# Patient Record
Sex: MALE | Race: BLACK OR AFRICAN AMERICAN | NOT HISPANIC OR LATINO | URBAN - METROPOLITAN AREA
[De-identification: names, ages, dates, MRNs, and addresses within clinical notes are randomized per-mention and may not be internally consistent; named-entity substitution may affect disease eponyms.]

---

## 2019-02-12 ENCOUNTER — OUTPATIENT (OUTPATIENT)
Dept: OUTPATIENT SERVICES | Facility: HOSPITAL | Age: 59
LOS: 1 days | End: 2019-02-12
Payer: MEDICARE

## 2019-02-12 ENCOUNTER — APPOINTMENT (OUTPATIENT)
Dept: CV DIAGNOSITCS | Facility: HOSPITAL | Age: 59
End: 2019-02-12

## 2019-02-12 DIAGNOSIS — I25.10 ATHEROSCLEROTIC HEART DISEASE OF NATIVE CORONARY ARTERY WITHOUT ANGINA PECTORIS: ICD-10-CM

## 2019-02-12 PROCEDURE — 93306 TTE W/DOPPLER COMPLETE: CPT | Mod: 26

## 2019-02-12 PROCEDURE — 93306 TTE W/DOPPLER COMPLETE: CPT

## 2019-03-19 ENCOUNTER — APPOINTMENT (OUTPATIENT)
Dept: CV DIAGNOSTICS | Facility: HOSPITAL | Age: 59
End: 2019-03-19

## 2019-03-19 ENCOUNTER — OUTPATIENT (OUTPATIENT)
Dept: OUTPATIENT SERVICES | Facility: HOSPITAL | Age: 59
LOS: 1 days | End: 2019-03-19
Payer: MEDICARE

## 2019-03-19 DIAGNOSIS — I25.10 ATHEROSCLEROTIC HEART DISEASE OF NATIVE CORONARY ARTERY WITHOUT ANGINA PECTORIS: ICD-10-CM

## 2019-03-19 PROCEDURE — 93018 CV STRESS TEST I&R ONLY: CPT

## 2019-03-19 PROCEDURE — 78452 HT MUSCLE IMAGE SPECT MULT: CPT | Mod: 26

## 2019-03-19 PROCEDURE — 93017 CV STRESS TEST TRACING ONLY: CPT

## 2019-03-19 PROCEDURE — A9500: CPT

## 2019-03-19 PROCEDURE — 93016 CV STRESS TEST SUPVJ ONLY: CPT

## 2019-03-19 PROCEDURE — 78452 HT MUSCLE IMAGE SPECT MULT: CPT

## 2019-04-09 ENCOUNTER — OUTPATIENT (OUTPATIENT)
Dept: OUTPATIENT SERVICES | Facility: HOSPITAL | Age: 59
LOS: 1 days | Discharge: HOME | End: 2019-04-09
Payer: MEDICARE

## 2019-04-09 VITALS
DIASTOLIC BLOOD PRESSURE: 79 MMHG | RESPIRATION RATE: 16 BRPM | HEART RATE: 82 BPM | HEIGHT: 66 IN | TEMPERATURE: 98 F | SYSTOLIC BLOOD PRESSURE: 158 MMHG | OXYGEN SATURATION: 94 % | WEIGHT: 270.07 LBS

## 2019-04-09 DIAGNOSIS — R06.02 SHORTNESS OF BREATH: ICD-10-CM

## 2019-04-09 LAB
ANION GAP SERPL CALC-SCNC: 18 MMOL/L — HIGH (ref 5–17)
BUN SERPL-MCNC: 48 MG/DL — HIGH (ref 7–23)
CALCIUM SERPL-MCNC: 7.4 MG/DL — LOW (ref 8.4–10.5)
CHLORIDE SERPL-SCNC: 92 MMOL/L — LOW (ref 96–108)
CO2 SERPL-SCNC: 28 MMOL/L — SIGNIFICANT CHANGE UP (ref 22–31)
CREAT SERPL-MCNC: 10.61 MG/DL — HIGH (ref 0.5–1.3)
GLUCOSE SERPL-MCNC: 86 MG/DL — SIGNIFICANT CHANGE UP (ref 70–99)
HCT VFR BLD CALC: 31.1 % — LOW (ref 39–50)
HGB BLD-MCNC: 9.9 G/DL — LOW (ref 13–17)
MCHC RBC-ENTMCNC: 27.9 PG — SIGNIFICANT CHANGE UP (ref 27–34)
MCHC RBC-ENTMCNC: 31.7 GM/DL — LOW (ref 32–36)
MCV RBC AUTO: 87.9 FL — SIGNIFICANT CHANGE UP (ref 80–100)
PLATELET # BLD AUTO: 210 K/UL — SIGNIFICANT CHANGE UP (ref 150–400)
POTASSIUM SERPL-MCNC: 4.8 MMOL/L — SIGNIFICANT CHANGE UP (ref 3.5–5.3)
POTASSIUM SERPL-SCNC: 4.8 MMOL/L — SIGNIFICANT CHANGE UP (ref 3.5–5.3)
RBC # BLD: 3.53 M/UL — LOW (ref 4.2–5.8)
RBC # FLD: 15.6 % — HIGH (ref 10.3–14.5)
SODIUM SERPL-SCNC: 138 MMOL/L — SIGNIFICANT CHANGE UP (ref 135–145)
WBC # BLD: 7 K/UL — SIGNIFICANT CHANGE UP (ref 3.8–10.5)
WBC # FLD AUTO: 7 K/UL — SIGNIFICANT CHANGE UP (ref 3.8–10.5)

## 2019-04-09 PROCEDURE — 93005 ELECTROCARDIOGRAM TRACING: CPT

## 2019-04-09 PROCEDURE — 99203 OFFICE O/P NEW LOW 30 MIN: CPT

## 2019-04-09 PROCEDURE — 99152 MOD SED SAME PHYS/QHP 5/>YRS: CPT | Mod: GC

## 2019-04-09 PROCEDURE — 85027 COMPLETE CBC AUTOMATED: CPT

## 2019-04-09 PROCEDURE — 93010 ELECTROCARDIOGRAM REPORT: CPT

## 2019-04-09 PROCEDURE — 93456 R HRT CORONARY ARTERY ANGIO: CPT

## 2019-04-09 PROCEDURE — 99152 MOD SED SAME PHYS/QHP 5/>YRS: CPT

## 2019-04-09 PROCEDURE — C1894: CPT

## 2019-04-09 PROCEDURE — 93456 R HRT CORONARY ARTERY ANGIO: CPT | Mod: 26,GC

## 2019-04-09 PROCEDURE — 80048 BASIC METABOLIC PNL TOTAL CA: CPT

## 2019-04-09 PROCEDURE — C1887: CPT

## 2019-04-09 PROCEDURE — C1769: CPT

## 2019-04-09 NOTE — CHART NOTE - NSCHARTNOTEFT_GEN_A_CORE
Called to see pt for oozing groin site upon sitting up. Dressing taken down, no hematoma, superficial ooze that stopped within 2 minutes. Quick clot applied, re dressed. Pt to lay flat for an additional 30 minutes. Plan d/w pt and GERMAN Meyer.    Ancelmo Milton, NP  x [phone]
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
## 2019-04-09 NOTE — H&P CARDIOLOGY - PMH
Hepatitis C    HIV (human immunodeficiency virus infection)    Anemia    Transient ischemic attack (TIA)  5yrs ago  ESRD (end stage renal disease)    Dyslipidemia    DM (diabetes mellitus)    HTN (hypertension)

## 2019-04-09 NOTE — H&P CARDIOLOGY - HISTORY OF PRESENT ILLNESS
This is a 58y yo obese AA Male with PMHX  HTN, DM II, ESRD on HD since 4/2012 (HD Mon/Wed/Fri, Nephrologist Dr. Dumotn, HD @ [facility] via ELOYE [facility]A Fist[facility]), mild Aortic Valve stenosis , bicuspid aortic valve .  of former smoker, former cocaine abuser, current marijuana abuser with PMH HIV Positive 1996, Hepatitis C, anemia with prior blood transfusions, Pt presents with C/P and SOB x 3months ago . Pt had FLAHERTY and SOB walking < 1block .  Pt presented to [facility] with HTN and Dyspnea. He was started on Nitrates , Hydralazine and Beta Blocker with improvement of symptoms . Pt had Echo at [facility] which revealed mild Aortic stenosis. Found to have a valve area of 1.7cm. He has severe Pulmonary HTN, with PA pressure  66 mmhg. Pt went to Cardiologist  for medical workup . Had Stress test on March 19, 2019 which revealed EF 59% with reduced systolic thickening of the distal anterior wall and apex with overall preserved ejection fraction. Right ventricle was enlarged, with evidence of medium sized mild to moderate defect to the distal septum and apical wall suggestive of infarct and mild kemal infarct ischemia. Now presents for Right and Left Heart Catheterization today with Dr. Wang  Currently CP free no sob no palpitations no lightheadedness or dizziness noted.     < from: Transthoracic Echocardiogram (02.12.19 @ 10:15) >  Conclusions:  1. Mild mitral annular calcification, otherwise normal  mitral valve. Minimal mitral regurgitation.  2. Calcified aortic valve with decreased opening. Unable to  exclude the presence of a bicuspid valve. The noncoronary  and left coronary cusps are heavily calcified and may be  fused. SRIKANTH by planimetryabout 1.7 cmsq. Peak transaortic  valve gradient equals 40 mm Hg, mean transaortic valve  gradient equals 17 mm Hg, estimated aortic valve area  equals 1.6 sqcm (by continuity equation), aortic valve  velocity time integral equals 70 cm, consistent with mild  aortic stenosis. Minimal aortic regurgitation.  3. Mild concentric left ventricular hypertrophy.  4. Endocardium not well visualized; normal left ventricular  systolic function. Septal flattening consistent with right  ventricular overload.  5. Right ventricular enlargement with normal right  ventricularsystolic function.  6. Estimated pulmonary artery systolic pressure equals 66  mm Hg, assuming right atrial pressure equals 8 mm Hg,  consistent with severe pulmonary pressures.  *** No previous Echo exam.  ------------------------------------------------------------------------  Confirmed on  2/13/2019 - 09:23:28 by Isidra Bloom M.D.  ------------------------------------------------------------------------    < end of copied text >  < from: Nuclear Stress Test-Pharmacologic (03.19.19 @ 14:38) >  NUCLEAR FINDINGS:  Review of raw data shows: Minor motion artifact.,  Extensive splanchnic uptake  The left ventricle was enlarged. There are medium-sized,  mild to moderate defects in the distal anterior, distal  septal, and apical walls that are mostly fixed suggestive  of infarct with mild kemal-infarct ischemia.  There are also mild to moderate defects in the inferior  and basal inferolateral walls that mostly fixed,  predominantly correct with prone imaging, and have normal  wall motion suggestive of attenuation artifact.  Increased right ventricular tracer uptake is noted on rest  and stress imaging.  ------------------------------------------------------------------------  GATED ANALYSIS:  Post-stress gated wall motion analysis was performed (LVEF  = 59 %;LVEDV = 180 ml.) revealing reduced systolic  thickening of the distal anterior wall and apex with  preserved overall left ventricular ejection fraction.  The  right ventricle appears enlarge; correlation with  echocardiography is recommended.  ------------------------------------------------------------------------  IMPRESSIONS:Abnormal Study  * Technically difficult study due to body habitus.  * Chest Pain: No chest pain with administration of  Regadenoson.  * Symptom: Shortness of breath, dizziness.  * BP Response: Appropriate.  * Heart Rhythm: Sinus Rhythm - 67 BPM.  * Conduction defects: right bundle branch block.  * Baseline ECG: Nonspecific ST-T wave abnormality.  * ECG Changes: No significant ischemic ST segment changes  beyond baseline abnormalities.  * Arrhythmia: None.  * Review of raw data shows: Minor motion artifact.,  Extensive splanchnic uptake  * The left ventricle was enlarged. There are medium-sized,  mild to moderate defects in the distal anterior, distal  septal, and apical walls that are mostly fixed suggestive  of infarct with mild kemal-infarct ischemia.  * There are also mild to moderate defects in the inferior  and basal inferolateral walls that mostly fixed,  predominantly correct with prone imaging, and have normal  wall motion suggestive of attenuation artifact.  * Increased right ventricular tracer uptake is noted on  rest and stress imaging.  * Post-stress gated wall motion analysis was performed  (LVEF = 59 %;LVEDV = 180 ml.) revealing reduced systolic  thickening of the distal anterior wall and apex with  preserved overall left ventricular ejection fraction.  The  right ventricle appears enlarge; correlation with  echocardiography is recommended.  *** No previous Nuclear/Stress exam.  ------------------------------------------------------------------------  Confirmed on  3/19/2019 - 17:12:03 by Dilan Dc M.D.  ------------------------------------------------------------------------    < end of copied text >  < from: Cardiac Cath Lab (02.28.13 @ 13:32) >  VENTRICLES: No LV gram was performed; however, a recent echocardiogram  demonstrated an EF of 55 %.  CORONARY VESSELS:The coronary circulation is right dominant.  LM:   --  LM: Normal.  LAD:   --  LAD: Normal.  CX:   --  Circumflex: Normal.  RCA:   --  RCA: Normal.  COMPLICATIONS: There were no complications.  DIAGNOSTIC RECOMMENDATIONS:  1. Coronary angiography revealed normal coronary arteries, which do not  explain the positive stress test.  2. Medical management and risk factor modification.  INTERVENTIONAL RECOMMENDATIONS:  1. Coronary angiography revealed normal coronary arteries, which do not  explain thepositive stress test.  2. Medical management and risk factor modification.  Prepared and signed by  Steven Cortes M.D.  Signed 02/28/2013 18:37:31    < end of copied text > This is a 58y yo obese AA Male with PMHX  HTN,  ESRD on HD since 4/2012 (HD Mon/Wed/Fri, Nephrologist Dr. Dumont, HD @ [facility] via ELOYE AV Fistual), mild Aortic Valve stenosis , bicuspid aortic valve .  of former smoker, former cocaine abuser, current marijuana abuser with PMH HIV Positive 1996, Hepatitis C, anemia with prior blood transfusions, Pt presents with C/P and SOB x 3months ago . Pt had FLAHERTY and SOB walking < 1block .  Pt presented to [facility] with HTN and Dyspnea. He was started on Nitrates , Hydralazine and Beta Blocker with improvement of symptoms . Pt had Echo at [facility] which revealed mild Aortic stenosis. Found to have a valve area of 1.7cm. He has severe Pulmonary HTN, with PA pressure  66 mmhg. Pt went to Cardiologist  for medical workup . Had Stress test on March 19, 2019 which revealed EF 59% with reduced systolic thickening of the distal anterior wall and apex with overall preserved ejection fraction. Right ventricle was enlarged, with evidence of medium sized mild to moderate defect to the distal septum and apical wall suggestive of infarct and mild kemal infarct ischemia. Now presents for Right and Left Heart Catheterization today with Dr. Wang  Currently CP free no sob no palpitations no lightheadedness or dizziness noted.     < from: Transthoracic Echocardiogram (02.12.19 @ 10:15) >  Conclusions:  1. Mild mitral annular calcification, otherwise normal  mitral valve. Minimal mitral regurgitation.  2. Calcified aortic valve with decreased opening. Unable to  exclude the presence of a bicuspid valve. The noncoronary  and left coronary cusps are heavily calcified and may be  fused. SRIKANTH by planimetryabout 1.7 cmsq. Peak transaortic  valve gradient equals 40 mm Hg, mean transaortic valve  gradient equals 17 mm Hg, estimated aortic valve area  equals 1.6 sqcm (by continuity equation), aortic valve  velocity time integral equals 70 cm, consistent with mild  aortic stenosis. Minimal aortic regurgitation.  3. Mild concentric left ventricular hypertrophy.  4. Endocardium not well visualized; normal left ventricular  systolic function. Septal flattening consistent with right  ventricular overload.  5. Right ventricular enlargement with normal right  ventricularsystolic function.  6. Estimated pulmonary artery systolic pressure equals 66  mm Hg, assuming right atrial pressure equals 8 mm Hg,  consistent with severe pulmonary pressures.  *** No previous Echo exam.  ------------------------------------------------------------------------  Confirmed on  2/13/2019 - 09:23:28 by Isidra Bloom M.D.  ------------------------------------------------------------------------    < end of copied text >  < from: Nuclear Stress Test-Pharmacologic (03.19.19 @ 14:38) >  NUCLEAR FINDINGS:  Review of raw data shows: Minor motion artifact.,  Extensive splanchnic uptake  The left ventricle was enlarged. There are medium-sized,  mild to moderate defects in the distal anterior, distal  septal, and apical walls that are mostly fixed suggestive  of infarct with mild kemal-infarct ischemia.  There are also mild to moderate defects in the inferior  and basal inferolateral walls that mostly fixed,  predominantly correct with prone imaging, and have normal  wall motion suggestive of attenuation artifact.  Increased right ventricular tracer uptake is noted on rest  and stress imaging.  ------------------------------------------------------------------------  GATED ANALYSIS:  Post-stress gated wall motion analysis was performed (LVEF  = 59 %;LVEDV = 180 ml.) revealing reduced systolic  thickening of the distal anterior wall and apex with  preserved overall left ventricular ejection fraction.  The  right ventricle appears enlarge; correlation with  echocardiography is recommended.  ------------------------------------------------------------------------  IMPRESSIONS:Abnormal Study  * Technically difficult study due to body habitus.  * Chest Pain: No chest pain with administration of  Regadenoson.  * Symptom: Shortness of breath, dizziness.  * BP Response: Appropriate.  * Heart Rhythm: Sinus Rhythm - 67 BPM.  * Conduction defects: right bundle branch block.  * Baseline ECG: Nonspecific ST-T wave abnormality.  * ECG Changes: No significant ischemic ST segment changes  beyond baseline abnormalities.  * Arrhythmia: None.  * Review of raw data shows: Minor motion artifact.,  Extensive splanchnic uptake  * The left ventricle was enlarged. There are medium-sized,  mild to moderate defects in the distal anterior, distal  septal, and apical walls that are mostly fixed suggestive  of infarct with mild kemal-infarct ischemia.  * There are also mild to moderate defects in the inferior  and basal inferolateral walls that mostly fixed,  predominantly correct with prone imaging, and have normal  wall motion suggestive of attenuation artifact.  * Increased right ventricular tracer uptake is noted on  rest and stress imaging.  * Post-stress gated wall motion analysis was performed  (LVEF = 59 %;LVEDV = 180 ml.) revealing reduced systolic  thickening of the distal anterior wall and apex with  preserved overall left ventricular ejection fraction.  The  right ventricle appears enlarge; correlation with  echocardiography is recommended.  *** No previous Nuclear/Stress exam.  ------------------------------------------------------------------------  Confirmed on  3/19/2019 - 17:12:03 by Dilan Dc M.D.  ------------------------------------------------------------------------    < end of copied text >  < from: Cardiac Cath Lab (02.28.13 @ 13:32) >  VENTRICLES: No LV gram was performed; however, a recent echocardiogram  demonstrated an EF of 55 %.  CORONARY VESSELS:The coronary circulation is right dominant.  LM:   --  LM: Normal.  LAD:   --  LAD: Normal.  CX:   --  Circumflex: Normal.  RCA:   --  RCA: Normal.  COMPLICATIONS: There were no complications.  DIAGNOSTIC RECOMMENDATIONS:  1. Coronary angiography revealed normal coronary arteries, which do not  explain the positive stress test.  2. Medical management and risk factor modification.  INTERVENTIONAL RECOMMENDATIONS:  1. Coronary angiography revealed normal coronary arteries, which do not  explain thepositive stress test.  2. Medical management and risk factor modification.  Prepared and signed by  Steven Cortes M.D.  Signed 02/28/2013 18:37:31    < end of copied text >

## 2019-05-14 ENCOUNTER — OUTPATIENT (OUTPATIENT)
Dept: OUTPATIENT SERVICES | Facility: HOSPITAL | Age: 59
LOS: 1 days | End: 2019-05-14
Payer: MEDICARE

## 2019-05-14 DIAGNOSIS — J84.9 INTERSTITIAL PULMONARY DISEASE, UNSPECIFIED: ICD-10-CM

## 2019-05-14 PROCEDURE — 71250 CT THORAX DX C-: CPT | Mod: 26

## 2019-05-14 PROCEDURE — 71250 CT THORAX DX C-: CPT

## 2019-07-09 ENCOUNTER — APPOINTMENT (OUTPATIENT)
Dept: PULMONOLOGY | Facility: CLINIC | Age: 59
End: 2019-07-09

## 2019-09-17 ENCOUNTER — TRANSCRIPTION ENCOUNTER (OUTPATIENT)
Age: 59
End: 2019-09-17

## 2019-09-18 ENCOUNTER — RESULT REVIEW (OUTPATIENT)
Age: 59
End: 2019-09-18

## 2019-09-18 ENCOUNTER — INPATIENT (INPATIENT)
Facility: HOSPITAL | Age: 59
LOS: 12 days | Discharge: HOME HEALTH | End: 2019-10-01
Attending: SURGERY | Admitting: SURGERY
Payer: MEDICARE

## 2019-09-18 VITALS
DIASTOLIC BLOOD PRESSURE: 63 MMHG | TEMPERATURE: 98 F | WEIGHT: 259.93 LBS | SYSTOLIC BLOOD PRESSURE: 124 MMHG | RESPIRATION RATE: 16 BRPM | OXYGEN SATURATION: 98 % | HEIGHT: 66 IN | HEART RATE: 93 BPM

## 2019-09-18 DIAGNOSIS — K63.1 PERFORATION OF INTESTINE (NONTRAUMATIC): ICD-10-CM

## 2019-09-18 DIAGNOSIS — E66.01 MORBID (SEVERE) OBESITY DUE TO EXCESS CALORIES: ICD-10-CM

## 2019-09-18 DIAGNOSIS — Z21 ASYMPTOMATIC HUMAN IMMUNODEFICIENCY VIRUS [HIV] INFECTION STATUS: ICD-10-CM

## 2019-09-18 DIAGNOSIS — B18.2 CHRONIC VIRAL HEPATITIS C: ICD-10-CM

## 2019-09-18 DIAGNOSIS — Z29.9 ENCOUNTER FOR PROPHYLACTIC MEASURES, UNSPECIFIED: ICD-10-CM

## 2019-09-18 DIAGNOSIS — K43.0 INCISIONAL HERNIA WITH OBSTRUCTION, WITHOUT GANGRENE: ICD-10-CM

## 2019-09-18 DIAGNOSIS — I10 ESSENTIAL (PRIMARY) HYPERTENSION: ICD-10-CM

## 2019-09-18 DIAGNOSIS — N18.6 END STAGE RENAL DISEASE: ICD-10-CM

## 2019-09-18 DIAGNOSIS — E78.5 HYPERLIPIDEMIA, UNSPECIFIED: ICD-10-CM

## 2019-09-18 LAB
ABO RH CONFIRMATION: SIGNIFICANT CHANGE UP
ALBUMIN SERPL ELPH-MCNC: 2.4 G/DL — LOW (ref 3.3–5)
ALP SERPL-CCNC: 104 U/L — SIGNIFICANT CHANGE UP (ref 40–120)
ALT FLD-CCNC: 10 U/L — LOW (ref 12–78)
ANION GAP SERPL CALC-SCNC: 9 MMOL/L — SIGNIFICANT CHANGE UP (ref 5–17)
APTT BLD: 36.2 SEC — SIGNIFICANT CHANGE UP (ref 28.5–37)
AST SERPL-CCNC: 24 U/L — SIGNIFICANT CHANGE UP (ref 15–37)
BASOPHILS # BLD AUTO: 0.03 K/UL — SIGNIFICANT CHANGE UP (ref 0–0.2)
BASOPHILS NFR BLD AUTO: 0.3 % — SIGNIFICANT CHANGE UP (ref 0–2)
BILIRUB SERPL-MCNC: 0.5 MG/DL — SIGNIFICANT CHANGE UP (ref 0.2–1.2)
BLD GP AB SCN SERPL QL: SIGNIFICANT CHANGE UP
BUN SERPL-MCNC: 23 MG/DL — SIGNIFICANT CHANGE UP (ref 7–23)
CALCIUM SERPL-MCNC: 7.6 MG/DL — LOW (ref 8.5–10.1)
CHLORIDE SERPL-SCNC: 96 MMOL/L — SIGNIFICANT CHANGE UP (ref 96–108)
CO2 SERPL-SCNC: 30 MMOL/L — SIGNIFICANT CHANGE UP (ref 22–31)
CREAT SERPL-MCNC: 6 MG/DL — HIGH (ref 0.5–1.3)
EOSINOPHIL # BLD AUTO: 0.18 K/UL — SIGNIFICANT CHANGE UP (ref 0–0.5)
EOSINOPHIL NFR BLD AUTO: 1.7 % — SIGNIFICANT CHANGE UP (ref 0–6)
GLUCOSE BLDC GLUCOMTR-MCNC: 87 MG/DL — SIGNIFICANT CHANGE UP (ref 70–99)
GLUCOSE SERPL-MCNC: 69 MG/DL — LOW (ref 70–99)
HCT VFR BLD CALC: 34.5 % — LOW (ref 39–50)
HGB BLD-MCNC: 10.6 G/DL — LOW (ref 13–17)
IMM GRANULOCYTES NFR BLD AUTO: 1.8 % — HIGH (ref 0–1.5)
INR BLD: 1.28 RATIO — HIGH (ref 0.88–1.16)
LYMPHOCYTES # BLD AUTO: 1.32 K/UL — SIGNIFICANT CHANGE UP (ref 1–3.3)
LYMPHOCYTES # BLD AUTO: 12.5 % — LOW (ref 13–44)
MCHC RBC-ENTMCNC: 27 PG — SIGNIFICANT CHANGE UP (ref 27–34)
MCHC RBC-ENTMCNC: 30.7 GM/DL — LOW (ref 32–36)
MCV RBC AUTO: 87.8 FL — SIGNIFICANT CHANGE UP (ref 80–100)
MONOCYTES # BLD AUTO: 0.74 K/UL — SIGNIFICANT CHANGE UP (ref 0–0.9)
MONOCYTES NFR BLD AUTO: 7 % — SIGNIFICANT CHANGE UP (ref 2–14)
NEUTROPHILS # BLD AUTO: 8.07 K/UL — HIGH (ref 1.8–7.4)
NEUTROPHILS NFR BLD AUTO: 76.7 % — SIGNIFICANT CHANGE UP (ref 43–77)
NRBC # BLD: 0 /100 WBCS — SIGNIFICANT CHANGE UP (ref 0–0)
PLATELET # BLD AUTO: 205 K/UL — SIGNIFICANT CHANGE UP (ref 150–400)
POTASSIUM SERPL-MCNC: 3.7 MMOL/L — SIGNIFICANT CHANGE UP (ref 3.5–5.3)
POTASSIUM SERPL-SCNC: 3.7 MMOL/L — SIGNIFICANT CHANGE UP (ref 3.5–5.3)
PROT SERPL-MCNC: 7.7 GM/DL — SIGNIFICANT CHANGE UP (ref 6–8.3)
PROTHROM AB SERPL-ACNC: 14.5 SEC — HIGH (ref 10–12.9)
RBC # BLD: 3.93 M/UL — LOW (ref 4.2–5.8)
RBC # FLD: 17.6 % — HIGH (ref 10.3–14.5)
SODIUM SERPL-SCNC: 135 MMOL/L — SIGNIFICANT CHANGE UP (ref 135–145)
WBC # BLD: 10.53 K/UL — HIGH (ref 3.8–10.5)
WBC # FLD AUTO: 10.53 K/UL — HIGH (ref 3.8–10.5)

## 2019-09-18 PROCEDURE — 88302 TISSUE EXAM BY PATHOLOGIST: CPT | Mod: 26

## 2019-09-18 PROCEDURE — 44143 PARTIAL REMOVAL OF COLON: CPT | Mod: AS

## 2019-09-18 PROCEDURE — 71045 X-RAY EXAM CHEST 1 VIEW: CPT | Mod: 26

## 2019-09-18 PROCEDURE — 44120 REMOVAL OF SMALL INTESTINE: CPT | Mod: AS

## 2019-09-18 PROCEDURE — 74176 CT ABD & PELVIS W/O CONTRAST: CPT | Mod: 26

## 2019-09-18 PROCEDURE — 99285 EMERGENCY DEPT VISIT HI MDM: CPT

## 2019-09-18 PROCEDURE — 88307 TISSUE EXAM BY PATHOLOGIST: CPT | Mod: 26

## 2019-09-18 PROCEDURE — 93010 ELECTROCARDIOGRAM REPORT: CPT

## 2019-09-18 PROCEDURE — 93010 ELECTROCARDIOGRAM REPORT: CPT | Mod: 77

## 2019-09-18 PROCEDURE — 99291 CRITICAL CARE FIRST HOUR: CPT

## 2019-09-18 PROCEDURE — 71045 X-RAY EXAM CHEST 1 VIEW: CPT | Mod: 26,77

## 2019-09-18 PROCEDURE — 49566: CPT | Mod: AS,59

## 2019-09-18 RX ORDER — HYDROMORPHONE HYDROCHLORIDE 2 MG/ML
1 INJECTION INTRAMUSCULAR; INTRAVENOUS; SUBCUTANEOUS ONCE
Refills: 0 | Status: DISCONTINUED | OUTPATIENT
Start: 2019-09-18 | End: 2019-09-18

## 2019-09-18 RX ORDER — METOPROLOL TARTRATE 50 MG
1 TABLET ORAL
Qty: 0 | Refills: 0 | DISCHARGE

## 2019-09-18 RX ORDER — DARUNAVIR 75 MG/1
1 TABLET, FILM COATED ORAL
Qty: 0 | Refills: 0 | DISCHARGE

## 2019-09-18 RX ORDER — HYDROMORPHONE HYDROCHLORIDE 2 MG/ML
0.5 INJECTION INTRAMUSCULAR; INTRAVENOUS; SUBCUTANEOUS ONCE
Refills: 0 | Status: DISCONTINUED | OUTPATIENT
Start: 2019-09-18 | End: 2019-09-18

## 2019-09-18 RX ORDER — CHLORHEXIDINE GLUCONATE 213 G/1000ML
1 SOLUTION TOPICAL
Refills: 0 | Status: DISCONTINUED | OUTPATIENT
Start: 2019-09-18 | End: 2019-10-01

## 2019-09-18 RX ORDER — ETRAVIRINE 200 MG/1
1 TABLET ORAL
Qty: 0 | Refills: 0 | DISCHARGE

## 2019-09-18 RX ORDER — PIPERACILLIN AND TAZOBACTAM 4; .5 G/20ML; G/20ML
3.38 INJECTION, POWDER, LYOPHILIZED, FOR SOLUTION INTRAVENOUS ONCE
Refills: 0 | Status: COMPLETED | OUTPATIENT
Start: 2019-09-18 | End: 2019-09-18

## 2019-09-18 RX ORDER — FLUCONAZOLE 150 MG/1
100 TABLET ORAL ONCE
Refills: 0 | Status: COMPLETED | OUTPATIENT
Start: 2019-09-18 | End: 2019-09-18

## 2019-09-18 RX ORDER — SODIUM CHLORIDE 9 MG/ML
1000 INJECTION INTRAMUSCULAR; INTRAVENOUS; SUBCUTANEOUS
Refills: 0 | Status: DISCONTINUED | OUTPATIENT
Start: 2019-09-18 | End: 2019-09-18

## 2019-09-18 RX ORDER — CHLORHEXIDINE GLUCONATE 213 G/1000ML
15 SOLUTION TOPICAL EVERY 12 HOURS
Refills: 0 | Status: DISCONTINUED | OUTPATIENT
Start: 2019-09-18 | End: 2019-09-19

## 2019-09-18 RX ORDER — SODIUM CHLORIDE 9 MG/ML
1000 INJECTION INTRAMUSCULAR; INTRAVENOUS; SUBCUTANEOUS ONCE
Refills: 0 | Status: COMPLETED | OUTPATIENT
Start: 2019-09-18 | End: 2019-09-18

## 2019-09-18 RX ORDER — PIPERACILLIN AND TAZOBACTAM 4; .5 G/20ML; G/20ML
3.38 INJECTION, POWDER, LYOPHILIZED, FOR SOLUTION INTRAVENOUS EVERY 12 HOURS
Refills: 0 | Status: COMPLETED | OUTPATIENT
Start: 2019-09-18 | End: 2019-09-23

## 2019-09-18 RX ORDER — RALTEGRAVIR 400 MG/1
1 TABLET, FILM COATED ORAL
Qty: 0 | Refills: 0 | DISCHARGE

## 2019-09-18 RX ORDER — IOHEXOL 300 MG/ML
30 INJECTION, SOLUTION INTRAVENOUS ONCE
Refills: 0 | Status: COMPLETED | OUTPATIENT
Start: 2019-09-18 | End: 2019-09-18

## 2019-09-18 RX ORDER — SODIUM CHLORIDE 9 MG/ML
1000 INJECTION, SOLUTION INTRAVENOUS
Refills: 0 | Status: DISCONTINUED | OUTPATIENT
Start: 2019-09-18 | End: 2019-09-18

## 2019-09-18 RX ORDER — PANTOPRAZOLE SODIUM 20 MG/1
40 TABLET, DELAYED RELEASE ORAL DAILY
Refills: 0 | Status: DISCONTINUED | OUTPATIENT
Start: 2019-09-18 | End: 2019-09-19

## 2019-09-18 RX ORDER — HEPARIN SODIUM 5000 [USP'U]/ML
5000 INJECTION INTRAVENOUS; SUBCUTANEOUS EVERY 12 HOURS
Refills: 0 | Status: DISCONTINUED | OUTPATIENT
Start: 2019-09-18 | End: 2019-10-01

## 2019-09-18 RX ORDER — FENTANYL CITRATE 50 UG/ML
0.5 INJECTION INTRAVENOUS
Qty: 2500 | Refills: 0 | Status: DISCONTINUED | OUTPATIENT
Start: 2019-09-18 | End: 2019-09-19

## 2019-09-18 RX ORDER — RITONAVIR 100 MG/1
1 TABLET, FILM COATED ORAL
Qty: 0 | Refills: 0 | DISCHARGE

## 2019-09-18 RX ADMIN — HYDROMORPHONE HYDROCHLORIDE 1 MILLIGRAM(S): 2 INJECTION INTRAMUSCULAR; INTRAVENOUS; SUBCUTANEOUS at 15:26

## 2019-09-18 RX ADMIN — IOHEXOL 30 MILLILITER(S): 300 INJECTION, SOLUTION INTRAVENOUS at 14:41

## 2019-09-18 RX ADMIN — PANTOPRAZOLE SODIUM 40 MILLIGRAM(S): 20 TABLET, DELAYED RELEASE ORAL at 23:50

## 2019-09-18 RX ADMIN — HEPARIN SODIUM 5000 UNIT(S): 5000 INJECTION INTRAVENOUS; SUBCUTANEOUS at 23:50

## 2019-09-18 RX ADMIN — PIPERACILLIN AND TAZOBACTAM 25 GRAM(S): 4; .5 INJECTION, POWDER, LYOPHILIZED, FOR SOLUTION INTRAVENOUS at 23:50

## 2019-09-18 RX ADMIN — FLUCONAZOLE 50 MILLIGRAM(S): 150 TABLET ORAL at 23:55

## 2019-09-18 RX ADMIN — PIPERACILLIN AND TAZOBACTAM 200 GRAM(S): 4; .5 INJECTION, POWDER, LYOPHILIZED, FOR SOLUTION INTRAVENOUS at 17:18

## 2019-09-18 RX ADMIN — SODIUM CHLORIDE 1000 MILLILITER(S): 9 INJECTION INTRAMUSCULAR; INTRAVENOUS; SUBCUTANEOUS at 17:20

## 2019-09-18 NOTE — H&P ADULT - ATTENDING COMMENTS
The pathology and indication for Emergent Exploratory Laparotomy were explained to the patient in person and mother on the phone.  The risks and complications include but not limited to bleeding, infection, bowel leak, heart attack, organ failure, death.  Patient and mother verbally expressed understanding.  Informed consent was obtained and documented in chart.

## 2019-09-18 NOTE — ED ADULT NURSE NOTE - CHPI ED NUR SYMPTOMS NEG
no chills/no diarrhea/no hematuria/no burning urination/no abdominal distension/no blood in stool/no vomiting/no dysuria/no fever/no nausea

## 2019-09-18 NOTE — ED ADULT NURSE REASSESSMENT NOTE - NS ED NURSE REASSESS COMMENT FT1
Pts mom called to notify pt going to OR at pts request Surgeon at bedside
1430 unable to get line on Pt Dr christopher aware dialysis RN called unable to draw from pt access without clearance from nephrology Dr Christopher aware 1530 large prtrusion noted Dr Christopher at bedside reducable hernia CT pending. ![phone] pt returned from CT R Jose RN covering pt aware pink needed surgical consult pending

## 2019-09-18 NOTE — H&P ADULT - NSHPLABSRESULTS_GEN_ALL_CORE
< from: CT Abdomen and Pelvis w/ Oral Cont (09.18.19 @ 16:08) >    BOWEL: Small hiatal hernia. No bowel obstruction. Appendix within normal   limits. Colonic diverticulosis. Sigmoid colon inflammatory changes. 8.5 x   2.8 cm gas-filled collection adjacent to the sigmoid colon.  PERITONEUM: Trace ascites. Mild-to-moderate free intraperitoneal air.  VESSELS: Atherosclerotic calcifications.  RETROPERITONEUM/LYMPH NODES: No lymphadenopathy. Several prominent   retroperitoneal lymph nodes.  ABDOMINAL WALL: Large ventral abdominal hernia containing small bowel.   Hernia arises inferior to an anterior abdominal wall surgical mesh.  BONES: Spinal degenerative changes. Appearance of osseous structures   suggestive of renal osteodystrophy.    IMPRESSION:     Mild to moderate pneumoperitoneum, appears to be arising from sigmoid   colon where there is a large gas-filled collection measuring 8.5 x 2.8 cm   and inflammatory changes, question diverticulitis with perforation.  Large ventral abdominal hernia containing small bowel.  Indeterminate splenic mass (5.7 cm).

## 2019-09-18 NOTE — H&P ADULT - NSHPREVIEWOFSYSTEMS_GEN_ALL_CORE
REVIEW OF SYSTEMS:  Constitutional: Denies fever, weight loss, fatigue  Eye: Denies eye pain, visual changes, discharge, blurred vision  ENT: Denies hearing changes, tinnitus, vertigo, sinus congestion, sore throat  Neck: Denies pain or stiffness  Respiratory: Denies cough, wheezing, chills, hemoptysis, shortness of breath, difficulty breathing  Cardiovascular: Denies chest pain, palpitations, dizziness, leg swelling  Gastrointestinal: Admits to abdominal pain. Denies nausea, vomiting, hematemesis, diarrhea, constipation, melena, hematochezia  Genitourinary: Patient anuric  Neurological: Denies headaches, memory loss, loss of strength, numbness, tremors  Skin: Denies itching, burning, rashes, lesions   Endocrine: Denies heat or cold intolerance, hair loss  Musculoskeletal: Denies joint pain or swelling, back, extremity pain  Psychiatric: Denies depression, anxiety, mood swings, difficulty sleeping, suicidal ideation  Hematology: Denies easy bruising, bleeding gums  Immunologic: Denies hives or eczema

## 2019-09-18 NOTE — ED PROVIDER NOTE - CARE PLAN
Principal Discharge DX:	Perforated sigmoid colon  Secondary Diagnosis:	ESRD (end stage renal disease) on dialysis

## 2019-09-18 NOTE — H&P ADULT - NSHPPHYSICALEXAM_GEN_ALL_CORE
Vital Signs Last 24 Hrs  T(C): 36.7 (18 Sep 2019 17:03), Max: 36.9 (18 Sep 2019 12:25)  T(F): 98.1 (18 Sep 2019 17:03), Max: 98.5 (18 Sep 2019 12:25)  HR: 94 (18 Sep 2019 17:03) (93 - 94)  BP: 110/66 (18 Sep 2019 17:03) (110/66 - 124/63)  RR: 16 (18 Sep 2019 17:03) (16 - 16)  SpO2: 96% (18 Sep 2019 17:03) (96% - 98%)    PHYSICAL EXAM:  GENERAL: NAD, Obese  HEAD:  Atraumatic, Normocephalic  EYES: EOMI, PERRL, conjunctiva and sclera clear  ENMT: No tonsillar erythema, exudates, or enlargement; Moist mucous membranes  NECK: Supple, No JVD, Normal thyroid  NERVOUS SYSTEM:  Alert & Oriented X3, Good concentration  CHEST/LUNG: Clear to auscultation bilaterally; No rales, rhonchi, wheezing, or rubs  HEART: Regular rate and rhythm; systolic murmur present  ABDOMEN: Distended abdomen, severe tenderness to palpation in all 4 quadrants  MSK: ROM intact in all extremities, 5/5 strength in upper and lower extremities, B/L.  EXTREMITIES:  2+ Peripheral Pulses, No clubbing, cyanosis, or edema  LYMPH: No lymphadenopathy noted Vital Signs Last 24 Hrs  T(C): 36.7 (18 Sep 2019 17:03), Max: 36.9 (18 Sep 2019 12:25)  T(F): 98.1 (18 Sep 2019 17:03), Max: 98.5 (18 Sep 2019 12:25)  HR: 94 (18 Sep 2019 17:03) (93 - 94)  BP: 110/66 (18 Sep 2019 17:03) (110/66 - 124/63)  RR: 16 (18 Sep 2019 17:03) (16 - 16)  SpO2: 96% (18 Sep 2019 17:03) (96% - 98%)    PHYSICAL EXAM:  GENERAL: NAD, Obese  HEAD:  Atraumatic, Normocephalic  EYES: EOMI, PERRL, conjunctiva and sclera clear  ENMT: No tonsillar erythema, exudates, or enlargement; Moist mucous membranes  NECK: Supple, No JVD, Normal thyroid  NERVOUS SYSTEM:  Alert & Oriented X3, Good concentration  CHEST/LUNG: Clear to auscultation bilaterally; No rales, rhonchi, wheezing, or rubs  HEART: Regular rate and rhythm; systolic murmur present  ABDOMEN: Distended abdomen, severe tenderness to palpation in all 4 quadrants  MSK: ROM intact in all extremities, 5/5 strength in upper and lower extremities, B/L.  EXTREMITIES:  2+ Peripheral Pulses, No clubbing, cyanosis, or edema  LYMPH: No lymphadenopathy noted  Vasc: peripheral pulses  Psych: Normal affect

## 2019-09-18 NOTE — ED PROVIDER NOTE - PMH
Anemia    Diabetes    DM (diabetes mellitus)    Dyslipidemia    ESRD (end stage renal disease)    ESRD (end stage renal disease) on dialysis    Hepatitis C    HIV (human immunodeficiency virus infection)    HTN (hypertension)    Hypertension    Transient ischemic attack (TIA)  5yrs ago

## 2019-09-18 NOTE — H&P ADULT - PROBLEM SELECTOR PLAN 3
Continue home medications  Continue to monitor Continue home medications Nephrology consult appreciated  Dialysis as per nephro
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
## 2019-09-18 NOTE — ED ADULT NURSE NOTE - CHIEF COMPLAINT QUOTE
Infected hernia, seen at [facility] for the same, mid abd pain, vomiting and diarrhea intermittant since last week, Dialysis M-W-F

## 2019-09-18 NOTE — ED ADULT NURSE NOTE - NSIMPLEMENTINTERV_GEN_ALL_ED
Implemented All Fall Risk Interventions:  [place] to call system. Call bell, personal items and telephone within reach. Instruct patient to call for assistance. Room bathroom lighting operational. Non-slip footwear when patient is off stretcher. Physically safe environment: no spills, clutter or unnecessary equipment. Stretcher in lowest position, wheels locked, appropriate side rails in place. Provide visual cue, wrist band, yellow gown, etc. Monitor gait and stability. Monitor for mental status changes and reorient to person, place, and time. Review medications for side effects contributing to fall risk. Reinforce activity limits and safety measures with patient and family.

## 2019-09-18 NOTE — H&P ADULT - PROBLEM SELECTOR PROBLEM 3
Hypertension, unspecified type Asymptomatic HIV infection ESRD (end stage renal disease) on dialysis
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
## 2019-09-18 NOTE — H&P ADULT - NSICDXPASTMEDICALHX_GEN_ALL_CORE_FT
PAST MEDICAL HISTORY:  Anemia     Diabetes     DM (diabetes mellitus)     Dyslipidemia     ESRD (end stage renal disease)     ESRD (end stage renal disease) on dialysis     Hepatitis C     HIV (human immunodeficiency virus infection)     HTN (hypertension)     Hypertension     Transient ischemic attack (TIA) 5yrs ago

## 2019-09-18 NOTE — ED ADULT NURSE NOTE - OBJECTIVE STATEMENT
c/o abd pain disch yesterday from [facility] and was to  flagyl and cipro pt states he didn't get and pain persists
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
## 2019-09-18 NOTE — ED ADULT TRIAGE NOTE - CHIEF COMPLAINT QUOTE
Infected hernia, seen at [facility] for the same, mid abd pain, vomiting and diarrhea intermittant since last week, Dialysis M-W-F

## 2019-09-18 NOTE — BRIEF OPERATIVE NOTE - NSICDXBRIEFPROCEDURE_GEN_ALL_CORE_FT
PROCEDURES:  Incision and drainage of intra-abdominal abscess 18-Sep-2019 23:06:02  Pau Johnson  Creation, colostomy 18-Sep-2019 23:05:03  Pau Johnson  Peritoneal lavage 18-Sep-2019 23:04:51  Pau Johnson  Repair of incarcerated recurrent ventral hernia 18-Sep-2019 23:04:42  Pau Johnson  Small bowel resection with anastomosis 18-Sep-2019 23:03:54  Pau Johnson  Resection, sigmoid colon, open 18-Sep-2019 23:03:40  Pau Johnson  Exploratory laparotomy 18-Sep-2019 23:02:58  Pau Johnson

## 2019-09-18 NOTE — H&P ADULT - PROBLEM SELECTOR PLAN 1
Admit to surgery  Pending OR for repair of perforated sigmoid colon  Judicious IVF  Zosyn Perforated Sigmoid Diverticulitis with large Abscess and Free Air.  Admit to surgery  Emergent Exploratory Laparotomy, possible bowel resection, possible Ostomy and indicated procedures.  Bowel rest, IVF Resuscitation  Zosyn IV.
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
## 2019-09-18 NOTE — ED PROVIDER NOTE - GASTROINTESTINAL, MLM
Abdomen soft, tender to palpation of RLQ, abdomen distended Abdomen soft, tender to palpation of RLQ, abdomen distended + reducible ventral hernia with old sx scars + diffused tenderness

## 2019-09-18 NOTE — H&P ADULT - NSHPSOCIALHISTORY_GEN_ALL_CORE
Patient has history of tobacco use and drug abuse. Patient states he quit 20 years ago. Denies ETOH abuse.
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
## 2019-09-19 LAB
4/8 RATIO: 0.35 RATIO — LOW (ref 0.9–3.6)
4/8 RATIO: 0.44 RATIO — LOW (ref 0.9–3.6)
ABS CD8: 348 /UL — SIGNIFICANT CHANGE UP (ref 142–740)
ABS CD8: 566 /UL — SIGNIFICANT CHANGE UP (ref 142–740)
ALBUMIN SERPL ELPH-MCNC: 2.2 G/DL — LOW (ref 3.3–5)
ALP SERPL-CCNC: 125 U/L — HIGH (ref 40–120)
ALT FLD-CCNC: 10 U/L — LOW (ref 12–78)
ANION GAP SERPL CALC-SCNC: 13 MMOL/L — SIGNIFICANT CHANGE UP (ref 5–17)
APTT BLD: 31.2 SEC — SIGNIFICANT CHANGE UP (ref 28.5–37)
AST SERPL-CCNC: 31 U/L — SIGNIFICANT CHANGE UP (ref 15–37)
BASE EXCESS BLDA CALC-SCNC: 1 MMOL/L — SIGNIFICANT CHANGE UP (ref -2–2)
BASOPHILS # BLD AUTO: 0.03 K/UL — SIGNIFICANT CHANGE UP (ref 0–0.2)
BASOPHILS NFR BLD AUTO: 0.1 % — SIGNIFICANT CHANGE UP (ref 0–2)
BILIRUB SERPL-MCNC: 0.6 MG/DL — SIGNIFICANT CHANGE UP (ref 0.2–1.2)
BLOOD GAS COMMENTS: SIGNIFICANT CHANGE UP
BLOOD GAS SOURCE: SIGNIFICANT CHANGE UP
BUN SERPL-MCNC: 28 MG/DL — HIGH (ref 7–23)
CALCIUM SERPL-MCNC: 7 MG/DL — LOW (ref 8.5–10.1)
CD16+CD56+ CELLS NFR BLD: 25 % — HIGH (ref 5–23)
CD16+CD56+ CELLS NFR SPEC: 215 /UL — SIGNIFICANT CHANGE UP (ref 71–410)
CD19 BLASTS SPEC-ACNC: 11 % — SIGNIFICANT CHANGE UP (ref 6–24)
CD19 BLASTS SPEC-ACNC: 97 /UL — SIGNIFICANT CHANGE UP (ref 84–469)
CD3 BLASTS SPEC-ACNC: 528 /UL — LOW (ref 672–1870)
CD3 BLASTS SPEC-ACNC: 61 % — SIGNIFICANT CHANGE UP (ref 59–83)
CD3 BLASTS SPEC-ACNC: 73 % — SIGNIFICANT CHANGE UP (ref 59–83)
CD3 BLASTS SPEC-ACNC: 788 /UL — SIGNIFICANT CHANGE UP (ref 672–1870)
CD4 %: 18 % — LOW (ref 30–62)
CD4 %: 19 % — LOW (ref 30–62)
CD8 %: 40 % — HIGH (ref 12–36)
CD8 %: 53 % — HIGH (ref 12–36)
CHLORIDE SERPL-SCNC: 99 MMOL/L — SIGNIFICANT CHANGE UP (ref 96–108)
CO2 SERPL-SCNC: 23 MMOL/L — SIGNIFICANT CHANGE UP (ref 22–31)
CREAT SERPL-MCNC: 6.88 MG/DL — HIGH (ref 0.5–1.3)
EOSINOPHIL # BLD AUTO: 0.02 K/UL — SIGNIFICANT CHANGE UP (ref 0–0.5)
EOSINOPHIL NFR BLD AUTO: 0.1 % — SIGNIFICANT CHANGE UP (ref 0–6)
GLUCOSE BLDC GLUCOMTR-MCNC: 127 MG/DL — HIGH (ref 70–99)
GLUCOSE BLDC GLUCOMTR-MCNC: 129 MG/DL — HIGH (ref 70–99)
GLUCOSE BLDC GLUCOMTR-MCNC: 135 MG/DL — HIGH (ref 70–99)
GLUCOSE SERPL-MCNC: 137 MG/DL — HIGH (ref 70–99)
HCO3 BLDA-SCNC: 24 MMOL/L — SIGNIFICANT CHANGE UP (ref 21–29)
HCT VFR BLD CALC: 35.5 % — LOW (ref 39–50)
HCV AB S/CO SERPL IA: 0.17 S/CO — SIGNIFICANT CHANGE UP (ref 0–0.99)
HCV AB SERPL-IMP: SIGNIFICANT CHANGE UP
HGB BLD-MCNC: 10.7 G/DL — LOW (ref 13–17)
HOROWITZ INDEX BLDA+IHG-RTO: 100 — SIGNIFICANT CHANGE UP
IMM GRANULOCYTES NFR BLD AUTO: 1 % — SIGNIFICANT CHANGE UP (ref 0–1.5)
INR BLD: 1.29 RATIO — HIGH (ref 0.88–1.16)
LYMPHOCYTES # BLD AUTO: 1.08 K/UL — SIGNIFICANT CHANGE UP (ref 1–3.3)
LYMPHOCYTES # BLD AUTO: 5.3 % — LOW (ref 13–44)
MAGNESIUM SERPL-MCNC: 2.3 MG/DL — SIGNIFICANT CHANGE UP (ref 1.6–2.6)
MCHC RBC-ENTMCNC: 26.8 PG — LOW (ref 27–34)
MCHC RBC-ENTMCNC: 30.1 GM/DL — LOW (ref 32–36)
MCV RBC AUTO: 88.8 FL — SIGNIFICANT CHANGE UP (ref 80–100)
MONOCYTES # BLD AUTO: 0.83 K/UL — SIGNIFICANT CHANGE UP (ref 0–0.9)
MONOCYTES NFR BLD AUTO: 4.1 % — SIGNIFICANT CHANGE UP (ref 2–14)
NEUTROPHILS # BLD AUTO: 18.24 K/UL — HIGH (ref 1.8–7.4)
NEUTROPHILS NFR BLD AUTO: 89.4 % — HIGH (ref 43–77)
NRBC # BLD: 0 /100 WBCS — SIGNIFICANT CHANGE UP (ref 0–0)
PCO2 BLDA: 36 MMHG — SIGNIFICANT CHANGE UP (ref 32–46)
PH BLD: 7.44 — SIGNIFICANT CHANGE UP (ref 7.35–7.45)
PHOSPHATE SERPL-MCNC: 5 MG/DL — HIGH (ref 2.5–4.5)
PLATELET # BLD AUTO: 290 K/UL — SIGNIFICANT CHANGE UP (ref 150–400)
PO2 BLDA: 342 MMHG — HIGH (ref 74–108)
POTASSIUM SERPL-MCNC: 3.9 MMOL/L — SIGNIFICANT CHANGE UP (ref 3.5–5.3)
POTASSIUM SERPL-SCNC: 3.9 MMOL/L — SIGNIFICANT CHANGE UP (ref 3.5–5.3)
PROT SERPL-MCNC: 7.2 GM/DL — SIGNIFICANT CHANGE UP (ref 6–8.3)
PROTHROM AB SERPL-ACNC: 14.6 SEC — HIGH (ref 10–12.9)
RBC # BLD: 4 M/UL — LOW (ref 4.2–5.8)
RBC # FLD: 18 % — HIGH (ref 10.3–14.5)
SAO2 % BLDA: 100 % — HIGH (ref 92–96)
SODIUM SERPL-SCNC: 135 MMOL/L — SIGNIFICANT CHANGE UP (ref 135–145)
T-CELL CD4 SUBSET PNL BLD: 153 /UL — LOW (ref 489–1457)
T-CELL CD4 SUBSET PNL BLD: 201 /UL — LOW (ref 489–1457)
WBC # BLD: 20.41 K/UL — HIGH (ref 3.8–10.5)
WBC # FLD AUTO: 20.41 K/UL — HIGH (ref 3.8–10.5)

## 2019-09-19 PROCEDURE — 99291 CRITICAL CARE FIRST HOUR: CPT

## 2019-09-19 RX ORDER — ATORVASTATIN CALCIUM 80 MG/1
10 TABLET, FILM COATED ORAL AT BEDTIME
Refills: 0 | Status: DISCONTINUED | OUTPATIENT
Start: 2019-09-19 | End: 2019-10-01

## 2019-09-19 RX ORDER — DARUNAVIR 75 MG/1
800 TABLET, FILM COATED ORAL DAILY
Refills: 0 | Status: DISCONTINUED | OUTPATIENT
Start: 2019-09-19 | End: 2019-10-01

## 2019-09-19 RX ORDER — ETRAVIRINE 200 MG/1
200 TABLET ORAL
Refills: 0 | Status: DISCONTINUED | OUTPATIENT
Start: 2019-09-19 | End: 2019-10-01

## 2019-09-19 RX ORDER — FENTANYL CITRATE 50 UG/ML
50 INJECTION INTRAVENOUS
Refills: 0 | Status: DISCONTINUED | OUTPATIENT
Start: 2019-09-19 | End: 2019-09-19

## 2019-09-19 RX ORDER — RALTEGRAVIR 400 MG/1
400 TABLET, FILM COATED ORAL
Refills: 0 | Status: DISCONTINUED | OUTPATIENT
Start: 2019-09-19 | End: 2019-10-01

## 2019-09-19 RX ORDER — MORPHINE SULFATE 50 MG/1
2 CAPSULE, EXTENDED RELEASE ORAL EVERY 4 HOURS
Refills: 0 | Status: DISCONTINUED | OUTPATIENT
Start: 2019-09-19 | End: 2019-09-25

## 2019-09-19 RX ORDER — FENTANYL CITRATE 50 UG/ML
100 INJECTION INTRAVENOUS ONCE
Refills: 0 | Status: DISCONTINUED | OUTPATIENT
Start: 2019-09-19 | End: 2019-09-19

## 2019-09-19 RX ORDER — RITONAVIR 100 MG/1
100 TABLET, FILM COATED ORAL DAILY
Refills: 0 | Status: DISCONTINUED | OUTPATIENT
Start: 2019-09-19 | End: 2019-10-01

## 2019-09-19 RX ORDER — HYDRALAZINE HCL 50 MG
10 TABLET ORAL EVERY 8 HOURS
Refills: 0 | Status: DISCONTINUED | OUTPATIENT
Start: 2019-09-19 | End: 2019-10-01

## 2019-09-19 RX ADMIN — HEPARIN SODIUM 5000 UNIT(S): 5000 INJECTION INTRAVENOUS; SUBCUTANEOUS at 06:49

## 2019-09-19 RX ADMIN — PIPERACILLIN AND TAZOBACTAM 25 GRAM(S): 4; .5 INJECTION, POWDER, LYOPHILIZED, FOR SOLUTION INTRAVENOUS at 13:01

## 2019-09-19 RX ADMIN — CHLORHEXIDINE GLUCONATE 1 APPLICATION(S): 213 SOLUTION TOPICAL at 06:49

## 2019-09-19 RX ADMIN — CHLORHEXIDINE GLUCONATE 15 MILLILITER(S): 213 SOLUTION TOPICAL at 06:48

## 2019-09-19 RX ADMIN — FENTANYL CITRATE 100 MICROGRAM(S): 50 INJECTION INTRAVENOUS at 02:15

## 2019-09-19 RX ADMIN — FENTANYL CITRATE 50 MICROGRAM(S): 50 INJECTION INTRAVENOUS at 17:28

## 2019-09-19 RX ADMIN — MORPHINE SULFATE 2 MILLIGRAM(S): 50 CAPSULE, EXTENDED RELEASE ORAL at 19:41

## 2019-09-19 RX ADMIN — HEPARIN SODIUM 5000 UNIT(S): 5000 INJECTION INTRAVENOUS; SUBCUTANEOUS at 17:30

## 2019-09-19 RX ADMIN — PANTOPRAZOLE SODIUM 40 MILLIGRAM(S): 20 TABLET, DELAYED RELEASE ORAL at 13:00

## 2019-09-19 RX ADMIN — FENTANYL CITRATE 5.89 MICROGRAM(S)/KG/HR: 50 INJECTION INTRAVENOUS at 03:54

## 2019-09-19 RX ADMIN — MORPHINE SULFATE 2 MILLIGRAM(S): 50 CAPSULE, EXTENDED RELEASE ORAL at 19:57

## 2019-09-19 NOTE — PROGRESS NOTE ADULT - SUBJECTIVE AND OBJECTIVE BOX
Subjective: stable borderline low BP. Awake. FiO2 40%      MEDICATIONS  (STANDING):  chlorhexidine 0.12% Liquid 15 milliLiter(s) Oral Mucosa every 12 hours  chlorhexidine 4% Liquid 1 Application(s) Topical <User Schedule>  fentaNYL   Infusion. 0.5 MICROgram(s)/kG/Hr (5.895 mL/Hr) IV Continuous <Continuous>  heparin  Injectable 5000 Unit(s) SubCutaneous every 12 hours  pantoprazole  Injectable 40 milliGRAM(s) IV Push daily  piperacillin/tazobactam IVPB.. 3.375 Gram(s) IV Intermittent every 12 hours    MEDICATIONS  (PRN):          T(C): 37.3 (09-19-19 @ 04:35), Max: 37.3 (09-19-19 @ 04:35)  HR: 90 (09-19-19 @ 07:32) (89 - 111)  BP: 104/64 (09-19-19 @ 07:32) (89/70 - 179/91)  RR: 19 (09-19-19 @ 07:32) (15 - 23)  SpO2: 99% (09-19-19 @ 07:32) (58% - 100%)  Wt(kg): --    ABG - ( 19 Sep 2019 00:19 )  pH, Arterial: x     pH, Blood: 7.44  /  pCO2: 36    /  pO2: 342   / HCO3: 24    / Base Excess: 1.0   /  SaO2: 100                 I&O's Detail    18 Sep 2019 07:01  -  19 Sep 2019 07:00  --------------------------------------------------------  IN:    fentaNYL Infusion.: 51.8 mL    IV PiggyBack: 100 mL  Total IN: 151.8 mL    OUT:    Bulb: 50 mL    Nasoenteral Tube: 100 mL  Total OUT: 150 mL    Total NET: 1.8 mL          Mode: AC/ CMV (Assist Control/ Continuous Mandatory Ventilation)  RR (machine): 16  TV (machine): 500  FiO2: 50  PEEP: 5  ITime: 0.91  MAP: 10  PIP: 25       PHYSICAL EXAM:    GENERAL: awake, vented  EYES: EOMI, PERRLA, conjunctiva and sclera clear  NECK: Supple, no inc in JVP  CHEST/LUNG: Clear  HEART: S1S2  ABDOMEN: L ostomy, midline dressing, R CHARLEY drain  EXTREMITIES:  min edema  L AVF pos thrill, bruit      LABS:  CBC Full  -  ( 19 Sep 2019 04:34 )  WBC Count : 20.41 K/uL  RBC Count : 4.00 M/uL  Hemoglobin : 10.7 g/dL  Hematocrit : 35.5 %  Platelet Count - Automated : 290 K/uL  Mean Cell Volume : 88.8 fl  Mean Cell Hemoglobin : 26.8 pg  Mean Cell Hemoglobin Concentration : 30.1 gm/dL  Auto Neutrophil # : 18.24 K/uL  Auto Lymphocyte # : 1.08 K/uL  Auto Monocyte # : 0.83 K/uL  Auto Eosinophil # : 0.02 K/uL  Auto Basophil # : 0.03 K/uL  Auto Neutrophil % : 89.4 %  Auto Lymphocyte % : 5.3 %  Auto Monocyte % : 4.1 %  Auto Eosinophil % : 0.1 %  Auto Basophil % : 0.1 %    09-19    135  |  99  |  28<H>  ----------------------------<  137<H>  3.9   |  23  |  6.88<H>    Ca    7.0<L>      19 Sep 2019 04:34  Phos  5.0     09-19  Mg     2.3     09-19    TPro  7.2  /  Alb  2.2<L>  /  TBili  0.6  /  DBili  x   /  AST  31  /  ALT  10<L>  /  AlkPhos  125<H>  09-19    PT/INR - ( 19 Sep 2019 04:34 )   PT: 14.6 sec;   INR: 1.29 ratio         PTT - ( 19 Sep 2019 04:34 )  PTT:31.2 sec        Impression:  * HD dependent ESRD  * POD #1 exp lap, sigm colon resection, perit lavage, I&D of abscess for perfed viscus    Recommendations:   * Not in need of dialysis today. Will re-eval in 24h  * Please avoid volume resuscitation with LR in ESRD as it contains K
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
## 2019-09-19 NOTE — AIRWAY REMOVAL NOTE  ADULT & PEDS - ARTIFICAL AIRWAY REMOVAL COMMENTS
no stridor heard post extubation. pt able to talk. placed on 2L nasal cannula. will continue to monitor
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
## 2019-09-19 NOTE — PROGRESS NOTE ADULT - SUBJECTIVE AND OBJECTIVE BOX
Pt seen and examined at bedside, intubated, no acute issues overnight. Lowgrade temp, Tmax 99.1. No complaints offered, pain adequately controlled.    T(F): 99.1 (09-19-19 @ 04:35), Max: 99.1 (09-19-19 @ 04:35)  HR: 98 (09-19-19 @ 05:33) (93 - 111)  BP: 120/70 (09-19-19 @ 04:35) (109/72 - 179/91)  RR: 19 (09-19-19 @ 04:35) (15 - 23)  SpO2: 100% (09-19-19 @ 05:33) (58% - 100%)  Wt(kg): --  CAPILLARY BLOOD GLUCOSE      POCT Blood Glucose.: 87 mg/dL (18 Sep 2019 23:12)      PHYSICAL EXAM:  General: intubated, awake alert, NAD, communicates by nodding and communication board   Neuro:  Alert & oriented x 3  CV: +S1+S2 regular rate and rhythm  Lung: clear to ausculation bilaterally, respirations nonlabored, good inspiratory effort  Abdomen: obese, soft, ND, hypoactive BS, ostomy pink with serosanguinous drainage, no gas or stool in bag, Bhavin drain with bloody drainage 50cc/24hrs, NGT in place with light green output 100cc/6hrs.  Extremities: no pedal edema or calf tenderness noted, IPC in place    LABS:                        10.7   20.41 )-----------( 290      ( 19 Sep 2019 04:34 )             35.5     09-19    135  |  99  |  28<H>  ----------------------------<  137<H>  3.9   |  23  |  6.88<H>    Ca    7.0<L>      19 Sep 2019 04:34  Phos  5.0     09-19  Mg     2.3     09-19    TPro  7.2  /  Alb  2.2<L>  /  TBili  0.6  /  DBili  x   /  AST  31  /  ALT  10<L>  /  AlkPhos  125<H>  09-19    PT/INR - ( 19 Sep 2019 04:34 )   PT: 14.6 sec;   INR: 1.29 ratio         PTT - ( 19 Sep 2019 04:34 )  PTT:31.2 sec  I&O's Detail        Impression: 58y MaleS/P Exp Lap, MAYDA, SBR, Sigmoid Colon rsxn, peritoneal lavage, I&D if intra-abdominal abscess- POD#1  Plan:  - cont vent per ICU, Possible wean trial today  - NPO/IVF/NGT  -cont IV ABX  -continue VTE prophylaxis: SQH   -continue local wound and ostomy care  - Renal Consult for continued HD care/treatment  -cont care per ICU  -will discuss with surgical attending

## 2019-09-20 DIAGNOSIS — E11.9 TYPE 2 DIABETES MELLITUS WITHOUT COMPLICATIONS: ICD-10-CM

## 2019-09-20 DIAGNOSIS — B20 HUMAN IMMUNODEFICIENCY VIRUS [HIV] DISEASE: ICD-10-CM

## 2019-09-20 LAB
ANION GAP SERPL CALC-SCNC: 12 MMOL/L — SIGNIFICANT CHANGE UP (ref 5–17)
BUN SERPL-MCNC: 39 MG/DL — HIGH (ref 7–23)
CALCIUM SERPL-MCNC: 7.8 MG/DL — LOW (ref 8.5–10.1)
CHLORIDE SERPL-SCNC: 100 MMOL/L — SIGNIFICANT CHANGE UP (ref 96–108)
CO2 SERPL-SCNC: 25 MMOL/L — SIGNIFICANT CHANGE UP (ref 22–31)
CREAT SERPL-MCNC: 10.3 MG/DL — HIGH (ref 0.5–1.3)
GLUCOSE SERPL-MCNC: 108 MG/DL — HIGH (ref 70–99)
HCT VFR BLD CALC: 31.6 % — LOW (ref 39–50)
HGB BLD-MCNC: 9.6 G/DL — LOW (ref 13–17)
HIV-1 VIRAL LOAD RESULT: ABNORMAL
HIV1 RNA # SERPL NAA+PROBE: SIGNIFICANT CHANGE UP
HIV1 RNA SER-IMP: SIGNIFICANT CHANGE UP
HIV1 RNA SERPL NAA+PROBE-ACNC: ABNORMAL
HIV1 RNA SERPL NAA+PROBE-LOG#: 3.23 — SIGNIFICANT CHANGE UP
MAGNESIUM SERPL-MCNC: 2.4 MG/DL — SIGNIFICANT CHANGE UP (ref 1.6–2.6)
MCHC RBC-ENTMCNC: 26.8 PG — LOW (ref 27–34)
MCHC RBC-ENTMCNC: 30.4 GM/DL — LOW (ref 32–36)
MCV RBC AUTO: 88.3 FL — SIGNIFICANT CHANGE UP (ref 80–100)
NRBC # BLD: 0 /100 WBCS — SIGNIFICANT CHANGE UP (ref 0–0)
PHOSPHATE SERPL-MCNC: 7.4 MG/DL — HIGH (ref 2.5–4.5)
PLATELET # BLD AUTO: 254 K/UL — SIGNIFICANT CHANGE UP (ref 150–400)
POTASSIUM SERPL-MCNC: 4.4 MMOL/L — SIGNIFICANT CHANGE UP (ref 3.5–5.3)
POTASSIUM SERPL-SCNC: 4.4 MMOL/L — SIGNIFICANT CHANGE UP (ref 3.5–5.3)
RBC # BLD: 3.58 M/UL — LOW (ref 4.2–5.8)
RBC # FLD: 18 % — HIGH (ref 10.3–14.5)
SODIUM SERPL-SCNC: 137 MMOL/L — SIGNIFICANT CHANGE UP (ref 135–145)
WBC # BLD: 13.37 K/UL — HIGH (ref 3.8–10.5)
WBC # FLD AUTO: 13.37 K/UL — HIGH (ref 3.8–10.5)

## 2019-09-20 PROCEDURE — 99223 1ST HOSP IP/OBS HIGH 75: CPT

## 2019-09-20 RX ORDER — SODIUM CHLORIDE 9 MG/ML
1000 INJECTION, SOLUTION INTRAVENOUS
Refills: 0 | Status: DISCONTINUED | OUTPATIENT
Start: 2019-09-20 | End: 2019-10-01

## 2019-09-20 RX ORDER — MICAFUNGIN SODIUM 100 MG/1
INJECTION, POWDER, LYOPHILIZED, FOR SOLUTION INTRAVENOUS
Refills: 0 | Status: DISCONTINUED | OUTPATIENT
Start: 2019-09-20 | End: 2019-09-25

## 2019-09-20 RX ORDER — MICAFUNGIN SODIUM 100 MG/1
100 INJECTION, POWDER, LYOPHILIZED, FOR SOLUTION INTRAVENOUS EVERY 24 HOURS
Refills: 0 | Status: DISCONTINUED | OUTPATIENT
Start: 2019-09-21 | End: 2019-09-25

## 2019-09-20 RX ORDER — MICAFUNGIN SODIUM 100 MG/1
100 INJECTION, POWDER, LYOPHILIZED, FOR SOLUTION INTRAVENOUS ONCE
Refills: 0 | Status: COMPLETED | OUTPATIENT
Start: 2019-09-20 | End: 2019-09-20

## 2019-09-20 RX ADMIN — SODIUM CHLORIDE 75 MILLILITER(S): 9 INJECTION, SOLUTION INTRAVENOUS at 11:52

## 2019-09-20 RX ADMIN — Medication 10 MILLIGRAM(S): at 13:01

## 2019-09-20 RX ADMIN — MICAFUNGIN SODIUM 105 MILLIGRAM(S): 100 INJECTION, POWDER, LYOPHILIZED, FOR SOLUTION INTRAVENOUS at 18:22

## 2019-09-20 RX ADMIN — CHLORHEXIDINE GLUCONATE 1 APPLICATION(S): 213 SOLUTION TOPICAL at 07:36

## 2019-09-20 RX ADMIN — RALTEGRAVIR 400 MILLIGRAM(S): 400 TABLET, FILM COATED ORAL at 06:27

## 2019-09-20 RX ADMIN — ETRAVIRINE 200 MILLIGRAM(S): 200 TABLET ORAL at 09:47

## 2019-09-20 RX ADMIN — DARUNAVIR 800 MILLIGRAM(S): 75 TABLET, FILM COATED ORAL at 11:52

## 2019-09-20 RX ADMIN — PIPERACILLIN AND TAZOBACTAM 25 GRAM(S): 4; .5 INJECTION, POWDER, LYOPHILIZED, FOR SOLUTION INTRAVENOUS at 11:52

## 2019-09-20 RX ADMIN — HEPARIN SODIUM 5000 UNIT(S): 5000 INJECTION INTRAVENOUS; SUBCUTANEOUS at 06:09

## 2019-09-20 RX ADMIN — HEPARIN SODIUM 5000 UNIT(S): 5000 INJECTION INTRAVENOUS; SUBCUTANEOUS at 18:22

## 2019-09-20 RX ADMIN — Medication 10 MILLIGRAM(S): at 06:09

## 2019-09-20 RX ADMIN — RITONAVIR 100 MILLIGRAM(S): 100 TABLET, FILM COATED ORAL at 11:52

## 2019-09-20 RX ADMIN — RALTEGRAVIR 400 MILLIGRAM(S): 400 TABLET, FILM COATED ORAL at 18:23

## 2019-09-20 RX ADMIN — ETRAVIRINE 200 MILLIGRAM(S): 200 TABLET ORAL at 18:22

## 2019-09-20 RX ADMIN — PIPERACILLIN AND TAZOBACTAM 25 GRAM(S): 4; .5 INJECTION, POWDER, LYOPHILIZED, FOR SOLUTION INTRAVENOUS at 00:41

## 2019-09-20 RX ADMIN — Medication 10 MILLIGRAM(S): at 22:40

## 2019-09-20 RX ADMIN — ATORVASTATIN CALCIUM 10 MILLIGRAM(S): 80 TABLET, FILM COATED ORAL at 22:40

## 2019-09-20 RX ADMIN — MORPHINE SULFATE 2 MILLIGRAM(S): 50 CAPSULE, EXTENDED RELEASE ORAL at 06:10

## 2019-09-20 RX ADMIN — MORPHINE SULFATE 2 MILLIGRAM(S): 50 CAPSULE, EXTENDED RELEASE ORAL at 06:30

## 2019-09-20 NOTE — DIETITIAN INITIAL EVALUATION ADULT. - PROBLEM SELECTOR PLAN 1
Perforated Sigmoid Diverticulitis with large Abscess and Free Air.  Admit to surgery  Emergent Exploratory Laparotomy, possible bowel resection, possible Ostomy and indicated procedures.  Bowel rest, IVF Resuscitation  Zosyn IV.

## 2019-09-20 NOTE — PHYSICAL THERAPY INITIAL EVALUATION ADULT - CRITERIA FOR SKILLED THERAPEUTIC INTERVENTIONS
functional limitations in following categories/therapy frequency/predicted duration of therapy intervention/anticipated discharge recommendation/impairments found/risk reduction/prevention/rehab potential

## 2019-09-20 NOTE — PROGRESS NOTE ADULT - SUBJECTIVE AND OBJECTIVE BOX
Patient comfortable; no distress;   on bipap earlier    MEDICATIONS  (STANDING):  atorvastatin 10 milliGRAM(s) Oral at bedtime  chlorhexidine 4% Liquid 1 Application(s) Topical <User Schedule>  darunavir 800 milliGRAM(s) Oral daily  dextrose 5% + sodium chloride 0.45%. 1000 milliLiter(s) (75 mL/Hr) IV Continuous <Continuous>  etravirine 200 milliGRAM(s) Oral two times a day after meals  heparin  Injectable 5000 Unit(s) SubCutaneous every 12 hours  hydrALAZINE 10 milliGRAM(s) Oral every 8 hours  micafungin IVPB      micafungin IVPB 100 milliGRAM(s) IV Intermittent once  piperacillin/tazobactam IVPB.. 3.375 Gram(s) IV Intermittent every 12 hours  raltegravir Tablet 400 milliGRAM(s) Oral two times a day  ritonavir Tablet 100 milliGRAM(s) Oral daily    MEDICATIONS  (PRN):  morphine  - Injectable 2 milliGRAM(s) IV Push every 4 hours PRN Moderate Pain (4 - 6)      09-19-19 @ 07:01  -  09-20-19 @ 07:00  --------------------------------------------------------  IN: 268.8 mL / OUT: 115 mL / NET: 153.8 mL    09-20-19 @ 07:01  -  09-20-19 @ 17:28  --------------------------------------------------------  IN: 0 mL / OUT: 0 mL / NET: 0 mL      PHYSICAL EXAM:      T(C): 36.9 (09-20-19 @ 13:45), Max: 36.9 (09-20-19 @ 13:45)  HR: 90 (09-20-19 @ 13:45) (89 - 96)  BP: 134/73 (09-20-19 @ 13:45) (96/65 - 151/82)  RR: 18 (09-20-19 @ 13:45) (16 - 20)  SpO2: 98% (09-20-19 @ 13:45) (97% - 100%)  Wt(kg): --  Respiratory: clear anteriorly, decreased BS at bases  Cardiovascular: S1 S2  Gastrointestinal: soft mild diffuse tenderness; no BS  Extremities:   1 edema                                    9.6    13.37 )-----------( 254      ( 20 Sep 2019 10:45 )             31.6     09-20    137  |  100  |  39<H>  ----------------------------<  108<H>  4.4   |  25  |  10.30<H>    Ca    7.8<L>      20 Sep 2019 10:45  Phos  7.4     09-20  Mg     2.4     09-20    TPro  7.2  /  Alb  2.2<L>  /  TBili  0.6  /  DBili  x   /  AST  31  /  ALT  10<L>  /  AlkPhos  125<H>  09-19    ABG - ( 19 Sep 2019 00:19 )  pH, Arterial: x     pH, Blood: 7.44  /  pCO2: 36    /  pO2: 342   / HCO3: 24    / Base Excess: 1.0   /  SaO2: 100               LIVER FUNCTIONS - ( 19 Sep 2019 04:34 )  Alb: 2.2 g/dL / Pro: 7.2 gm/dL / ALK PHOS: 125 U/L / ALT: 10 U/L / AST: 31 U/L / GGT: x           Creatinine Trend: 10.30<--, 6.88<--, 6.00<--  Assessment and Plan:  Maintenance HD  UF as tolerated 1.5 kg  Will follow course.

## 2019-09-20 NOTE — PHYSICAL THERAPY INITIAL EVALUATION ADULT - BALANCE TRAINING, PT EVAL
Pt will increase static/dynamic sitting balance to good and static/dynamic standing balance to good to perform all functional mobility without LOB, by 2weeks.

## 2019-09-20 NOTE — CHART NOTE - NSCHARTNOTEFT_GEN_A_CORE
Upon Nutritional Assessment by the Registered Dietitian your patient was determined to meet criteria / has evidence of the following diagnosis/diagnoses:          [ ]  Mild Protein Calorie Malnutrition        [ ]  Moderate Protein Calorie Malnutrition        [ ] Severe Protein Calorie Malnutrition        [ ] Unspecified Protein Calorie Malnutrition        [ ] Underweight / BMI <19        [x ] Morbid Obesity / BMI > 40      Findings as based on:  •  Comprehensive nutrition assessment and consultation  •  Calorie counts (nutrient intake analysis)  •  Food acceptance and intake status from observations by staff  •  Follow up  •  Patient education  •  Intervention secondary to interdisciplinary rounds  •   concerns      Treatment:    The following diet has been recommended:  Adv po diet when medically feasible Clear liquids to full liquids to Low fiber/soft & if NPO/Clear liquids > 7 days consider temp nutrition support via PPN/TPN    PROVIDER Section:     By signing this assessment you are acknowledging and agree with the diagnosis/diagnoses assigned by the Registered Dietitian    Comments:

## 2019-09-20 NOTE — DIETITIAN INITIAL EVALUATION ADULT. - PERTINENT MEDS FT
atorvastatin  chlorhexidine 4% Liquid  darunavir  etravirine  heparin  Injectable  hydrALAZINE  morphine  - Injectable PRN  piperacillin/tazobactam IVPB..  raltegravir Tablet  ritonavir Tablet

## 2019-09-20 NOTE — PHYSICAL THERAPY INITIAL EVALUATION ADULT - GENERAL OBSERVATIONS, REHAB EVAL
Pt was seen in supine c NG drainage tube, abdominal binder,Bipap and  Venodyne pumps  donned, abdominal dressing and colostomy bag c/d/i, alert and Ox4. SpO2 was 100%

## 2019-09-20 NOTE — PHYSICAL THERAPY INITIAL EVALUATION ADULT - IMPAIRMENTS FOUND, PT EVAL
ergonomics and body mechanics/aerobic capacity/endurance/muscle strength/gait, locomotion, and balance

## 2019-09-20 NOTE — DIETITIAN INITIAL EVALUATION ADULT. - OTHER INFO
Pt with hx ESRD on dialyisis, HIV, Hp C, drug abuse unable to obtain Wt & diet hx due to currently on Bipap. Pt with s/p 9/18 Exp lap, sigmoid colon resection, I&D of abscess for perforated viscus. NGT [id number] x 24 hours.

## 2019-09-20 NOTE — PROGRESS NOTE ADULT - SUBJECTIVE AND OBJECTIVE BOX
INTERVAL HPI/OVERNIGHT EVENTS:  Pt. seen and examined at bedside resting comfortably on BIPAP. C/o mild abdominal pain at this time, pain has been well controlled. Denies N/V. No other complaints offered, no acute overnight events.   Denies fever/chills, chest pain, dyspnea, cough, dizziness.     Vital Signs Last 24 Hrs  T(C): 36.1 (20 Sep 2019 05:40), Max: 36.4 (19 Sep 2019 19:35)  T(F): 97 (20 Sep 2019 05:40), Max: 97.5 (19 Sep 2019 19:35)  HR: 89 (20 Sep 2019 09:26) (87 - 100)  BP: 129/74 (20 Sep 2019 09:26) (96/65 - 133/70)  BP(mean): 85 (19 Sep 2019 21:00) (72 - 96)  RR: 18 (20 Sep 2019 09:26) (16 - 26)  SpO2: 100% (20 Sep 2019 09:26) (74% - 100%)    PHYSICAL EXAM:    GENERAL: NAD. NGT in place with light green output 85cc/24hrs.  CHEST/LUNG: Clear to ausculation, bilaterally   HEART: S1S2  ABDOMEN: Abdominal binder in place. Midline dressing CDI. Bhavin drain with s/s fluid (output: 30cc/24hr). Ostomy viable, congested, no air or stool in bag yet. Hypoactive BS, Nondistended, soft, Appropriate tenderness to postop area, no guarding, no rebound   EXTREMITIES:  calf soft, non tender b/l. 2+ distal pulses b/l.     I&O's Detail    19 Sep 2019 07:01  -  20 Sep 2019 07:00  --------------------------------------------------------  IN:    fentaNYL Infusion.: 68.8 mL    IV PiggyBack: 200 mL  Total IN: 268.8 mL    OUT:    Bulb: 30 mL    Nasoenteral Tube: 85 mL  Total OUT: 115 mL    Total NET: 153.8 mL      LABS:                        9.6    13.37 )-----------( 254      ( 20 Sep 2019 10:45 )             31.6     09-20    137  |  100  |  39<H>  ----------------------------<  108<H>  4.4   |  25  |  10.30<H>    Ca    7.8<L>      20 Sep 2019 10:45  Phos  5.0     09-19  Mg     2.4     09-20    TPro  7.2  /  Alb  2.2<L>  /  TBili  0.6  /  DBili  x   /  AST  31  /  ALT  10<L>  /  AlkPhos  125<H>  09-19    PT/INR - ( 19 Sep 2019 04:34 )   PT: 14.6 sec;   INR: 1.29 ratio         PTT - ( 19 Sep 2019 04:34 )  PTT:31.2 sec    Culture Results:   Rare Yeast (09-19 @ 09:12)      Impression: 58y Male admitted with perforated sigmoid diverticulitis S/P Exp Lap, MAYDA, SBR, Sigmoid Colon rsxn, peritoneal lavage, I&D of intra-abdominal abscess- POD#2    Plan:  - NPO / IVF / NGT  - cont IV ABX, f/u OR culture  - F/u Medical consult (Left message for Dr. Paulino)  - F/u ID consult (Dr. Chase)  - continue VTE prophylaxis: SQH   - pain management   - incentive spirometer, ambulate as tolerated, increase activity with PT  - continue local wound and ostomy care -- will monitor ostomy closely   - Bhavin drain care, monitor output  - HD per renal, Renal f/u  - will discuss with Dr. Garcia INTERVAL HPI/OVERNIGHT EVENTS:  Pt. seen and examined at bedside resting comfortably on BIPAP. C/o mild abdominal pain at this time, pain has been well controlled. Denies N/V. No other complaints offered, no acute overnight events.   Denies fever/chills, chest pain, dyspnea, cough, dizziness.     Vital Signs Last 24 Hrs  T(C): 36.1 (20 Sep 2019 05:40), Max: 36.4 (19 Sep 2019 19:35)  T(F): 97 (20 Sep 2019 05:40), Max: 97.5 (19 Sep 2019 19:35)  HR: 89 (20 Sep 2019 09:26) (87 - 100)  BP: 129/74 (20 Sep 2019 09:26) (96/65 - 133/70)  BP(mean): 85 (19 Sep 2019 21:00) (72 - 96)  RR: 18 (20 Sep 2019 09:26) (16 - 26)  SpO2: 100% (20 Sep 2019 09:26) (74% - 100%)    PHYSICAL EXAM:    GENERAL: NAD. NGT in place with light green output 85cc/24hrs.  CHEST/LUNG: Clear to ausculation, bilaterally   HEART: S1S2  ABDOMEN: Abdominal binder in place. Midline dressing CDI. Bhavin drain with s/s fluid (output: 30cc/24hr). Ostomy viable, congested, no air or stool in bag yet. Hypoactive BS, Nondistended, soft, Appropriate tenderness to postop area, no guarding, no rebound   EXTREMITIES:  calf soft, non tender b/l. 2+ distal pulses b/l.     I&O's Detail    19 Sep 2019 07:01  -  20 Sep 2019 07:00  --------------------------------------------------------  IN:    fentaNYL Infusion.: 68.8 mL    IV PiggyBack: 200 mL  Total IN: 268.8 mL    OUT:    Bulb: 30 mL    Nasoenteral Tube: 85 mL  Total OUT: 115 mL    Total NET: 153.8 mL      LABS:                        9.6    13.37 )-----------( 254      ( 20 Sep 2019 10:45 )             31.6     09-20    137  |  100  |  39<H>  ----------------------------<  108<H>  4.4   |  25  |  10.30<H>    Ca    7.8<L>      20 Sep 2019 10:45  Phos  5.0     09-19  Mg     2.4     09-20    TPro  7.2  /  Alb  2.2<L>  /  TBili  0.6  /  DBili  x   /  AST  31  /  ALT  10<L>  /  AlkPhos  125<H>  09-19    PT/INR - ( 19 Sep 2019 04:34 )   PT: 14.6 sec;   INR: 1.29 ratio         PTT - ( 19 Sep 2019 04:34 )  PTT:31.2 sec    Culture Results:   Rare Yeast (09-19 @ 09:12)      Impression: 58y Male admitted with perforated sigmoid diverticulitis S/P Exp Lap, MAYDA, SBR, Sigmoid Colon rsxn, peritoneal lavage, I&D of intra-abdominal abscess- POD#2    Plan:  - NPO / IVF / NGT  - cont IV ABX, f/u OR culture  - F/u Medical consult (Left message for Dr. Paulino)  - F/u ID consult (Dr. Chase)  - GI consult per Dr. Garcia for peripheral nutrition for expected prolonged NPO status  (Left message for Dr. Weber-Onu)  - continue VTE prophylaxis: SQH   - pain management   - incentive spirometer, ambulate as tolerated, increase activity with PT  - continue local wound and ostomy care -- will monitor ostomy closely   - Bhavin drain care, monitor output  - HD per renal, Renal f/u  - Discussed with Dr. Garcia
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
## 2019-09-20 NOTE — PROGRESS NOTE ADULT - SUBJECTIVE AND OBJECTIVE BOX
Patient is a 58y old  Male who presents with a chief complaint of Sigmoid perforation (20 Sep 2019 11:15)      INTERVAL HPI/OVERNIGHT EVENTS:  Patient is a 58 year old man with HIV, esrd on dialysis, sigmoid colon perforarion s/p sigmoid colon resection.  He is stable.  No complaints except for pain  MEDICATIONS  (STANDING):  atorvastatin 10 milliGRAM(s) Oral at bedtime  chlorhexidine 4% Liquid 1 Application(s) Topical <User Schedule>  darunavir 800 milliGRAM(s) Oral daily  dextrose 5% + sodium chloride 0.45%. 1000 milliLiter(s) (75 mL/Hr) IV Continuous <Continuous>  etravirine 200 milliGRAM(s) Oral two times a day after meals  heparin  Injectable 5000 Unit(s) SubCutaneous every 12 hours  hydrALAZINE 10 milliGRAM(s) Oral every 8 hours  micafungin IVPB      piperacillin/tazobactam IVPB.. 3.375 Gram(s) IV Intermittent every 12 hours  raltegravir Tablet 400 milliGRAM(s) Oral two times a day  ritonavir Tablet 100 milliGRAM(s) Oral daily    MEDICATIONS  (PRN):  morphine  - Injectable 2 milliGRAM(s) IV Push every 4 hours PRN Moderate Pain (4 - 6)      Allergies    ciprofloxacin (Rash)  Levaquin (Rash)    Intolerances        REVIEW OF SYSTEMS:  CONSTITUTIONAL: No fever, weight loss, or fatigue  EYES: No eye pain, visual disturbances, or discharge  ENMT:  No difficulty hearing, tinnitus, vertigo; No sinus or throat pain  NECK: No pain or stiffness  BREASTS: No pain, masses, or nipple discharge  RESPIRATORY: No cough, wheezing, chills or hemoptysis; No shortness of breath  CARDIOVASCULAR: No chest pain, palpitations, dizziness, or leg swelling  GASTROINTESTINAL: No abdominal or epigastric pain. No nausea, vomiting, or hematemesis; No diarrhea or constipation. No melena or hematochezia.  GENITOURINARY: No dysuria, frequency, hematuria, or incontinence  NEUROLOGICAL: No headaches, memory loss, loss of strength, numbness, or tremors  SKIN: No itching, burning, rashes, or lesions   LYMPH NODES: No enlarged glands  ENDOCRINE: No heat or cold intolerance; No hair loss  MUSCULOSKELETAL: No joint pain or swelling; No muscle, back, or extremity pain  PSYCHIATRIC: No depression, anxiety, mood swings, or difficulty sleeping  HEME/LYMPH: No easy bruising, or bleeding gums  ALLERGY AND IMMUNOLOGIC: No hives or eczema    Vital Signs Last 24 Hrs  T(C): 36.1 (20 Sep 2019 05:40), Max: 36.4 (19 Sep 2019 19:35)  T(F): 97 (20 Sep 2019 05:40), Max: 97.5 (19 Sep 2019 19:35)  HR: 89 (20 Sep 2019 09:26) (87 - 99)  BP: 129/74 (20 Sep 2019 09:26) (96/65 - 133/70)  BP(mean): 85 (19 Sep 2019 21:00) (72 - 96)  RR: 18 (20 Sep 2019 09:26) (16 - 20)  SpO2: 100% (20 Sep 2019 09:26) (97% - 100%)    PHYSICAL EXAM:  GENERAL: NAD, well-groomed, well-developed  HEAD:  Atraumatic, Normocephalic  EYES: EOMI, PERRLA, conjunctiva and sclera clear  ENMT: No tonsillar erythema, exudates, or enlargement; Moist mucous membranes, Good dentition, No lesions  NECK: Supple, No JVD, Normal thyroid  NERVOUS SYSTEM:  Alert & Oriented X3, Good concentration; Motor Strength 5/5 B/L upper and lower extremities; DTRs 2+ intact and symmetric  CHEST/LUNG: Clear to percussion bilaterally; No rales, rhonchi, wheezing, or rubs  HEART: Regular rate and rhythm; No murmurs, rubs, or gallops  ABDOMEN: Soft, Nontender, Nondistended; Bowel sounds present  EXTREMITIES:  2+ Peripheral Pulses, No clubbing, cyanosis, or edema  LYMPH: No lymphadenopathy noted  SKIN: No rashes or lesions    LABS:                        9.6    13.37 )-----------( 254      ( 20 Sep 2019 10:45 )             31.6     09-20    137  |  100  |  39<H>  ----------------------------<  108<H>  4.4   |  25  |  10.30<H>    Ca    7.8<L>      20 Sep 2019 10:45  Phos  7.4     09-20  Mg     2.4     09-20    TPro  7.2  /  Alb  2.2<L>  /  TBili  0.6  /  DBili  x   /  AST  31  /  ALT  10<L>  /  AlkPhos  125<H>  09-19    PT/INR - ( 19 Sep 2019 04:34 )   PT: 14.6 sec;   INR: 1.29 ratio         PTT - ( 19 Sep 2019 04:34 )  PTT:31.2 sec    CAPILLARY BLOOD GLUCOSE      POCT Blood Glucose.: 127 mg/dL (19 Sep 2019 17:36)  POCT Blood Glucose.: 135 mg/dL (19 Sep 2019 11:50)    CULTURES:  Culture Results:   Rare Yeast (09-19 @ 09:12)    HEMOGLOBIN A1C:    CHOLESTEROL:    ABG - ( 19 Sep 2019 00:19 )  pH, Arterial: x     pH, Blood: 7.44  /  pCO2: 36    /  pO2: 342   / HCO3: 24    / Base Excess: 1.0   /  SaO2: 100                 RADIOLOGY & ADDITIONAL TESTS:

## 2019-09-21 LAB
ALBUMIN SERPL ELPH-MCNC: 1.8 G/DL — LOW (ref 3.3–5)
ALP SERPL-CCNC: 85 U/L — SIGNIFICANT CHANGE UP (ref 40–120)
ALT FLD-CCNC: 6 U/L — LOW (ref 12–78)
ANION GAP SERPL CALC-SCNC: 10 MMOL/L — SIGNIFICANT CHANGE UP (ref 5–17)
AST SERPL-CCNC: 19 U/L — SIGNIFICANT CHANGE UP (ref 15–37)
BILIRUB SERPL-MCNC: 0.5 MG/DL — SIGNIFICANT CHANGE UP (ref 0.2–1.2)
BUN SERPL-MCNC: 20 MG/DL — SIGNIFICANT CHANGE UP (ref 7–23)
CALCIUM SERPL-MCNC: 7.7 MG/DL — LOW (ref 8.5–10.1)
CHLORIDE SERPL-SCNC: 98 MMOL/L — SIGNIFICANT CHANGE UP (ref 96–108)
CO2 SERPL-SCNC: 28 MMOL/L — SIGNIFICANT CHANGE UP (ref 22–31)
CREAT SERPL-MCNC: 6.78 MG/DL — HIGH (ref 0.5–1.3)
GLUCOSE SERPL-MCNC: 82 MG/DL — SIGNIFICANT CHANGE UP (ref 70–99)
HCT VFR BLD CALC: 28 % — LOW (ref 39–50)
HGB BLD-MCNC: 8.6 G/DL — LOW (ref 13–17)
MAGNESIUM SERPL-MCNC: 2.5 MG/DL — SIGNIFICANT CHANGE UP (ref 1.6–2.6)
MCHC RBC-ENTMCNC: 27.1 PG — SIGNIFICANT CHANGE UP (ref 27–34)
MCHC RBC-ENTMCNC: 30.7 GM/DL — LOW (ref 32–36)
MCV RBC AUTO: 88.3 FL — SIGNIFICANT CHANGE UP (ref 80–100)
NRBC # BLD: 0 /100 WBCS — SIGNIFICANT CHANGE UP (ref 0–0)
PHOSPHATE SERPL-MCNC: 5.3 MG/DL — HIGH (ref 2.5–4.5)
PLATELET # BLD AUTO: 252 K/UL — SIGNIFICANT CHANGE UP (ref 150–400)
POTASSIUM SERPL-MCNC: 3.6 MMOL/L — SIGNIFICANT CHANGE UP (ref 3.5–5.3)
POTASSIUM SERPL-SCNC: 3.6 MMOL/L — SIGNIFICANT CHANGE UP (ref 3.5–5.3)
PROT SERPL-MCNC: 6.9 GM/DL — SIGNIFICANT CHANGE UP (ref 6–8.3)
RBC # BLD: 3.17 M/UL — LOW (ref 4.2–5.8)
RBC # FLD: 17.8 % — HIGH (ref 10.3–14.5)
SODIUM SERPL-SCNC: 136 MMOL/L — SIGNIFICANT CHANGE UP (ref 135–145)
WBC # BLD: 10.84 K/UL — HIGH (ref 3.8–10.5)
WBC # FLD AUTO: 10.84 K/UL — HIGH (ref 3.8–10.5)

## 2019-09-21 PROCEDURE — 76937 US GUIDE VASCULAR ACCESS: CPT | Mod: 26,59

## 2019-09-21 PROCEDURE — 36569 INSJ PICC 5 YR+ W/O IMAGING: CPT

## 2019-09-21 RX ORDER — INFLUENZA VIRUS VACCINE 15; 15; 15; 15 UG/.5ML; UG/.5ML; UG/.5ML; UG/.5ML
0.5 SUSPENSION INTRAMUSCULAR ONCE
Refills: 0 | Status: DISCONTINUED | OUTPATIENT
Start: 2019-09-21 | End: 2019-10-01

## 2019-09-21 RX ORDER — CHLORHEXIDINE GLUCONATE 213 G/1000ML
1 SOLUTION TOPICAL DAILY
Refills: 0 | Status: DISCONTINUED | OUTPATIENT
Start: 2019-09-21 | End: 2019-10-01

## 2019-09-21 RX ADMIN — CHLORHEXIDINE GLUCONATE 1 APPLICATION(S): 213 SOLUTION TOPICAL at 10:03

## 2019-09-21 RX ADMIN — PIPERACILLIN AND TAZOBACTAM 25 GRAM(S): 4; .5 INJECTION, POWDER, LYOPHILIZED, FOR SOLUTION INTRAVENOUS at 14:18

## 2019-09-21 RX ADMIN — MICAFUNGIN SODIUM 105 MILLIGRAM(S): 100 INJECTION, POWDER, LYOPHILIZED, FOR SOLUTION INTRAVENOUS at 11:50

## 2019-09-21 RX ADMIN — MORPHINE SULFATE 2 MILLIGRAM(S): 50 CAPSULE, EXTENDED RELEASE ORAL at 14:23

## 2019-09-21 RX ADMIN — CHLORHEXIDINE GLUCONATE 1 APPLICATION(S): 213 SOLUTION TOPICAL at 14:54

## 2019-09-21 RX ADMIN — SODIUM CHLORIDE 50 MILLILITER(S): 9 INJECTION, SOLUTION INTRAVENOUS at 17:33

## 2019-09-21 RX ADMIN — RALTEGRAVIR 400 MILLIGRAM(S): 400 TABLET, FILM COATED ORAL at 06:00

## 2019-09-21 RX ADMIN — ETRAVIRINE 200 MILLIGRAM(S): 200 TABLET ORAL at 17:30

## 2019-09-21 RX ADMIN — Medication 10 MILLIGRAM(S): at 14:20

## 2019-09-21 RX ADMIN — RALTEGRAVIR 400 MILLIGRAM(S): 400 TABLET, FILM COATED ORAL at 17:30

## 2019-09-21 RX ADMIN — MORPHINE SULFATE 2 MILLIGRAM(S): 50 CAPSULE, EXTENDED RELEASE ORAL at 14:38

## 2019-09-21 RX ADMIN — ETRAVIRINE 200 MILLIGRAM(S): 200 TABLET ORAL at 10:02

## 2019-09-21 RX ADMIN — DARUNAVIR 800 MILLIGRAM(S): 75 TABLET, FILM COATED ORAL at 14:19

## 2019-09-21 RX ADMIN — HEPARIN SODIUM 5000 UNIT(S): 5000 INJECTION INTRAVENOUS; SUBCUTANEOUS at 17:31

## 2019-09-21 RX ADMIN — MORPHINE SULFATE 2 MILLIGRAM(S): 50 CAPSULE, EXTENDED RELEASE ORAL at 09:40

## 2019-09-21 RX ADMIN — Medication 10 MILLIGRAM(S): at 05:54

## 2019-09-21 RX ADMIN — RITONAVIR 100 MILLIGRAM(S): 100 TABLET, FILM COATED ORAL at 14:19

## 2019-09-21 RX ADMIN — HEPARIN SODIUM 5000 UNIT(S): 5000 INJECTION INTRAVENOUS; SUBCUTANEOUS at 05:54

## 2019-09-21 RX ADMIN — PIPERACILLIN AND TAZOBACTAM 25 GRAM(S): 4; .5 INJECTION, POWDER, LYOPHILIZED, FOR SOLUTION INTRAVENOUS at 04:01

## 2019-09-21 NOTE — PROGRESS NOTE ADULT - SUBJECTIVE AND OBJECTIVE BOX
SURGERY PROGRESS HPI:  Pt seen and examined at bedside. Denies pain. Pt denies complaints. Pt tolerating NGT. Pt denies nausea and vomiting. No ostomy function yet. Pt denies chest pain, SOB, dizziness, fever, chills.       Vital Signs Last 24 Hrs  T(C): 36.6 (21 Sep 2019 05:52), Max: 36.9 (20 Sep 2019 13:45)  T(F): 97.8 (21 Sep 2019 05:52), Max: 98.5 (20 Sep 2019 18:30)  HR: 101 (21 Sep 2019 06:12) (89 - 110)  BP: 125/60 (21 Sep 2019 05:52) (125/60 - 151/82)  BP(mean): --  RR: 18 (21 Sep 2019 05:52) (17 - 20)  SpO2: 96% (21 Sep 2019 06:12) (96% - 100%)      PHYSICAL EXAM:  GENERAL: NAD. NGT in place with bilious output  CHEST/LUNG: Clear to ausculation, bilaterally   HEART: S1S2  ABDOMEN: Abdominal binder in place. Midline dressing CDI. Dressing changed. Bhavin drain with s/s fluid. Ostomy viable, no air or stool in bag yet. Hypoactive BS, Nondistended, soft, Appropriate tenderness to postop area, no guarding, no rebound   EXTREMITIES:  calf soft, non tender b/l. 2+ distal pulses b/l.     I&O's Detail    20 Sep 2019 07:01  -  21 Sep 2019 07:00  --------------------------------------------------------  IN:  Total IN: 0 mL    OUT:    Bulb: 10 mL    Nasoenteral Tube: 200 mL    Other: 1500 mL  Total OUT: 1710 mL    Total NET: -1710 mL          LABS:                        8.6    10.84 )-----------( 252      ( 21 Sep 2019 07:49 )             28.0     09-21    136  |  98  |  20  ----------------------------<  82  3.6   |  28  |  6.78<H>    Ca    7.7<L>      21 Sep 2019 07:49  Phos  5.3     09-21  Mg     2.5     09-21    TPro  6.9  /  Alb  1.8<L>  /  TBili  0.5  /  DBili  x   /  AST  19  /  ALT  6<L>  /  AlkPhos  85  09-21    Culture Results:   Rare Yeast (09-19 @ 09:12)      Impression: 58y Male admitted with perforated sigmoid diverticulitis S/P Exp Lap, MAYDA, SBR, Sigmoid Colon rsxn, peritoneal lavage, I&D of intra-abdominal abscess- POD#3    Plan:  -NPO, NGT, IVF  -pain management prn  -continue DVT prophylaxis, OOB, Ambulating  -continue incentive spirometer  -continue local wound care and ostomy care  -antibiotics per ID  -f/u labs  -continue medical and renal input. Follow up GI input   -will discuss pt with surgical attending
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
## 2019-09-21 NOTE — PROCEDURE NOTE - ADDITIONAL PROCEDURE DETAILS
Patient seen at bedside for midline catheter placement.  Consent obtained from patient after risks/benefits/alternatives explained.   Upper extremity prepped and draped in usual sterile fashion. Time out performed with RN. 4Fr 15 cm single lumen midline catheter placed using ultrasound guided seldinger technique, R brachial vein. Flushes well, with good venous return. Patient tolerated procedure well without complication and was left in no acute distress. Upper arm circumference noted to be 35cm

## 2019-09-21 NOTE — PROCEDURE NOTE - NSPROCDETAILS_GEN_ALL_CORE
sterile dressing applied/sterile technique, catheter placed/ultrasound guidance/location identified, draped/prepped, sterile technique used/supine position/ultrasound assessment

## 2019-09-21 NOTE — OCCUPATIONAL THERAPY INITIAL EVALUATION ADULT - PERTINENT HX OF CURRENT PROBLEM, REHAB EVAL
59 y/o male admitted to [facility] due to perforated sigmoid diverticulitis. Patient had Exp Lap, MAYDA, SBR, Sigmoid Colon rsxn, peritoneal lavage, I&D of intra-abdominal abscess done on 9/18/19 and was remained intubated post surgery due to acute respiratory failure secondary to anaesthesia. Patient extubated on 9/19/19 succesfully.

## 2019-09-21 NOTE — OCCUPATIONAL THERAPY INITIAL EVALUATION ADULT - STRENGTHENING, PT EVAL
Pt will increase BUE/BLE lower extremity strength to 5/5 to improve functional strength needed to engage in functional tasks by 4 weeks

## 2019-09-21 NOTE — PROGRESS NOTE ADULT - ASSESSMENT
58 male with a history of HIV, Hep C, HTN, CAD, CHF, DM. PVD, ESRD on HD now with perforated diverticulitis and S/P Exp Lap. Had stable dialysis yesterday. labs and medications reviewed. Continue supportive care.

## 2019-09-21 NOTE — OCCUPATIONAL THERAPY INITIAL EVALUATION ADULT - GENERAL OBSERVATIONS, REHAB EVAL
Pt was encountered supine in bed; NAD, S/P Exp Lap, MAYDA, SBR, Sigmoid Colon rsxn, peritoneal lavage, I&D of intra-abdominal abscess- POD#3, nasal cannula on 3.5L O2, NG tube on and intact, IV on and intact, CHARLEY drain on and intact, abdominal binder on and intact, surgical site clean and intact, pneumatic teds on and intact, AXOX4, cooperative, followed commands; pt c/o pain in abdominal region which limites patients ability to perform functional tasks and transfers/mobility.

## 2019-09-21 NOTE — PROCEDURE NOTE - NSPOSTCAREGUIDE_GEN_A_CORE
Care for catheter as per unit/ICU protocols
Instructed patient/caregiver to follow-up with primary care physician/Instructed patient/caregiver regarding signs and symptoms of infection/Verbal/written post procedure instructions were given to patient/caregiver/Keep the cast/splint/dressing clean and dry/Care for catheter as per unit/ICU protocols
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
## 2019-09-21 NOTE — OCCUPATIONAL THERAPY INITIAL EVALUATION ADULT - TRANSFER SAFETY CONCERNS NOTED: SIT/STAND, REHAB EVAL
decreased step length/decreased sequencing ability/decreased weight-shifting ability/decreased balance during turns

## 2019-09-22 LAB
-  AMPICILLIN: SIGNIFICANT CHANGE UP
-  DAPTOMYCIN: SIGNIFICANT CHANGE UP
-  LEVOFLOXACIN: SIGNIFICANT CHANGE UP
-  LINEZOLID: SIGNIFICANT CHANGE UP
-  TETRACYCLINE: SIGNIFICANT CHANGE UP
-  VANCOMYCIN: SIGNIFICANT CHANGE UP
ANION GAP SERPL CALC-SCNC: 10 MMOL/L — SIGNIFICANT CHANGE UP (ref 5–17)
BUN SERPL-MCNC: 28 MG/DL — HIGH (ref 7–23)
CALCIUM SERPL-MCNC: 7.1 MG/DL — LOW (ref 8.5–10.1)
CHLORIDE SERPL-SCNC: 97 MMOL/L — SIGNIFICANT CHANGE UP (ref 96–108)
CO2 SERPL-SCNC: 27 MMOL/L — SIGNIFICANT CHANGE UP (ref 22–31)
CREAT SERPL-MCNC: 8.75 MG/DL — HIGH (ref 0.5–1.3)
GLUCOSE SERPL-MCNC: 70 MG/DL — SIGNIFICANT CHANGE UP (ref 70–99)
HCT VFR BLD CALC: 27.7 % — LOW (ref 39–50)
HGB BLD-MCNC: 8.2 G/DL — LOW (ref 13–17)
MAGNESIUM SERPL-MCNC: 2.2 MG/DL — SIGNIFICANT CHANGE UP (ref 1.6–2.6)
MCHC RBC-ENTMCNC: 26 PG — LOW (ref 27–34)
MCHC RBC-ENTMCNC: 29.6 GM/DL — LOW (ref 32–36)
MCV RBC AUTO: 87.9 FL — SIGNIFICANT CHANGE UP (ref 80–100)
METHOD TYPE: SIGNIFICANT CHANGE UP
NRBC # BLD: 0 /100 WBCS — SIGNIFICANT CHANGE UP (ref 0–0)
PHOSPHATE SERPL-MCNC: 6 MG/DL — HIGH (ref 2.5–4.5)
PLATELET # BLD AUTO: 236 K/UL — SIGNIFICANT CHANGE UP (ref 150–400)
POTASSIUM SERPL-MCNC: 3.7 MMOL/L — SIGNIFICANT CHANGE UP (ref 3.5–5.3)
POTASSIUM SERPL-SCNC: 3.7 MMOL/L — SIGNIFICANT CHANGE UP (ref 3.5–5.3)
RBC # BLD: 3.15 M/UL — LOW (ref 4.2–5.8)
RBC # FLD: 17.9 % — HIGH (ref 10.3–14.5)
SODIUM SERPL-SCNC: 134 MMOL/L — LOW (ref 135–145)
WBC # BLD: 7.77 K/UL — SIGNIFICANT CHANGE UP (ref 3.8–10.5)
WBC # FLD AUTO: 7.77 K/UL — SIGNIFICANT CHANGE UP (ref 3.8–10.5)

## 2019-09-22 RX ADMIN — HEPARIN SODIUM 5000 UNIT(S): 5000 INJECTION INTRAVENOUS; SUBCUTANEOUS at 17:07

## 2019-09-22 RX ADMIN — MORPHINE SULFATE 2 MILLIGRAM(S): 50 CAPSULE, EXTENDED RELEASE ORAL at 00:45

## 2019-09-22 RX ADMIN — PIPERACILLIN AND TAZOBACTAM 25 GRAM(S): 4; .5 INJECTION, POWDER, LYOPHILIZED, FOR SOLUTION INTRAVENOUS at 11:35

## 2019-09-22 RX ADMIN — HEPARIN SODIUM 5000 UNIT(S): 5000 INJECTION INTRAVENOUS; SUBCUTANEOUS at 05:51

## 2019-09-22 RX ADMIN — MICAFUNGIN SODIUM 105 MILLIGRAM(S): 100 INJECTION, POWDER, LYOPHILIZED, FOR SOLUTION INTRAVENOUS at 11:34

## 2019-09-22 RX ADMIN — RALTEGRAVIR 400 MILLIGRAM(S): 400 TABLET, FILM COATED ORAL at 17:08

## 2019-09-22 RX ADMIN — RALTEGRAVIR 400 MILLIGRAM(S): 400 TABLET, FILM COATED ORAL at 06:15

## 2019-09-22 RX ADMIN — ETRAVIRINE 200 MILLIGRAM(S): 200 TABLET ORAL at 17:09

## 2019-09-22 RX ADMIN — RITONAVIR 100 MILLIGRAM(S): 100 TABLET, FILM COATED ORAL at 11:35

## 2019-09-22 RX ADMIN — ATORVASTATIN CALCIUM 10 MILLIGRAM(S): 80 TABLET, FILM COATED ORAL at 22:05

## 2019-09-22 RX ADMIN — MORPHINE SULFATE 2 MILLIGRAM(S): 50 CAPSULE, EXTENDED RELEASE ORAL at 00:31

## 2019-09-22 RX ADMIN — PIPERACILLIN AND TAZOBACTAM 25 GRAM(S): 4; .5 INJECTION, POWDER, LYOPHILIZED, FOR SOLUTION INTRAVENOUS at 00:22

## 2019-09-22 RX ADMIN — SODIUM CHLORIDE 50 MILLILITER(S): 9 INJECTION, SOLUTION INTRAVENOUS at 13:00

## 2019-09-22 RX ADMIN — Medication 10 MILLIGRAM(S): at 22:05

## 2019-09-22 RX ADMIN — ATORVASTATIN CALCIUM 10 MILLIGRAM(S): 80 TABLET, FILM COATED ORAL at 00:23

## 2019-09-22 RX ADMIN — ETRAVIRINE 200 MILLIGRAM(S): 200 TABLET ORAL at 10:27

## 2019-09-22 RX ADMIN — CHLORHEXIDINE GLUCONATE 1 APPLICATION(S): 213 SOLUTION TOPICAL at 07:50

## 2019-09-22 RX ADMIN — Medication 10 MILLIGRAM(S): at 05:51

## 2019-09-22 RX ADMIN — DARUNAVIR 800 MILLIGRAM(S): 75 TABLET, FILM COATED ORAL at 11:35

## 2019-09-22 RX ADMIN — Medication 10 MILLIGRAM(S): at 00:22

## 2019-09-22 RX ADMIN — Medication 10 MILLIGRAM(S): at 13:00

## 2019-09-22 NOTE — PROGRESS NOTE ADULT - ASSESSMENT
58 male with a history of HIV, Hep C, HTN, CAD, CHF, DM. PVD, ESRD on HD now with perforated diverticulitis and S/P Exp Lap. NGT suction in progress. Had stable dialysis yesterday. labs and medications reviewed. Continue supportive care.

## 2019-09-22 NOTE — PROGRESS NOTE ADULT - SUBJECTIVE AND OBJECTIVE BOX
POD#4 s/p ex lap, MAYDA, SB resection, sigmoid colon resection w creation of colostomy, I&D of intraabdominal abscess and repair of incarcerated hernia     Patient seen and examined bedside resting comfortably. Admits to mild nausea, no vomiting. As well as diffuse mild abdominal pain, controlled with medications.   Tolerating NGT. +ostomy function.  Denies fevers, chills, cough, dyspnea, chest pain. 	      T(F): 97.7 (09-22-19 @ 05:17), Max: 98.6 (09-21-19 @ 17:19)  HR: 98 (09-22-19 @ 08:17) (89 - 98)  BP: 127/77 (09-22-19 @ 05:17) (122/64 - 135/78)  RR: 17 (09-22-19 @ 05:17) (15 - 17)  SpO2: 99% (09-22-19 @ 08:17) (95% - 100%)  Wt(kg): --  CAPILLARY BLOOD GLUCOSE      PHYSICAL EXAM:  General: NAD, A&Ox3, NGT in place with bilious output  CV: +S1+S2 regular rate and rhythm  Lung: clear to auscultation bilaterally, respirations nonlabored   Abdomen: abdominal binder in place. midline dressing C/D/I. Dressing changed. Bhavin drain with serosanguinous fluid. Ostomy viable, stool in bag. obese abdomen, non-distended, normoactive BS, soft, appropriate incisional tenderness, no rebound/guarding.  Extremities: no pedal edema or calf tenderness noted     LABS:                        8.2    7.77  )-----------( 236      ( 22 Sep 2019 07:55 )             27.7     09-22    134<L>  |  97  |  28<H>  ----------------------------<  70  3.7   |  27  |  8.75<H>    Ca    7.1<L>      22 Sep 2019 07:55  Phos  6.0     09-22  Mg     2.2     09-22    TPro  6.9  /  Alb  1.8<L>  /  TBili  0.5  /  DBili  x   /  AST  19  /  ALT  6<L>  /  AlkPhos  85  09-21        I&O:     Culture Results:   Rare Yeast  Rare Enterococcus faecium (09-19 @ 09:12)      Impression: 58y Male admitted with perforated sigmoid diverticulitis S/P Exp Lap, MAYDA, SBR, Sigmoid Colon rsxn w creation of colostomy, peritoneal lavage, I&D of intra-abdominal abscess- POD#4  +stool in colostomy bag this AM    Plan:  - Continue NPO and NGT for now, IVF  - pain management prn  - continue DVT prophylaxis, OOB, Ambulating  - continue incentive spirometer  - continue local wound care and ostomy care  - antibiotics per ID  - continue medical and renal input. Follow up GI input for PPN      Will D/w Dr. Garcia

## 2019-09-22 NOTE — PROGRESS NOTE ADULT - SUBJECTIVE AND OBJECTIVE BOX
Patient is a 58y old  Male who presents with a chief complaint of Sigmoid perforation (21 Sep 2019 11:06)      INTERVAL HPI/OVERNIGHT EVENTS:  pt with no new complaint  MEDICATIONS  (STANDING):  atorvastatin 10 milliGRAM(s) Oral at bedtime  chlorhexidine 4% Liquid 1 Application(s) Topical <User Schedule>  chlorhexidine 4% Liquid 1 Application(s) Topical daily  darunavir 800 milliGRAM(s) Oral daily  dextrose 5% + sodium chloride 0.45%. 1000 milliLiter(s) (50 mL/Hr) IV Continuous <Continuous>  etravirine 200 milliGRAM(s) Oral two times a day after meals  heparin  Injectable 5000 Unit(s) SubCutaneous every 12 hours  hydrALAZINE 10 milliGRAM(s) Oral every 8 hours  influenza   Vaccine 0.5 milliLiter(s) IntraMuscular once  micafungin IVPB      micafungin IVPB 100 milliGRAM(s) IV Intermittent every 24 hours  piperacillin/tazobactam IVPB.. 3.375 Gram(s) IV Intermittent every 12 hours  raltegravir Tablet 400 milliGRAM(s) Oral two times a day  ritonavir Tablet 100 milliGRAM(s) Oral daily    MEDICATIONS  (PRN):  morphine  - Injectable 2 milliGRAM(s) IV Push every 4 hours PRN Moderate Pain (4 - 6)      Allergies    ciprofloxacin (Rash)  Levaquin (Rash)    Intolerances        REVIEW OF SYSTEMS:  CONSTITUTIONAL: No fever, weight loss, or fatigue  EYES: No eye pain, visual disturbances, or discharge  ENMT:  No difficulty hearing, tinnitus, vertigo; No sinus or throat pain  NECK: No pain or stiffness  BREASTS: No pain, masses, or nipple discharge  RESPIRATORY: No cough, wheezing, chills or hemoptysis; No shortness of breath  CARDIOVASCULAR: No chest pain, palpitations, dizziness, or leg swelling  GASTROINTESTINAL: No abdominal or epigastric pain. No nausea, vomiting, or hematemesis; No diarrhea or constipation. No melena or hematochezia.  GENITOURINARY: No dysuria, frequency, hematuria, or incontinence  NEUROLOGICAL: No headaches, memory loss, loss of strength, numbness, or tremors  SKIN: No itching, burning, rashes, or lesions   LYMPH NODES: No enlarged glands  ENDOCRINE: No heat or cold intolerance; No hair loss  MUSCULOSKELETAL: No joint pain or swelling; No muscle, back, or extremity pain  PSYCHIATRIC: No depression, anxiety, mood swings, or difficulty sleeping  HEME/LYMPH: No easy bruising, or bleeding gums  ALLERGY AND IMMUNOLOGIC: No hives or eczema    Vital Signs Last 24 Hrs  T(C): 36.5 (22 Sep 2019 05:17), Max: 37 (21 Sep 2019 17:19)  T(F): 97.7 (22 Sep 2019 05:17), Max: 98.6 (21 Sep 2019 17:19)  HR: 98 (22 Sep 2019 08:17) (89 - 98)  BP: 127/77 (22 Sep 2019 05:17) (122/64 - 135/78)  BP(mean): --  RR: 17 (22 Sep 2019 05:17) (15 - 17)  SpO2: 99% (22 Sep 2019 08:17) (95% - 100%)    PHYSICAL EXAM:  GENERAL: NAD, well-groomed, well-developed  HEAD:  Atraumatic, Normocephalic  EYES: EOMI, PERRLA, conjunctiva and sclera clear  ENMT: No tonsillar erythema, exudates, or enlargement; Moist mucous membranes, Good dentition, No lesions  NECK: Supple, No JVD, Normal thyroid  NERVOUS SYSTEM:  Alert & Oriented X3, Good concentration; Motor Strength 5/5 B/L upper and lower extremities; DTRs 2+ intact and symmetric  CHEST/LUNG: Clear to percussion bilaterally; No rales, rhonchi, wheezing, or rubs  HEART: Regular rate and rhythm; No murmurs, rubs, or gallops  ABDOMEN: Soft, Nontender, Nondistended; Bowel sounds present  EXTREMITIES:  2+ Peripheral Pulses, No clubbing, cyanosis, or edema  LYMPH: No lymphadenopathy noted  SKIN: No rashes or lesions    LABS:                        8.2    7.77  )-----------( 236      ( 22 Sep 2019 07:55 )             27.7     09-22    134<L>  |  97  |  28<H>  ----------------------------<  70  3.7   |  27  |  8.75<H>    Ca    7.1<L>      22 Sep 2019 07:55  Phos  6.0     09-22  Mg     2.2     09-22    TPro  6.9  /  Alb  1.8<L>  /  TBili  0.5  /  DBili  x   /  AST  19  /  ALT  6<L>  /  AlkPhos  85  09-21        CAPILLARY BLOOD GLUCOSE        CULTURES:    HEMOGLOBIN A1C:    CHOLESTEROL:        RADIOLOGY & ADDITIONAL TESTS:
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
## 2019-09-23 LAB
ANION GAP SERPL CALC-SCNC: 15 MMOL/L — SIGNIFICANT CHANGE UP (ref 5–17)
BUN SERPL-MCNC: 32 MG/DL — HIGH (ref 7–23)
CALCIUM SERPL-MCNC: 8 MG/DL — LOW (ref 8.5–10.1)
CHLORIDE SERPL-SCNC: 96 MMOL/L — SIGNIFICANT CHANGE UP (ref 96–108)
CO2 SERPL-SCNC: 24 MMOL/L — SIGNIFICANT CHANGE UP (ref 22–31)
CREAT SERPL-MCNC: 11.1 MG/DL — HIGH (ref 0.5–1.3)
GLUCOSE SERPL-MCNC: 58 MG/DL — LOW (ref 70–99)
HCT VFR BLD CALC: 27.4 % — LOW (ref 39–50)
HGB BLD-MCNC: 8.3 G/DL — LOW (ref 13–17)
MCHC RBC-ENTMCNC: 26.5 PG — LOW (ref 27–34)
MCHC RBC-ENTMCNC: 30.3 GM/DL — LOW (ref 32–36)
MCV RBC AUTO: 87.5 FL — SIGNIFICANT CHANGE UP (ref 80–100)
NRBC # BLD: 0 /100 WBCS — SIGNIFICANT CHANGE UP (ref 0–0)
PLATELET # BLD AUTO: 266 K/UL — SIGNIFICANT CHANGE UP (ref 150–400)
POTASSIUM SERPL-MCNC: 3.9 MMOL/L — SIGNIFICANT CHANGE UP (ref 3.5–5.3)
POTASSIUM SERPL-SCNC: 3.9 MMOL/L — SIGNIFICANT CHANGE UP (ref 3.5–5.3)
RBC # BLD: 3.13 M/UL — LOW (ref 4.2–5.8)
RBC # FLD: 17.6 % — HIGH (ref 10.3–14.5)
SODIUM SERPL-SCNC: 135 MMOL/L — SIGNIFICANT CHANGE UP (ref 135–145)
WBC # BLD: 8.56 K/UL — SIGNIFICANT CHANGE UP (ref 3.8–10.5)
WBC # FLD AUTO: 8.56 K/UL — SIGNIFICANT CHANGE UP (ref 3.8–10.5)

## 2019-09-23 PROCEDURE — 74019 RADEX ABDOMEN 2 VIEWS: CPT | Mod: 26

## 2019-09-23 PROCEDURE — 99233 SBSQ HOSP IP/OBS HIGH 50: CPT

## 2019-09-23 RX ORDER — CARVEDILOL PHOSPHATE 80 MG/1
6.25 CAPSULE, EXTENDED RELEASE ORAL EVERY 12 HOURS
Refills: 0 | Status: DISCONTINUED | OUTPATIENT
Start: 2019-09-23 | End: 2019-10-01

## 2019-09-23 RX ORDER — DAPTOMYCIN 500 MG/10ML
500 INJECTION, POWDER, LYOPHILIZED, FOR SOLUTION INTRAVENOUS ONCE
Refills: 0 | Status: COMPLETED | OUTPATIENT
Start: 2019-09-23 | End: 2019-09-23

## 2019-09-23 RX ORDER — DAPTOMYCIN 500 MG/10ML
INJECTION, POWDER, LYOPHILIZED, FOR SOLUTION INTRAVENOUS
Refills: 0 | Status: DISCONTINUED | OUTPATIENT
Start: 2019-09-23 | End: 2019-09-25

## 2019-09-23 RX ORDER — DAPTOMYCIN 500 MG/10ML
500 INJECTION, POWDER, LYOPHILIZED, FOR SOLUTION INTRAVENOUS
Refills: 0 | Status: DISCONTINUED | OUTPATIENT
Start: 2019-09-25 | End: 2019-09-25

## 2019-09-23 RX ADMIN — MICAFUNGIN SODIUM 105 MILLIGRAM(S): 100 INJECTION, POWDER, LYOPHILIZED, FOR SOLUTION INTRAVENOUS at 18:25

## 2019-09-23 RX ADMIN — ETRAVIRINE 200 MILLIGRAM(S): 200 TABLET ORAL at 18:24

## 2019-09-23 RX ADMIN — HEPARIN SODIUM 5000 UNIT(S): 5000 INJECTION INTRAVENOUS; SUBCUTANEOUS at 05:23

## 2019-09-23 RX ADMIN — HEPARIN SODIUM 5000 UNIT(S): 5000 INJECTION INTRAVENOUS; SUBCUTANEOUS at 18:25

## 2019-09-23 RX ADMIN — PIPERACILLIN AND TAZOBACTAM 25 GRAM(S): 4; .5 INJECTION, POWDER, LYOPHILIZED, FOR SOLUTION INTRAVENOUS at 00:22

## 2019-09-23 RX ADMIN — CARVEDILOL PHOSPHATE 6.25 MILLIGRAM(S): 80 CAPSULE, EXTENDED RELEASE ORAL at 18:25

## 2019-09-23 RX ADMIN — CHLORHEXIDINE GLUCONATE 1 APPLICATION(S): 213 SOLUTION TOPICAL at 18:27

## 2019-09-23 RX ADMIN — DARUNAVIR 800 MILLIGRAM(S): 75 TABLET, FILM COATED ORAL at 18:23

## 2019-09-23 RX ADMIN — PIPERACILLIN AND TAZOBACTAM 25 GRAM(S): 4; .5 INJECTION, POWDER, LYOPHILIZED, FOR SOLUTION INTRAVENOUS at 19:45

## 2019-09-23 RX ADMIN — RITONAVIR 100 MILLIGRAM(S): 100 TABLET, FILM COATED ORAL at 18:24

## 2019-09-23 RX ADMIN — MORPHINE SULFATE 2 MILLIGRAM(S): 50 CAPSULE, EXTENDED RELEASE ORAL at 19:41

## 2019-09-23 RX ADMIN — RALTEGRAVIR 400 MILLIGRAM(S): 400 TABLET, FILM COATED ORAL at 18:24

## 2019-09-23 RX ADMIN — MORPHINE SULFATE 2 MILLIGRAM(S): 50 CAPSULE, EXTENDED RELEASE ORAL at 19:56

## 2019-09-23 RX ADMIN — RALTEGRAVIR 400 MILLIGRAM(S): 400 TABLET, FILM COATED ORAL at 05:23

## 2019-09-23 RX ADMIN — CHLORHEXIDINE GLUCONATE 1 APPLICATION(S): 213 SOLUTION TOPICAL at 05:30

## 2019-09-23 RX ADMIN — DAPTOMYCIN 120 MILLIGRAM(S): 500 INJECTION, POWDER, LYOPHILIZED, FOR SOLUTION INTRAVENOUS at 19:44

## 2019-09-23 RX ADMIN — Medication 10 MILLIGRAM(S): at 05:23

## 2019-09-23 RX ADMIN — Medication 10 MILLIGRAM(S): at 18:25
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
## 2019-09-23 NOTE — PROGRESS NOTE ADULT - ASSESSMENT
58 yr old   obese male seen with   1.AIDS/HIV :cont HAART  pt non compliant   Inspite his Cd 4 is 201 and VL is 1703  will cont current HAART   will do genotype and phenotype to see if HAART therapy can be changed       2.Acute peritonitis: sec to perforated sigmoid diverticulitis perforation and abscess  s/p MAYDA and resection   OR c/s growingly yeast  and VRE so will add daptomycin  cont micafungin (day 4)  cont zosyn for now (day 6 )       3.Leukocytosis : resolving  sec to above      4.ESRD on HD  antibiotics dosage adjusted      5.Acute resp failure : resolved  CPAP as per requirement

## 2019-09-23 NOTE — PROGRESS NOTE ADULT - SUBJECTIVE AND OBJECTIVE BOX
Patient is a 58y old  Male who presents with a chief complaint of Sigmoid perforation (24 Sep 2019 10:10)      INTERVAL HPI / OVERNIGHT EVENTS: doing ok ,off BIPAP/CPAP now, uses it at night as needed, no abdo pain ,NGT +.pt seen in HD     MEDICATIONS  (STANDING):  atorvastatin 10 milliGRAM(s) Oral at bedtime  carvedilol 6.25 milliGRAM(s) Oral every 12 hours  chlorhexidine 4% Liquid 1 Application(s) Topical <User Schedule>  chlorhexidine 4% Liquid 1 Application(s) Topical daily  DAPTOmycin IVPB      darunavir 800 milliGRAM(s) Oral daily  dextrose 5% + sodium chloride 0.45%. 1000 milliLiter(s) (50 mL/Hr) IV Continuous <Continuous>  etravirine 200 milliGRAM(s) Oral two times a day after meals  heparin  Injectable 5000 Unit(s) SubCutaneous every 12 hours  hydrALAZINE 10 milliGRAM(s) Oral every 8 hours  influenza   Vaccine 0.5 milliLiter(s) IntraMuscular once  micafungin IVPB      micafungin IVPB 100 milliGRAM(s) IV Intermittent every 24 hours  raltegravir Tablet 400 milliGRAM(s) Oral two times a day  ritonavir Tablet 100 milliGRAM(s) Oral daily    MEDICATIONS  (PRN):  morphine  - Injectable 2 milliGRAM(s) IV Push every 4 hours PRN Moderate Pain (4 - 6)      Vital Signs Last 24 Hrs  T(C): 36.7 (24 Sep 2019 11:34), Max: 37.1 (23 Sep 2019 17:26)  T(F): 98 (24 Sep 2019 11:34), Max: 98.7 (23 Sep 2019 17:26)  HR: 103 (24 Sep 2019 11:34) (90 - 106)  BP: 150/77 (24 Sep 2019 11:34) (122/77 - 150/77)  BP(mean): --  RR: 17 (24 Sep 2019 11:34) (16 - 17)  SpO2: 96% (24 Sep 2019 11:34) (93% - 100%)    Review of systems:  General : no fever /chills, fatigue  CVS : no chest pain, palpitations  Lungs : no shortness of breath, cough  GI : no abdominal pain, vomiting, diarrhea   : no dysuria, hematuria        PHYSICAL EXAM:  General :NAD,obese ,NGT present  Constitutional:  well-groomed, well-developed  Respiratory: CTAB/L  Cardiovascular: S1 and S2, RRR, no M/G/R  Gastrointestinal: s/p Ex lap Right sided CHARLEY drain + colostomy present   Extremities: No peripheral edema  Vascular: 2+ peripheral pulses  Skin: No rashes      LABS:                        8.3    8.56  )-----------( 266      ( 23 Sep 2019 08:23 )             27.4     09-23    135  |  96  |  32<H>  ----------------------------<  58<L>  3.9   |  24  |  11.10<H>    Ca    8.0<L>      23 Sep 2019 11:37    HIV markers    ABS CD4: 201 /uL (09.19.19 @ 19:29)      HIV-1 RNA Quantitative, Viral Load:   1,703 (09.19.19 @ 19:42)          MICROBIOLOGY:  RECENT CULTURES:  09-19 .Surgical Swab Enterococcus faecium (vancomycin resistant) XXXX   Rare Candida albicans "Susceptibilities not performed"  Rare Enterococcus faecium (vancomycin resistant)          RADIOLOGY & ADDITIONAL STUDIES: Patient is a 58y old  Male who presents with a chief complaint of Sigmoid perforation (24 Sep 2019 10:10)      INTERVAL HPI / OVERNIGHT EVENTS: doing ok ,off BIPAP/CPAP now, uses it at night as needed, no abdo pain ,NGT +.pt seen in HD     MEDICATIONS  (STANDING):  atorvastatin 10 milliGRAM(s) Oral at bedtime  carvedilol 6.25 milliGRAM(s) Oral every 12 hours  chlorhexidine 4% Liquid 1 Application(s) Topical <User Schedule>  chlorhexidine 4% Liquid 1 Application(s) Topical daily  DAPTOmycin IVPB      darunavir 800 milliGRAM(s) Oral daily  dextrose 5% + sodium chloride 0.45%. 1000 milliLiter(s) (50 mL/Hr) IV Continuous <Continuous>  etravirine 200 milliGRAM(s) Oral two times a day after meals  heparin  Injectable 5000 Unit(s) SubCutaneous every 12 hours  hydrALAZINE 10 milliGRAM(s) Oral every 8 hours  influenza   Vaccine 0.5 milliLiter(s) IntraMuscular once  micafungin IVPB      micafungin IVPB 100 milliGRAM(s) IV Intermittent every 24 hours  raltegravir Tablet 400 milliGRAM(s) Oral two times a day  ritonavir Tablet 100 milliGRAM(s) Oral daily    MEDICATIONS  (PRN):  morphine  - Injectable 2 milliGRAM(s) IV Push every 4 hours PRN Moderate Pain (4 - 6)      Vital Signs Last 24 Hrs  Vs Stable ,no fever    Review of systems:  General : no fever /chills, fatigue  CVS : no chest pain, palpitations  Lungs : no shortness of breath, cough  GI : no abdominal pain, vomiting, diarrhea   : no dysuria, hematuria        PHYSICAL EXAM:  General :NAD,obese ,NGT present  Constitutional:  well-groomed, well-developed  Respiratory: CTAB/L  Cardiovascular: S1 and S2, RRR, no M/G/R  Gastrointestinal: s/p Ex lap Right sided CHARLEY drain + colostomy present   Extremities: No peripheral edema  Vascular: 2+ peripheral pulses  Skin: No rashes      LABS:                        8.3    8.56  )-----------( 266      ( 23 Sep 2019 08:23 )             27.4     09-23    135  |  96  |  32<H>  ----------------------------<  58<L>  3.9   |  24  |  11.10<H>    Ca    8.0<L>      23 Sep 2019 11:37    HIV markers    ABS CD4: 201 /uL (09.19.19 @ 19:29)      HIV-1 RNA Quantitative, Viral Load:   1,703 (09.19.19 @ 19:42)          MICROBIOLOGY:  RECENT CULTURES:  09-19 .Surgical Swab Enterococcus faecium (vancomycin resistant) XXXX   Rare Candida albicans "Susceptibilities not performed"  Rare Enterococcus faecium (vancomycin resistant)          RADIOLOGY & ADDITIONAL STUDIES:

## 2019-09-23 NOTE — PROGRESS NOTE ADULT - SUBJECTIVE AND OBJECTIVE BOX
Patient feels well no complaints today.    MEDICATIONS  (STANDING):  atorvastatin 10 milliGRAM(s) Oral at bedtime  chlorhexidine 4% Liquid 1 Application(s) Topical <User Schedule>  chlorhexidine 4% Liquid 1 Application(s) Topical daily  DAPTOmycin IVPB      DAPTOmycin IVPB 500 milliGRAM(s) IV Intermittent once  darunavir 800 milliGRAM(s) Oral daily  dextrose 5% + sodium chloride 0.45%. 1000 milliLiter(s) (50 mL/Hr) IV Continuous <Continuous>  etravirine 200 milliGRAM(s) Oral two times a day after meals  heparin  Injectable 5000 Unit(s) SubCutaneous every 12 hours  hydrALAZINE 10 milliGRAM(s) Oral every 8 hours  influenza   Vaccine 0.5 milliLiter(s) IntraMuscular once  micafungin IVPB      micafungin IVPB 100 milliGRAM(s) IV Intermittent every 24 hours  piperacillin/tazobactam IVPB.. 3.375 Gram(s) IV Intermittent every 12 hours  raltegravir Tablet 400 milliGRAM(s) Oral two times a day  ritonavir Tablet 100 milliGRAM(s) Oral daily    MEDICATIONS  (PRN):  morphine  - Injectable 2 milliGRAM(s) IV Push every 4 hours PRN Moderate Pain (4 - 6)      09-22-19 @ 07:01  -  09-23-19 @ 07:00  --------------------------------------------------------  IN: 700 mL / OUT: 1520 mL / NET: -820 mL    09-23-19 @ 07:01  -  09-23-19 @ 17:37  --------------------------------------------------------  IN: 0 mL / OUT: 1700 mL / NET: -1700 mL      PHYSICAL EXAM:      T(C): 37.1 (09-23-19 @ 17:26), Max: 37.1 (09-23-19 @ 10:45)  HR: 106 (09-23-19 @ 17:26) (96 - 106)  BP: 149/75 (09-23-19 @ 17:26) (139/74 - 169/81)  RR: 16 (09-23-19 @ 14:50) (16 - 19)  SpO2: 96% (09-23-19 @ 17:26) (96% - 100%)  Wt(kg): --  Respiratory: clear anteriorly, decreased BS at bases  Cardiovascular: S1 S2  Gastrointestinal: soft NT ND +BS  Extremities:   1 edema                            8.3    8.56  )-----------( 266      ( 23 Sep 2019 08:23 )             27.4     09-23    135  |  96  |  32<H>  ----------------------------<  58<L>  3.9   |  24  |  11.10<H>    Ca    8.0<L>      23 Sep 2019 11:37  Phos  6.0     09-22  Mg     2.2     09-22      58y Male admitted with perforated sigmoid diverticulitis S/P Exp Lap, MAYDA, SBR, Sigmoid Colon rsxn w creation of colostomy, peritoneal lavage, I&D of intra-abdominal abscess    Creatinine Trend: 11.10<--, 8.75<--, 6.78<--, 10.30<--, 6.88<--, 6.00<--  Assessment and Plan:    ESRD; maintenance HD  UF as tolerated;   Slow BP medication adjustments;   Will follow.

## 2019-09-23 NOTE — PROGRESS NOTE ADULT - SUBJECTIVE AND OBJECTIVE BOX
INTERVAL HPI/OVERNIGHT EVENTS:  Pt. seen and examined at bedside resting comfortably. Patient c/o postop abdominal pain, slowly improving, controlled with pain medications. Denies N/V. Tolerating NGT. No acute overnight events.  Ostomy functioning. Patient states he wants to get out of bed, has not ambulated yet or been to chair yet.   Denies fever/chills, chest pain, dyspnea, cough, dizziness.     Vital Signs Last 24 Hrs  T(C): 36.6 (22 Sep 2019 23:11), Max: 36.6 (22 Sep 2019 23:11)  T(F): 97.9 (22 Sep 2019 23:11), Max: 97.9 (22 Sep 2019 23:11)  HR: 96 (23 Sep 2019 09:13) (91 - 102)  BP: 139/74 (23 Sep 2019 05:13) (123/65 - 169/81)  RR: 19 (23 Sep 2019 05:13) (18 - 19)  SpO2: 98% (23 Sep 2019 09:13) (98% - 100%)    PHYSICAL EXAM:    GENERAL: NAD. NGT with bilious output (1400cc/24hr)  CHEST/LUNG: Clear to ausculation, bilaterally   HEART: S1S2  ABDOMEN: Midline wound clean and dry, with staples intact, new DSD placed. Ostomy viable, with stool and air in bag. Bhavin drain with s/s output (20cc/24hr).  Nondistended, soft, appropriate incisional tenderness, no guarding  EXTREMITIES:  calf soft, non tender b/l. 2+ distal pulses b/l.     I&O's Detail    22 Sep 2019 07:01  -  23 Sep 2019 07:00  --------------------------------------------------------  IN:    dextrose 5% + sodium chloride 0.45%.: 500 mL    IV PiggyBack: 200 mL  Total IN: 700 mL    OUT:    Bulb: 20 mL    Colostomy: 100 mL    Nasoenteral Tube: 1400 mL  Total OUT: 1520 mL    Total NET: -820 mL      LABS:                        8.3    8.56  )-----------( 266      ( 23 Sep 2019 08:23 )             27.4     09-22    134<L>  |  97  |  28<H>  ----------------------------<  70  3.7   |  27  |  8.75<H>    Ca    7.1<L>      22 Sep 2019 07:55  Phos  6.0     09-22  Mg     2.2     09-22      Impression: 58y Male admitted with perforated sigmoid diverticulitis S/P Exp Lap, MAYDA, SBR, Sigmoid Colon rsxn w creation of colostomy, peritoneal lavage, I&D of intra-abdominal abscess- POD#5  Ostomy viable and functioning well. NGT with high output    Plan:  - F/u AXR for position of NGT tip  - Continue NPO and NGT for now, IVF  - pain management prn  - continue DVT prophylaxis  - Encourage ambulation and OOB as tolerated, Increase activity with PT  - continue incentive spirometer  - continue local wound care and ostomy care  - antibiotics per ID  - continue medical and renal input. Follow up GI input for PPN    Discussed with Dr. Garcia

## 2019-09-23 NOTE — PROGRESS NOTE ADULT - SUBJECTIVE AND OBJECTIVE BOX
Patient is a 58y old  Male who presents with a chief complaint of Sigmoid perforation (23 Sep 2019 11:27)      INTERVAL HPI/OVERNIGHT EVENTS:    MEDICATIONS  (STANDING):  atorvastatin 10 milliGRAM(s) Oral at bedtime  chlorhexidine 4% Liquid 1 Application(s) Topical <User Schedule>  chlorhexidine 4% Liquid 1 Application(s) Topical daily  darunavir 800 milliGRAM(s) Oral daily  dextrose 5% + sodium chloride 0.45%. 1000 milliLiter(s) (50 mL/Hr) IV Continuous <Continuous>  etravirine 200 milliGRAM(s) Oral two times a day after meals  heparin  Injectable 5000 Unit(s) SubCutaneous every 12 hours  hydrALAZINE 10 milliGRAM(s) Oral every 8 hours  influenza   Vaccine 0.5 milliLiter(s) IntraMuscular once  micafungin IVPB      micafungin IVPB 100 milliGRAM(s) IV Intermittent every 24 hours  piperacillin/tazobactam IVPB.. 3.375 Gram(s) IV Intermittent every 12 hours  raltegravir Tablet 400 milliGRAM(s) Oral two times a day  ritonavir Tablet 100 milliGRAM(s) Oral daily    MEDICATIONS  (PRN):  morphine  - Injectable 2 milliGRAM(s) IV Push every 4 hours PRN Moderate Pain (4 - 6)      Allergies    ciprofloxacin (Rash)  Levaquin (Rash)    Intolerances        REVIEW OF SYSTEMS:  CONSTITUTIONAL: No fever, weight loss, or fatigue  EYES: No eye pain, visual disturbances, or discharge  ENMT:  No difficulty hearing, tinnitus, vertigo; No sinus or throat pain  NECK: No pain or stiffness  BREASTS: No pain, masses, or nipple discharge  RESPIRATORY: No cough, wheezing, chills or hemoptysis; No shortness of breath  CARDIOVASCULAR: No chest pain, palpitations, dizziness, or leg swelling  GASTROINTESTINAL: No abdominal or epigastric pain. No nausea, vomiting, or hematemesis; No diarrhea or constipation. No melena or hematochezia.  GENITOURINARY: No dysuria, frequency, hematuria, or incontinence  NEUROLOGICAL: No headaches, memory loss, loss of strength, numbness, or tremors  SKIN: No itching, burning, rashes, or lesions   LYMPH NODES: No enlarged glands  ENDOCRINE: No heat or cold intolerance; No hair loss  MUSCULOSKELETAL: No joint pain or swelling; No muscle, back, or extremity pain  PSYCHIATRIC: No depression, anxiety, mood swings, or difficulty sleeping  HEME/LYMPH: No easy bruising, or bleeding gums  ALLERGY AND IMMUNOLOGIC: No hives or eczema    Vital Signs Last 24 Hrs  T(C): 37.1 (23 Sep 2019 10:45), Max: 37.1 (23 Sep 2019 10:45)  T(F): 98.7 (23 Sep 2019 10:45), Max: 98.7 (23 Sep 2019 10:45)  HR: 97 (23 Sep 2019 10:45) (91 - 102)  BP: 149/86 (23 Sep 2019 10:45) (123/65 - 169/81)  BP(mean): --  RR: 17 (23 Sep 2019 10:45) (17 - 19)  SpO2: 98% (23 Sep 2019 10:45) (98% - 100%)    PHYSICAL EXAM:  GENERAL: NAD, well-groomed, well-developed  HEAD:  Atraumatic, Normocephalic  EYES: EOMI, PERRLA, conjunctiva and sclera clear  ENMT: No tonsillar erythema, exudates, or enlargement; Moist mucous membranes, Good dentition, No lesions  NECK: Supple, No JVD, Normal thyroid  NERVOUS SYSTEM:  Alert & Oriented X3, Good concentration; Motor Strength 5/5 B/L upper and lower extremities; DTRs 2+ intact and symmetric  CHEST/LUNG: Clear to percussion bilaterally; No rales, rhonchi, wheezing, or rubs  HEART: Regular rate and rhythm; No murmurs, rubs, or gallops  ABDOMEN: Soft, Nontender, Nondistended; Bowel sounds present  EXTREMITIES:  2+ Peripheral Pulses, No clubbing, cyanosis, or edema  LYMPH: No lymphadenopathy noted  SKIN: No rashes or lesions    LABS:                        8.3    8.56  )-----------( 266      ( 23 Sep 2019 08:23 )             27.4     09-23    135  |  96  |  32<H>  ----------------------------<  58<L>  3.9   |  24  |  11.10<H>    Ca    8.0<L>      23 Sep 2019 11:37  Phos  6.0     09-22  Mg     2.2     09-22          CAPILLARY BLOOD GLUCOSE        CULTURES:    HEMOGLOBIN A1C:    CHOLESTEROL:        RADIOLOGY & ADDITIONAL TESTS:

## 2019-09-24 LAB
CULTURE RESULTS: SIGNIFICANT CHANGE UP
ORGANISM # SPEC MICROSCOPIC CNT: SIGNIFICANT CHANGE UP
ORGANISM # SPEC MICROSCOPIC CNT: SIGNIFICANT CHANGE UP
SPECIMEN SOURCE: SIGNIFICANT CHANGE UP
SURGICAL PATHOLOGY STUDY: SIGNIFICANT CHANGE UP
SURGICAL PATHOLOGY STUDY: SIGNIFICANT CHANGE UP

## 2019-09-24 PROCEDURE — 99233 SBSQ HOSP IP/OBS HIGH 50: CPT

## 2019-09-24 RX ADMIN — Medication 10 MILLIGRAM(S): at 14:39

## 2019-09-24 RX ADMIN — SODIUM CHLORIDE 50 MILLILITER(S): 9 INJECTION, SOLUTION INTRAVENOUS at 18:07

## 2019-09-24 RX ADMIN — DARUNAVIR 800 MILLIGRAM(S): 75 TABLET, FILM COATED ORAL at 12:47

## 2019-09-24 RX ADMIN — CARVEDILOL PHOSPHATE 6.25 MILLIGRAM(S): 80 CAPSULE, EXTENDED RELEASE ORAL at 18:07

## 2019-09-24 RX ADMIN — ETRAVIRINE 200 MILLIGRAM(S): 200 TABLET ORAL at 10:16

## 2019-09-24 RX ADMIN — RALTEGRAVIR 400 MILLIGRAM(S): 400 TABLET, FILM COATED ORAL at 18:10

## 2019-09-24 RX ADMIN — MICAFUNGIN SODIUM 105 MILLIGRAM(S): 100 INJECTION, POWDER, LYOPHILIZED, FOR SOLUTION INTRAVENOUS at 12:46

## 2019-09-24 RX ADMIN — HEPARIN SODIUM 5000 UNIT(S): 5000 INJECTION INTRAVENOUS; SUBCUTANEOUS at 18:10

## 2019-09-24 RX ADMIN — MORPHINE SULFATE 2 MILLIGRAM(S): 50 CAPSULE, EXTENDED RELEASE ORAL at 07:55

## 2019-09-24 RX ADMIN — MORPHINE SULFATE 2 MILLIGRAM(S): 50 CAPSULE, EXTENDED RELEASE ORAL at 07:40

## 2019-09-24 RX ADMIN — CHLORHEXIDINE GLUCONATE 1 APPLICATION(S): 213 SOLUTION TOPICAL at 07:43

## 2019-09-24 RX ADMIN — ETRAVIRINE 200 MILLIGRAM(S): 200 TABLET ORAL at 18:07

## 2019-09-24 RX ADMIN — RITONAVIR 100 MILLIGRAM(S): 100 TABLET, FILM COATED ORAL at 12:47

## 2019-09-24 NOTE — PROGRESS NOTE ADULT - SUBJECTIVE AND OBJECTIVE BOX
Patient is a 58y old  Male who presents with a chief complaint of Sigmoid perforation (24 Sep 2019 10:10)      INTERVAL HPI / OVERNIGHT EVENTS:    MEDICATIONS  (STANDING):  atorvastatin 10 milliGRAM(s) Oral at bedtime  carvedilol 6.25 milliGRAM(s) Oral every 12 hours  chlorhexidine 4% Liquid 1 Application(s) Topical <User Schedule>  chlorhexidine 4% Liquid 1 Application(s) Topical daily  DAPTOmycin IVPB      darunavir 800 milliGRAM(s) Oral daily  dextrose 5% + sodium chloride 0.45%. 1000 milliLiter(s) (50 mL/Hr) IV Continuous <Continuous>  etravirine 200 milliGRAM(s) Oral two times a day after meals  heparin  Injectable 5000 Unit(s) SubCutaneous every 12 hours  hydrALAZINE 10 milliGRAM(s) Oral every 8 hours  influenza   Vaccine 0.5 milliLiter(s) IntraMuscular once  micafungin IVPB      micafungin IVPB 100 milliGRAM(s) IV Intermittent every 24 hours  raltegravir Tablet 400 milliGRAM(s) Oral two times a day  ritonavir Tablet 100 milliGRAM(s) Oral daily    MEDICATIONS  (PRN):  morphine  - Injectable 2 milliGRAM(s) IV Push every 4 hours PRN Moderate Pain (4 - 6)      Vital Signs Last 24 Hrs  T(C): 36.7 (24 Sep 2019 11:34), Max: 37.1 (23 Sep 2019 17:26)  T(F): 98 (24 Sep 2019 11:34), Max: 98.7 (23 Sep 2019 17:26)  HR: 103 (24 Sep 2019 11:34) (90 - 106)  BP: 150/77 (24 Sep 2019 11:34) (122/77 - 150/77)  BP(mean): --  RR: 17 (24 Sep 2019 11:34) (16 - 17)  SpO2: 96% (24 Sep 2019 11:34) (93% - 100%)    Review of systems:  General : no fever /chills,fatigue  CVS : no chest pain, palpitations  Lungs : no shortness of breath, cough  GI : no abdominal pain,vomiting, diarrhea   : no dysuria,hematuria        PHYSICAL EXAM:  General :NAD  Constitutional:  well-groomed, well-developed  Respiratory: CTAB/L  Cardiovascular: S1 and S2, RRR, no M/G/R  Gastrointestinal: BS+, soft, NT/ND  Extremities: No peripheral edema  Vascular: 2+ peripheral pulses  Skin: No rashes      LABS:                        8.3    8.56  )-----------( 266      ( 23 Sep 2019 08:23 )             27.4     09-23    135  |  96  |  32<H>  ----------------------------<  58<L>  3.9   |  24  |  11.10<H>    Ca    8.0<L>      23 Sep 2019 11:37            MICROBIOLOGY:  RECENT CULTURES:  09-19 .Surgical Swab Enterococcus faecium (vancomycin resistant) XXXX   Rare Candida albicans "Susceptibilities not performed"  Rare Enterococcus faecium (vancomycin resistant)          RADIOLOGY & ADDITIONAL STUDIES: Patient is a 58y old  Male who presents with a chief complaint of Sigmoid perforation (24 Sep 2019 10:10)      INTERVAL HPI / OVERNIGHT EVENTS: doing ok ,off BIPAP/CPAP now, uses it at night as needed, no abdo pain ,NGT +    MEDICATIONS  (STANDING):  atorvastatin 10 milliGRAM(s) Oral at bedtime  carvedilol 6.25 milliGRAM(s) Oral every 12 hours  chlorhexidine 4% Liquid 1 Application(s) Topical <User Schedule>  chlorhexidine 4% Liquid 1 Application(s) Topical daily  DAPTOmycin IVPB      darunavir 800 milliGRAM(s) Oral daily  dextrose 5% + sodium chloride 0.45%. 1000 milliLiter(s) (50 mL/Hr) IV Continuous <Continuous>  etravirine 200 milliGRAM(s) Oral two times a day after meals  heparin  Injectable 5000 Unit(s) SubCutaneous every 12 hours  hydrALAZINE 10 milliGRAM(s) Oral every 8 hours  influenza   Vaccine 0.5 milliLiter(s) IntraMuscular once  micafungin IVPB      micafungin IVPB 100 milliGRAM(s) IV Intermittent every 24 hours  raltegravir Tablet 400 milliGRAM(s) Oral two times a day  ritonavir Tablet 100 milliGRAM(s) Oral daily    MEDICATIONS  (PRN):  morphine  - Injectable 2 milliGRAM(s) IV Push every 4 hours PRN Moderate Pain (4 - 6)      Vital Signs Last 24 Hrs  T(C): 36.7 (24 Sep 2019 11:34), Max: 37.1 (23 Sep 2019 17:26)  T(F): 98 (24 Sep 2019 11:34), Max: 98.7 (23 Sep 2019 17:26)  HR: 103 (24 Sep 2019 11:34) (90 - 106)  BP: 150/77 (24 Sep 2019 11:34) (122/77 - 150/77)  BP(mean): --  RR: 17 (24 Sep 2019 11:34) (16 - 17)  SpO2: 96% (24 Sep 2019 11:34) (93% - 100%)    Review of systems:  General : no fever /chills, fatigue  CVS : no chest pain, palpitations  Lungs : no shortness of breath, cough  GI : no abdominal pain, vomiting, diarrhea   : no dysuria, hematuria        PHYSICAL EXAM:  General :NAD,obese ,NGT present  Constitutional:  well-groomed, well-developed  Respiratory: CTAB/L  Cardiovascular: S1 and S2, RRR, no M/G/R  Gastrointestinal: s/p Ex lap Right sided CHARLEY drain + colostomy present   Extremities: No peripheral edema  Vascular: 2+ peripheral pulses  Skin: No rashes      LABS:                        8.3    8.56  )-----------( 266      ( 23 Sep 2019 08:23 )             27.4     09-23    135  |  96  |  32<H>  ----------------------------<  58<L>  3.9   |  24  |  11.10<H>    Ca    8.0<L>      23 Sep 2019 11:37    HIV markers    ABS CD4: 201 /uL (09.19.19 @ 19:29)      HIV-1 RNA Quantitative, Viral Load:   1,703 (09.19.19 @ 19:42)          MICROBIOLOGY:  RECENT CULTURES:  09-19 .Surgical Swab Enterococcus faecium (vancomycin resistant) XXXX   Rare Candida albicans "Susceptibilities not performed"  Rare Enterococcus faecium (vancomycin resistant)          RADIOLOGY & ADDITIONAL STUDIES:

## 2019-09-24 NOTE — PROGRESS NOTE ADULT - ASSESSMENT
58 yr old   obese male seen with   1.AIDS/HIV :cont HAART  pt non complaint so follow up on t cell and viral load  pt says his current HAART is giving him diarrhea dn dosent agree with his body so he takes it every other day/few times a week   Inspite his Cd 4 is 201 and VL is 1703  will cont current HAART   will do genotype and phenotype to see if HAART therapy can be changed   with him having ESRD the choices will be restricted   He says last HIV visit was few days before admission but he wants to change his HIV doctor. Will give him phone number for [facility] ID office     2.Acute peritonitis: sec to perforated sigmoid diverticulitis perforation and abscess  s/p MAYDA and resection   OR c/s growingly yeast  and VRE so daptomycin has been added since two days  cont micafungin (day 5)  cont zosyn for now (day 7 )       3.Leukocytosis : resolving  sec to above      4.ESRD on HD  antibiotics dosage adjusted      5.Acute resp failure : resolved  CPAP as per requirement

## 2019-09-24 NOTE — PROGRESS NOTE ADULT - SUBJECTIVE AND OBJECTIVE BOX
POD#6 s/p ex lap, MAYDA, SBR, sigmoid colon resection w creation of ostomy, I&D of intraabd abscess, repair of ventral hernia     Patient seen and examined bedside resting comfortably. Admits to intermittent nausea requiring IV zofran, however states it is improving and less frequent.   Admits to mild abdominal pain, well controlled with pain medication. Pt not frequently oob.   Denies fevers, chills, cough, chest pain, dyspnea.       T(F): 98.1 (09-24-19 @ 06:28), Max: 98.7 (09-23-19 @ 10:45)  HR: 91 (09-24-19 @ 06:28) (91 - 106)  BP: 130/79 (09-24-19 @ 06:28) (122/77 - 167/77)  RR: 17 (09-24-19 @ 06:28) (16 - 17)  SpO2: 100% (09-24-19 @ 06:28) (94% - 100%)  Wt(kg): --  CAPILLARY BLOOD GLUCOSE      PHYSICAL EXAM:  General: NAD. NGT in place w bilious output - pulled back 4-5 inches and reinforced, patient tolerated well.   CV: +S1+S2 regular rate and rhythm  Lung: clear to auscultation bilaterally, respirations nonlabored  Abdomen: dressing c/d/i, dressing removed, midline incision healing well, intact with staples, new sterile dressing applied. Ostomy viable, bag with stool. CHARLEY with 25ccs serosanguinous output. Abdomen obese, non-distended, normoactive BS, soft, mildly tender to palpation around incision sites. no rebound/guarding.  Extremities: no pedal edema or calf tenderness noted     LABS:                        8.3    8.56  )-----------( 266      ( 23 Sep 2019 08:23 )             27.4     09-23    135  |  96  |  32<H>  ----------------------------<  58<L>  3.9   |  24  |  11.10<H>    Ca    8.0<L>      23 Sep 2019 11:37  Phos  6.0     09-22  Mg     2.2     09-22          I&O:     Culture Results:   Rare Carlene albicans "Susceptibilities not performed"  Rare Enterococcus faecium (09-19 @ 09:12)      Impression: 58y Male admitted with perforated sigmoid diverticulitis S/P Exp Lap, MAYDA, SBR, Sigmoid Colon rsxn w creation of colostomy, peritoneal lavage, I&D of intra-abdominal abscess- POD#6  Ostomy viable and functioning well. NGT with high output, abdominal x ray obtained to assess NGT placement     Plan:  - Continue NPO and NGT for now, IVF  - F/u abdominal XR read: NGT readjusted and pulled back 4-5 inches. Possible clamp trial and removal today.   - pain management prn  - continue DVT prophylaxis  - Encourage ambulation and OOB as tolerated, Increase activity with PT  - continue incentive spirometer  - continue local wound care and ostomy care  - antibiotics per ID  - continue medical and renal input. Follow up GI input for PPN    Will d.w Dr. Garcia
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
## 2019-09-24 NOTE — PROGRESS NOTE ADULT - SUBJECTIVE AND OBJECTIVE BOX
Patient in no distress;   Still with NGT;     MEDICATIONS  (STANDING):  atorvastatin 10 milliGRAM(s) Oral at bedtime  carvedilol 6.25 milliGRAM(s) Oral every 12 hours  chlorhexidine 4% Liquid 1 Application(s) Topical <User Schedule>  chlorhexidine 4% Liquid 1 Application(s) Topical daily  DAPTOmycin IVPB      darunavir 800 milliGRAM(s) Oral daily  dextrose 5% + sodium chloride 0.45%. 1000 milliLiter(s) (50 mL/Hr) IV Continuous <Continuous>  etravirine 200 milliGRAM(s) Oral two times a day after meals  heparin  Injectable 5000 Unit(s) SubCutaneous every 12 hours  hydrALAZINE 10 milliGRAM(s) Oral every 8 hours  influenza   Vaccine 0.5 milliLiter(s) IntraMuscular once  micafungin IVPB      micafungin IVPB 100 milliGRAM(s) IV Intermittent every 24 hours  raltegravir Tablet 400 milliGRAM(s) Oral two times a day  ritonavir Tablet 100 milliGRAM(s) Oral daily    MEDICATIONS  (PRN):  morphine  - Injectable 2 milliGRAM(s) IV Push every 4 hours PRN Moderate Pain (4 - 6)      09-23-19 @ 07:01  -  09-24-19 @ 07:00  --------------------------------------------------------  IN: 0 mL / OUT: 1975 mL / NET: -1975 mL      PHYSICAL EXAM:      T(C): 36.7 (09-24-19 @ 11:34), Max: 37.1 (09-23-19 @ 17:26)  HR: 103 (09-24-19 @ 11:34) (90 - 106)  BP: 150/77 (09-24-19 @ 11:34) (122/77 - 150/77)  RR: 17 (09-24-19 @ 11:34) (16 - 17)  SpO2: 96% (09-24-19 @ 11:34) (93% - 100%)  Wt(kg): --  Respiratory: clear anteriorly, decreased BS at bases  Cardiovascular: S1 S2  Gastrointestinal: soft NT ND minimal BS?  + ostomy  Extremities:  1 edema                                    8.3    8.56  )-----------( 266      ( 23 Sep 2019 08:23 )             27.4     09-23    135  |  96  |  32<H>  ----------------------------<  58<L>  3.9   |  24  |  11.10<H>    Ca    8.0<L>      23 Sep 2019 11:37          Creatinine Trend: 11.10<--, 8.75<--, 6.78<--, 10.30<--, 6.88<--, 6.00<--  Assessment and Plan:  Perforated sigmoid diverticulitis S/P Exp Lap, MAYDA, SBR, Sigmoid Colon rsxn w creation of colostomy, peritoneal lavage, I&D of intra-abdominal abscess-  ESRD; maintenance HD; BP stable;   HD for tomorrow; Will follow.

## 2019-09-24 NOTE — PROGRESS NOTE ADULT - SUBJECTIVE AND OBJECTIVE BOX
Patient is a 58y old  Male who presents with a chief complaint of Sigmoid perforation (24 Sep 2019 06:31)      INTERVAL HPI/OVERNIGHT EVENTS:    MEDICATIONS  (STANDING):  atorvastatin 10 milliGRAM(s) Oral at bedtime  carvedilol 6.25 milliGRAM(s) Oral every 12 hours  chlorhexidine 4% Liquid 1 Application(s) Topical <User Schedule>  chlorhexidine 4% Liquid 1 Application(s) Topical daily  DAPTOmycin IVPB      darunavir 800 milliGRAM(s) Oral daily  dextrose 5% + sodium chloride 0.45%. 1000 milliLiter(s) (50 mL/Hr) IV Continuous <Continuous>  etravirine 200 milliGRAM(s) Oral two times a day after meals  heparin  Injectable 5000 Unit(s) SubCutaneous every 12 hours  hydrALAZINE 10 milliGRAM(s) Oral every 8 hours  influenza   Vaccine 0.5 milliLiter(s) IntraMuscular once  micafungin IVPB      micafungin IVPB 100 milliGRAM(s) IV Intermittent every 24 hours  raltegravir Tablet 400 milliGRAM(s) Oral two times a day  ritonavir Tablet 100 milliGRAM(s) Oral daily    MEDICATIONS  (PRN):  morphine  - Injectable 2 milliGRAM(s) IV Push every 4 hours PRN Moderate Pain (4 - 6)      Allergies    ciprofloxacin (Rash)  Levaquin (Rash)    Intolerances        REVIEW OF SYSTEMS:  CONSTITUTIONAL: No fever, weight loss, or fatigue  EYES: No eye pain, visual disturbances, or discharge  ENMT:  No difficulty hearing, tinnitus, vertigo; No sinus or throat pain  NECK: No pain or stiffness  BREASTS: No pain, masses, or nipple discharge  RESPIRATORY: No cough, wheezing, chills or hemoptysis; No shortness of breath  CARDIOVASCULAR: No chest pain, palpitations, dizziness, or leg swelling  GASTROINTESTINAL: No abdominal or epigastric pain. No nausea, vomiting, or hematemesis; No diarrhea or constipation. No melena or hematochezia.  GENITOURINARY: No dysuria, frequency, hematuria, or incontinence  NEUROLOGICAL: No headaches, memory loss, loss of strength, numbness, or tremors  SKIN: No itching, burning, rashes, or lesions   LYMPH NODES: No enlarged glands  ENDOCRINE: No heat or cold intolerance; No hair loss  MUSCULOSKELETAL: No joint pain or swelling; No muscle, back, or extremity pain  PSYCHIATRIC: No depression, anxiety, mood swings, or difficulty sleeping  HEME/LYMPH: No easy bruising, or bleeding gums  ALLERGY AND IMMUNOLOGIC: No hives or eczema    Vital Signs Last 24 Hrs  T(C): 36.7 (24 Sep 2019 06:28), Max: 37.1 (23 Sep 2019 10:45)  T(F): 98.1 (24 Sep 2019 06:28), Max: 98.7 (23 Sep 2019 10:45)  HR: 90 (24 Sep 2019 07:26) (90 - 106)  BP: 130/79 (24 Sep 2019 06:28) (122/77 - 167/77)  BP(mean): --  RR: 17 (24 Sep 2019 06:28) (16 - 17)  SpO2: 93% (24 Sep 2019 07:26) (93% - 100%)    PHYSICAL EXAM:  GENERAL: NAD, well-groomed, well-developed  HEAD:  Atraumatic, Normocephalic  EYES: EOMI, PERRLA, conjunctiva and sclera clear  ENMT: No tonsillar erythema, exudates, or enlargement; Moist mucous membranes, Good dentition, No lesions  NECK: Supple, No JVD, Normal thyroid  NERVOUS SYSTEM:  Alert & Oriented X3, Good concentration; Motor Strength 5/5 B/L upper and lower extremities; DTRs 2+ intact and symmetric  CHEST/LUNG: Clear to percussion bilaterally; No rales, rhonchi, wheezing, or rubs  HEART: Regular rate and rhythm; No murmurs, rubs, or gallops  ABDOMEN: Soft, Nontender, Nondistended; Bowel sounds present  EXTREMITIES:  2+ Peripheral Pulses, No clubbing, cyanosis, or edema  LYMPH: No lymphadenopathy noted  SKIN: No rashes or lesions    LABS:                        8.3    8.56  )-----------( 266      ( 23 Sep 2019 08:23 )             27.4     09-23    135  |  96  |  32<H>  ----------------------------<  58<L>  3.9   |  24  |  11.10<H>    Ca    8.0<L>      23 Sep 2019 11:37          CAPILLARY BLOOD GLUCOSE        CULTURES:    HEMOGLOBIN A1C:    CHOLESTEROL:        RADIOLOGY & ADDITIONAL TESTS:

## 2019-09-25 LAB
ANION GAP SERPL CALC-SCNC: 14 MMOL/L — SIGNIFICANT CHANGE UP (ref 5–17)
BUN SERPL-MCNC: 25 MG/DL — HIGH (ref 7–23)
CALCIUM SERPL-MCNC: 8.3 MG/DL — LOW (ref 8.5–10.1)
CHLORIDE SERPL-SCNC: 97 MMOL/L — SIGNIFICANT CHANGE UP (ref 96–108)
CO2 SERPL-SCNC: 25 MMOL/L — SIGNIFICANT CHANGE UP (ref 22–31)
CREAT SERPL-MCNC: 9.68 MG/DL — HIGH (ref 0.5–1.3)
GLUCOSE SERPL-MCNC: 86 MG/DL — SIGNIFICANT CHANGE UP (ref 70–99)
HCT VFR BLD CALC: 28 % — LOW (ref 39–50)
HGB BLD-MCNC: 8.6 G/DL — LOW (ref 13–17)
MCHC RBC-ENTMCNC: 26.5 PG — LOW (ref 27–34)
MCHC RBC-ENTMCNC: 30.7 GM/DL — LOW (ref 32–36)
MCV RBC AUTO: 86.2 FL — SIGNIFICANT CHANGE UP (ref 80–100)
NRBC # BLD: 0 /100 WBCS — SIGNIFICANT CHANGE UP (ref 0–0)
PLATELET # BLD AUTO: 296 K/UL — SIGNIFICANT CHANGE UP (ref 150–400)
POTASSIUM SERPL-MCNC: 3.2 MMOL/L — LOW (ref 3.5–5.3)
POTASSIUM SERPL-SCNC: 3.2 MMOL/L — LOW (ref 3.5–5.3)
RBC # BLD: 3.25 M/UL — LOW (ref 4.2–5.8)
RBC # FLD: 17.4 % — HIGH (ref 10.3–14.5)
SODIUM SERPL-SCNC: 136 MMOL/L — SIGNIFICANT CHANGE UP (ref 135–145)
WBC # BLD: 8.89 K/UL — SIGNIFICANT CHANGE UP (ref 3.8–10.5)
WBC # FLD AUTO: 8.89 K/UL — SIGNIFICANT CHANGE UP (ref 3.8–10.5)

## 2019-09-25 RX ORDER — LINEZOLID 600 MG/300ML
600 INJECTION, SOLUTION INTRAVENOUS EVERY 12 HOURS
Refills: 0 | Status: DISCONTINUED | OUTPATIENT
Start: 2019-09-25 | End: 2019-10-01

## 2019-09-25 RX ORDER — FLUCONAZOLE 150 MG/1
200 TABLET ORAL EVERY 24 HOURS
Refills: 0 | Status: DISCONTINUED | OUTPATIENT
Start: 2019-09-25 | End: 2019-10-01

## 2019-09-25 RX ORDER — ERYTHROPOIETIN 10000 [IU]/ML
10000 INJECTION, SOLUTION INTRAVENOUS; SUBCUTANEOUS
Refills: 0 | Status: COMPLETED | OUTPATIENT
Start: 2019-09-25 | End: 2019-09-30

## 2019-09-25 RX ADMIN — ETRAVIRINE 200 MILLIGRAM(S): 200 TABLET ORAL at 19:19

## 2019-09-25 RX ADMIN — RALTEGRAVIR 400 MILLIGRAM(S): 400 TABLET, FILM COATED ORAL at 06:02

## 2019-09-25 RX ADMIN — CHLORHEXIDINE GLUCONATE 1 APPLICATION(S): 213 SOLUTION TOPICAL at 11:22

## 2019-09-25 RX ADMIN — LINEZOLID 600 MILLIGRAM(S): 600 INJECTION, SOLUTION INTRAVENOUS at 19:17

## 2019-09-25 RX ADMIN — DARUNAVIR 800 MILLIGRAM(S): 75 TABLET, FILM COATED ORAL at 11:35

## 2019-09-25 RX ADMIN — MORPHINE SULFATE 2 MILLIGRAM(S): 50 CAPSULE, EXTENDED RELEASE ORAL at 09:35

## 2019-09-25 RX ADMIN — HEPARIN SODIUM 5000 UNIT(S): 5000 INJECTION INTRAVENOUS; SUBCUTANEOUS at 06:02

## 2019-09-25 RX ADMIN — FLUCONAZOLE 200 MILLIGRAM(S): 150 TABLET ORAL at 19:18

## 2019-09-25 RX ADMIN — MICAFUNGIN SODIUM 105 MILLIGRAM(S): 100 INJECTION, POWDER, LYOPHILIZED, FOR SOLUTION INTRAVENOUS at 11:35

## 2019-09-25 RX ADMIN — RITONAVIR 100 MILLIGRAM(S): 100 TABLET, FILM COATED ORAL at 11:35

## 2019-09-25 RX ADMIN — CHLORHEXIDINE GLUCONATE 1 APPLICATION(S): 213 SOLUTION TOPICAL at 06:02

## 2019-09-25 RX ADMIN — ETRAVIRINE 200 MILLIGRAM(S): 200 TABLET ORAL at 10:28

## 2019-09-25 RX ADMIN — MORPHINE SULFATE 2 MILLIGRAM(S): 50 CAPSULE, EXTENDED RELEASE ORAL at 09:50

## 2019-09-25 RX ADMIN — ERYTHROPOIETIN 10000 UNIT(S): 10000 INJECTION, SOLUTION INTRAVENOUS; SUBCUTANEOUS at 14:18

## 2019-09-25 RX ADMIN — CARVEDILOL PHOSPHATE 6.25 MILLIGRAM(S): 80 CAPSULE, EXTENDED RELEASE ORAL at 19:17

## 2019-09-25 RX ADMIN — HEPARIN SODIUM 5000 UNIT(S): 5000 INJECTION INTRAVENOUS; SUBCUTANEOUS at 19:16

## 2019-09-25 RX ADMIN — RALTEGRAVIR 400 MILLIGRAM(S): 400 TABLET, FILM COATED ORAL at 19:22
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
## 2019-09-25 NOTE — PROGRESS NOTE ADULT - SUBJECTIVE AND OBJECTIVE BOX
Patient seen and examined at bedside in no distress.  No complaints offered.  Tolerated clear liquids. Denies abdominal pain/nausea/vomiting.   Denies fever, chills, chest pain, sob.    Vital Signs Last 24 Hrs  T(F): 97.5 (09-24-19 @ 23:25), Max: 98 (09-24-19 @ 11:34)  HR: 82 (09-25-19 @ 06:05)  BP: 112/64 (09-24-19 @ 23:25)  RR: 17 (09-24-19 @ 23:25)  SpO2: 100% (09-25-19 @ 06:05)    GENERAL: Alert, oriented, NAD  CHEST/LUNG: Clear to auscultation bilaterally, respirations nonlabored  HEART: S1S2, RRR  ABDOMEN: + Bowel sounds. Colostomy functioning, stool in bag. Midline dressing c/d/i, removed, staples clean and dry, surrounding skin WNL. New dry, sterile dressing applied. CHARLEY s/s output, 10cc/24hrs. Soft, nondistended, nontender  EXTREMITIES: RUE midline. No pedal edema b/l       LABS:  AM labs pending    A/P: 58M a/w perforated sigmoid diverticulitis, now POD7 s/p Exp Lap, MAYDA, SBR, Sigmoid Colon rsxn w creation of colostomy, peritoneal lavage, I&D of intra-abdominal abscess  - advance diet as tolerated  - post op care; DVT ppx, ambulate frequently, pain management PRN, incentive spirometer  - antibiotics per ID  - local wound care, CHARLEY monitoring  - discharge planning  - will d/w surgical attending

## 2019-09-25 NOTE — PROGRESS NOTE ADULT - SUBJECTIVE AND OBJECTIVE BOX
Patient is a 58y old  Male who presents with a chief complaint of Sigmoid perforation (25 Sep 2019 09:20)      INTERVAL HPI/OVERNIGHT EVENTS:  pt is feeing better  MEDICATIONS  (STANDING):  atorvastatin 10 milliGRAM(s) Oral at bedtime  carvedilol 6.25 milliGRAM(s) Oral every 12 hours  chlorhexidine 4% Liquid 1 Application(s) Topical <User Schedule>  chlorhexidine 4% Liquid 1 Application(s) Topical daily  DAPTOmycin IVPB      DAPTOmycin IVPB 500 milliGRAM(s) IV Intermittent every 48 hours  darunavir 800 milliGRAM(s) Oral daily  dextrose 5% + sodium chloride 0.45%. 1000 milliLiter(s) (50 mL/Hr) IV Continuous <Continuous>  etravirine 200 milliGRAM(s) Oral two times a day after meals  heparin  Injectable 5000 Unit(s) SubCutaneous every 12 hours  hydrALAZINE 10 milliGRAM(s) Oral every 8 hours  influenza   Vaccine 0.5 milliLiter(s) IntraMuscular once  micafungin IVPB      micafungin IVPB 100 milliGRAM(s) IV Intermittent every 24 hours  raltegravir Tablet 400 milliGRAM(s) Oral two times a day  ritonavir Tablet 100 milliGRAM(s) Oral daily    MEDICATIONS  (PRN):  morphine  - Injectable 2 milliGRAM(s) IV Push every 4 hours PRN Moderate Pain (4 - 6)      Allergies    ciprofloxacin (Rash)  Levaquin (Rash)    Intolerances        REVIEW OF SYSTEMS:  CONSTITUTIONAL: No fever, weight loss, or fatigue  EYES: No eye pain, visual disturbances, or discharge  ENMT:  No difficulty hearing, tinnitus, vertigo; No sinus or throat pain  NECK: No pain or stiffness  BREASTS: No pain, masses, or nipple discharge  RESPIRATORY: No cough, wheezing, chills or hemoptysis; No shortness of breath  CARDIOVASCULAR: No chest pain, palpitations, dizziness, or leg swelling  GASTROINTESTINAL: No abdominal or epigastric pain. No nausea, vomiting, or hematemesis; No diarrhea or constipation. No melena or hematochezia.  GENITOURINARY: No dysuria, frequency, hematuria, or incontinence  NEUROLOGICAL: No headaches, memory loss, loss of strength, numbness, or tremors  SKIN: No itching, burning, rashes, or lesions   LYMPH NODES: No enlarged glands  ENDOCRINE: No heat or cold intolerance; No hair loss  MUSCULOSKELETAL: No joint pain or swelling; No muscle, back, or extremity pain  PSYCHIATRIC: No depression, anxiety, mood swings, or difficulty sleeping  HEME/LYMPH: No easy bruising, or bleeding gums  ALLERGY AND IMMUNOLOGIC: No hives or eczema    Vital Signs Last 24 Hrs  T(C): 36.6 (25 Sep 2019 09:27), Max: 36.7 (24 Sep 2019 11:34)  T(F): 97.8 (25 Sep 2019 09:27), Max: 98 (24 Sep 2019 11:34)  HR: 92 (25 Sep 2019 09:27) (79 - 103)  BP: 168/91 (25 Sep 2019 09:27) (105/62 - 168/91)  BP(mean): --  RR: 17 (25 Sep 2019 09:27) (17 - 17)  SpO2: 97% (25 Sep 2019 09:27) (95% - 100%)    PHYSICAL EXAM:  GENERAL: NAD, well-groomed, well-developed  HEAD:  Atraumatic, Normocephalic  EYES: EOMI, PERRLA, conjunctiva and sclera clear  ENMT: No tonsillar erythema, exudates, or enlargement; Moist mucous membranes, Good dentition, No lesions  NECK: Supple, No JVD, Normal thyroid  NERVOUS SYSTEM:  Alert & Oriented X3, Good concentration; Motor Strength 5/5 B/L upper and lower extremities; DTRs 2+ intact and symmetric  CHEST/LUNG: Clear to percussion bilaterally; No rales, rhonchi, wheezing, or rubs  HEART: Regular rate and rhythm; No murmurs, rubs, or gallops  ABDOMEN: Soft, Nontender, Nondistended; Bowel sounds present  EXTREMITIES:  2+ Peripheral Pulses, No clubbing, cyanosis, or edema  LYMPH: No lymphadenopathy noted  SKIN: No rashes or lesions    LABS:    09-23    135  |  96  |  32<H>  ----------------------------<  58<L>  3.9   |  24  |  11.10<H>    Ca    8.0<L>      23 Sep 2019 11:37          CAPILLARY BLOOD GLUCOSE        CULTURES:    HEMOGLOBIN A1C:    CHOLESTEROL:        RADIOLOGY & ADDITIONAL TESTS:

## 2019-09-25 NOTE — PROGRESS NOTE ADULT - SUBJECTIVE AND OBJECTIVE BOX
[facility] NEPHROLOGY SERVICES, [facility]  NEPHROLOGY AND HYPERTENSION  [address]  [address]  [place] [address]  [phone]    MD PARVEEN NOLAND MD ANDREY GONCHARUK, MD MADHU KORRAPATI, MD YELENA ROSENBERG, MD BINNY KOSHY, MD CHRISTOPHER CAPUTO, MD DONAVAN CORTES MD          Patient feels better;     MEDICATIONS  (STANDING):  atorvastatin 10 milliGRAM(s) Oral at bedtime  carvedilol 6.25 milliGRAM(s) Oral every 12 hours  chlorhexidine 4% Liquid 1 Application(s) Topical <User Schedule>  chlorhexidine 4% Liquid 1 Application(s) Topical daily  darunavir 800 milliGRAM(s) Oral daily  dextrose 5% + sodium chloride 0.45%. 1000 milliLiter(s) (50 mL/Hr) IV Continuous <Continuous>  etravirine 200 milliGRAM(s) Oral two times a day after meals  fluconAZOLE   Tablet 200 milliGRAM(s) Oral every 24 hours  heparin  Injectable 5000 Unit(s) SubCutaneous every 12 hours  hydrALAZINE 10 milliGRAM(s) Oral every 8 hours  influenza   Vaccine 0.5 milliLiter(s) IntraMuscular once  linezolid    Tablet 600 milliGRAM(s) Oral every 12 hours  raltegravir Tablet 400 milliGRAM(s) Oral two times a day  ritonavir Tablet 100 milliGRAM(s) Oral daily    MEDICATIONS  (PRN):  morphine  - Injectable 2 milliGRAM(s) IV Push every 4 hours PRN Moderate Pain (4 - 6)      09-24-19 @ 07:01  -  09-25-19 @ 07:00  --------------------------------------------------------  IN: 600 mL / OUT: 417 mL / NET: 183 mL      PHYSICAL EXAM:      T(C): 36.8 (09-25-19 @ 12:25), Max: 36.8 (09-25-19 @ 12:25)  HR: 89 (09-25-19 @ 12:25) (79 - 99)  BP: 121/63 (09-25-19 @ 12:25) (105/62 - 168/91)  RR: 16 (09-25-19 @ 12:25) (16 - 17)  SpO2: 98% (09-25-19 @ 12:25) (95% - 100%)  Wt(kg): --  Respiratory: clear anteriorly, decreased BS at bases  Cardiovascular: S1 S2  Gastrointestinal: soft mild diffuse tenderness +BS  + ostomy  Extremities:   1 edema                          Creatinine Trend: 11.10<--, 8.75<--, 6.78<--, 10.30<--, 6.88<--, 6.00<--      Assessment and Plan:    Perforated sigmoid diverticulitis S/P Exp Lap, MAYDA, SBR, Sigmoid Colon rsxn w creation of colostomy, peritoneal lavage, I&D of intra-abdominal abscess-  Maintenance HD; UF as tolerated;   DIVYA support  Bp improved, adjusting medications accordingly.  Jacob Hobson MD

## 2019-09-26 LAB
ANION GAP SERPL CALC-SCNC: 10 MMOL/L — SIGNIFICANT CHANGE UP (ref 5–17)
BUN SERPL-MCNC: 13 MG/DL — SIGNIFICANT CHANGE UP (ref 7–23)
CALCIUM SERPL-MCNC: 8.1 MG/DL — LOW (ref 8.5–10.1)
CHLORIDE SERPL-SCNC: 99 MMOL/L — SIGNIFICANT CHANGE UP (ref 96–108)
CK MB BLD-MCNC: <3.8 % — HIGH (ref 0–3.5)
CK MB CFR SERPL CALC: <1 NG/ML — SIGNIFICANT CHANGE UP (ref 0.5–3.6)
CK SERPL-CCNC: 26 U/L — SIGNIFICANT CHANGE UP (ref 26–308)
CO2 SERPL-SCNC: 29 MMOL/L — SIGNIFICANT CHANGE UP (ref 22–31)
CREAT SERPL-MCNC: 6.67 MG/DL — HIGH (ref 0.5–1.3)
GLUCOSE SERPL-MCNC: 91 MG/DL — SIGNIFICANT CHANGE UP (ref 70–99)
HBV SURFACE AB SER-ACNC: SIGNIFICANT CHANGE UP
HBV SURFACE AG SER-ACNC: SIGNIFICANT CHANGE UP
HCT VFR BLD CALC: 28.9 % — LOW (ref 39–50)
HGB BLD-MCNC: 8.6 G/DL — LOW (ref 13–17)
MAGNESIUM SERPL-MCNC: 2.1 MG/DL — SIGNIFICANT CHANGE UP (ref 1.6–2.6)
MCHC RBC-ENTMCNC: 26.5 PG — LOW (ref 27–34)
MCHC RBC-ENTMCNC: 29.8 GM/DL — LOW (ref 32–36)
MCV RBC AUTO: 88.9 FL — SIGNIFICANT CHANGE UP (ref 80–100)
NRBC # BLD: 0 /100 WBCS — SIGNIFICANT CHANGE UP (ref 0–0)
PHOSPHATE SERPL-MCNC: 4.4 MG/DL — SIGNIFICANT CHANGE UP (ref 2.5–4.5)
PLATELET # BLD AUTO: 217 K/UL — SIGNIFICANT CHANGE UP (ref 150–400)
POTASSIUM SERPL-MCNC: 3.4 MMOL/L — LOW (ref 3.5–5.3)
POTASSIUM SERPL-SCNC: 3.4 MMOL/L — LOW (ref 3.5–5.3)
RBC # BLD: 3.25 M/UL — LOW (ref 4.2–5.8)
RBC # FLD: 17.2 % — HIGH (ref 10.3–14.5)
SODIUM SERPL-SCNC: 138 MMOL/L — SIGNIFICANT CHANGE UP (ref 135–145)
TROPONIN I SERPL-MCNC: 0.07 NG/ML — HIGH (ref 0.01–0.04)
TROPONIN I SERPL-MCNC: 0.09 NG/ML — HIGH (ref 0.01–0.04)
WBC # BLD: 8.57 K/UL — SIGNIFICANT CHANGE UP (ref 3.8–10.5)
WBC # FLD AUTO: 8.57 K/UL — SIGNIFICANT CHANGE UP (ref 3.8–10.5)

## 2019-09-26 PROCEDURE — 93010 ELECTROCARDIOGRAM REPORT: CPT

## 2019-09-26 PROCEDURE — 99223 1ST HOSP IP/OBS HIGH 75: CPT

## 2019-09-26 PROCEDURE — 74018 RADEX ABDOMEN 1 VIEW: CPT | Mod: 26

## 2019-09-26 PROCEDURE — 99232 SBSQ HOSP IP/OBS MODERATE 35: CPT

## 2019-09-26 PROCEDURE — 71045 X-RAY EXAM CHEST 1 VIEW: CPT | Mod: 26

## 2019-09-26 RX ORDER — ASPIRIN/CALCIUM CARB/MAGNESIUM 324 MG
81 TABLET ORAL DAILY
Refills: 0 | Status: DISCONTINUED | OUTPATIENT
Start: 2019-09-26 | End: 2019-10-01

## 2019-09-26 RX ORDER — ASPIRIN/CALCIUM CARB/MAGNESIUM 324 MG
81 TABLET ORAL DAILY
Refills: 0 | Status: DISCONTINUED | OUTPATIENT
Start: 2019-09-26 | End: 2019-09-26

## 2019-09-26 RX ORDER — PANTOPRAZOLE SODIUM 20 MG/1
40 TABLET, DELAYED RELEASE ORAL ONCE
Refills: 0 | Status: COMPLETED | OUTPATIENT
Start: 2019-09-26 | End: 2019-09-26

## 2019-09-26 RX ORDER — SIMETHICONE 80 MG/1
80 TABLET, CHEWABLE ORAL ONCE
Refills: 0 | Status: COMPLETED | OUTPATIENT
Start: 2019-09-26 | End: 2019-09-26

## 2019-09-26 RX ADMIN — FLUCONAZOLE 200 MILLIGRAM(S): 150 TABLET ORAL at 21:41

## 2019-09-26 RX ADMIN — Medication 10 MILLIGRAM(S): at 21:42

## 2019-09-26 RX ADMIN — LINEZOLID 600 MILLIGRAM(S): 600 INJECTION, SOLUTION INTRAVENOUS at 17:24

## 2019-09-26 RX ADMIN — Medication 10 MILLIGRAM(S): at 06:32

## 2019-09-26 RX ADMIN — ETRAVIRINE 200 MILLIGRAM(S): 200 TABLET ORAL at 18:29

## 2019-09-26 RX ADMIN — ATORVASTATIN CALCIUM 10 MILLIGRAM(S): 80 TABLET, FILM COATED ORAL at 21:41

## 2019-09-26 RX ADMIN — ETRAVIRINE 200 MILLIGRAM(S): 200 TABLET ORAL at 09:37

## 2019-09-26 RX ADMIN — RITONAVIR 100 MILLIGRAM(S): 100 TABLET, FILM COATED ORAL at 13:11

## 2019-09-26 RX ADMIN — PANTOPRAZOLE SODIUM 40 MILLIGRAM(S): 20 TABLET, DELAYED RELEASE ORAL at 09:38

## 2019-09-26 RX ADMIN — SIMETHICONE 80 MILLIGRAM(S): 80 TABLET, CHEWABLE ORAL at 09:38

## 2019-09-26 RX ADMIN — CHLORHEXIDINE GLUCONATE 1 APPLICATION(S): 213 SOLUTION TOPICAL at 06:37

## 2019-09-26 RX ADMIN — RALTEGRAVIR 400 MILLIGRAM(S): 400 TABLET, FILM COATED ORAL at 06:33

## 2019-09-26 RX ADMIN — HEPARIN SODIUM 5000 UNIT(S): 5000 INJECTION INTRAVENOUS; SUBCUTANEOUS at 17:24

## 2019-09-26 RX ADMIN — RALTEGRAVIR 400 MILLIGRAM(S): 400 TABLET, FILM COATED ORAL at 17:23

## 2019-09-26 RX ADMIN — DARUNAVIR 800 MILLIGRAM(S): 75 TABLET, FILM COATED ORAL at 13:11

## 2019-09-26 RX ADMIN — HEPARIN SODIUM 5000 UNIT(S): 5000 INJECTION INTRAVENOUS; SUBCUTANEOUS at 06:35

## 2019-09-26 RX ADMIN — LINEZOLID 600 MILLIGRAM(S): 600 INJECTION, SOLUTION INTRAVENOUS at 06:32

## 2019-09-26 RX ADMIN — CHLORHEXIDINE GLUCONATE 1 APPLICATION(S): 213 SOLUTION TOPICAL at 13:10

## 2019-09-26 RX ADMIN — CARVEDILOL PHOSPHATE 6.25 MILLIGRAM(S): 80 CAPSULE, EXTENDED RELEASE ORAL at 06:35
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
## 2019-09-26 NOTE — PROGRESS NOTE ADULT - ASSESSMENT
58 yr old   obese male seen with   1.AIDS/HIV :cont HAART  pt non complaint so follow up on t cell and viral load  pt says his current HAART is giving him diarrhea dn dosent agree with his body so he takes it every other day/few times a week   Inspite his Cd 4 is 201 and VL is 1703  will cont current HAART   will do genotype and phenotype to see if HAART therapy can be changed   with him having ESRD the choices will be restricted   He says last HIV visit was few days before admission but he wants to change his HIV doctor. Will give him phone number for [facility] ID office     2.Acute peritonitis: sec to perforated sigmoid diverticulitis perforation and abscess  s/p MAYDA and resection   OR c/s growingly yeast  and VRE  cont linezolid (changed from daptomycin ) day 4 of VRE coverage  cont diflucan (changed from micafungin ) day 7 fungal coverage  cont zosyn for now (day 9 )       3.Leukocytosis : resolving  sec to above      4.ESRD on HD  antibiotics dosage adjusted      5.Acute resp failure : resolved  CPAP as per requirement

## 2019-09-26 NOTE — PROGRESS NOTE ADULT - SUBJECTIVE AND OBJECTIVE BOX
Patient is a 58y old  Male who presents with a chief complaint of Sigmoid perforation (26 Sep 2019 15:22)      INTERVAL HPI / OVERNIGHT EVENTS: doing ok     MEDICATIONS  (STANDING):  atorvastatin 10 milliGRAM(s) Oral at bedtime  carvedilol 6.25 milliGRAM(s) Oral every 12 hours  chlorhexidine 4% Liquid 1 Application(s) Topical <User Schedule>  chlorhexidine 4% Liquid 1 Application(s) Topical daily  darunavir 800 milliGRAM(s) Oral daily  dextrose 5% + sodium chloride 0.45%. 1000 milliLiter(s) (50 mL/Hr) IV Continuous <Continuous>  epoetin marsha Injectable 77720 Unit(s) SubCutaneous <User Schedule>  etravirine 200 milliGRAM(s) Oral two times a day after meals  fluconAZOLE   Tablet 200 milliGRAM(s) Oral every 24 hours  heparin  Injectable 5000 Unit(s) SubCutaneous every 12 hours  hydrALAZINE 10 milliGRAM(s) Oral every 8 hours  influenza   Vaccine 0.5 milliLiter(s) IntraMuscular once  linezolid    Tablet 600 milliGRAM(s) Oral every 12 hours  raltegravir Tablet 400 milliGRAM(s) Oral two times a day  ritonavir Tablet 100 milliGRAM(s) Oral daily    MEDICATIONS  (PRN):  morphine  - Injectable 2 milliGRAM(s) IV Push every 4 hours PRN Moderate Pain (4 - 6)      Vital Signs Last 24 Hrs  T(C): 36.9 (26 Sep 2019 12:18), Max: 37.1 (26 Sep 2019 05:18)  T(F): 98.4 (26 Sep 2019 12:18), Max: 98.8 (26 Sep 2019 05:18)  HR: 88 (26 Sep 2019 12:18) (80 - 93)  BP: 99/58 (26 Sep 2019 12:18) (99/58 - 156/74)  BP(mean): --  RR: 16 (26 Sep 2019 12:18) (16 - 19)  SpO2: 94% (26 Sep 2019 12:18) (91% - 100%)    Review of systems:  General : no fever /chills,fatigue  CVS : no chest pain, palpitations  Lungs : no shortness of breath, cough  GI : no abdominal pain,vomiting, diarrhea   : no dysuria,hematuria        PHYSICAL EXAM:  General :NAD  Constitutional: well-groomed, well-developed  Respiratory: CTAB/L  Cardiovascular: S1 and S2, RRR, no M/G/R  Gastrointestinal: BS+, s/p colostomy, NGT removed, drain +  Extremities: No peripheral edema  Vascular: 2+ peripheral pulses  Skin: No rashes      LABS:                        8.6    8.57  )-----------( 217      ( 26 Sep 2019 06:55 )             28.9     09-26    138  |  99  |  13  ----------------------------<  91  3.4<L>   |  29  |  6.67<H>    Ca    8.1<L>      26 Sep 2019 10:52  Phos  4.4     09-26  Mg     2.1     09-26            MICROBIOLOGY:  RECENT CULTURES:        RADIOLOGY & ADDITIONAL STUDIES:

## 2019-09-26 NOTE — PROGRESS NOTE ADULT - SUBJECTIVE AND OBJECTIVE BOX
Subjective: c/o weakness. No dyspnea. Well tolerated HD yest.       MEDICATIONS  (STANDING):  atorvastatin 10 milliGRAM(s) Oral at bedtime  carvedilol 6.25 milliGRAM(s) Oral every 12 hours  chlorhexidine 4% Liquid 1 Application(s) Topical <User Schedule>  chlorhexidine 4% Liquid 1 Application(s) Topical daily  darunavir 800 milliGRAM(s) Oral daily  dextrose 5% + sodium chloride 0.45%. 1000 milliLiter(s) (50 mL/Hr) IV Continuous <Continuous>  epoetin marsha Injectable 46436 Unit(s) SubCutaneous <User Schedule>  etravirine 200 milliGRAM(s) Oral two times a day after meals  fluconAZOLE   Tablet 200 milliGRAM(s) Oral every 24 hours  heparin  Injectable 5000 Unit(s) SubCutaneous every 12 hours  hydrALAZINE 10 milliGRAM(s) Oral every 8 hours  influenza   Vaccine 0.5 milliLiter(s) IntraMuscular once  linezolid    Tablet 600 milliGRAM(s) Oral every 12 hours  raltegravir Tablet 400 milliGRAM(s) Oral two times a day  ritonavir Tablet 100 milliGRAM(s) Oral daily    MEDICATIONS  (PRN):  morphine  - Injectable 2 milliGRAM(s) IV Push every 4 hours PRN Moderate Pain (4 - 6)          T(C): 36.8 (09-26-19 @ 08:45), Max: 37.1 (09-26-19 @ 05:18)  HR: 81 (09-26-19 @ 08:45) (80 - 93)  BP: 102/65 (09-26-19 @ 08:45) (102/65 - 156/74)  RR: 16 (09-26-19 @ 08:45) (16 - 19)  SpO2: 95% (09-26-19 @ 08:45) (91% - 100%)  Wt(kg): --        I&O's Detail    25 Sep 2019 07:01  -  26 Sep 2019 07:00  --------------------------------------------------------  IN:    Oral Fluid: 360 mL  Total IN: 360 mL    OUT:    Bulb: 13 mL    Other: 1500 mL  Total OUT: 1513 mL    Total NET: -1153 mL               PHYSICAL EXAM:    GENERAL: awake, mildly anxious  EYES: EOMI, PERRLA, conjunctiva and sclera clear  NECK: Supple, no inc in JVP  CHEST/LUNG: Clear  HEART: S1S2  ABDOMEN: L ostomy with loose dark stool  EXTREMITIES:  min edema  L AVF pos thrill, bruit          LABS:  CBC Full  -  ( 26 Sep 2019 06:55 )  WBC Count : 8.57 K/uL  RBC Count : 3.25 M/uL  Hemoglobin : 8.6 g/dL  Hematocrit : 28.9 %  Platelet Count - Automated : 217 K/uL  Mean Cell Volume : 88.9 fl  Mean Cell Hemoglobin : 26.5 pg  Mean Cell Hemoglobin Concentration : 29.8 gm/dL  Auto Neutrophil # : x  Auto Lymphocyte # : x  Auto Monocyte # : x  Auto Eosinophil # : x  Auto Basophil # : x  Auto Neutrophil % : x  Auto Lymphocyte % : x  Auto Monocyte % : x  Auto Eosinophil % : x  Auto Basophil % : x    09-25    136  |  97  |  25<H>  ----------------------------<  86  3.2<L>   |  25  |  9.68<H>    Ca    8.3<L>      25 Sep 2019 13:50              Impression:  * HD dependent ESRD  * POD #8 exp lap, sigm colon resection, perit lavage, I&D of abscess for perfed viscus    Recommendations:   * Not in need of dialysis today. Will plan maint session for am of 9/27  * 3K/4K bath with HD pending repeat serum K

## 2019-09-26 NOTE — PROGRESS NOTE ADULT - SUBJECTIVE AND OBJECTIVE BOX
Surgery NP note  Patient seen and examined bedside.  Patient endorses having "chest pain", described as pressure-like pain rated 7/10 overnight.  Abdominal pain is well controlled.  Denies nausea and vomiting. Tolerating diet.  Ostomy bag is viable, stool and gas seen.     T(F): 98.2 (09-26-19 @ 08:45), Max: 98.8 (09-26-19 @ 05:18)  HR: 81 (09-26-19 @ 08:45) (80 - 93)  BP: 102/65 (09-26-19 @ 08:45) (102/65 - 168/91)  RR: 16 (09-26-19 @ 08:45) (16 - 19)  SpO2: 95% (09-26-19 @ 08:45) (91% - 100%    PHYSICAL EXAM:  General: NAD, WDWN. Afebrile.  Neuro:  Alert & responsive  HEENT: NCAT, EOMI, icteric sclera.  CV: +S1+S2 regular rate and rhythm. nonreproducible pain, upon chest palpation( Non Tender)  Lung: clear to ausculation bilaterally, respirations nonlabored, good inspiratory effort  Abdomen: nondistended, dressings c/d/i at midline, osvaldo drain on R side with 13cc/24 hr serosanguinous fluid, + BS, soft, + TTP on RUQ.   Extremities: no pedal edema or calf tenderness noted     LABS:                        8.6    8.57  )-----------( 217      ( 26 Sep 2019 06:55 )             28.9     09-25    136  |  97  |  25<H>  ----------------------------<  86  3.2<L>   |  25  |  9.68<H>    Ca    8.3<L>      25 Sep 2019 13:50      25 Sep 2019 07:01  -  26 Sep 2019 07:00  --------------------------------------------------------  IN:    Oral Fluid: 360 mL  Total IN: 360 mL    OUT:    Bulb: 13 mL    Other: 1500 mL  Total OUT: 1513 mL    Total NET: -1153 mL        A/P: 58M a/w perforated sigmoid diverticulitis, now POD8 s/p Exp Lap, MAYDA, SBR, Sigmoid Colon rsxn w creation of colostomy, peritoneal lavage, I&D of intra-abdominal abscess. Patient has new onset of chest pain, cardiac vs GI origin.      PMH   ESRD (end stage renal disease) on dialysis  Diabetes  Hypertension  Hepatitis C  HIV (human immunodeficiency virus infection)  Anemia  Transient ischemic attack (TIA)  ESRD (end stage renal disease)  Dyslipidemia  DM (diabetes mellitus)  HTN (hypertension)      Plan:  -F/U AM labs, cardiac labs, EKG, CXR - Medicine contacted for Evaluation  -Cont Dapto/Diflucan per ID  -Started on Protonix and simethicone  -Local wound care, CHARLEY drain d/c today  -continue VTE prophylaxis with SQ heparin and SCDs  -Continue regular diet  -Patient instructed on and encouraged incentive spirometry use, PT on CPAP  -will discuss with surgical attending for Recommendations
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
## 2019-09-26 NOTE — PROGRESS NOTE ADULT - SUBJECTIVE AND OBJECTIVE BOX
Patient is a 58y old  Male who presents with a chief complaint of Sigmoid perforation (26 Sep 2019 10:47)      INTERVAL HPI/OVERNIGHT EVENTS:  pt has chest pain this morning  MEDICATIONS  (STANDING):  atorvastatin 10 milliGRAM(s) Oral at bedtime  carvedilol 6.25 milliGRAM(s) Oral every 12 hours  chlorhexidine 4% Liquid 1 Application(s) Topical <User Schedule>  chlorhexidine 4% Liquid 1 Application(s) Topical daily  darunavir 800 milliGRAM(s) Oral daily  dextrose 5% + sodium chloride 0.45%. 1000 milliLiter(s) (50 mL/Hr) IV Continuous <Continuous>  epoetin marsha Injectable 50978 Unit(s) SubCutaneous <User Schedule>  etravirine 200 milliGRAM(s) Oral two times a day after meals  fluconAZOLE   Tablet 200 milliGRAM(s) Oral every 24 hours  heparin  Injectable 5000 Unit(s) SubCutaneous every 12 hours  hydrALAZINE 10 milliGRAM(s) Oral every 8 hours  influenza   Vaccine 0.5 milliLiter(s) IntraMuscular once  linezolid    Tablet 600 milliGRAM(s) Oral every 12 hours  raltegravir Tablet 400 milliGRAM(s) Oral two times a day  ritonavir Tablet 100 milliGRAM(s) Oral daily    MEDICATIONS  (PRN):  morphine  - Injectable 2 milliGRAM(s) IV Push every 4 hours PRN Moderate Pain (4 - 6)      Allergies    ciprofloxacin (Rash)  Levaquin (Rash)    Intolerances        REVIEW OF SYSTEMS:  CONSTITUTIONAL: No fever, weight loss, or fatigue  EYES: No eye pain, visual disturbances, or discharge  ENMT:  No difficulty hearing, tinnitus, vertigo; No sinus or throat pain  NECK: No pain or stiffness  BREASTS: No pain, masses, or nipple discharge  RESPIRATORY: No cough, wheezing, chills or hemoptysis; No shortness of breath  CARDIOVASCULAR: No chest pain, palpitations, dizziness, or leg swelling  GASTROINTESTINAL: No abdominal or epigastric pain. No nausea, vomiting, or hematemesis; No diarrhea or constipation. No melena or hematochezia.  GENITOURINARY: No dysuria, frequency, hematuria, or incontinence  NEUROLOGICAL: No headaches, memory loss, loss of strength, numbness, or tremors  SKIN: No itching, burning, rashes, or lesions   LYMPH NODES: No enlarged glands  ENDOCRINE: No heat or cold intolerance; No hair loss  MUSCULOSKELETAL: No joint pain or swelling; No muscle, back, or extremity pain  PSYCHIATRIC: No depression, anxiety, mood swings, or difficulty sleeping  HEME/LYMPH: No easy bruising, or bleeding gums  ALLERGY AND IMMUNOLOGIC: No hives or eczema    Vital Signs Last 24 Hrs  T(C): 36.9 (26 Sep 2019 12:18), Max: 37.1 (26 Sep 2019 05:18)  T(F): 98.4 (26 Sep 2019 12:18), Max: 98.8 (26 Sep 2019 05:18)  HR: 88 (26 Sep 2019 12:18) (80 - 93)  BP: 99/58 (26 Sep 2019 12:18) (99/58 - 156/74)  BP(mean): --  RR: 16 (26 Sep 2019 12:18) (16 - 19)  SpO2: 94% (26 Sep 2019 12:18) (91% - 100%)    PHYSICAL EXAM:  GENERAL: NAD, well-groomed, well-developed  HEAD:  Atraumatic, Normocephalic  EYES: EOMI, PERRLA, conjunctiva and sclera clear  ENMT: No tonsillar erythema, exudates, or enlargement; Moist mucous membranes, Good dentition, No lesions  NECK: Supple, No JVD, Normal thyroid  NERVOUS SYSTEM:  Alert & Oriented X3, Good concentration; Motor Strength 5/5 B/L upper and lower extremities; DTRs 2+ intact and symmetric  CHEST/LUNG: Clear to percussion bilaterally; No rales, rhonchi, wheezing, or rubs  HEART: Regular rate and rhythm; No murmurs, rubs, or gallops  ABDOMEN: Soft, Nontender, Nondistended; Bowel sounds present  EXTREMITIES:  2+ Peripheral Pulses, No clubbing, cyanosis, or edema  LYMPH: No lymphadenopathy noted  SKIN: No rashes or lesions    LABS:                        8.6    8.57  )-----------( 217      ( 26 Sep 2019 06:55 )             28.9     09-26    138  |  99  |  13  ----------------------------<  91  3.4<L>   |  29  |  6.67<H>    Ca    8.1<L>      26 Sep 2019 10:52  Phos  4.4     09-26  Mg     2.1 09-26          CAPILLARY BLOOD GLUCOSE        CULTURES:    HEMOGLOBIN A1C:    CHOLESTEROL:        RADIOLOGY & ADDITIONAL TESTS:

## 2019-09-27 LAB
ANION GAP SERPL CALC-SCNC: 11 MMOL/L — SIGNIFICANT CHANGE UP (ref 5–17)
BUN SERPL-MCNC: 22 MG/DL — SIGNIFICANT CHANGE UP (ref 7–23)
CALCIUM SERPL-MCNC: 8.4 MG/DL — LOW (ref 8.5–10.1)
CHLORIDE SERPL-SCNC: 101 MMOL/L — SIGNIFICANT CHANGE UP (ref 96–108)
CO2 SERPL-SCNC: 27 MMOL/L — SIGNIFICANT CHANGE UP (ref 22–31)
CREAT SERPL-MCNC: 9 MG/DL — HIGH (ref 0.5–1.3)
GLUCOSE SERPL-MCNC: 128 MG/DL — HIGH (ref 70–99)
HCT VFR BLD CALC: 26.2 % — LOW (ref 39–50)
HGB BLD-MCNC: 8 G/DL — LOW (ref 13–17)
MCHC RBC-ENTMCNC: 26.8 PG — LOW (ref 27–34)
MCHC RBC-ENTMCNC: 30.5 GM/DL — LOW (ref 32–36)
MCV RBC AUTO: 87.9 FL — SIGNIFICANT CHANGE UP (ref 80–100)
NRBC # BLD: 0 /100 WBCS — SIGNIFICANT CHANGE UP (ref 0–0)
PLATELET # BLD AUTO: 235 K/UL — SIGNIFICANT CHANGE UP (ref 150–400)
POTASSIUM SERPL-MCNC: 3.3 MMOL/L — LOW (ref 3.5–5.3)
POTASSIUM SERPL-SCNC: 3.3 MMOL/L — LOW (ref 3.5–5.3)
RBC # BLD: 2.98 M/UL — LOW (ref 4.2–5.8)
RBC # FLD: 17.4 % — HIGH (ref 10.3–14.5)
SODIUM SERPL-SCNC: 139 MMOL/L — SIGNIFICANT CHANGE UP (ref 135–145)
TROPONIN I SERPL-MCNC: 0.06 NG/ML — HIGH (ref 0.01–0.04)
WBC # BLD: 9.32 K/UL — SIGNIFICANT CHANGE UP (ref 3.8–10.5)
WBC # FLD AUTO: 9.32 K/UL — SIGNIFICANT CHANGE UP (ref 3.8–10.5)

## 2019-09-27 PROCEDURE — 99232 SBSQ HOSP IP/OBS MODERATE 35: CPT

## 2019-09-27 PROCEDURE — 93306 TTE W/DOPPLER COMPLETE: CPT | Mod: 26

## 2019-09-27 RX ADMIN — Medication 81 MILLIGRAM(S): at 15:29

## 2019-09-27 RX ADMIN — RALTEGRAVIR 400 MILLIGRAM(S): 400 TABLET, FILM COATED ORAL at 05:57

## 2019-09-27 RX ADMIN — LINEZOLID 600 MILLIGRAM(S): 600 INJECTION, SOLUTION INTRAVENOUS at 05:57

## 2019-09-27 RX ADMIN — RITONAVIR 100 MILLIGRAM(S): 100 TABLET, FILM COATED ORAL at 15:32

## 2019-09-27 RX ADMIN — HEPARIN SODIUM 5000 UNIT(S): 5000 INJECTION INTRAVENOUS; SUBCUTANEOUS at 05:57

## 2019-09-27 RX ADMIN — Medication 10 MILLIGRAM(S): at 05:57

## 2019-09-27 RX ADMIN — Medication 10 MILLIGRAM(S): at 15:31

## 2019-09-27 RX ADMIN — HEPARIN SODIUM 5000 UNIT(S): 5000 INJECTION INTRAVENOUS; SUBCUTANEOUS at 17:48

## 2019-09-27 RX ADMIN — CARVEDILOL PHOSPHATE 6.25 MILLIGRAM(S): 80 CAPSULE, EXTENDED RELEASE ORAL at 17:48

## 2019-09-27 RX ADMIN — DARUNAVIR 800 MILLIGRAM(S): 75 TABLET, FILM COATED ORAL at 15:31

## 2019-09-27 RX ADMIN — ETRAVIRINE 200 MILLIGRAM(S): 200 TABLET ORAL at 08:02

## 2019-09-27 RX ADMIN — RALTEGRAVIR 400 MILLIGRAM(S): 400 TABLET, FILM COATED ORAL at 17:49

## 2019-09-27 RX ADMIN — CHLORHEXIDINE GLUCONATE 1 APPLICATION(S): 213 SOLUTION TOPICAL at 08:04

## 2019-09-27 RX ADMIN — ERYTHROPOIETIN 10000 UNIT(S): 10000 INJECTION, SOLUTION INTRAVENOUS; SUBCUTANEOUS at 13:25

## 2019-09-27 RX ADMIN — CARVEDILOL PHOSPHATE 6.25 MILLIGRAM(S): 80 CAPSULE, EXTENDED RELEASE ORAL at 05:57

## 2019-09-27 RX ADMIN — CHLORHEXIDINE GLUCONATE 1 APPLICATION(S): 213 SOLUTION TOPICAL at 15:30

## 2019-09-27 RX ADMIN — LINEZOLID 600 MILLIGRAM(S): 600 INJECTION, SOLUTION INTRAVENOUS at 17:48

## 2019-09-27 NOTE — PROGRESS NOTE ADULT - SUBJECTIVE AND OBJECTIVE BOX
[facility] [place] NEPHROLOGY SERVICES, [facility]  NEPHROLOGY AND HYPERTENSION  [address]  [address]  [place] [address]  [phone]    MD PARVEEN NOLAND, MD SHEYLA MILLS MD YELENA ROSENBERG, MD STACIE OWENS, MD DONAVAN CORTES MD          Patient feels ok still with incisional pain;     MEDICATIONS  (STANDING):  aspirin enteric coated 81 milliGRAM(s) Oral daily  atorvastatin 10 milliGRAM(s) Oral at bedtime  carvedilol 6.25 milliGRAM(s) Oral every 12 hours  chlorhexidine 4% Liquid 1 Application(s) Topical <User Schedule>  chlorhexidine 4% Liquid 1 Application(s) Topical daily  darunavir 800 milliGRAM(s) Oral daily  dextrose 5% + sodium chloride 0.45%. 1000 milliLiter(s) (50 mL/Hr) IV Continuous <Continuous>  epoetin marsha Injectable 33926 Unit(s) SubCutaneous <User Schedule>  etravirine 200 milliGRAM(s) Oral two times a day after meals  fluconAZOLE   Tablet 200 milliGRAM(s) Oral every 24 hours  heparin  Injectable 5000 Unit(s) SubCutaneous every 12 hours  hydrALAZINE 10 milliGRAM(s) Oral every 8 hours  influenza   Vaccine 0.5 milliLiter(s) IntraMuscular once  linezolid    Tablet 600 milliGRAM(s) Oral every 12 hours  raltegravir Tablet 400 milliGRAM(s) Oral two times a day  ritonavir Tablet 100 milliGRAM(s) Oral daily    MEDICATIONS  (PRN):      PHYSICAL EXAM:      T(C): 36.3 (09-27-19 @ 09:49), Max: 36.6 (09-26-19 @ 17:00)  HR: 84 (09-27-19 @ 09:49) (77 - 88)  BP: 97/62 (09-27-19 @ 09:49) (94/58 - 126/76)  RR: 16 (09-27-19 @ 09:49) (16 - 17)  SpO2: 100% (09-27-19 @ 09:49) (95% - 100%)  Wt(kg): --  Respiratory: clear anteriorly, decreased BS at bases  Cardiovascular: S1 S2  Gastrointestinal: soft NT ND +BS  + ostomy  Extremities:  1 edema                                    8.0    9.32  )-----------( 235      ( 27 Sep 2019 10:29 )             26.2     09-27    139  |  101  |  22  ----------------------------<  128<H>  3.3<L>   |  27  |  9.00<H>    Ca    8.4<L>      27 Sep 2019 10:29  Phos  4.4     09-26  Mg     2.1     09-26          Creatinine Trend: 9.00<--, 6.67<--, 9.68<--, 11.10<--, 8.75<--, 6.78<--      Assessment and Plan:  Perforated sigmoid diverticulitis S/P Exp Lap, MAYDA, SBR, Sigmoid Colon rsxn w creation of colostomy, peritoneal lavage, I&D of intra-abdominal abscess-  Maintenance HD; UF as tolerated;   4 k bath  Will follow closely.      Jacob Hobson MD

## 2019-09-27 NOTE — PROGRESS NOTE ADULT - ASSESSMENT
58 yr old   obese male seen with   1.AIDS/HIV :cont HAART  pt non complaint so follow up on t cell and viral load  pt says his current HAART is giving him diarrhea dn dosent agree with his body so he takes it every other day/few times a week   Inspite his Cd 4 is 201 and VL is 1703  will cont current HAART   will do genotype and phenotype to see if HAART therapy can be changed   with him having ESRD the choices will be restricted   He says last HIV visit was few days before admission but he wants to change his HIV doctor. Will give him phone number for [facility] ID office     2.Acute peritonitis: sec to perforated sigmoid diverticulitis perforation and abscess  s/p MAYDA and resection   OR c/s growingly yeast  and VRE  cont linezolid (changed from daptomycin ) day 4 of VRE coverage  cont diflucan (changed from micafungin ) day 7 fungal coverage  cont zosyn for now (day 9 )       3.Leukocytosis : resolving  sec to above      4.ESRD on HD  antibiotics dosage adjusted      5.Acute resp failure : resolved  CPAP as per requirement 58 yr old   obese male seen with   1.AIDS/HIV :cont HAART  pt non complaint so follow up on t cell and viral load  pt says his current HAART is giving him diarrhea dn dosent agree with his body so he takes it every other day/few times a week   Inspite his Cd 4 is 201 and VL is 1703  will cont current HAART   will do genotype and phenotype to see if HAART therapy can be changed   with him having ESRD the choices will be restricted   He says last HIV visit was few days before admission but he wants to change his HIV doctor. Will give him phone number for [facility] ID office     2.Acute peritonitis: sec to perforated sigmoid diverticulitis perforation and abscess  s/p MAYDA and resection   OR c/s growingly yeast  and VRE  cont linezolid (changed from daptomycin ) day 5 of VRE coverage  cont diflucan (changed from micafungin ) day8 fungal coverage  cont zosyn for now (day 9 )       3.Leukocytosis : resolving  sec to above      4.ESRD on HD  antibiotics dosage adjusted      5.Acute resp failure : resolved  CPAP as per requirement

## 2019-09-27 NOTE — CHART NOTE - NSCHARTNOTEFT_GEN_A_CORE
Pt s/p exp lat, SBR, MAYDA, creation of colostomy, peritoneal lavage, I & D abdominal abscess POD #9    Factors impacting intake: [ ] none [ ] nausea  [ ] vomiting [ ] diarrhea [ ] constipation  [ ]chewing problems [ ] swallowing issues  [X ] other: pain    Diet Prescription: Diet, Regular:   Fiber/Residue Restricted  DASH/TLC {Sodium & Cholesterol Restricted}  For patients receiving Renal Replacement - No Protein Restr, No Conc K, No Conc Phos, Low Sodium (RENAL) (09-26-19 @ 09:32)    Intake:  staff & pt report appetite improving; mostly approx 50%; RD observed 75% breakfast consumed  this morning    Current Weight:  111 kg (9/27); admission wt 115 kg (9/18)  % Weight Change: 3% loss x 9 days; wt fluctuates between HD txs    Pertinent Medications: MEDICATIONS  (STANDING):  aspirin enteric coated 81 milliGRAM(s) Oral daily  atorvastatin 10 milliGRAM(s) Oral at bedtime  carvedilol 6.25 milliGRAM(s) Oral every 12 hours  chlorhexidine 4% Liquid 1 Application(s) Topical <User Schedule>  chlorhexidine 4% Liquid 1 Application(s) Topical daily  darunavir 800 milliGRAM(s) Oral daily  dextrose 5% + sodium chloride 0.45%. 1000 milliLiter(s) (50 mL/Hr) IV Continuous <Continuous>  epoetin marsha Injectable 74792 Unit(s) SubCutaneous <User Schedule>  etravirine 200 milliGRAM(s) Oral two times a day after meals  fluconAZOLE   Tablet 200 milliGRAM(s) Oral every 24 hours  heparin  Injectable 5000 Unit(s) SubCutaneous every 12 hours  hydrALAZINE 10 milliGRAM(s) Oral every 8 hours  influenza   Vaccine 0.5 milliLiter(s) IntraMuscular once  linezolid    Tablet 600 milliGRAM(s) Oral every 12 hours  raltegravir Tablet 400 milliGRAM(s) Oral two times a day  ritonavir Tablet 100 milliGRAM(s) Oral daily    MEDICATIONS  (PRN):    Pertinent Labs:  09-26 Na138 mmol/L Glu 91 mg/dL K+ 3.4 mmol/L<L> Cr  6.67 mg/dL<H> BUN 13 mg/dL 09-26 Phos 4.4 mg/dL 09-21 Alb 1.8 g/dL<L>    Skin: WDL; surgical incision noted    Estimated Needs:   [x ] no change since previous assessment  (9/20)  [ ] recalculated:     Previous Nutrition Diagnosis:   [X ] Inadequate Energy Intake   Etiology:  Decreased ability to consume sufficient energy    Previous Signs & Symptoms:  NPO x 2 days; distended abdomen, abdominal pain  NEW Signs & Symptoms:  Pt consuming < 75% most meals; c/o continued abdominal pain    Previous GOAL:  As medically appropriate Pt to tolerate PO diet w/o GI distress - met  NEW GOAL:  Pt to consume > 75% meals/supplements    Nutrition Diagnosis is [X ] ongoing  [ ] resolved [ ] not applicable     New Nutrition Diagnosis: [X ] not applicable    Interventions:   Recommend  [ ] Change Diet To: Liberalize to Low Fiber + Renal diet  [X ] Nutrition Supplement: Nepro x 1/day (provides 425 kcal, 19 g protein)   [ ] Nutrition Support  [X ] Other: provided nutritional counseling/educational material re Renal & Low Fiber diet recommendations    Monitoring and Evaluation:   [ ] PO intake [ x ] Tolerance to diet prescription [ x ] weights [ x ] labs[ x ] follow up per protocol  [ ] other: Pt s/p exp lat, SBR, MAYDA, creation of colostomy, peritoneal lavage, I & D abdominal abscess POD #9    Factors impacting intake: [ ] none [ ] nausea  [ ] vomiting [ ] diarrhea [ ] constipation  [ ]chewing problems [ ] swallowing issues  [X ] other: pain    Diet Prescription: Diet, Regular:   Fiber/Residue Restricted  DASH/TLC {Sodium & Cholesterol Restricted}  For patients receiving Renal Replacement - No Protein Restr, No Conc K, No Conc Phos, Low Sodium (RENAL) (09-26-19 @ 09:32)    Intake:  staff & pt report appetite improving; mostly approx 50%; RD observed 75% breakfast consumed  this morning    Current Weight:  111 kg (9/27); admission wt 115 kg (9/18)  % Weight Change: 3% loss x 9 days; wt fluctuates between HD txs    Pertinent Medications: MEDICATIONS  (STANDING):  aspirin enteric coated 81 milliGRAM(s) Oral daily  atorvastatin 10 milliGRAM(s) Oral at bedtime  carvedilol 6.25 milliGRAM(s) Oral every 12 hours  chlorhexidine 4% Liquid 1 Application(s) Topical <User Schedule>  chlorhexidine 4% Liquid 1 Application(s) Topical daily  darunavir 800 milliGRAM(s) Oral daily  dextrose 5% + sodium chloride 0.45%. 1000 milliLiter(s) (50 mL/Hr) IV Continuous <Continuous>  epoetin marsha Injectable 98408 Unit(s) SubCutaneous <User Schedule>  etravirine 200 milliGRAM(s) Oral two times a day after meals  fluconAZOLE   Tablet 200 milliGRAM(s) Oral every 24 hours  heparin  Injectable 5000 Unit(s) SubCutaneous every 12 hours  hydrALAZINE 10 milliGRAM(s) Oral every 8 hours  influenza   Vaccine 0.5 milliLiter(s) IntraMuscular once  linezolid    Tablet 600 milliGRAM(s) Oral every 12 hours  raltegravir Tablet 400 milliGRAM(s) Oral two times a day  ritonavir Tablet 100 milliGRAM(s) Oral daily    MEDICATIONS  (PRN):    Pertinent Labs:  09-26 Na138 mmol/L Glu 91 mg/dL K+ 3.4 mmol/L<L> Cr  6.67 mg/dL<H> BUN 13 mg/dL 09-26 Phos 4.4 mg/dL 09-21 Alb 1.8 g/dL<L>    Skin: WDL; surgical incision noted    Estimated Needs:   [x ] no change since previous assessment  (9/20)  [ ] recalculated:     Previous Nutrition Diagnosis:   [X ] Inadequate Energy Intake   Etiology:  Decreased ability to consume sufficient energy    Previous Signs & Symptoms:  NPO x 2 days; distended abdomen, abdominal pain  NEW Signs & Symptoms:  Pt consuming < 75% most meals; c/o continued abdominal pain    Previous GOAL:  As medically appropriate Pt to tolerate PO diet w/o GI distress - met  NEW GOAL:  Pt to consume > 75% meals/supplements    Nutrition Diagnosis is [X ] ongoing  [ ] resolved [ ] not applicable     New Nutrition Diagnosis: [X ] not applicable    Interventions:  recommendation for diet change to surgery as they are managing diet  Recommend  [ ] Change Diet To: Liberalize to Low Fiber + Renal diet  [X ] Nutrition Supplement: Nepro x 1/day (provides 425 kcal, 19 g protein)   [ ] Nutrition Support  [X ] Other: provided nutritional counseling/educational material re Renal & Low Fiber diet recommendations    Monitoring and Evaluation:   [ ] PO intake [ x ] Tolerance to diet prescription [ x ] weights [ x ] labs[ x ] follow up per protocol  [ ] other:

## 2019-09-27 NOTE — PROGRESS NOTE ADULT - SUBJECTIVE AND OBJECTIVE BOX
Patient is a 58y old  Male who presents with a chief complaint of Sigmoid perforation (27 Sep 2019 12:29)      INTERVAL HPI / OVERNIGHT EVENTS: no new c/o    MEDICATIONS  (STANDING):  aspirin enteric coated 81 milliGRAM(s) Oral daily  atorvastatin 10 milliGRAM(s) Oral at bedtime  carvedilol 6.25 milliGRAM(s) Oral every 12 hours  chlorhexidine 4% Liquid 1 Application(s) Topical <User Schedule>  chlorhexidine 4% Liquid 1 Application(s) Topical daily  darunavir 800 milliGRAM(s) Oral daily  dextrose 5% + sodium chloride 0.45%. 1000 milliLiter(s) (50 mL/Hr) IV Continuous <Continuous>  epoetin marsha Injectable 74156 Unit(s) SubCutaneous <User Schedule>  etravirine 200 milliGRAM(s) Oral two times a day after meals  fluconAZOLE   Tablet 200 milliGRAM(s) Oral every 24 hours  heparin  Injectable 5000 Unit(s) SubCutaneous every 12 hours  hydrALAZINE 10 milliGRAM(s) Oral every 8 hours  influenza   Vaccine 0.5 milliLiter(s) IntraMuscular once  linezolid    Tablet 600 milliGRAM(s) Oral every 12 hours  raltegravir Tablet 400 milliGRAM(s) Oral two times a day  ritonavir Tablet 100 milliGRAM(s) Oral daily    MEDICATIONS  (PRN):      Vital Signs Last 24 Hrs  T(C): 36 (27 Sep 2019 15:16), Max: 36.6 (27 Sep 2019 00:07)  T(F): 96.8 (27 Sep 2019 15:16), Max: 97.8 (27 Sep 2019 00:07)  HR: 92 (27 Sep 2019 16:59) (75 - 95)  BP: 130/83 (27 Sep 2019 15:16) (97/62 - 145/75)  BP(mean): --  RR: 17 (27 Sep 2019 15:16) (16 - 17)  SpO2: 93% (27 Sep 2019 16:59) (93% - 100%)    Review of systems:  General : no fever /chills,fatigue  CVS : no chest pain, palpitations  Lungs : no shortness of breath, cough  GI : no abdominal pain,vomiting, diarrhea   : no dysuria,hematuria        PHYSICAL EXAM:  General :NAD  Constitutional:  well-groomed, well-developed  Respiratory: CTAB/L  Cardiovascular: S1 and S2, RRR, no M/G/R  Gastrointestinal: BS+, soft, NT/ND  Extremities: No peripheral edema  Vascular: 2+ peripheral pulses  Skin: No rashes      LABS:                        8.0    9.32  )-----------( 235      ( 27 Sep 2019 10:29 )             26.2     09-27    139  |  101  |  22  ----------------------------<  128<H>  3.3<L>   |  27  |  9.00<H>    Ca    8.4<L>      27 Sep 2019 10:29  Phos  4.4     09-26  Mg     2.1     09-26            MICROBIOLOGY:  RECENT CULTURES:        RADIOLOGY & ADDITIONAL STUDIES:

## 2019-09-27 NOTE — PROGRESS NOTE ADULT - SUBJECTIVE AND OBJECTIVE BOX
Patient seen and examined at bedside in no distress.  Reports relief of chest pain. Denies sob, palpitations.  Tolerating regular diet.   Admits to mild abdominal pain, well controlled with pain meds.  Denies fever, chills, nausea, vomiting.    Vital Signs Last 24 Hrs  T(F): 97.6 (09-27-19 @ 05:20), Max: 98.4 (09-26-19 @ 12:18)  HR: 84 (09-27-19 @ 05:35)  BP: 126/76 (09-27-19 @ 05:20)  RR: 17 (09-27-19 @ 05:20)  SpO2: 95% (09-27-19 @ 05:35)    GENERAL: Alert, oriented, NAD  CHEST/LUNG: Clear to auscultation bilaterally, respirations nonlabored  HEART: S1S2, RRR  ABDOMEN: + Bowel sounds. Midline dressing c/d/i, removed. Staples intact, skin healed. New dressing applied. R drain with minima. output. Colostomy functioning, stool in bag. Soft, nondistended, nontender  EXTREMITIES: No pedal edema b/l     LABS:  AM labs pending      A/P: 58M a/w perforated sigmoid diverticulitis, now POD9 s/p Exp Lap, MAYDA, SBR, Sigmoid Colon rsxn w creation of colostomy, peritoneal lavage, I&D of intra-abdominal abscess, c/o chest pain with elevated troponins 9/26  - baby ASA per cardio, f/u echo  - diet as tolerated  - post op care; DVT ppx, ambulate frequently, pain management PRN, incentive spirometer  - linezolid/diflucan/zosyn per ID  - local wound care, CHARLEY monitoring  - f/u AM labs  - discharge planning once cleared by cardio  - will d/w surgical attending

## 2019-09-27 NOTE — PROGRESS NOTE ADULT - SUBJECTIVE AND OBJECTIVE BOX
Patient is a 58y old  Male who presents with a chief complaint of Sigmoid perforation (27 Sep 2019 17:01)      INTERVAL HPI/OVERNIGHT EVENTS:  pt is no new complaint  MEDICATIONS  (STANDING):  aspirin enteric coated 81 milliGRAM(s) Oral daily  atorvastatin 10 milliGRAM(s) Oral at bedtime  carvedilol 6.25 milliGRAM(s) Oral every 12 hours  chlorhexidine 4% Liquid 1 Application(s) Topical <User Schedule>  chlorhexidine 4% Liquid 1 Application(s) Topical daily  darunavir 800 milliGRAM(s) Oral daily  dextrose 5% + sodium chloride 0.45%. 1000 milliLiter(s) (50 mL/Hr) IV Continuous <Continuous>  epoetin marsha Injectable 91674 Unit(s) SubCutaneous <User Schedule>  etravirine 200 milliGRAM(s) Oral two times a day after meals  fluconAZOLE   Tablet 200 milliGRAM(s) Oral every 24 hours  heparin  Injectable 5000 Unit(s) SubCutaneous every 12 hours  hydrALAZINE 10 milliGRAM(s) Oral every 8 hours  influenza   Vaccine 0.5 milliLiter(s) IntraMuscular once  linezolid    Tablet 600 milliGRAM(s) Oral every 12 hours  raltegravir Tablet 400 milliGRAM(s) Oral two times a day  ritonavir Tablet 100 milliGRAM(s) Oral daily    MEDICATIONS  (PRN):      Allergies    ciprofloxacin (Rash)  Levaquin (Rash)    Intolerances        REVIEW OF SYSTEMS:  CONSTITUTIONAL: No fever, weight loss, or fatigue  EYES: No eye pain, visual disturbances, or discharge  ENMT:  No difficulty hearing, tinnitus, vertigo; No sinus or throat pain  NECK: No pain or stiffness  BREASTS: No pain, masses, or nipple discharge  RESPIRATORY: No cough, wheezing, chills or hemoptysis; No shortness of breath  CARDIOVASCULAR: No chest pain, palpitations, dizziness, or leg swelling  GASTROINTESTINAL: No abdominal or epigastric pain. No nausea, vomiting, or hematemesis; No diarrhea or constipation. No melena or hematochezia.  GENITOURINARY: No dysuria, frequency, hematuria, or incontinence  NEUROLOGICAL: No headaches, memory loss, loss of strength, numbness, or tremors  SKIN: No itching, burning, rashes, or lesions   LYMPH NODES: No enlarged glands  ENDOCRINE: No heat or cold intolerance; No hair loss  MUSCULOSKELETAL: No joint pain or swelling; No muscle, back, or extremity pain  PSYCHIATRIC: No depression, anxiety, mood swings, or difficulty sleeping  HEME/LYMPH: No easy bruising, or bleeding gums  ALLERGY AND IMMUNOLOGIC: No hives or eczema    Vital Signs Last 24 Hrs  T(C): 36.4 (27 Sep 2019 17:00), Max: 36.6 (27 Sep 2019 00:07)  T(F): 97.6 (27 Sep 2019 17:00), Max: 97.8 (27 Sep 2019 00:07)  HR: 93 (27 Sep 2019 17:00) (75 - 95)  BP: 125/75 (27 Sep 2019 17:00) (97/62 - 145/75)  BP(mean): --  RR: 17 (27 Sep 2019 17:00) (16 - 17)  SpO2: 95% (27 Sep 2019 17:00) (93% - 100%)    PHYSICAL EXAM:  GENERAL: NAD, well-groomed, well-developed  HEAD:  Atraumatic, Normocephalic  EYES: EOMI, PERRLA, conjunctiva and sclera clear  ENMT: No tonsillar erythema, exudates, or enlargement; Moist mucous membranes, Good dentition, No lesions  NECK: Supple, No JVD, Normal thyroid  NERVOUS SYSTEM:  Alert & Oriented X3, Good concentration; Motor Strength 5/5 B/L upper and lower extremities; DTRs 2+ intact and symmetric  CHEST/LUNG: Clear to percussion bilaterally; No rales, rhonchi, wheezing, or rubs  HEART: Regular rate and rhythm; No murmurs, rubs, or gallops  ABDOMEN: Soft, Nontender, Nondistended; Bowel sounds present  EXTREMITIES:  2+ Peripheral Pulses, No clubbing, cyanosis, or edema  LYMPH: No lymphadenopathy noted  SKIN: No rashes or lesions    LABS:                        8.0    9.32  )-----------( 235      ( 27 Sep 2019 10:29 )             26.2     09-27    139  |  101  |  22  ----------------------------<  128<H>  3.3<L>   |  27  |  9.00<H>    Ca    8.4<L>      27 Sep 2019 10:29  Phos  4.4     09-26  Mg     2.1     09-26          CAPILLARY BLOOD GLUCOSE        CULTURES:    HEMOGLOBIN A1C:    CHOLESTEROL:        RADIOLOGY & ADDITIONAL TESTS:

## 2019-09-28 ENCOUNTER — TRANSCRIPTION ENCOUNTER (OUTPATIENT)
Age: 59
End: 2019-09-28

## 2019-09-28 LAB
ANION GAP SERPL CALC-SCNC: 7 MMOL/L — SIGNIFICANT CHANGE UP (ref 5–17)
BUN SERPL-MCNC: 14 MG/DL — SIGNIFICANT CHANGE UP (ref 7–23)
CALCIUM SERPL-MCNC: 8.2 MG/DL — LOW (ref 8.5–10.1)
CHLORIDE SERPL-SCNC: 104 MMOL/L — SIGNIFICANT CHANGE UP (ref 96–108)
CK MB BLD-MCNC: <3.6 % — HIGH (ref 0–3.5)
CK MB CFR SERPL CALC: <1 NG/ML — SIGNIFICANT CHANGE UP (ref 0.5–3.6)
CK SERPL-CCNC: 28 U/L — SIGNIFICANT CHANGE UP (ref 26–308)
CO2 SERPL-SCNC: 30 MMOL/L — SIGNIFICANT CHANGE UP (ref 22–31)
CREAT SERPL-MCNC: 6.31 MG/DL — HIGH (ref 0.5–1.3)
GLUCOSE SERPL-MCNC: 100 MG/DL — HIGH (ref 70–99)
HCT VFR BLD CALC: 28.5 % — LOW (ref 39–50)
HGB BLD-MCNC: 8.3 G/DL — LOW (ref 13–17)
MCHC RBC-ENTMCNC: 26.3 PG — LOW (ref 27–34)
MCHC RBC-ENTMCNC: 29.1 GM/DL — LOW (ref 32–36)
MCV RBC AUTO: 90.2 FL — SIGNIFICANT CHANGE UP (ref 80–100)
NRBC # BLD: 0 /100 WBCS — SIGNIFICANT CHANGE UP (ref 0–0)
PLATELET # BLD AUTO: 224 K/UL — SIGNIFICANT CHANGE UP (ref 150–400)
POTASSIUM SERPL-MCNC: 3.9 MMOL/L — SIGNIFICANT CHANGE UP (ref 3.5–5.3)
POTASSIUM SERPL-SCNC: 3.9 MMOL/L — SIGNIFICANT CHANGE UP (ref 3.5–5.3)
RBC # BLD: 3.16 M/UL — LOW (ref 4.2–5.8)
RBC # FLD: 17.5 % — HIGH (ref 10.3–14.5)
SODIUM SERPL-SCNC: 141 MMOL/L — SIGNIFICANT CHANGE UP (ref 135–145)
TROPONIN I SERPL-MCNC: 0.06 NG/ML — HIGH (ref 0.01–0.04)
WBC # BLD: 8.91 K/UL — SIGNIFICANT CHANGE UP (ref 3.8–10.5)
WBC # FLD AUTO: 8.91 K/UL — SIGNIFICANT CHANGE UP (ref 3.8–10.5)

## 2019-09-28 RX ORDER — OXYCODONE AND ACETAMINOPHEN 5; 325 MG/1; MG/1
1 TABLET ORAL EVERY 6 HOURS
Refills: 0 | Status: DISCONTINUED | OUTPATIENT
Start: 2019-09-28 | End: 2019-10-01

## 2019-09-28 RX ORDER — MORPHINE SULFATE 50 MG/1
2 CAPSULE, EXTENDED RELEASE ORAL ONCE
Refills: 0 | Status: DISCONTINUED | OUTPATIENT
Start: 2019-09-28 | End: 2019-09-28

## 2019-09-28 RX ADMIN — HEPARIN SODIUM 5000 UNIT(S): 5000 INJECTION INTRAVENOUS; SUBCUTANEOUS at 17:09

## 2019-09-28 RX ADMIN — MORPHINE SULFATE 2 MILLIGRAM(S): 50 CAPSULE, EXTENDED RELEASE ORAL at 22:05

## 2019-09-28 RX ADMIN — RITONAVIR 100 MILLIGRAM(S): 100 TABLET, FILM COATED ORAL at 11:12

## 2019-09-28 RX ADMIN — Medication 10 MILLIGRAM(S): at 21:45

## 2019-09-28 RX ADMIN — RALTEGRAVIR 400 MILLIGRAM(S): 400 TABLET, FILM COATED ORAL at 17:11

## 2019-09-28 RX ADMIN — Medication 81 MILLIGRAM(S): at 11:11

## 2019-09-28 RX ADMIN — CHLORHEXIDINE GLUCONATE 1 APPLICATION(S): 213 SOLUTION TOPICAL at 06:28

## 2019-09-28 RX ADMIN — HEPARIN SODIUM 5000 UNIT(S): 5000 INJECTION INTRAVENOUS; SUBCUTANEOUS at 06:18

## 2019-09-28 RX ADMIN — LINEZOLID 600 MILLIGRAM(S): 600 INJECTION, SOLUTION INTRAVENOUS at 17:09

## 2019-09-28 RX ADMIN — MORPHINE SULFATE 2 MILLIGRAM(S): 50 CAPSULE, EXTENDED RELEASE ORAL at 22:20

## 2019-09-28 RX ADMIN — ETRAVIRINE 200 MILLIGRAM(S): 200 TABLET ORAL at 07:45

## 2019-09-28 RX ADMIN — LINEZOLID 600 MILLIGRAM(S): 600 INJECTION, SOLUTION INTRAVENOUS at 06:18

## 2019-09-28 RX ADMIN — RALTEGRAVIR 400 MILLIGRAM(S): 400 TABLET, FILM COATED ORAL at 06:20

## 2019-09-28 RX ADMIN — Medication 10 MILLIGRAM(S): at 06:18

## 2019-09-28 RX ADMIN — ETRAVIRINE 200 MILLIGRAM(S): 200 TABLET ORAL at 17:10

## 2019-09-28 RX ADMIN — ATORVASTATIN CALCIUM 10 MILLIGRAM(S): 80 TABLET, FILM COATED ORAL at 21:45

## 2019-09-28 RX ADMIN — CARVEDILOL PHOSPHATE 6.25 MILLIGRAM(S): 80 CAPSULE, EXTENDED RELEASE ORAL at 06:18

## 2019-09-28 RX ADMIN — FLUCONAZOLE 200 MILLIGRAM(S): 150 TABLET ORAL at 19:39

## 2019-09-28 RX ADMIN — Medication 30 MILLILITER(S): at 22:04

## 2019-09-28 RX ADMIN — DARUNAVIR 800 MILLIGRAM(S): 75 TABLET, FILM COATED ORAL at 11:30

## 2019-09-28 NOTE — DISCHARGE NOTE PROVIDER - NSDCCPCAREPLAN_GEN_ALL_CORE_FT
PRINCIPAL DISCHARGE DIAGNOSIS  Diagnosis: Perforated sigmoid colon  Assessment and Plan of Treatment:       SECONDARY DISCHARGE DIAGNOSES  Diagnosis: ESRD (end stage renal disease) on dialysis  Assessment and Plan of Treatment:

## 2019-09-28 NOTE — PROGRESS NOTE ADULT - SUBJECTIVE AND OBJECTIVE BOX
Patient is a 58y old  Male who presents with a chief complaint of Sigmoid perforation (28 Sep 2019 07:46)      INTERVAL HPI/OVERNIGHT EVENTS:    MEDICATIONS  (STANDING):  aspirin enteric coated 81 milliGRAM(s) Oral daily  atorvastatin 10 milliGRAM(s) Oral at bedtime  carvedilol 6.25 milliGRAM(s) Oral every 12 hours  chlorhexidine 4% Liquid 1 Application(s) Topical <User Schedule>  chlorhexidine 4% Liquid 1 Application(s) Topical daily  darunavir 800 milliGRAM(s) Oral daily  dextrose 5% + sodium chloride 0.45%. 1000 milliLiter(s) (50 mL/Hr) IV Continuous <Continuous>  epoetin marsha Injectable 37474 Unit(s) SubCutaneous <User Schedule>  etravirine 200 milliGRAM(s) Oral two times a day after meals  fluconAZOLE   Tablet 200 milliGRAM(s) Oral every 24 hours  heparin  Injectable 5000 Unit(s) SubCutaneous every 12 hours  hydrALAZINE 10 milliGRAM(s) Oral every 8 hours  influenza   Vaccine 0.5 milliLiter(s) IntraMuscular once  linezolid    Tablet 600 milliGRAM(s) Oral every 12 hours  raltegravir Tablet 400 milliGRAM(s) Oral two times a day  ritonavir Tablet 100 milliGRAM(s) Oral daily    MEDICATIONS  (PRN):      Allergies    ciprofloxacin (Rash)  Levaquin (Rash)    Intolerances        REVIEW OF SYSTEMS:  CONSTITUTIONAL: No fever, weight loss, or fatigue  EYES: No eye pain, visual disturbances, or discharge  ENMT:  No difficulty hearing, tinnitus, vertigo; No sinus or throat pain  NECK: No pain or stiffness  BREASTS: No pain, masses, or nipple discharge  RESPIRATORY: No cough, wheezing, chills or hemoptysis; No shortness of breath  CARDIOVASCULAR: No chest pain, palpitations, dizziness, or leg swelling  GASTROINTESTINAL: No abdominal or epigastric pain. No nausea, vomiting, or hematemesis; No diarrhea or constipation. No melena or hematochezia.  GENITOURINARY: No dysuria, frequency, hematuria, or incontinence  NEUROLOGICAL: No headaches, memory loss, loss of strength, numbness, or tremors  SKIN: No itching, burning, rashes, or lesions   LYMPH NODES: No enlarged glands  ENDOCRINE: No heat or cold intolerance; No hair loss  MUSCULOSKELETAL: No joint pain or swelling; No muscle, back, or extremity pain  PSYCHIATRIC: No depression, anxiety, mood swings, or difficulty sleeping  HEME/LYMPH: No easy bruising, or bleeding gums  ALLERGY AND IMMUNOLOGIC: No hives or eczema    Vital Signs Last 24 Hrs  T(C): 36.5 (28 Sep 2019 06:59), Max: 37.1 (27 Sep 2019 23:15)  T(F): 97.7 (28 Sep 2019 06:59), Max: 98.7 (27 Sep 2019 23:15)  HR: 78 (28 Sep 2019 08:31) (74 - 100)  BP: 120/70 (28 Sep 2019 06:59) (97/62 - 145/75)  BP(mean): --  RR: 18 (28 Sep 2019 06:59) (16 - 18)  SpO2: 100% (28 Sep 2019 08:31) (93% - 100%)    PHYSICAL EXAM:  GENERAL: NAD, well-groomed, well-developed  HEAD:  Atraumatic, Normocephalic  EYES: EOMI, PERRLA, conjunctiva and sclera clear  ENMT: No tonsillar erythema, exudates, or enlargement; Moist mucous membranes, Good dentition, No lesions  NECK: Supple, No JVD, Normal thyroid  NERVOUS SYSTEM:  Alert & Oriented X3, Good concentration; Motor Strength 5/5 B/L upper and lower extremities; DTRs 2+ intact and symmetric  CHEST/LUNG: Clear to percussion bilaterally; No rales, rhonchi, wheezing, or rubs  HEART: Regular rate and rhythm; No murmurs, rubs, or gallops  ABDOMEN: Soft, Nontender, Nondistended; Bowel sounds present  EXTREMITIES:  2+ Peripheral Pulses, No clubbing, cyanosis, or edema  LYMPH: No lymphadenopathy noted  SKIN: No rashes or lesions    LABS:                        8.3    8.91  )-----------( 224      ( 28 Sep 2019 07:23 )             28.5     09-28    141  |  104  |  14  ----------------------------<  100<H>  3.9   |  30  |  6.31<H>    Ca    8.2<L>      28 Sep 2019 07:23  Phos  4.4     09-26  Mg     2.1     09-26          CAPILLARY BLOOD GLUCOSE        CULTURES:    HEMOGLOBIN A1C:    CHOLESTEROL:        RADIOLOGY & ADDITIONAL TESTS:

## 2019-09-28 NOTE — DISCHARGE NOTE PROVIDER - HOSPITAL COURSE
57 y/o male  admitted with perforated sigmoid diverticulitis s/p Exp Lap, MAYDA, SBR, Sigmoid Colon rsxn w creation of colostomy, peritoneal lavage, I&D of intra-abdominal abscess 9/18/19. Operation went well. Patient had return of bowel function and tolerated advancement of diet. Patient had chest pain with elevated troponins 9/26. Cardiology was consulted and an echo was completed which showed an EF: 60-65%. Medicine, GI, renal, and ID were consulted as well. Per cardiology, in setting of normal EF patient may be discharged from cardiac standpoint. Pt cleared by ID who recommended that patient may be discharged on 10 days of: zyvox 600mg BID, augmentin 250mg daily and diflucan 200mg daily.    At the time of discharge on POD#11, the patient was hemodynamically stable, was tolerating PO diet, was voiding urine, was ambulating, and was comfortable with adequate pain control. Patient instructed to follow up and to call the office to make an appointment. Case discussed with case management and patient d/c'd with home care for ostomy care and home PT as well as OP HD. 57 y/o male  admitted with perforated sigmoid diverticulitis s/p Exp Lap, MAYDA, SBR, Sigmoid Colon rsxn w creation of colostomy, peritoneal lavage, I&D of intra-abdominal abscess 9/18/19. Operation went well. Patient had return of bowel function and tolerated advancement of diet. Patient had chest pain with elevated troponins 9/26. Cardiology was consulted and an echo was completed which showed an EF: 60-65%. Medicine, GI, renal, and ID were consulted as well. Per cardiology, in setting of normal EF patient may be discharged from cardiac standpoint. Pt cleared by ID who recommended that patient may be discharged on 10 days of: zyvox 600mg BID, augmentin 250mg daily and diflucan 200mg daily.    At the time of discharge on POD#13, the patient was hemodynamically stable, was tolerating PO diet, was voiding urine, was ambulating, and was comfortable with adequate pain control. Patient instructed to follow up and to call the office to make an appointment. Case discussed with case management and patient d/c'd with home care for ostomy care and home PT as well as OP HD.

## 2019-09-28 NOTE — CHART NOTE - NSCHARTNOTEFT_GEN_A_CORE
Pt was seen for follow up throughout the day.  He had c/o epigastric pain earlier this am, described as tightness across stomach, around upper abdomen.  Cardiac enzymes were performed. CE and TTE results discussed with Dr Vidal, cardiology, who states in setting of normal EF patient may be discharged from cardiac standpoint.    Presently, patient denies cp, n/v. Tolerating diet. Ostomy functioning with stool and air in bag. Abd soft and nontender to palpation.     Staples removed from healed midline incision. Pt tolerated well.     Continue postoperative care, patient encouraged to use analgesia prn. Continue diet as tolerated.     Per Dr Garcia, patient is surgically stable for discharge with OP Follow up in office on Tuesday 10/1 at 10am.  Explained to patient, he is comfortable with plan.    I have discussed the case with Dr Chase, patient may be discharged on 10 days of: zyvox 600mg BID, augmentin 250mg daily and diflucan 200mg daily.    I discussed case with case management, plan for d/c in AM with home care for ostomy care and home PT as well as OP HD.

## 2019-09-28 NOTE — PROGRESS NOTE ADULT - PROBLEM SELECTOR PROBLEM 4
ESRD (end stage renal disease) on dialysis
Type 2 diabetes mellitus without complication, without long-term current use of insulin

## 2019-09-28 NOTE — DISCHARGE NOTE PROVIDER - NSDCCPGOAL_GEN_ALL_CORE_FT
Follow up on October 1st at 10am with Dr. Garcia. Activity as tolerated. Rest as needed. You may eat a low residue diet. Do not lift anything heavier than 10 pounds. You may take motrin or advil for mild pain as needed, in addition to prescribed narcotic medication. Do not drive while taking narcotic pain medication. You may take over the counter stool softeners as needed. Call for any fever over 101, nausea, vomiting, severe pain, no passing of gas or bowel movement. You may shower and pat dry abdomen. Follow up on October 15th at 10am with Dr. Garcia. Activity as tolerated. Rest as needed. You may eat a low residue diet. Do not lift anything heavier than 10 pounds. You may take motrin or advil for mild pain as needed, in addition to prescribed narcotic medication. Do not drive while taking narcotic pain medication. You may take over the counter stool softeners as needed. Call for any fever over 101, nausea, vomiting, severe pain, no passing of gas or bowel movement. You may shower and pat dry abdomen.

## 2019-09-28 NOTE — DISCHARGE NOTE PROVIDER - CARE PROVIDER_API CALL
Dilan Garcia (MD)  Surgery  [address] [facility], [place] [address]  Phone: [phone]  Fax: [phone]  Follow Up Time: Dilan Garcia (MD)  Surgery  [address] [facility], [place] [address]  Phone: [phone]  Fax: [phone]  Follow Up Time: 1-3 days

## 2019-09-29 RX ADMIN — RALTEGRAVIR 400 MILLIGRAM(S): 400 TABLET, FILM COATED ORAL at 06:24

## 2019-09-29 RX ADMIN — OXYCODONE AND ACETAMINOPHEN 1 TABLET(S): 5; 325 TABLET ORAL at 11:34

## 2019-09-29 RX ADMIN — CHLORHEXIDINE GLUCONATE 1 APPLICATION(S): 213 SOLUTION TOPICAL at 08:01

## 2019-09-29 RX ADMIN — OXYCODONE AND ACETAMINOPHEN 1 TABLET(S): 5; 325 TABLET ORAL at 12:30

## 2019-09-29 RX ADMIN — DARUNAVIR 800 MILLIGRAM(S): 75 TABLET, FILM COATED ORAL at 11:36

## 2019-09-29 RX ADMIN — HEPARIN SODIUM 5000 UNIT(S): 5000 INJECTION INTRAVENOUS; SUBCUTANEOUS at 17:33

## 2019-09-29 RX ADMIN — FLUCONAZOLE 200 MILLIGRAM(S): 150 TABLET ORAL at 20:01

## 2019-09-29 RX ADMIN — ETRAVIRINE 200 MILLIGRAM(S): 200 TABLET ORAL at 08:01

## 2019-09-29 RX ADMIN — Medication 10 MILLIGRAM(S): at 22:30

## 2019-09-29 RX ADMIN — LINEZOLID 600 MILLIGRAM(S): 600 INJECTION, SOLUTION INTRAVENOUS at 06:23

## 2019-09-29 RX ADMIN — SODIUM CHLORIDE 50 MILLILITER(S): 9 INJECTION, SOLUTION INTRAVENOUS at 20:01

## 2019-09-29 RX ADMIN — OXYCODONE AND ACETAMINOPHEN 1 TABLET(S): 5; 325 TABLET ORAL at 06:22

## 2019-09-29 RX ADMIN — Medication 10 MILLIGRAM(S): at 06:23

## 2019-09-29 RX ADMIN — LINEZOLID 600 MILLIGRAM(S): 600 INJECTION, SOLUTION INTRAVENOUS at 17:33

## 2019-09-29 RX ADMIN — OXYCODONE AND ACETAMINOPHEN 1 TABLET(S): 5; 325 TABLET ORAL at 07:22

## 2019-09-29 RX ADMIN — HEPARIN SODIUM 5000 UNIT(S): 5000 INJECTION INTRAVENOUS; SUBCUTANEOUS at 06:23

## 2019-09-29 RX ADMIN — RITONAVIR 100 MILLIGRAM(S): 100 TABLET, FILM COATED ORAL at 11:36

## 2019-09-29 RX ADMIN — Medication 81 MILLIGRAM(S): at 11:34

## 2019-09-29 RX ADMIN — CARVEDILOL PHOSPHATE 6.25 MILLIGRAM(S): 80 CAPSULE, EXTENDED RELEASE ORAL at 06:23

## 2019-09-29 RX ADMIN — ETRAVIRINE 200 MILLIGRAM(S): 200 TABLET ORAL at 17:33

## 2019-09-29 RX ADMIN — RALTEGRAVIR 400 MILLIGRAM(S): 400 TABLET, FILM COATED ORAL at 17:34

## 2019-09-29 RX ADMIN — ATORVASTATIN CALCIUM 10 MILLIGRAM(S): 80 TABLET, FILM COATED ORAL at 22:30

## 2019-09-29 RX ADMIN — Medication 10 MILLIGRAM(S): at 13:27

## 2019-09-29 NOTE — PROGRESS NOTE ADULT - SUBJECTIVE AND OBJECTIVE BOX
Surgery NP note  Patient seen and examined bedside resting comfortably. No complaints offered. Admits to having concerns about a home aid and wishes to only be discharged once a home aid is provided.    Abdominal pain is well controlled.  Denies nausea and vomiting. Tolerating diet.  Normal flatus/BM.       T(F): 96.8 (09-29-19 @ 11:30), Max: 98.6 (09-29-19 @ 06:08)  HR: 65 (09-29-19 @ 13:25) (65 - 105)  BP: 112/71 (09-29-19 @ 13:25) (111/70 - 132/79)  RR: 18 (09-29-19 @ 11:30) (18 - 18)  SpO2: 100% (09-29-19 @ 11:30) (94% - 100%)      PHYSICAL EXAM:  General: NAD, WDWN.   Neuro:  Alert & responsive  HEENT: NCAT, EOMI, conjunctiva clear  CV: +S1+S2  Lung: Respirations nonlabored  Abdomen: Nondistended, Midline incision c/d/i, healing well, RLQ incision open to air, ostomy with viable stoma with stool in bag, Non tender, Normal active BS,   Extremities: No pedal edema or calf tenderness noted     LABS:                        8.3    8.91  )-----------( 224      ( 28 Sep 2019 07:23 )             28.5     09-28    141  |  104  |  14  ----------------------------<  100<H>  3.9   |  30  |  6.31<H>    Ca    8.2<L>      28 Sep 2019 07:23          I&O's Detail    28 Sep 2019 07:01  -  29 Sep 2019 07:00  --------------------------------------------------------  IN:    dextrose 5% + sodium chloride 0.45%.: 600 mL  Total IN: 600 mL    OUT:  Total OUT: 0 mL    Total NET: 600 mL      Assessment: 58M a/w perforated sigmoid diverticulitis, now POD#11 s/p Exp Lap, MAYDA, SBR, Sigmoid Colon rsxn w creation of colostomy, peritoneal lavage, and I&D of intra-abdominal abscess. Postop stay complicated by elevated troponins 9/26, cleared by Cardiology consult.       Plan:  - D/C after home health aid and PT services set up, discussed with case management, will be set up tomorrow. Will need ambulette services upon discharge.   - Continue with ostomy teaching   - Continue DVT prophylaxis, Abx, pain management, and IVF   - Continue reg diet   - Continue OOB to ambulate  - Continue medical management and supportive care

## 2019-09-29 NOTE — PROGRESS NOTE ADULT - SUBJECTIVE AND OBJECTIVE BOX
Patient is a 58y old  Male who presents with a chief complaint of Sigmoid perforation (28 Sep 2019 20:49)      INTERVAL HPI/OVERNIGHT EVENTS:  pt is doing better  MEDICATIONS  (STANDING):  aspirin enteric coated 81 milliGRAM(s) Oral daily  atorvastatin 10 milliGRAM(s) Oral at bedtime  carvedilol 6.25 milliGRAM(s) Oral every 12 hours  chlorhexidine 4% Liquid 1 Application(s) Topical <User Schedule>  chlorhexidine 4% Liquid 1 Application(s) Topical daily  darunavir 800 milliGRAM(s) Oral daily  dextrose 5% + sodium chloride 0.45%. 1000 milliLiter(s) (50 mL/Hr) IV Continuous <Continuous>  epoetin marsha Injectable 19502 Unit(s) SubCutaneous <User Schedule>  etravirine 200 milliGRAM(s) Oral two times a day after meals  fluconAZOLE   Tablet 200 milliGRAM(s) Oral every 24 hours  heparin  Injectable 5000 Unit(s) SubCutaneous every 12 hours  hydrALAZINE 10 milliGRAM(s) Oral every 8 hours  influenza   Vaccine 0.5 milliLiter(s) IntraMuscular once  linezolid    Tablet 600 milliGRAM(s) Oral every 12 hours  raltegravir Tablet 400 milliGRAM(s) Oral two times a day  ritonavir Tablet 100 milliGRAM(s) Oral daily    MEDICATIONS  (PRN):  aluminum hydroxide/magnesium hydroxide/simethicone Suspension 30 milliLiter(s) Oral every 6 hours PRN Dyspepsia  oxyCODONE    5 mG/acetaminophen 325 mG 1 Tablet(s) Oral every 6 hours PRN Severe Pain (7 - 10)      Allergies    ciprofloxacin (Rash)  Levaquin (Rash)    Intolerances        REVIEW OF SYSTEMS:  CONSTITUTIONAL: No fever, weight loss, or fatigue  EYES: No eye pain, visual disturbances, or discharge  ENMT:  No difficulty hearing, tinnitus, vertigo; No sinus or throat pain  NECK: No pain or stiffness  BREASTS: No pain, masses, or nipple discharge  RESPIRATORY: No cough, wheezing, chills or hemoptysis; No shortness of breath  CARDIOVASCULAR: No chest pain, palpitations, dizziness, or leg swelling  GASTROINTESTINAL: No abdominal or epigastric pain. No nausea, vomiting, or hematemesis; No diarrhea or constipation. No melena or hematochezia.  GENITOURINARY: No dysuria, frequency, hematuria, or incontinence  NEUROLOGICAL: No headaches, memory loss, loss of strength, numbness, or tremors  SKIN: No itching, burning, rashes, or lesions   LYMPH NODES: No enlarged glands  ENDOCRINE: No heat or cold intolerance; No hair loss  MUSCULOSKELETAL: No joint pain or swelling; No muscle, back, or extremity pain  PSYCHIATRIC: No depression, anxiety, mood swings, or difficulty sleeping  HEME/LYMPH: No easy bruising, or bleeding gums  ALLERGY AND IMMUNOLOGIC: No hives or eczema    Vital Signs Last 24 Hrs  T(C): 37 (29 Sep 2019 06:08), Max: 37 (29 Sep 2019 06:08)  T(F): 98.6 (29 Sep 2019 06:08), Max: 98.6 (29 Sep 2019 06:08)  HR: 94 (29 Sep 2019 08:50) (73 - 105)  BP: 116/66 (29 Sep 2019 06:08) (108/65 - 132/79)  BP(mean): --  RR: 18 (29 Sep 2019 06:08) (17 - 18)  SpO2: 95% (29 Sep 2019 08:50) (94% - 100%)    PHYSICAL EXAM:  GENERAL: NAD, well-groomed, well-developed  HEAD:  Atraumatic, Normocephalic  EYES: EOMI, PERRLA, conjunctiva and sclera clear  ENMT: No tonsillar erythema, exudates, or enlargement; Moist mucous membranes, Good dentition, No lesions  NECK: Supple, No JVD, Normal thyroid  NERVOUS SYSTEM:  Alert & Oriented X3, Good concentration; Motor Strength 5/5 B/L upper and lower extremities; DTRs 2+ intact and symmetric  CHEST/LUNG: Clear to percussion bilaterally; No rales, rhonchi, wheezing, or rubs  HEART: Regular rate and rhythm; No murmurs, rubs, or gallops  ABDOMEN: Soft, Nontender, Nondistended; Bowel sounds present  EXTREMITIES:  2+ Peripheral Pulses, No clubbing, cyanosis, or edema  LYMPH: No lymphadenopathy noted  SKIN: No rashes or lesions    LABS:                        8.3    8.91  )-----------( 224      ( 28 Sep 2019 07:23 )             28.5     09-28    141  |  104  |  14  ----------------------------<  100<H>  3.9   |  30  |  6.31<H>    Ca    8.2<L>      28 Sep 2019 07:23          CAPILLARY BLOOD GLUCOSE        CULTURES:    HEMOGLOBIN A1C:    CHOLESTEROL:        RADIOLOGY & ADDITIONAL TESTS:

## 2019-09-30 LAB
ANION GAP SERPL CALC-SCNC: 11 MMOL/L — SIGNIFICANT CHANGE UP (ref 5–17)
BUN SERPL-MCNC: 32 MG/DL — HIGH (ref 7–23)
CALCIUM SERPL-MCNC: 8.7 MG/DL — SIGNIFICANT CHANGE UP (ref 8.5–10.1)
CHLORIDE SERPL-SCNC: 102 MMOL/L — SIGNIFICANT CHANGE UP (ref 96–108)
CO2 SERPL-SCNC: 27 MMOL/L — SIGNIFICANT CHANGE UP (ref 22–31)
CREAT SERPL-MCNC: 10.8 MG/DL — HIGH (ref 0.5–1.3)
GLUCOSE SERPL-MCNC: 108 MG/DL — HIGH (ref 70–99)
HCT VFR BLD CALC: 24.5 % — LOW (ref 39–50)
HGB BLD-MCNC: 7.2 G/DL — LOW (ref 13–17)
MCHC RBC-ENTMCNC: 26.3 PG — LOW (ref 27–34)
MCHC RBC-ENTMCNC: 29.4 GM/DL — LOW (ref 32–36)
MCV RBC AUTO: 89.4 FL — SIGNIFICANT CHANGE UP (ref 80–100)
NRBC # BLD: 0 /100 WBCS — SIGNIFICANT CHANGE UP (ref 0–0)
PLATELET # BLD AUTO: 212 K/UL — SIGNIFICANT CHANGE UP (ref 150–400)
POTASSIUM SERPL-MCNC: 4 MMOL/L — SIGNIFICANT CHANGE UP (ref 3.5–5.3)
POTASSIUM SERPL-SCNC: 4 MMOL/L — SIGNIFICANT CHANGE UP (ref 3.5–5.3)
RBC # BLD: 2.74 M/UL — LOW (ref 4.2–5.8)
RBC # FLD: 18 % — HIGH (ref 10.3–14.5)
SODIUM SERPL-SCNC: 140 MMOL/L — SIGNIFICANT CHANGE UP (ref 135–145)
WBC # BLD: 6.92 K/UL — SIGNIFICANT CHANGE UP (ref 3.8–10.5)
WBC # FLD AUTO: 6.92 K/UL — SIGNIFICANT CHANGE UP (ref 3.8–10.5)

## 2019-09-30 PROCEDURE — 99232 SBSQ HOSP IP/OBS MODERATE 35: CPT

## 2019-09-30 RX ORDER — MORPHINE SULFATE 50 MG/1
2 CAPSULE, EXTENDED RELEASE ORAL ONCE
Refills: 0 | Status: DISCONTINUED | OUTPATIENT
Start: 2019-09-30 | End: 2019-09-30

## 2019-09-30 RX ADMIN — ATORVASTATIN CALCIUM 10 MILLIGRAM(S): 80 TABLET, FILM COATED ORAL at 21:51

## 2019-09-30 RX ADMIN — FLUCONAZOLE 200 MILLIGRAM(S): 150 TABLET ORAL at 20:30

## 2019-09-30 RX ADMIN — OXYCODONE AND ACETAMINOPHEN 1 TABLET(S): 5; 325 TABLET ORAL at 08:57

## 2019-09-30 RX ADMIN — OXYCODONE AND ACETAMINOPHEN 1 TABLET(S): 5; 325 TABLET ORAL at 09:12

## 2019-09-30 RX ADMIN — RALTEGRAVIR 400 MILLIGRAM(S): 400 TABLET, FILM COATED ORAL at 17:08

## 2019-09-30 RX ADMIN — CARVEDILOL PHOSPHATE 6.25 MILLIGRAM(S): 80 CAPSULE, EXTENDED RELEASE ORAL at 06:20

## 2019-09-30 RX ADMIN — MORPHINE SULFATE 2 MILLIGRAM(S): 50 CAPSULE, EXTENDED RELEASE ORAL at 21:01

## 2019-09-30 RX ADMIN — HEPARIN SODIUM 5000 UNIT(S): 5000 INJECTION INTRAVENOUS; SUBCUTANEOUS at 06:21

## 2019-09-30 RX ADMIN — RALTEGRAVIR 400 MILLIGRAM(S): 400 TABLET, FILM COATED ORAL at 06:26

## 2019-09-30 RX ADMIN — Medication 10 MILLIGRAM(S): at 06:21

## 2019-09-30 RX ADMIN — ERYTHROPOIETIN 10000 UNIT(S): 10000 INJECTION, SOLUTION INTRAVENOUS; SUBCUTANEOUS at 11:48

## 2019-09-30 RX ADMIN — LINEZOLID 600 MILLIGRAM(S): 600 INJECTION, SOLUTION INTRAVENOUS at 06:21

## 2019-09-30 RX ADMIN — CARVEDILOL PHOSPHATE 6.25 MILLIGRAM(S): 80 CAPSULE, EXTENDED RELEASE ORAL at 17:04

## 2019-09-30 RX ADMIN — Medication 10 MILLIGRAM(S): at 21:51

## 2019-09-30 RX ADMIN — LINEZOLID 600 MILLIGRAM(S): 600 INJECTION, SOLUTION INTRAVENOUS at 17:04

## 2019-09-30 RX ADMIN — HEPARIN SODIUM 5000 UNIT(S): 5000 INJECTION INTRAVENOUS; SUBCUTANEOUS at 17:04

## 2019-09-30 RX ADMIN — OXYCODONE AND ACETAMINOPHEN 1 TABLET(S): 5; 325 TABLET ORAL at 17:04

## 2019-09-30 RX ADMIN — CHLORHEXIDINE GLUCONATE 1 APPLICATION(S): 213 SOLUTION TOPICAL at 08:52

## 2019-09-30 RX ADMIN — ETRAVIRINE 200 MILLIGRAM(S): 200 TABLET ORAL at 20:30

## 2019-09-30 RX ADMIN — MORPHINE SULFATE 2 MILLIGRAM(S): 50 CAPSULE, EXTENDED RELEASE ORAL at 20:31

## 2019-09-30 NOTE — PROGRESS NOTE ADULT - PROBLEM SELECTOR PLAN 3
cont dialysis
RISS
RISS
cont dialysis
cont dialysis
Continue dialysis
RISS
cont dialysis

## 2019-09-30 NOTE — PROGRESS NOTE ADULT - PROBLEM SELECTOR PROBLEM 2
R/O Perforated sigmoid colon
R/O Perforated sigmoid colon
Type 2 diabetes mellitus without complication, without long-term current use of insulin
Type 2 diabetes mellitus without complication, without long-term current use of insulin
HIV infection, unspecified symptom status
R/O Perforated sigmoid colon
Type 2 diabetes mellitus without complication, without long-term current use of insulin

## 2019-09-30 NOTE — PROGRESS NOTE ADULT - ASSESSMENT
58 yr old   obese male seen with   1.AIDS/HIV :cont HAART  pt non complaint so follow up on t cell and viral load  pt says his current HAART is giving him diarrhea dn dosent agree with his body so he takes it every other day/few times a week   Inspite his Cd 4 is 201 and VL is 1703  will cont current HAART   will do genotype and phenotype to see if HAART therapy can be changed  with him having ESRD the choices will be restricted   He says last HIV visit was few days before admission but he wants to change his HIV doctor. Will give him phone number for [facility] ID office     2.Acute peritonitis: sec to perforated sigmoid diverticulitis perforation and abscess  s/p MAYDA and resection   OR c/s growingly yeast  and VRE  cont linezolid (changed from daptomycin ) day 8 of VRE coverage  cont diflucan (changed from micafungin ) day 11fungal coverage  add augmentin  cont all antibiotics for 10 more days (renal dose )      3.Leukocytosis : resolving  sec to above      4.ESRD on HD  antibiotics dosage adjusted      5.Acute resp failure : resolved  CPAP as per requirement 58 yr old   obese male seen with       1.AIDS/HIV :cont HAART  pt non complaint so follow up on t cell and viral load  pt says his current HAART is giving him diarrhea dn dosent agree with his body so he takes it every other day/few times a week   Inspite his Cd 4 is 201 and VL is 1703  will cont current HAART   awaiting genotype and phenotype to see if HAART therapy can be changed  with him having ESRD the choices will be restricted   He says last HIV visit was few days before admission but he wants to change his HIV doctor. Will give him phone number for [facility] ID office     2.Acute peritonitis: sec to perforated sigmoid diverticulitis perforation and abscess  s/p MAYDA and resection   OR c/s growingly yeast  and VRE  cont linezolid (changed from daptomycin ) day 8 of VRE coverage  cont diflucan (changed from micafungin ) day 11fungal coverage  add augmentin  cont all antimicrobials for 10 more days (renal dose )      3.Leukocytosis : resolving  sec to above      4.ESRD on HD  antibiotics dosage adjusted      5.Acute resp failure : resolved  CPAP as per requirement

## 2019-09-30 NOTE — PROGRESS NOTE ADULT - PROBLEM SELECTOR PLAN 1
echo, cardiology consult
cardiology follow up
surgery follow up
surgery follow up
echo, cardiology consult
s/p resection of sigmoid colon  Surgery to follow.
surgery follow up

## 2019-09-30 NOTE — PROGRESS NOTE ADULT - PROBLEM SELECTOR PROBLEM 3
Type 2 diabetes mellitus without complication, without long-term current use of insulin
ESRD (end stage renal disease) on dialysis
ESRD (end stage renal disease) on dialysis
Type 2 diabetes mellitus without complication, without long-term current use of insulin
ESRD (end stage renal disease) on dialysis
Type 2 diabetes mellitus without complication, without long-term current use of insulin
ESRD (end stage renal disease) on dialysis

## 2019-09-30 NOTE — PROGRESS NOTE ADULT - SUBJECTIVE AND OBJECTIVE BOX
[facility] NEPHROLOGY SERVICES, [facility]  NEPHROLOGY AND HYPERTENSION  [address]  [address]  [place] [address]  [phone]    MD PARVEEN NOLAND MD ANDREY GONCHARUK, MD MADHU KORRAPATI, MD YELENA ROSENBERG, MD BINNY KOSHY, MD CHRISTOPHER CAPUTO, MD DONAVAN CORTES MD          Patient feels well no complaints today.    MEDICATIONS  (STANDING):  aspirin enteric coated 81 milliGRAM(s) Oral daily  atorvastatin 10 milliGRAM(s) Oral at bedtime  carvedilol 6.25 milliGRAM(s) Oral every 12 hours  chlorhexidine 4% Liquid 1 Application(s) Topical <User Schedule>  chlorhexidine 4% Liquid 1 Application(s) Topical daily  darunavir 800 milliGRAM(s) Oral daily  dextrose 5% + sodium chloride 0.45%. 1000 milliLiter(s) (50 mL/Hr) IV Continuous <Continuous>  etravirine 200 milliGRAM(s) Oral two times a day after meals  fluconAZOLE   Tablet 200 milliGRAM(s) Oral every 24 hours  heparin  Injectable 5000 Unit(s) SubCutaneous every 12 hours  hydrALAZINE 10 milliGRAM(s) Oral every 8 hours  influenza   Vaccine 0.5 milliLiter(s) IntraMuscular once  linezolid    Tablet 600 milliGRAM(s) Oral every 12 hours  raltegravir Tablet 400 milliGRAM(s) Oral two times a day  ritonavir Tablet 100 milliGRAM(s) Oral daily    MEDICATIONS  (PRN):  aluminum hydroxide/magnesium hydroxide/simethicone Suspension 30 milliLiter(s) Oral every 6 hours PRN Dyspepsia  oxyCODONE    5 mG/acetaminophen 325 mG 1 Tablet(s) Oral every 6 hours PRN Severe Pain (7 - 10)      09-29-19 @ 07:01  -  09-30-19 @ 07:00  --------------------------------------------------------  IN: 1080 mL / OUT: 0 mL / NET: 1080 mL    09-30-19 @ 07:01  -  09-30-19 @ 16:20  --------------------------------------------------------  IN: 0 mL / OUT: 1500 mL / NET: -1500 mL      PHYSICAL EXAM:      T(C): 37 (09-30-19 @ 13:50), Max: 37 (09-29-19 @ 17:31)  HR: 93 (09-30-19 @ 13:50) (80 - 93)  BP: 131/80 (09-30-19 @ 13:50) (106/66 - 131/80)  RR: 18 (09-30-19 @ 13:50) (17 - 18)  SpO2: 97% (09-30-19 @ 13:50) (95% - 100%)  Wt(kg): --  Respiratory: clear anteriorly, decreased BS at bases  Cardiovascular: S1 S2  Gastrointestinal: soft NT ND +BS  Extremities:   1 edema                                    7.2    6.92  )-----------( 212      ( 30 Sep 2019 10:29 )             24.5     09-30    140  |  102  |  32<H>  ----------------------------<  108<H>  4.0   |  27  |  10.80<H>    Ca    8.7      30 Sep 2019 10:29          Creatinine Trend: 10.80<--, 6.31<--, 9.00<--, 6.67<--, 9.68<--, 11.10<--    Assessment and Plan:  Perforated sigmoid diverticulitis S/P Exp Lap, MAYDA, SBR, Sigmoid Colon rsxn w creation of colostomy, peritoneal lavage, I&D of intra-abdominal abscess-  Maintenance HD; UF as tolerated;   4 k bath  Will follow closely.            Jacob Hobson MD

## 2019-09-30 NOTE — PROGRESS NOTE ADULT - SUBJECTIVE AND OBJECTIVE BOX
Patient is a 58y old  Male who presents with a chief complaint of Sigmoid perforation (30 Sep 2019 20:44)      INTERVAL HPI / OVERNIGHT EVENTS:    MEDICATIONS  (STANDING):  aspirin enteric coated 81 milliGRAM(s) Oral daily  atorvastatin 10 milliGRAM(s) Oral at bedtime  carvedilol 6.25 milliGRAM(s) Oral every 12 hours  chlorhexidine 4% Liquid 1 Application(s) Topical <User Schedule>  chlorhexidine 4% Liquid 1 Application(s) Topical daily  darunavir 800 milliGRAM(s) Oral daily  dextrose 5% + sodium chloride 0.45%. 1000 milliLiter(s) (50 mL/Hr) IV Continuous <Continuous>  etravirine 200 milliGRAM(s) Oral two times a day after meals  fluconAZOLE   Tablet 200 milliGRAM(s) Oral every 24 hours  heparin  Injectable 5000 Unit(s) SubCutaneous every 12 hours  hydrALAZINE 10 milliGRAM(s) Oral every 8 hours  influenza   Vaccine 0.5 milliLiter(s) IntraMuscular once  linezolid    Tablet 600 milliGRAM(s) Oral every 12 hours  raltegravir Tablet 400 milliGRAM(s) Oral two times a day  ritonavir Tablet 100 milliGRAM(s) Oral daily    MEDICATIONS  (PRN):  aluminum hydroxide/magnesium hydroxide/simethicone Suspension 30 milliLiter(s) Oral every 6 hours PRN Dyspepsia  oxyCODONE    5 mG/acetaminophen 325 mG 1 Tablet(s) Oral every 6 hours PRN Severe Pain (7 - 10)      Vital Signs Last 24 Hrs  T(C): 36.8 (30 Sep 2019 17:12), Max: 37 (30 Sep 2019 00:36)  T(F): 98.2 (30 Sep 2019 17:12), Max: 98.6 (30 Sep 2019 00:36)  HR: 85 (30 Sep 2019 17:19) (84 - 100)  BP: 125/81 (30 Sep 2019 17:12) (120/72 - 131/80)  BP(mean): --  RR: 18 (30 Sep 2019 17:12) (17 - 18)  SpO2: 96% (30 Sep 2019 17:19) (95% - 100%)    Review of systems:  General : no fever /chills,fatigue  CVS : no chest pain, palpitations  Lungs : no shortness of breath, cough  GI : no abdominal pain,vomiting, diarrhea   : no dysuria,hematuria        PHYSICAL EXAM:  General :NAD  Constitutional:  well-groomed, well-developed  Respiratory: CTAB/L  Cardiovascular: S1 and S2, RRR, no M/G/R  Gastrointestinal: BS+, soft, NT/ND  Extremities: No peripheral edema  Vascular: 2+ peripheral pulses  Skin: No rashes      LABS:                        7.2    6.92  )-----------( 212      ( 30 Sep 2019 10:29 )             24.5     09-30    140  |  102  |  32<H>  ----------------------------<  108<H>  4.0   |  27  |  10.80<H>    Ca    8.7      30 Sep 2019 10:29            MICROBIOLOGY:  RECENT CULTURES:        RADIOLOGY & ADDITIONAL STUDIES: Patient is a 58y old  Male who presents with a chief complaint of Sigmoid perforation (30 Sep 2019 20:44)      INTERVAL HPI / OVERNIGHT EVENTS: doing better,pt was supposed to leave in the weekend ,abdo drain has been removed    MEDICATIONS  (STANDING):  aspirin enteric coated 81 milliGRAM(s) Oral daily  atorvastatin 10 milliGRAM(s) Oral at bedtime  carvedilol 6.25 milliGRAM(s) Oral every 12 hours  chlorhexidine 4% Liquid 1 Application(s) Topical <User Schedule>  chlorhexidine 4% Liquid 1 Application(s) Topical daily  darunavir 800 milliGRAM(s) Oral daily  dextrose 5% + sodium chloride 0.45%. 1000 milliLiter(s) (50 mL/Hr) IV Continuous <Continuous>  etravirine 200 milliGRAM(s) Oral two times a day after meals  fluconAZOLE   Tablet 200 milliGRAM(s) Oral every 24 hours  heparin  Injectable 5000 Unit(s) SubCutaneous every 12 hours  hydrALAZINE 10 milliGRAM(s) Oral every 8 hours  influenza   Vaccine 0.5 milliLiter(s) IntraMuscular once  linezolid    Tablet 600 milliGRAM(s) Oral every 12 hours  raltegravir Tablet 400 milliGRAM(s) Oral two times a day  ritonavir Tablet 100 milliGRAM(s) Oral daily    MEDICATIONS  (PRN):  aluminum hydroxide/magnesium hydroxide/simethicone Suspension 30 milliLiter(s) Oral every 6 hours PRN Dyspepsia  oxyCODONE    5 mG/acetaminophen 325 mG 1 Tablet(s) Oral every 6 hours PRN Severe Pain (7 - 10)      Vital Signs Last 24 Hrs  T(C): 36.8 (30 Sep 2019 17:12), Max: 37 (30 Sep 2019 00:36)  T(F): 98.2 (30 Sep 2019 17:12), Max: 98.6 (30 Sep 2019 00:36)  HR: 85 (30 Sep 2019 17:19) (84 - 100)  BP: 125/81 (30 Sep 2019 17:12) (120/72 - 131/80)  BP(mean): --  RR: 18 (30 Sep 2019 17:12) (17 - 18)  SpO2: 96% (30 Sep 2019 17:19) (95% - 100%)    Review of systems:  General : no fever /chills,fatigue  CVS : no chest pain, palpitations  Lungs : no shortness of breath, cough  GI : no abdominal pain,vomiting, diarrhea   : no dysuria,hematuria        PHYSICAL EXAM:  General :NAD  Constitutional:  well-groomed, well-developed  Respiratory: CTAB/L  Cardiovascular: S1 and S2, RRR, no M/G/R  Gastrointestinal: BS+, s/p exlap with colostomy  Extremities: No peripheral edema  Vascular: 2+ peripheral pulses  Skin: No rashes      LABS:                        7.2    6.92  )-----------( 212      ( 30 Sep 2019 10:29 )             24.5     09-30    140  |  102  |  32<H>  ----------------------------<  108<H>  4.0   |  27  |  10.80<H>    Ca    8.7      30 Sep 2019 10:29            MICROBIOLOGY:  RECENT CULTURES:        RADIOLOGY & ADDITIONAL STUDIES:

## 2019-09-30 NOTE — PROGRESS NOTE ADULT - SUBJECTIVE AND OBJECTIVE BOX
Patient seen and examined bedside resting comfortably. No complaints offered.   Abdominal pain well controlled on Percocet.   Denies nausea and vomiting. Tolerating diet.  Normal flatus/BM.       T(F): 98.6 (09-30-19 @ 13:50), Max: 98.6 (09-29-19 @ 17:31)  HR: 93 (09-30-19 @ 13:50) (80 - 93)  BP: 131/80 (09-30-19 @ 13:50) (106/66 - 131/80)  RR: 18 (09-30-19 @ 13:50) (17 - 18)  SpO2: 97% (09-30-19 @ 13:50) (95% - 100%)      PHYSICAL EXAM:  General: NAD, WDWN  Neuro:  Alert & oriented x 3  HEENT: NCAT, EOMI, conjunctiva clear  CV: +S1+S2 regular rate and rhythm  Lung: clear to auscultation bilaterally, respirations nonlabored, good inspiratory effort  Abdomen:  Nondistended, Midline incision c/d/i, healing well, RLQ incision with c/d/i dressing, ostomy with viable stoma, Non tender, Normal active BS  Extremities: no pedal edema or calf tenderness noted       LABS:                        7.2    6.92  )-----------( 212      ( 30 Sep 2019 10:29 )             24.5     09-30    140  |  102  |  32<H>  ----------------------------<  108<H>  4.0   |  27  |  10.80<H>    Ca    8.7      30 Sep 2019 10:29        Assessment: 58M a/w perforated sigmoid diverticulitis, POD#12 s/p Exp Lap, MAYDA, SBR, Sigmoid Colon rsxn w creation of colostomy, peritoneal lavage, and I&D of intra-abdominal abscess. Postop stay complicated by elevated troponins 9/26, cleared by Cardiology    Plan:  - patient is stable for discharge. Pet my discussion with case management, awaiting HD services to be set up prior to discharge, plan for tomorrow.  - Continue with present care

## 2019-09-30 NOTE — PROGRESS NOTE ADULT - SUBJECTIVE AND OBJECTIVE BOX
Patient is a 58y old  Male who presents with a chief complaint of Sigmoid perforation (30 Sep 2019 16:32)      INTERVAL HPI/OVERNIGHT EVENTS:  No new complaints.  MEDICATIONS  (STANDING):  aspirin enteric coated 81 milliGRAM(s) Oral daily  atorvastatin 10 milliGRAM(s) Oral at bedtime  carvedilol 6.25 milliGRAM(s) Oral every 12 hours  chlorhexidine 4% Liquid 1 Application(s) Topical <User Schedule>  chlorhexidine 4% Liquid 1 Application(s) Topical daily  darunavir 800 milliGRAM(s) Oral daily  dextrose 5% + sodium chloride 0.45%. 1000 milliLiter(s) (50 mL/Hr) IV Continuous <Continuous>  etravirine 200 milliGRAM(s) Oral two times a day after meals  fluconAZOLE   Tablet 200 milliGRAM(s) Oral every 24 hours  heparin  Injectable 5000 Unit(s) SubCutaneous every 12 hours  hydrALAZINE 10 milliGRAM(s) Oral every 8 hours  influenza   Vaccine 0.5 milliLiter(s) IntraMuscular once  linezolid    Tablet 600 milliGRAM(s) Oral every 12 hours  raltegravir Tablet 400 milliGRAM(s) Oral two times a day  ritonavir Tablet 100 milliGRAM(s) Oral daily    MEDICATIONS  (PRN):  aluminum hydroxide/magnesium hydroxide/simethicone Suspension 30 milliLiter(s) Oral every 6 hours PRN Dyspepsia  oxyCODONE    5 mG/acetaminophen 325 mG 1 Tablet(s) Oral every 6 hours PRN Severe Pain (7 - 10)      Allergies    ciprofloxacin (Rash)  Levaquin (Rash)    Intolerances        REVIEW OF SYSTEMS:  CONSTITUTIONAL: No fever, weight loss, or fatigue  EYES: No eye pain, visual disturbances, or discharge  ENMT:  No difficulty hearing, tinnitus, vertigo; No sinus or throat pain  NECK: No pain or stiffness  BREASTS: No pain, masses, or nipple discharge  RESPIRATORY: No cough, wheezing, chills or hemoptysis; No shortness of breath  CARDIOVASCULAR: No chest pain, palpitations, dizziness, or leg swelling  GASTROINTESTINAL: No abdominal or epigastric pain. No nausea, vomiting, or hematemesis; No diarrhea or constipation. No melena or hematochezia.  GENITOURINARY: No dysuria, frequency, hematuria, or incontinence  NEUROLOGICAL: No headaches, memory loss, loss of strength, numbness, or tremors  SKIN: No itching, burning, rashes, or lesions   LYMPH NODES: No enlarged glands  ENDOCRINE: No heat or cold intolerance; No hair loss  MUSCULOSKELETAL: No joint pain or swelling; No muscle, back, or extremity pain  PSYCHIATRIC: No depression, anxiety, mood swings, or difficulty sleeping  HEME/LYMPH: No easy bruising, or bleeding gums  ALLERGY AND IMMUNOLOGIC: No hives or eczema    Vital Signs Last 24 Hrs  T(C): 36.8 (30 Sep 2019 17:12), Max: 37 (30 Sep 2019 00:36)  T(F): 98.2 (30 Sep 2019 17:12), Max: 98.6 (30 Sep 2019 00:36)  HR: 85 (30 Sep 2019 17:19) (84 - 100)  BP: 125/81 (30 Sep 2019 17:12) (120/72 - 131/80)  BP(mean): --  RR: 18 (30 Sep 2019 17:12) (17 - 18)  SpO2: 96% (30 Sep 2019 17:19) (95% - 100%)    PHYSICAL EXAM:  GENERAL: NAD, well-groomed, well-developed  HEAD:  Atraumatic, Normocephalic  EYES: EOMI, PERRLA, conjunctiva and sclera clear  ENMT: No tonsillar erythema, exudates, or enlargement; Moist mucous membranes, Good dentition, No lesions  NECK: Supple, No JVD, Normal thyroid  NERVOUS SYSTEM:  Alert & Oriented X3, Good concentration; Motor Strength 5/5 B/L upper and lower extremities; DTRs 2+ intact and symmetric  CHEST/LUNG: Clear to percussion bilaterally; No rales, rhonchi, wheezing, or rubs  HEART: Regular rate and rhythm; No murmurs, rubs, or gallops  ABDOMEN: Soft, Nontender, Nondistended; Bowel sounds present  EXTREMITIES:  2+ Peripheral Pulses, No clubbing, cyanosis, or edema  LYMPH: No lymphadenopathy noted  SKIN: No rashes or lesions    LABS:                        7.2    6.92  )-----------( 212      ( 30 Sep 2019 10:29 )             24.5     09-30    140  |  102  |  32<H>  ----------------------------<  108<H>  4.0   |  27  |  10.80<H>    Ca    8.7      30 Sep 2019 10:29          CAPILLARY BLOOD GLUCOSE        CULTURES:    HEMOGLOBIN A1C:    CHOLESTEROL:        RADIOLOGY & ADDITIONAL TESTS:

## 2019-09-30 NOTE — PROGRESS NOTE ADULT - PROBLEM SELECTOR PROBLEM 1
R/O Chest pain, unspecified type
R/O Chest pain, unspecified type
R/O Perforated sigmoid colon
R/O Perforated sigmoid colon
Perforated sigmoid colon
R/O Perforated sigmoid colon
R/O Chest pain, unspecified type

## 2019-10-01 ENCOUNTER — TRANSCRIPTION ENCOUNTER (OUTPATIENT)
Age: 59
End: 2019-10-01

## 2019-10-01 VITALS — OXYGEN SATURATION: 98 % | HEART RATE: 99 BPM

## 2019-10-01 RX ORDER — FLUCONAZOLE 150 MG/1
1 TABLET ORAL
Qty: 0 | Refills: 0 | DISCHARGE
Start: 2019-10-01

## 2019-10-01 RX ORDER — METRONIDAZOLE 500 MG
1 TABLET ORAL
Qty: 0 | Refills: 0 | DISCHARGE

## 2019-10-01 RX ORDER — CARVEDILOL PHOSPHATE 80 MG/1
1 CAPSULE, EXTENDED RELEASE ORAL
Qty: 0 | Refills: 0 | DISCHARGE
Start: 2019-10-01

## 2019-10-01 RX ORDER — FLUCONAZOLE 150 MG/1
1 TABLET ORAL
Qty: 10 | Refills: 0
Start: 2019-10-01 | End: 2019-10-10

## 2019-10-01 RX ORDER — LINEZOLID 600 MG/300ML
1 INJECTION, SOLUTION INTRAVENOUS
Qty: 0 | Refills: 0 | DISCHARGE
Start: 2019-10-01

## 2019-10-01 RX ORDER — MOXIFLOXACIN HYDROCHLORIDE TABLETS, 400 MG 400 MG/1
1 TABLET, FILM COATED ORAL
Qty: 0 | Refills: 0 | DISCHARGE

## 2019-10-01 RX ORDER — LINEZOLID 600 MG/300ML
1 INJECTION, SOLUTION INTRAVENOUS
Qty: 20 | Refills: 0
Start: 2019-10-01 | End: 2019-10-10

## 2019-10-01 RX ORDER — ATORVASTATIN CALCIUM 80 MG/1
1 TABLET, FILM COATED ORAL
Qty: 0 | Refills: 0 | DISCHARGE
Start: 2019-10-01

## 2019-10-01 RX ADMIN — CARVEDILOL PHOSPHATE 6.25 MILLIGRAM(S): 80 CAPSULE, EXTENDED RELEASE ORAL at 05:52

## 2019-10-01 RX ADMIN — HEPARIN SODIUM 5000 UNIT(S): 5000 INJECTION INTRAVENOUS; SUBCUTANEOUS at 05:53

## 2019-10-01 RX ADMIN — CHLORHEXIDINE GLUCONATE 1 APPLICATION(S): 213 SOLUTION TOPICAL at 08:23

## 2019-10-01 RX ADMIN — RALTEGRAVIR 400 MILLIGRAM(S): 400 TABLET, FILM COATED ORAL at 05:52

## 2019-10-01 RX ADMIN — DARUNAVIR 800 MILLIGRAM(S): 75 TABLET, FILM COATED ORAL at 11:47

## 2019-10-01 RX ADMIN — Medication 1 TABLET(S): at 11:47

## 2019-10-01 RX ADMIN — Medication 10 MILLIGRAM(S): at 05:52

## 2019-10-01 RX ADMIN — RITONAVIR 100 MILLIGRAM(S): 100 TABLET, FILM COATED ORAL at 11:45

## 2019-10-01 RX ADMIN — ETRAVIRINE 200 MILLIGRAM(S): 200 TABLET ORAL at 08:25

## 2019-10-01 RX ADMIN — CHLORHEXIDINE GLUCONATE 1 APPLICATION(S): 213 SOLUTION TOPICAL at 11:47

## 2019-10-01 RX ADMIN — LINEZOLID 600 MILLIGRAM(S): 600 INJECTION, SOLUTION INTRAVENOUS at 05:52

## 2019-10-01 RX ADMIN — OXYCODONE AND ACETAMINOPHEN 1 TABLET(S): 5; 325 TABLET ORAL at 12:30

## 2019-10-01 RX ADMIN — OXYCODONE AND ACETAMINOPHEN 1 TABLET(S): 5; 325 TABLET ORAL at 11:35

## 2019-10-01 RX ADMIN — Medication 81 MILLIGRAM(S): at 11:45

## 2019-10-01 NOTE — PROGRESS NOTE ADULT - REASON FOR ADMISSION
Sigmoid perforation
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
## 2019-10-01 NOTE — PROGRESS NOTE ADULT - ASSESSMENT
57 yo male with PMHx of HTN, DM, HLD, HIV, TIA, ESRD on HD p/w abdominal pain found to have perforated sigmoid colon s/p 1) Incision and drainage of intra-abdominal abscess 2) Creation, colostomy 3) Peritoneal lavage 4) Repair of incarcerated recurrent ventral hernia 5) Small bowel resection with anastomosis 6) Resection, sigmoid colon, open 7) 9/18 Exploratory laparotomy, c/o  (new) atypical epigastric and bilateral pleuritic chest wall pain on POD#8.  Despite borderline cardiac enzymes, does not clinically appear to be due to CAD.     Plan  Post op care  Pain management as per surgery team.  Cont with bbl and statin.   Cont with asa  Echo: normal EF, Grade I DD, mild  MR/AS/Mod pHTN  HD as per renal

## 2019-10-01 NOTE — DISCHARGE NOTE NURSING/CASE MANAGEMENT/SOCIAL WORK - PATIENT PORTAL LINK FT
You can access the FollowMyHealth Patient Portal offered by [facility] by registering at the following website: [email or web address]. By joining [facility]’s FollowMyHealth portal, you will also be able to view your health information using other applications (apps) compatible with our system.

## 2019-10-01 NOTE — PROGRESS NOTE ADULT - SUBJECTIVE AND OBJECTIVE BOX
INTERVAL HPI/OVERNIGHT EVENTS:  Pt. seen and examined at bedside resting comfortably. No new events or complaints. States pain has been improving, well controlled with percocet. Denies N/V, tolerating diet. Ostomy viable and functioning   Denies fever/chills, chest pain, dyspnea, cough, dizziness.     Vital Signs Last 24 Hrs  T(C): 37.1 (30 Sep 2019 23:48), Max: 37.1 (30 Sep 2019 23:48)  T(F): 98.7 (30 Sep 2019 23:48), Max: 98.7 (30 Sep 2019 23:48)  HR: 110 (01 Oct 2019 01:29) (85 - 112)  BP: 109/46 (30 Sep 2019 23:48) (109/46 - 131/80)  RR: 18 (30 Sep 2019 23:48) (17 - 18)  SpO2: 100% (01 Oct 2019 01:29) (95% - 100%)    PHYSICAL EXAM:    General: NAD, WDWN  Neuro:  Alert & oriented x 3  HEENT: NCAT, EOMI, conjunctiva clear  CV: +S1+S2  Lung: clear to auscultation bilaterally, respirations nonlabored, good inspiratory effort  Abdomen:  Nondistended, Midline incision c/d/i, healing well, RLQ incision with c/d/i dressing, ostomy with viable stoma, air and stool in bag. Non tender, Normal active BS  Extremities: no pedal edema or calf tenderness noted     I&O's Detail    29 Sep 2019 07:01  -  30 Sep 2019 07:00  --------------------------------------------------------  IN:    dextrose 5% + sodium chloride 0.45%.: 600 mL    Oral Fluid: 480 mL  Total IN: 1080 mL    OUT:  Total OUT: 0 mL    Total NET: 1080 mL      30 Sep 2019 07:01  -  01 Oct 2019 06:22  --------------------------------------------------------  IN:    Oral Fluid: 480 mL  Total IN: 480 mL    OUT:    Other: 1500 mL  Total OUT: 1500 mL    Total NET: -1020 mL      LABS:                        7.2    6.92  )-----------( 212      ( 30 Sep 2019 10:29 )             24.5     09-30    140  |  102  |  32<H>  ----------------------------<  108<H>  4.0   |  27  |  10.80<H>    Ca    8.7      30 Sep 2019 10:29    Assessment: 58M a/w perforated sigmoid diverticulitis, POD#12 s/p Exp Lap, MAYDA, SBR, Sigmoid Colon rsxn w creation of colostomy, peritoneal lavage, and I&D of intra-abdominal abscess. Postop stay complicated by elevated troponins 9/26, cleared by Cardiology    Plan:  - patient is stable for discharge. Per discussion with case management, awaiting HD services to be set up prior to discharge today  - Continue with present care INTERVAL HPI/OVERNIGHT EVENTS:  Pt. seen and examined at bedside resting comfortably. No new events or complaints. States pain has been improving, well controlled with percocet. Denies N/V, tolerating diet. Ostomy viable and functioning   Denies fever/chills, chest pain, dyspnea, cough, dizziness.     Vital Signs Last 24 Hrs  T(C): 37.1 (30 Sep 2019 23:48), Max: 37.1 (30 Sep 2019 23:48)  T(F): 98.7 (30 Sep 2019 23:48), Max: 98.7 (30 Sep 2019 23:48)  HR: 110 (01 Oct 2019 01:29) (85 - 112)  BP: 109/46 (30 Sep 2019 23:48) (109/46 - 131/80)  RR: 18 (30 Sep 2019 23:48) (17 - 18)  SpO2: 100% (01 Oct 2019 01:29) (95% - 100%)    PHYSICAL EXAM:    General: NAD, WDWN  Neuro:  Alert & oriented x 3  HEENT: NCAT, EOMI, conjunctiva clear  CV: +S1+S2  Lung: clear to auscultation bilaterally, respirations nonlabored, good inspiratory effort  Abdomen:  Nondistended, Midline incision c/d/i, healing well, RLQ incision with c/d/i dressing, ostomy with viable stoma, air and stool in bag. Non tender, Normal active BS  Extremities: no pedal edema or calf tenderness noted     I&O's Detail    29 Sep 2019 07:01  -  30 Sep 2019 07:00  --------------------------------------------------------  IN:    dextrose 5% + sodium chloride 0.45%.: 600 mL    Oral Fluid: 480 mL  Total IN: 1080 mL    OUT:  Total OUT: 0 mL    Total NET: 1080 mL      30 Sep 2019 07:01  -  01 Oct 2019 06:22  --------------------------------------------------------  IN:    Oral Fluid: 480 mL  Total IN: 480 mL    OUT:    Other: 1500 mL  Total OUT: 1500 mL    Total NET: -1020 mL      LABS:                        7.2    6.92  )-----------( 212      ( 30 Sep 2019 10:29 )             24.5     09-30    140  |  102  |  32<H>  ----------------------------<  108<H>  4.0   |  27  |  10.80<H>    Ca    8.7      30 Sep 2019 10:29    Assessment: 58M a/w perforated sigmoid diverticulitis, POD#13 s/p Exp Lap, MAYDA, SBR, Sigmoid Colon rsxn w creation of colostomy, peritoneal lavage, and I&D of intra-abdominal abscess. Postop stay complicated by elevated troponins 9/26, cleared by Cardiology    Plan:  - patient is stable for discharge. Per discussion with case management, awaiting HD services to be set up prior to discharge today  - Continue with present care

## 2019-10-01 NOTE — PROGRESS NOTE ADULT - PROVIDER SPECIALTY LIST ADULT
Cardiology
Critical Care
Gastroenterology
Infectious Disease
Internal Medicine
Nephrology
Surgery
Infectious Disease
Internal Medicine
Surgery
Surgery
Internal Medicine

## 2019-10-05 DIAGNOSIS — Z86.73 PERSONAL HISTORY OF TRANSIENT ISCHEMIC ATTACK (TIA), AND CEREBRAL INFARCTION WITHOUT RESIDUAL DEFICITS: ICD-10-CM

## 2019-10-05 DIAGNOSIS — D72.829 ELEVATED WHITE BLOOD CELL COUNT, UNSPECIFIED: ICD-10-CM

## 2019-10-05 DIAGNOSIS — R79.89 OTHER SPECIFIED ABNORMAL FINDINGS OF BLOOD CHEMISTRY: ICD-10-CM

## 2019-10-05 DIAGNOSIS — K43.0 INCISIONAL HERNIA WITH OBSTRUCTION, WITHOUT GANGRENE: ICD-10-CM

## 2019-10-05 DIAGNOSIS — K65.0 GENERALIZED (ACUTE) PERITONITIS: ICD-10-CM

## 2019-10-05 DIAGNOSIS — Z21 ASYMPTOMATIC HUMAN IMMUNODEFICIENCY VIRUS [HIV] INFECTION STATUS: ICD-10-CM

## 2019-10-05 DIAGNOSIS — B19.20 UNSPECIFIED VIRAL HEPATITIS C WITHOUT HEPATIC COMA: ICD-10-CM

## 2019-10-05 DIAGNOSIS — Z90.49 ACQUIRED ABSENCE OF OTHER SPECIFIED PARTS OF DIGESTIVE TRACT: ICD-10-CM

## 2019-10-05 DIAGNOSIS — Z95.828 PRESENCE OF OTHER VASCULAR IMPLANTS AND GRAFTS: ICD-10-CM

## 2019-10-05 DIAGNOSIS — N18.6 END STAGE RENAL DISEASE: ICD-10-CM

## 2019-10-05 DIAGNOSIS — K66.0 PERITONEAL ADHESIONS (POSTPROCEDURAL) (POSTINFECTION): ICD-10-CM

## 2019-10-05 DIAGNOSIS — K63.1 PERFORATION OF INTESTINE (NONTRAUMATIC): ICD-10-CM

## 2019-10-05 DIAGNOSIS — K57.20 DIVERTICULITIS OF LARGE INTESTINE WITH PERFORATION AND ABSCESS WITHOUT BLEEDING: ICD-10-CM

## 2019-10-05 DIAGNOSIS — Z99.2 DEPENDENCE ON RENAL DIALYSIS: ICD-10-CM

## 2019-10-05 DIAGNOSIS — L02.211 CUTANEOUS ABSCESS OF ABDOMINAL WALL: ICD-10-CM

## 2019-10-05 DIAGNOSIS — J95.821 ACUTE POSTPROCEDURAL RESPIRATORY FAILURE: ICD-10-CM

## 2019-10-05 DIAGNOSIS — I12.0 HYPERTENSIVE CHRONIC KIDNEY DISEASE WITH STAGE 5 CHRONIC KIDNEY DISEASE OR END STAGE RENAL DISEASE: ICD-10-CM

## 2019-10-14 ENCOUNTER — INPATIENT (INPATIENT)
Facility: HOSPITAL | Age: 59
LOS: 8 days | Discharge: HOME | End: 2019-10-23
Attending: INTERNAL MEDICINE | Admitting: INTERNAL MEDICINE
Payer: MEDICARE

## 2019-10-14 VITALS
TEMPERATURE: 100 F | DIASTOLIC BLOOD PRESSURE: 57 MMHG | OXYGEN SATURATION: 98 % | HEART RATE: 102 BPM | SYSTOLIC BLOOD PRESSURE: 123 MMHG | RESPIRATION RATE: 18 BRPM

## 2019-10-14 DIAGNOSIS — I10 ESSENTIAL (PRIMARY) HYPERTENSION: ICD-10-CM

## 2019-10-14 DIAGNOSIS — D64.9 ANEMIA, UNSPECIFIED: ICD-10-CM

## 2019-10-14 DIAGNOSIS — N18.6 END STAGE RENAL DISEASE: ICD-10-CM

## 2019-10-14 LAB
ALBUMIN SERPL ELPH-MCNC: 3.6 G/DL — SIGNIFICANT CHANGE UP (ref 3.3–5)
ALP SERPL-CCNC: 80 U/L — SIGNIFICANT CHANGE UP (ref 40–120)
ALT FLD-CCNC: 5 U/L — SIGNIFICANT CHANGE UP (ref 4–41)
ANION GAP SERPL CALC-SCNC: 19 MMO/L — HIGH (ref 7–14)
APTT BLD: 20 SEC — LOW (ref 27.5–36.3)
AST SERPL-CCNC: 20 U/L — SIGNIFICANT CHANGE UP (ref 4–40)
BASE EXCESS BLDV CALC-SCNC: 10.3 MMOL/L — SIGNIFICANT CHANGE UP
BASOPHILS # BLD AUTO: 0.03 K/UL — SIGNIFICANT CHANGE UP (ref 0–0.2)
BASOPHILS NFR BLD AUTO: 0.4 % — SIGNIFICANT CHANGE UP (ref 0–2)
BILIRUB SERPL-MCNC: 0.3 MG/DL — SIGNIFICANT CHANGE UP (ref 0.2–1.2)
BLD GP AB SCN SERPL QL: NEGATIVE — SIGNIFICANT CHANGE UP
BLOOD GAS VENOUS - CREATININE: SIGNIFICANT CHANGE UP MG/DL (ref 0.5–1.3)
BUN SERPL-MCNC: 15 MG/DL — SIGNIFICANT CHANGE UP (ref 7–23)
CALCIUM SERPL-MCNC: 9.1 MG/DL — SIGNIFICANT CHANGE UP (ref 8.4–10.5)
CHLORIDE BLDV-SCNC: 94 MMOL/L — LOW (ref 96–108)
CHLORIDE SERPL-SCNC: 90 MMOL/L — LOW (ref 98–107)
CO2 SERPL-SCNC: 28 MMOL/L — SIGNIFICANT CHANGE UP (ref 22–31)
CREAT SERPL-MCNC: 5.17 MG/DL — HIGH (ref 0.5–1.3)
EOSINOPHIL # BLD AUTO: 0.02 K/UL — SIGNIFICANT CHANGE UP (ref 0–0.5)
EOSINOPHIL NFR BLD AUTO: 0.3 % — SIGNIFICANT CHANGE UP (ref 0–6)
GAS PNL BLDV: 136 MMOL/L — SIGNIFICANT CHANGE UP (ref 136–146)
GLUCOSE BLDV-MCNC: 96 MG/DL — SIGNIFICANT CHANGE UP (ref 70–99)
GLUCOSE SERPL-MCNC: 91 MG/DL — SIGNIFICANT CHANGE UP (ref 70–99)
HCO3 BLDV-SCNC: 33 MMOL/L — HIGH (ref 20–27)
HCT VFR BLD CALC: 20.6 % — CRITICAL LOW (ref 39–50)
HCT VFR BLDV CALC: 16.6 % — CRITICAL LOW (ref 39–51)
HGB BLD-MCNC: 6.1 G/DL — CRITICAL LOW (ref 13–17)
HGB BLDV-MCNC: 5.2 G/DL — CRITICAL LOW (ref 13–17)
IMM GRANULOCYTES NFR BLD AUTO: 0.3 % — SIGNIFICANT CHANGE UP (ref 0–1.5)
INR BLD: 1.23 — HIGH (ref 0.88–1.17)
LACTATE BLDV-MCNC: 2.2 MMOL/L — HIGH (ref 0.5–2)
LIDOCAIN IGE QN: 45.2 U/L — SIGNIFICANT CHANGE UP (ref 7–60)
LYMPHOCYTES # BLD AUTO: 1.31 K/UL — SIGNIFICANT CHANGE UP (ref 1–3.3)
LYMPHOCYTES # BLD AUTO: 18.2 % — SIGNIFICANT CHANGE UP (ref 13–44)
MCHC RBC-ENTMCNC: 26.3 PG — LOW (ref 27–34)
MCHC RBC-ENTMCNC: 29.6 % — LOW (ref 32–36)
MCV RBC AUTO: 88.8 FL — SIGNIFICANT CHANGE UP (ref 80–100)
MONOCYTES # BLD AUTO: 0.68 K/UL — SIGNIFICANT CHANGE UP (ref 0–0.9)
MONOCYTES NFR BLD AUTO: 9.5 % — SIGNIFICANT CHANGE UP (ref 2–14)
NEUTROPHILS # BLD AUTO: 5.13 K/UL — SIGNIFICANT CHANGE UP (ref 1.8–7.4)
NEUTROPHILS NFR BLD AUTO: 71.3 % — SIGNIFICANT CHANGE UP (ref 43–77)
NRBC # FLD: 0.04 K/UL — SIGNIFICANT CHANGE UP (ref 0–0)
OB PNL STL: POSITIVE — SIGNIFICANT CHANGE UP
PCO2 BLDV: 44 MMHG — SIGNIFICANT CHANGE UP (ref 41–51)
PH BLDV: 7.5 PH — HIGH (ref 7.32–7.43)
PLATELET # BLD AUTO: 250 K/UL — SIGNIFICANT CHANGE UP (ref 150–400)
PMV BLD: 10.6 FL — SIGNIFICANT CHANGE UP (ref 7–13)
PO2 BLDV: < 24 MMHG — LOW (ref 35–40)
POTASSIUM BLDV-SCNC: 3.6 MMOL/L — SIGNIFICANT CHANGE UP (ref 3.4–4.5)
POTASSIUM SERPL-MCNC: 3.7 MMOL/L — SIGNIFICANT CHANGE UP (ref 3.5–5.3)
POTASSIUM SERPL-SCNC: 3.7 MMOL/L — SIGNIFICANT CHANGE UP (ref 3.5–5.3)
PROT SERPL-MCNC: 8 G/DL — SIGNIFICANT CHANGE UP (ref 6–8.3)
PROTHROM AB SERPL-ACNC: 13.7 SEC — HIGH (ref 9.8–13.1)
RBC # BLD: 2.32 M/UL — LOW (ref 4.2–5.8)
RBC # FLD: 18.3 % — HIGH (ref 10.3–14.5)
RH IG SCN BLD-IMP: POSITIVE — SIGNIFICANT CHANGE UP
SAO2 % BLDV: 36.3 % — LOW (ref 60–85)
SODIUM SERPL-SCNC: 137 MMOL/L — SIGNIFICANT CHANGE UP (ref 135–145)
TROPONIN T, HIGH SENSITIVITY: 95 NG/L — CRITICAL HIGH (ref ?–14)
WBC # BLD: 7.19 K/UL — SIGNIFICANT CHANGE UP (ref 3.8–10.5)
WBC # FLD AUTO: 7.19 K/UL — SIGNIFICANT CHANGE UP (ref 3.8–10.5)

## 2019-10-14 PROCEDURE — 71275 CT ANGIOGRAPHY CHEST: CPT | Mod: 26

## 2019-10-14 PROCEDURE — 74177 CT ABD & PELVIS W/CONTRAST: CPT | Mod: 26

## 2019-10-14 PROCEDURE — 71045 X-RAY EXAM CHEST 1 VIEW: CPT | Mod: 26

## 2019-10-14 RX ORDER — SODIUM CHLORIDE 9 MG/ML
500 INJECTION INTRAMUSCULAR; INTRAVENOUS; SUBCUTANEOUS ONCE
Refills: 0 | Status: COMPLETED | OUTPATIENT
Start: 2019-10-14 | End: 2019-10-14

## 2019-10-14 RX ORDER — MORPHINE SULFATE 50 MG/1
4 CAPSULE, EXTENDED RELEASE ORAL ONCE
Refills: 0 | Status: DISCONTINUED | OUTPATIENT
Start: 2019-10-14 | End: 2019-10-14

## 2019-10-14 RX ORDER — ERYTHROPOIETIN 10000 [IU]/ML
6000 INJECTION, SOLUTION INTRAVENOUS; SUBCUTANEOUS
Refills: 0 | Status: DISCONTINUED | OUTPATIENT
Start: 2019-10-14 | End: 2019-10-23

## 2019-10-14 RX ORDER — ONDANSETRON 8 MG/1
4 TABLET, FILM COATED ORAL ONCE
Refills: 0 | Status: COMPLETED | OUTPATIENT
Start: 2019-10-14 | End: 2019-10-14

## 2019-10-14 RX ADMIN — MORPHINE SULFATE 4 MILLIGRAM(S): 50 CAPSULE, EXTENDED RELEASE ORAL at 14:50

## 2019-10-14 RX ADMIN — MORPHINE SULFATE 4 MILLIGRAM(S): 50 CAPSULE, EXTENDED RELEASE ORAL at 14:34

## 2019-10-14 RX ADMIN — ONDANSETRON 4 MILLIGRAM(S): 8 TABLET, FILM COATED ORAL at 14:35

## 2019-10-14 RX ADMIN — SODIUM CHLORIDE 500 MILLILITER(S): 9 INJECTION INTRAMUSCULAR; INTRAVENOUS; SUBCUTANEOUS at 17:55
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
## 2019-10-14 NOTE — CONSULT NOTE ADULT - ASSESSMENT
58 y/o M PMHx of HTN, ESRD (M,W,F), HIV, hepatitis C, drug abuse (former cocaine 20 years prior), presented with abdominal pain and N/V. MICU consulted for hypotension.    #Hypotension  Likely in setting of HD today, now BP improved s/p 500cc bolus. BP at time of exam 110s/70s. No evidence of decreased end-organ perfusion.  - transfuse 1u pRBCs  - continue to monitor BP    Patient is not a MICU candidate at this time. Please reconsult if status changes.  d/w Dr. Edgar

## 2019-10-14 NOTE — ED PROVIDER NOTE - PHYSICAL EXAMINATION
GENERAL: Patient awake alert NAD.  HEENT: NC/AT, Moist mucous membranes, PERRL, EOMI.  LUNGS: CTAB, no wheezes or crackles.   CARDIAC: RRR, no m/r/g.    ABDOMEN: Soft, irreducible ventral hernia present, tender around the hernia, ND, No rebound, guarding. No CVA tenderness.   EXT: No edema. No calf tenderness. CV 2+DP/PT bilaterally.   MSK: No spinal tenderness, no pain with movement, no deformities.  NEURO: A&Ox3. Moving all extremities.  SKIN: Warm and dry. No rash.  PSYCH: Normal affect.

## 2019-10-14 NOTE — ED ADULT NURSE NOTE - OBJECTIVE STATEMENT
Patient received to room 10 sent from dialysis center for chest pain and vomiting at approximately 830. Pt reports left sided CP, generalized abdominal pain, SOB, and nausea. Pt has colostomy in LLQ, dressing dry and intact. VS as noted. Patient received to room 10, sent from dialysis center for chest pain and vomiting at approximately 830. Pt reports left sided CP, generalized abdominal pain, SOB, and nausea. Pt has colostomy in LLQ, dressing dry and intact. VS as noted, breathing even and unlabored. Awaiting further orders, will continue to monitor.

## 2019-10-14 NOTE — ED ADULT NURSE REASSESSMENT NOTE - NS ED NURSE REASSESS COMMENT FT1
report received from GERMAN Botello, pt A&Ox4, endorses chest pain. pt on tele monitor. MD Carballo aware. report received from GERMAN Botello, pt A&Ox4, endorses chest pain. no stool noted in colostomy bag.  pt on tele monitor. MD Carballo aware. report received from GERMAN Botello, pt A&Ox4, endorses chest pain. no stool noted in colostomy bag.  pt on tele monitor. MD Carballo aware. to obtain EKG.

## 2019-10-14 NOTE — CONSULT NOTE ADULT - ASSESSMENT
ASSESSMENT: Patient is a 59M with recent Dee's, SBR, and incisional hernia repair with mesh. Subcutaneous collections could be seromas vs small abscesses, but there are no skin changes or areas of fluctuance. Pt is also not febrile or tender in those areas. Most likely these are sterile, post-surgical changes.     Upper GI bleed could be secondary to his medications or stress of recent surgery and hospitalization.     PLAN:    - Would recommend transfer to [facility] when stable (also requested by the patient) so that he can continue his post-surgical care with his surgeon.   - Would recommend EGD with GI to evaluate for upper GI bleed.   - Trend H/H and transfuse as needed.   - Seen and examined with Dr. Calderon ASSESSMENT: Patient is a 59M with recent Dee's, SBR, and incisional hernia repair with mesh. Subcutaneous collections could be seromas vs small abscesses, but there are no skin changes or areas of fluctuance. Pt is also not febrile or tender in those areas. Most likely these are sterile, post-surgical changes.     Upper GI bleed could be secondary to his medications or stress of recent surgery and hospitalization.     PLAN:    - Would recommend transfer to [facility] when stable (also requested by the patient) so that he can continue his post-surgical care with his surgeon.   - Would recommend EGD with GI to evaluate for upper GI bleed.   - Trend H/H and transfuse as needed.   - Seen and examined with Dr. Calderon

## 2019-10-14 NOTE — ED PROVIDER NOTE - CARE PLAN
Principal Discharge DX:	GI bleed  Secondary Diagnosis:	Demand ischemia  Secondary Diagnosis:	Hypotension  Secondary Diagnosis:	Tachycardia

## 2019-10-14 NOTE — CONSULT NOTE ADULT - PROBLEM SELECTOR RECOMMENDATION 3
Patient with anemia in the setting of ESRD. Hemoglobin below target range. Will give DIVYA with HD. Monitor hemoglobin. Patient with anemia in the setting of ESRD. Hemoglobin below target range. Will give DIVYA with HD. Consider 1 U PRBC. Pt. needs further workup for GI Bleed. Monitor hemoglobin.    Case discussed with nephrologist on call (Dr. Husain)

## 2019-10-14 NOTE — CONSULT NOTE ADULT - PROBLEM SELECTOR RECOMMENDATION 9
Pt. with ESRD on HD TIW (MWF). Last HD as outpatient was on 10/14/19 via [facility]. Pt. clinically stable. Serum potassium WNL today. Will arrange for maintenance HD 10/16/19. Monitor labs. Pt. with ESRD on HD TIW (MWF). Last HD as outpatient was on 10/14/19 via [facility]. Pt. clinically stable. Serum potassium WNL today. No plan for HD today. Will arrange for maintenance HD 10/16/19. Monitor labs.

## 2019-10-14 NOTE — ED PROVIDER NOTE - ATTENDING CONTRIBUTION TO CARE
I have personally performed a face to face bedside history and physical examination of this patient. I have discussed the history, examination, review of systems, assessment and plan of management with the resident. I have reviewed the electronic medical record and amended it to reflect my history, review of systems, physical exam, assessment and plan.    59 y.o. M w/ PMHx of ESRD on HD (M,W,F), HIV on HAART, Hepatitis C, HLD, HTN, recent hospitalization for incarcerated hernia? now with ostomy p/w abdominal tenderness, CP, and SOB since today morning.  Patient also reporting dark stools from ostomy site.  Tachy on arrival.  Exam non-toxic appearing, lungs clear, heart sounds tachy, no le swelling, abdomen soft but diffusely tender, ostomy pink with gas in bag and black stools.  EKG sinus tach with rbbb similar to previous.  DDx broad at his time.  Possible infectious surgical complication, GI bleed.  PE also considered given new onset sob, tachycardia, and cp.  Low c/f acs.  Will send labs, ctpe, ct.  Dispo pending.

## 2019-10-14 NOTE — ED PROVIDER NOTE - PROGRESS NOTE DETAILS
Cedrick PGY1 - MICU has been consulted.  They will come and see pt. Cedrick PGY1 - Pt. found to have an anterior abdominal wall collection.  Surgery has been consulted and will see pt. Haris SWEET: Patient reports CP; EKG ordered and communicated with nurse. OMAIRA- spoke with surgical attending at [facility] regarding superficial fluid collection seen on abdominal CT and not recommending any acute intervention here and recommending transfer to [facility] as pt has very complicated surgical hx which if pt decompensates with GIB OR if any intervention needed for fluid collection, patient's surgery team at [facility] would be best to operate on patient.  Patient agrees to transfer.  Called Dr. Dilan Garcia (general surgeon) who performed most recent surgery on pt ([phone]), left message.  Called [facility] but requiring accepting surgeon to accept transfer Ricky SWEET: Pt signed out to me.  Pt here with GIB after weeks of melena - noted worsening anemia Hb 6, tachycardic and hypotensive.  Pt also with recent hernia repair, abd wall fluid collection, not abscess per CT read.  HD instability likely in setting of GIB, unlikely this post-op fluid collection is causing anemia or hypotension/tachycardia.  MICU reconsulted, 2nd unit PRBCs ordered, will need admission and pt is not stable for transfer. Haris SWEET: Patient declined by surgery and MICU x 2; MAP > 65 , tachycardic - 2nd unit running.  Patient mentating appropriately and no further melena episodes in the ER.

## 2019-10-14 NOTE — ED ADULT NURSE NOTE - CHIEF COMPLAINT QUOTE
Pt states that he has been having abd pain x 3 days, states that he had abd surgery with colostomy at [facility], discharged on 10/1/19. Sent here from dialysis  [facility] ESRD, HIV, HCV, HTN
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
## 2019-10-14 NOTE — ED ADULT TRIAGE NOTE - CHIEF COMPLAINT QUOTE
Pt states that he has been having abd pain x 3 days, states that he had abd surgery with colostomy at [facility], discharged on 10/1/19. Sent here from dialysis  [facility] ESRD, HIV, HCV, HTN
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
## 2019-10-14 NOTE — ED PROVIDER NOTE - CLINICAL SUMMARY MEDICAL DECISION MAKING FREE TEXT BOX
Pt. is a 59 y.o. M w/ multiple PMHx p/w abdominal pain, CP, SOB.  Concerned for incarcerated vs strangulated hernia, appendicitis, SBO.  concerned for PE considering tachycardia and SOB.  Will get basis, trops, ct abd, cta chest, vbg.  Will maintain an extremely low threshold for admission.

## 2019-10-14 NOTE — CONSULT NOTE ADULT - PROBLEM SELECTOR RECOMMENDATION 2
BP at target range. Monitor BP on current BP medication. Low salt diet. BP borderline low. Improved with IVF. Hold BP meds.

## 2019-10-14 NOTE — ED PROVIDER NOTE - NS ED ROS FT
General: denies fever, chills, weight loss/weight gain.  HENT: denies nasal congestion, sore throat, rhinorrhea, ear pain  Eyes: denies visual changes, blurred vision, eye discharge, eye redness  Neck: denies neck pain, neck swelling  CV: +chest pain; denies palpitations  Resp:  +difficulty breathing; denies cough  Abdominal: +abdominal pain, dark stool; denies nausea, vomiting, diarrhea  MSK: denies muscle aches, bony pain, leg pain, leg swelling  Neuro: denies headaches, numbness, tingling, dizziness, lightheadedness.  Skin: denies rashes, cuts, bruises  Hematologic: denies unexplained bruises
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
## 2019-10-14 NOTE — ED ADULT NURSE REASSESSMENT NOTE - NS ED NURSE REASSESS COMMENT FT1
MICU at bedside. Fluids started as per orders. Second type and screen drawn and sent. R AC IV discontinued. 20 G IV placed in R hand. VS as noted. Will continue to monitor.

## 2019-10-15 DIAGNOSIS — K92.2 GASTROINTESTINAL HEMORRHAGE, UNSPECIFIED: ICD-10-CM

## 2019-10-15 DIAGNOSIS — Z29.9 ENCOUNTER FOR PROPHYLACTIC MEASURES, UNSPECIFIED: ICD-10-CM

## 2019-10-15 DIAGNOSIS — D62 ACUTE POSTHEMORRHAGIC ANEMIA: ICD-10-CM

## 2019-10-15 DIAGNOSIS — B20 HUMAN IMMUNODEFICIENCY VIRUS [HIV] DISEASE: ICD-10-CM

## 2019-10-15 DIAGNOSIS — I24.8 OTHER FORMS OF ACUTE ISCHEMIC HEART DISEASE: ICD-10-CM

## 2019-10-15 DIAGNOSIS — E11.9 TYPE 2 DIABETES MELLITUS WITHOUT COMPLICATIONS: ICD-10-CM

## 2019-10-15 DIAGNOSIS — R16.1 SPLENOMEGALY, NOT ELSEWHERE CLASSIFIED: ICD-10-CM

## 2019-10-15 DIAGNOSIS — I10 ESSENTIAL (PRIMARY) HYPERTENSION: ICD-10-CM

## 2019-10-15 LAB
BASE EXCESS BLDV CALC-SCNC: 0.7 MMOL/L — SIGNIFICANT CHANGE UP
BLOOD GAS VENOUS - CREATININE: SIGNIFICANT CHANGE UP MG/DL (ref 0.5–1.3)
CHLORIDE BLDV-SCNC: 99 MMOL/L — SIGNIFICANT CHANGE UP (ref 96–108)
CK MB BLD-MCNC: 1.5 NG/ML — SIGNIFICANT CHANGE UP (ref 1–6.6)
CK SERPL-CCNC: 42 U/L — SIGNIFICANT CHANGE UP (ref 30–200)
GAS PNL BLDV: 135 MMOL/L — LOW (ref 136–146)
GLUCOSE BLDV-MCNC: 100 MG/DL — HIGH (ref 70–99)
HCO3 BLDV-SCNC: 24 MMOL/L — SIGNIFICANT CHANGE UP (ref 20–27)
HCT VFR BLD CALC: 21.8 % — LOW (ref 39–50)
HCT VFR BLD CALC: 24.4 % — LOW (ref 39–50)
HCT VFR BLD CALC: 25.6 % — LOW (ref 39–50)
HCT VFR BLDV CALC: 31.5 % — LOW (ref 39–51)
HGB BLD-MCNC: 6.7 G/DL — CRITICAL LOW (ref 13–17)
HGB BLD-MCNC: 7.6 G/DL — LOW (ref 13–17)
HGB BLD-MCNC: 7.9 G/DL — LOW (ref 13–17)
HGB BLDV-MCNC: 10.2 G/DL — LOW (ref 13–17)
LACTATE BLDV-MCNC: 3 MMOL/L — HIGH (ref 0.5–2)
MCHC RBC-ENTMCNC: 26.6 PG — LOW (ref 27–34)
MCHC RBC-ENTMCNC: 26.9 PG — LOW (ref 27–34)
MCHC RBC-ENTMCNC: 27 PG — SIGNIFICANT CHANGE UP (ref 27–34)
MCHC RBC-ENTMCNC: 30.7 % — LOW (ref 32–36)
MCHC RBC-ENTMCNC: 30.9 % — LOW (ref 32–36)
MCHC RBC-ENTMCNC: 31.1 % — LOW (ref 32–36)
MCV RBC AUTO: 86.2 FL — SIGNIFICANT CHANGE UP (ref 80–100)
MCV RBC AUTO: 86.8 FL — SIGNIFICANT CHANGE UP (ref 80–100)
MCV RBC AUTO: 87.6 FL — SIGNIFICANT CHANGE UP (ref 80–100)
NRBC # FLD: 0.03 K/UL — SIGNIFICANT CHANGE UP (ref 0–0)
NRBC # FLD: 0.03 K/UL — SIGNIFICANT CHANGE UP (ref 0–0)
NRBC # FLD: 0.05 K/UL — SIGNIFICANT CHANGE UP (ref 0–0)
PCO2 BLDV: 41 MMHG — SIGNIFICANT CHANGE UP (ref 41–51)
PH BLDV: 7.4 PH — SIGNIFICANT CHANGE UP (ref 7.32–7.43)
PLATELET # BLD AUTO: 211 K/UL — SIGNIFICANT CHANGE UP (ref 150–400)
PLATELET # BLD AUTO: 213 K/UL — SIGNIFICANT CHANGE UP (ref 150–400)
PLATELET # BLD AUTO: 232 K/UL — SIGNIFICANT CHANGE UP (ref 150–400)
PMV BLD: 10.2 FL — SIGNIFICANT CHANGE UP (ref 7–13)
PMV BLD: 10.4 FL — SIGNIFICANT CHANGE UP (ref 7–13)
PMV BLD: 10.7 FL — SIGNIFICANT CHANGE UP (ref 7–13)
PO2 BLDV: 36 MMHG — SIGNIFICANT CHANGE UP (ref 35–40)
POTASSIUM BLDV-SCNC: 4.1 MMOL/L — SIGNIFICANT CHANGE UP (ref 3.4–4.5)
RBC # BLD: 2.49 M/UL — LOW (ref 4.2–5.8)
RBC # BLD: 2.81 M/UL — LOW (ref 4.2–5.8)
RBC # BLD: 2.97 M/UL — LOW (ref 4.2–5.8)
RBC # FLD: 17.4 % — HIGH (ref 10.3–14.5)
RBC # FLD: 17.6 % — HIGH (ref 10.3–14.5)
RBC # FLD: 18.1 % — HIGH (ref 10.3–14.5)
SAO2 % BLDV: 64.9 % — SIGNIFICANT CHANGE UP (ref 60–85)
TROPONIN T, HIGH SENSITIVITY: 113 NG/L — CRITICAL HIGH (ref ?–14)
TROPONIN T, HIGH SENSITIVITY: 93 NG/L — CRITICAL HIGH (ref ?–14)
WBC # BLD: 6.45 K/UL — SIGNIFICANT CHANGE UP (ref 3.8–10.5)
WBC # BLD: 7.13 K/UL — SIGNIFICANT CHANGE UP (ref 3.8–10.5)
WBC # BLD: 7.42 K/UL — SIGNIFICANT CHANGE UP (ref 3.8–10.5)
WBC # FLD AUTO: 6.45 K/UL — SIGNIFICANT CHANGE UP (ref 3.8–10.5)
WBC # FLD AUTO: 7.13 K/UL — SIGNIFICANT CHANGE UP (ref 3.8–10.5)
WBC # FLD AUTO: 7.42 K/UL — SIGNIFICANT CHANGE UP (ref 3.8–10.5)

## 2019-10-15 PROCEDURE — 99222 1ST HOSP IP/OBS MODERATE 55: CPT | Mod: GC

## 2019-10-15 PROCEDURE — 99223 1ST HOSP IP/OBS HIGH 75: CPT | Mod: GC

## 2019-10-15 PROCEDURE — 93010 ELECTROCARDIOGRAM REPORT: CPT

## 2019-10-15 PROCEDURE — 99284 EMERGENCY DEPT VISIT MOD MDM: CPT

## 2019-10-15 RX ORDER — RITONAVIR 100 MG/1
100 TABLET, FILM COATED ORAL DAILY
Refills: 0 | Status: DISCONTINUED | OUTPATIENT
Start: 2019-10-15 | End: 2019-10-23

## 2019-10-15 RX ORDER — HYDROMORPHONE HYDROCHLORIDE 2 MG/ML
0.4 INJECTION INTRAMUSCULAR; INTRAVENOUS; SUBCUTANEOUS EVERY 6 HOURS
Refills: 0 | Status: DISCONTINUED | OUTPATIENT
Start: 2019-10-15 | End: 2019-10-17

## 2019-10-15 RX ORDER — PANTOPRAZOLE SODIUM 20 MG/1
8 TABLET, DELAYED RELEASE ORAL
Qty: 80 | Refills: 0 | Status: DISCONTINUED | OUTPATIENT
Start: 2019-10-15 | End: 2019-10-21

## 2019-10-15 RX ORDER — DARUNAVIR 75 MG/1
800 TABLET, FILM COATED ORAL DAILY
Refills: 0 | Status: DISCONTINUED | OUTPATIENT
Start: 2019-10-15 | End: 2019-10-23

## 2019-10-15 RX ORDER — RALTEGRAVIR 400 MG/1
400 TABLET, FILM COATED ORAL
Refills: 0 | Status: DISCONTINUED | OUTPATIENT
Start: 2019-10-15 | End: 2019-10-23

## 2019-10-15 RX ORDER — SODIUM CHLORIDE 9 MG/ML
1000 INJECTION, SOLUTION INTRAVENOUS
Refills: 0 | Status: DISCONTINUED | OUTPATIENT
Start: 2019-10-15 | End: 2019-10-15

## 2019-10-15 RX ORDER — SODIUM CHLORIDE 9 MG/ML
500 INJECTION, SOLUTION INTRAVENOUS ONCE
Refills: 0 | Status: COMPLETED | OUTPATIENT
Start: 2019-10-15 | End: 2019-10-15

## 2019-10-15 RX ORDER — ETRAVIRINE 200 MG/1
200 TABLET ORAL
Refills: 0 | Status: DISCONTINUED | OUTPATIENT
Start: 2019-10-15 | End: 2019-10-23

## 2019-10-15 RX ADMIN — PANTOPRAZOLE SODIUM 10 MG/HR: 20 TABLET, DELAYED RELEASE ORAL at 18:31

## 2019-10-15 RX ADMIN — PANTOPRAZOLE SODIUM 10 MG/HR: 20 TABLET, DELAYED RELEASE ORAL at 06:59

## 2019-10-15 RX ADMIN — RITONAVIR 100 MILLIGRAM(S): 100 TABLET, FILM COATED ORAL at 10:44

## 2019-10-15 RX ADMIN — PANTOPRAZOLE SODIUM 10 MG/HR: 20 TABLET, DELAYED RELEASE ORAL at 16:47

## 2019-10-15 RX ADMIN — DARUNAVIR 800 MILLIGRAM(S): 75 TABLET, FILM COATED ORAL at 10:44

## 2019-10-15 RX ADMIN — HYDROMORPHONE HYDROCHLORIDE 0.4 MILLIGRAM(S): 2 INJECTION INTRAMUSCULAR; INTRAVENOUS; SUBCUTANEOUS at 20:49

## 2019-10-15 RX ADMIN — ETRAVIRINE 200 MILLIGRAM(S): 200 TABLET ORAL at 10:44

## 2019-10-15 RX ADMIN — HYDROMORPHONE HYDROCHLORIDE 0.4 MILLIGRAM(S): 2 INJECTION INTRAMUSCULAR; INTRAVENOUS; SUBCUTANEOUS at 21:19

## 2019-10-15 RX ADMIN — ETRAVIRINE 200 MILLIGRAM(S): 200 TABLET ORAL at 20:03

## 2019-10-15 RX ADMIN — SODIUM CHLORIDE 1000 MILLILITER(S): 9 INJECTION, SOLUTION INTRAVENOUS at 21:58

## 2019-10-15 RX ADMIN — RALTEGRAVIR 400 MILLIGRAM(S): 400 TABLET, FILM COATED ORAL at 20:03

## 2019-10-15 NOTE — H&P ADULT - NSHPLABSRESULTS_GEN_ALL_CORE
The Labs were reviewed by me   The Radiology was reviewed by me    EKG tracing reviewed by me    10-14    137  |  90<L>  |  15  ----------------------------<  91  3.7   |  28  |  5.17<H>    Ca    9.1      14 Oct 2019 14:26    TPro  8.0  /  Alb  3.6  /  TBili  0.3  /  DBili  x   /  AST  20  /  ALT  5   /  AlkPhos  80  10-14          PT/INR - ( 14 Oct 2019 14:26 )   PT: 13.7 SEC;   INR: 1.23          PTT - ( 14 Oct 2019 14:26 )  PTT:20.0 SEC                                        6.7    7.13  )-----------( 211      ( 15 Oct 2019 03:10 )             21.8                         6.1    7.19  )-----------( 250      ( 14 Oct 2019 14:26 )             20.6     CAPILLARY BLOOD GLUCOSE The Labs were reviewed by me   The Radiology was reviewed by me    EKG tracing reviewed by me    10-14    137  |  90<L>  |  15  ----------------------------<  91  3.7   |  28  |  5.17<H>    Ca    9.1      14 Oct 2019 14:26    TPro  8.0  /  Alb  3.6  /  TBili  0.3  /  DBili  x   /  AST  20  /  ALT  5   /  AlkPhos  80  10-14          PT/INR - ( 14 Oct 2019 14:26 )   PT: 13.7 SEC;   INR: 1.23          PTT - ( 14 Oct 2019 14:26 )  PTT:20.0 SEC                                        6.7    7.13  )-----------( 211      ( 15 Oct 2019 03:10 )             21.8                         6.1    7.19  )-----------( 250      ( 14 Oct 2019 14:26 )             20.6     CAPILLARY BLOOD GLUCOSE    < from: CT Abdomen and Pelvis w/ IV Cont (10.14.19 @ 18:16) >    IMPRESSION:     CTA chest:  No pulmonary embolism within the main, right or left main, or lobar   pulmonary arteries. Evaluation of the segmental and subsegmental branches   is limited secondary to motion artifact.    CT abdomen and pelvis:  No evidence of acute appendicitis as questioned.    Status post ventral wall hernia repair with superficial collections   measuring up to 3.3 cm, new since 9/18/19.    < end of copied text > The Labs were reviewed by me   The Radiology was reviewed by me    EKG tracing reviewed by me    10-14    137  |  90<L>  |  15  ----------------------------<  91  3.7   |  28  |  5.17<H>    Ca    9.1      14 Oct 2019 14:26    TPro  8.0  /  Alb  3.6  /  TBili  0.3  /  DBili  x   /  AST  20  /  ALT  5   /  AlkPhos  80  10-14      EKG: sinus rhythm with RBBB, twi V1-V3, unchanged from prior    PT/INR - ( 14 Oct 2019 14:26 )   PT: 13.7 SEC;   INR: 1.23          PTT - ( 14 Oct 2019 14:26 )  PTT:20.0 SEC                                        6.7    7.13  )-----------( 211      ( 15 Oct 2019 03:10 )             21.8                         6.1    7.19  )-----------( 250      ( 14 Oct 2019 14:26 )             20.6     CAPILLARY BLOOD GLUCOSE    < from: CT Abdomen and Pelvis w/ IV Cont (10.14.19 @ 18:16) >    IMPRESSION:     CTA chest:  No pulmonary embolism within the main, right or left main, or lobar   pulmonary arteries. Evaluation of the segmental and subsegmental branches   is limited secondary to motion artifact.    CT abdomen and pelvis:  No evidence of acute appendicitis as questioned.    Status post ventral wall hernia repair with superficial collections   measuring up to 3.3 cm, new since 9/18/19.    < end of copied text >

## 2019-10-15 NOTE — H&P ADULT - PROBLEM SELECTOR PLAN 9
Hold DVT prophylaxis in setting of bleed  NPO for possible procedure Hold DVT prophylaxis in setting of bleed  NPO for possible procedure  -hold ASA in setting of bleed  -Needs medication reconcilitation Hold DVT prophylaxis in setting of bleed  NPO for possible procedure  -hold ASA in setting of bleed  -Needs medication reconciliation as med rec from previous hospitalization does not match med rec from dialysis center in chart    Dispo: clinical improvement
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
## 2019-10-15 NOTE — H&P ADULT - PROBLEM SELECTOR PLAN 1
-P/w abdominal pain and melena in ostomy, + FOBT from ostomy bag likely 2/2 upper GI bleed; differential includes bleeding ulcer vs.   -anemic to hgb 6.1 s/p 2 U pRBC; pending post transfusion CBC   -Over night shift, no further episodes of melena in ostomy.   -Place 2 large IV bores  -Maintain MAP >65, active type and screen  -Transfuse for hgb <7 or for symptomatic anemia   -GI consulted (via e-mail); appreciate recs. Will remain NPO for now -P/w abdominal pain and melena in ostomy, + FOBT from ostomy bag likely 2/2 upper GI bleed; differential includes bleeding ulcer vs.   -anemic to hgb 6.1 s/p 2 U pRBC; pending post transfusion CBC   -Over night shift, no further episodes of melena in ostomy.   -Place 2 large IV bores  -Maintain MAP >65, active type and screen  -monitor CBC q8h   -Transfuse for hgb <7 or for symptomatic anemia   -GI consulted (via e-mail); appreciate recs. Will remain NPO for now -P/w abdominal pain and melena in ostomy, + FOBT from ostomy bag likely 2/2 upper GI bleed; differential includes bleeding ulcer vs.   -anemic to hgb 6.1 s/p 2 U pRBC; pending post transfusion CBC   -Over night shift, no further episodes of melena in ostomy.   -Place 2 large IV bores  -Maintain MAP >65, active type and screen  -monitor CBC q8h   -Transfuse for hgb <7 or for symptomatic anemia   -c/w PPI gtt  -GI consulted (via e-mail); appreciate recs. Will remain NPO for now -P/w abdominal pain and melena in ostomy, + FOBT from ostomy bag likely 2/2 upper GI bleed; differential includes bleeding ulcer vs. post op complication - collection noted on CT - surgery consulted, rec transfer back to original center where surgery was performed - will consider when stable  -Pt does reports some NSAID use after discharge last time, but not significant  -anemic to hgb 6.1 s/p 2 U pRBC; pending post transfusion CBC ordered stat (6.7 value after 1 unit)   -Over night shift - no further episodes of melena in ostomy.   -Place 2 large IV bores  -Maintain MAP >65, active type and screen  -monitor CBC q8h   -Transfuse for hgb <7 or for symptomatic anemia   -c/w PPI gtt  -GI consulted (via e-mail); appreciate recs. Will remain NPO for now

## 2019-10-15 NOTE — PROGRESS NOTE ADULT - SUBJECTIVE AND OBJECTIVE BOX
SUBJECTIVE:  Patient received 2 units of blood this AM and H/H responded appropriately.      Vital Signs Last 24 Hrs  T(C): 36.7 (15 Oct 2019 09:14), Max: 37.4 (15 Oct 2019 05:02)  T(F): 98 (15 Oct 2019 09:14), Max: 99.4 (15 Oct 2019 05:02)  HR: 111 (15 Oct 2019 09:14) (104 - 120)  BP: 110/72 (15 Oct 2019 09:14) (89/50 - 149/49)  BP(mean): --  RR: 16 (15 Oct 2019 09:14) (16 - 20)  SpO2: 98% (15 Oct 2019 09:14) (95% - 98%)    I&O's Detail      Physical Exam:  General Appearance: Appears well, NAD  Respiratory: No acute resp distress, no accessory muscle use  Abdomen: Soft, nontender, appropriate incisional tenderness, midline incision with no signs of erythema or drainage of pus.  Colostomy in place with no feces or blood in bag.  Moderate gas in bag.  Extremities: Grossly symmetric    LABS:                        7.9    7.42  )-----------( 232      ( 15 Oct 2019 09:07 )             25.6     10-14    137  |  90<L>  |  15  ----------------------------<  91  3.7   |  28  |  5.17<H>    Ca    9.1      14 Oct 2019 14:26    TPro  8.0  /  Alb  3.6  /  TBili  0.3  /  DBili  x   /  AST  20  /  ALT  5   /  AlkPhos  80  10-14    PT/INR - ( 14 Oct 2019 14:26 )   PT: 13.7 SEC;   INR: 1.23          PTT - ( 14 Oct 2019 14:26 )  PTT:20.0 SEC      RADIOLOGY & ADDITIONAL STUDIES:

## 2019-10-15 NOTE — CHART NOTE - NSCHARTNOTEFT_GEN_A_CORE
Night float paged for pt having c/o cp. Pt seen and examined at the bedside. Pt reports cp that started at 7:30pm and feels like someone is sitting on top of him. Also having diffuse abdominal pain and nausea, but no vomiting. RN reports that pt has been tender to palpation all over his body, including his legs. Vitals showing tachycardia to 110 and SpO2 91-93%. Physical exam showed pt in moderate distress, S1 and S2, no murmur, chest non-tender to palpation, melanotic stool in colostomy bag, +bs, diffuse abdominal tenderness to light palpation, mildly distended abdomen, no rebound, peripheral pulses 2+. EKG showing no change. Cardiac enzymes ... Dilaudid given. O2 NC ordered, now satting 100%. Of note, pt fell asleep right after physical exam while I was still at the bedside prior to pain meds given. Night float paged for pt having c/o cp. Pt seen and examined at the bedside. Pt reports cp that started at 7:30pm and feels like someone is sitting on top of him. Also having diffuse abdominal pain and nausea, but no vomiting. RN reports that pt has been tender to palpation all over his body, including his legs. Vitals showing tachycardia to 110 and SpO2 91-93%. Physical exam showed pt in moderate distress, S1 and S2, no murmur, chest non-tender to palpation, melanotic stool in colostomy bag, +bs, diffuse abdominal tenderness to light palpation, mildly distended abdomen, no rebound, peripheral pulses 2+. EKG showing no change. Cardiac enzymes ... Lactate 3.0 from 2.2. Dilaudid given. O2 NC ordered, now satting 100%. Of note, pt fell asleep right after physical exam while I was still at the bedside prior to pain meds given. Night float paged for pt having c/o cp. Pt seen and examined at the bedside. Pt reports cp that started at 7:30pm and feels like someone is sitting on top of him. Also having diffuse abdominal pain and nausea, but no vomiting. RN reports that pt has been tender to palpation all over his body, including his legs. Of note, pt fell asleep right after physical exam while I was still at the bedside prior to pain meds given. Vitals showing tachycardia to 110 and SpO2 91-93%. Physical exam showed pt in moderate distress, S1 and S2, no murmur, chest non-tender to palpation, melanotic stool in colostomy bag, +bs, diffuse abdominal tenderness to light palpation, mildly distended abdomen, no rebound, peripheral pulses 2+. EKG showing no change. Cardiac enzymes pending. Lactate 3.0 from 2.2. Dilaudid given. O2 NC ordered, now saturating at 100%. Night float paged for pt having c/o cp. Pt seen and examined at the bedside. Pt reports cp that started around 7:30pm and feels like someone is sitting on top of him. Also having diffuse abdominal pain and nausea, but no vomiting. RN reports that pt has been tender to palpation all over his body, including his legs. Of note, pt fell asleep right after physical exam while I was still at the bedside prior to pain meds given. Vitals showing tachycardia to 110 and SpO2 91-93%. Physical exam showed pt in moderate distress, non-diaphoretic, S1 and S2, no murmur, chest wall diffusely tender to palpation, Lungs CTAB, melanotic stool in colostomy bag, no erythema on abdomen, +bs, diffuse abdominal tenderness to light palpation, mildly distended abdomen, no rebound, peripheral pulses 2+. Hb 7.6. Lactate 3.0 from 2.2. Cardiac enzymes pending. EKG showing no change. Dilaudid given. O2 NC ordered, now saturating at 100%. Night float paged for pt having c/o cp. Pt seen and examined at the bedside. Pt reports cp that started around 7:30pm and feels like someone is sitting on top of him. Also having diffuse abdominal pain and nausea, but no vomiting. RN reports that pt has been tender to palpation all over his body, including his legs. Of note, pt fell asleep right after physical exam while I was still at the bedside prior to pain meds given. Vitals showing tachycardia to 110 and SpO2 91-93%. Physical exam showed pt in moderate distress, non-diaphoretic, S1 and S2, no murmur, chest wall diffusely tender to palpation, Lungs CTAB, melanotic stool in colostomy bag, no erythema on abdomen, +bs, diffuse abdominal tenderness to light palpation, mildly distended abdomen, no rebound, peripheral pulses 2+. Hb 7.6. Lactate 3.0 from 2.2. Troponin 113 from 93, CK 42, CKMB 1.5. EKG showing no change. Dilaudid given. O2 NC ordered, now saturating at 100%. Night float paged for pt having c/o cp. Pt seen and examined at the bedside. Pt reports cp that started around 7:30pm and feels like someone is sitting on top of him. Also having diffuse abdominal pain and nausea, but no vomiting. RN reports that pt has been tender to palpation all over his body, including his legs. Of note, pt fell asleep right after physical exam while I was still at the bedside prior to pain meds given. Vitals showing tachycardia to 110 and SpO2 91-93%. Physical exam showed pt in moderate distress, non-diaphoretic, S1 and S2, no murmur, chest wall diffusely tender to palpation, Lungs CTAB, melanotic stool in colostomy bag, no erythema on abdomen, +bs, diffuse abdominal tenderness to light palpation, mildly distended abdomen, no rebound, peripheral pulses 2+. Hb 7.6. Lactate 3.0 from 2.2. Troponin 93->113->110, CK 42->34, CKMB 1.5->1.53. EKG showing no change. Dilaudid given. O2 NC ordered, now saturating at 100%.
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
## 2019-10-15 NOTE — PROGRESS NOTE ADULT - PROBLEM SELECTOR PLAN 2
-Found to have hgb 6.1 2/2 acute blood loss anemia 2/2 likely upper GI bleed  -baseline hgb ~9-10   -hgb corrected from 6.1 to 7.9 after 2 U pRBC  -appreciate GI cslt

## 2019-10-15 NOTE — H&P ADULT - NSHPSOCIALHISTORY_GEN_ALL_CORE
Smoking: denies  Alcohol: socially, denies any alcohol since his last hospitalization   Recreational drug: last used cocaine 20 years ago   Unemployed, lives at home alone

## 2019-10-15 NOTE — H&P ADULT - ASSESSMENT
Patient is a 59 y.o M w/ PMH ESRD on HD (MWF), HIV on HAART, VL 1703, CD4 201), hepatitis C infection, HLD, and HTN who p/w 10/10 diffuse, abdominal pain, found to be anemic to hgb 6.1 in setting of melena in ostomy, concerning for upper GI bleed.

## 2019-10-15 NOTE — H&P ADULT - PROBLEM SELECTOR PLAN 8
-Reported hx of HTN   -hold any anti hypertensive in setting of acute blood -Reported hx of HTN; hydralazine 10 TID, carvedilol 6. 25 q12h   -hold any anti hypertensive in setting of acute blood

## 2019-10-15 NOTE — CHART NOTE - NSCHARTNOTEFT_GEN_A_CORE
Spoke with cardiology fellow, doesn't think patient requires a card clearance. Patient recently underwent a abdominal surgery, no EKG changes, recent Angio in April with no occlusion. TTE with mild AS.  RCRI risk of 1 for ESRD. unable to assess METs given patient had recent surgery and has abdominal pain. Patient low risk for a low risk procedure.

## 2019-10-15 NOTE — ED ADULT NURSE REASSESSMENT NOTE - NS ED NURSE REASSESS COMMENT FT1
rectal temp obtained per MD order, afebrile. skin intact. no output noted in colostomy bag. clean linens provided, pt repositioned. PRBCs infusing well per order. VS as noted.

## 2019-10-15 NOTE — H&P ADULT - NSHPPHYSICALEXAM_GEN_ALL_CORE
PHYSICAL EXAM:  GENERAL: NAD, well-developed  HEAD:  Atraumatic, Normocephalic  EYES: EOMI, PERRLA, conjunctiva and sclera clear  NECK: Supple, No JVD  CHEST/LUNG: Clear to auscultation bilaterally; No wheeze  HEART: S1 and S2, Regular rhythm, tachycardic; No murmurs, rubs, or gallops  ABDOMEN: Soft, tenderness to palpation of LUQ, Nondistended; hypoactive bowel sounds  EXTREMITIES:  2+ Peripheral Pulses, No clubbing, cyanosis, or edema, left AV fistula in place   PSYCH: AAOx3  NEUROLOGY: non-focal  SKIN: No rashes or lesions PHYSICAL EXAM:  GENERAL: NAD, well-developed  HEAD:  Atraumatic, Normocephalic  EYES: EOMI, PERRLA, conjunctiva and sclera clear  NECK: Supple, No JVD  CHEST/LUNG: Tenderness to palpation lateral left to sternum, clear to auscultation bilaterally; No wheeze  HEART: S1 and S2, Regular rhythm, tachycardic; No murmurs, rubs, or gallops  ABDOMEN: Soft, tenderness to palpation of LUQ, Nondistended; hypoactive bowel sounds  EXTREMITIES:  2+ Peripheral Pulses, No clubbing, cyanosis, or edema, left AV fistula in place   PSYCH: AAOx3  NEUROLOGY: non-focal  SKIN: No rashes or lesions PHYSICAL EXAM:  GENERAL: NAD, well-developed  HEAD:  Atraumatic, Normocephalic  EYES: EOMI, PERRLA, conjunctiva and sclera clear  NECK: Supple, No JVD  CHEST/LUNG: Tenderness to palpation lateral left to sternum, clear to auscultation bilaterally; No wheeze  HEART: S1 and S2, Regular rhythm, tachycardic; No murmurs, rubs, or gallops  ABDOMEN: Soft, tenderness to palpation of LUQ, Nondistended; hypoactive bowel sounds, +colostomy bag with minimal stool some dark  EXTREMITIES:  2+ Peripheral Pulses, No clubbing, cyanosis, or edema, left AV fistula in place   PSYCH: AAOx3  NEUROLOGY: non-focal  SKIN: No rashes or lesions

## 2019-10-15 NOTE — PROGRESS NOTE ADULT - ASSESSMENT
Patient is a 59 y.o M w/ PMH ESRD on HD (MWF), HIV on HAART, VL 1703, CD4 201), hepatitis C infection, HLD, HTN, and recent hospitalization from 9/18-10/1 for perforated sigmoid diverticulitis s/p ex lap, MAYDA, SBR, sigmoi colon resection with colostomy creation c/b intra-abdominal abscess s/p drainage who p/w 10/10 diffuse, abdominal pain, found to be anemic to hgb 6.1 in setting of melena in ostomy, concerning for upper GI bleed vs surgical complication.

## 2019-10-15 NOTE — PROGRESS NOTE ADULT - ASSESSMENT
ASSESSMENT: Patient is a 59M with recent Dee's, SBR, and incisional hernia repair with mesh. Subcutaneous collections could be seromas vs small abscesses, but there are no skin changes or areas of fluctuance and patient is afebrile or tender in those areas, now with upper GI bleed.    PLAN:    - If patient requires any surgical intervention, would recommend transfer back to original hospital with original surgeon.  - Recommend EGD with GI to evaluate for upper GI bleed.   - Trend H/H and transfuse as needed.   - Discussed with Dr. Calderon.    Derrick RASHID-Team surgery  #[id number]

## 2019-10-15 NOTE — H&P ADULT - PROBLEM SELECTOR PLAN 6
-Reported hx of T2DM, med rec -Reported hx of T2DM, med rec reveals patient is not on anti -diabetic medication  -f/u Hgb A1c  -NPO for now for possible upper endoscopy; FS q6h while NPO  -Once initiate diet, switch to FS before meals and at bedtime, lss

## 2019-10-15 NOTE — H&P ADULT - NSHPREVIEWOFSYSTEMS_GEN_ALL_CORE
CONSTITUTIONAL:  No weight loss, fever, chills, weakness or fatigue.  HEENT:  Eyes:  No visual loss, blurred vision, double vision or yellow sclerae. Ears, Nose, Throat:  No hearing loss, sneezing, congestion, runny nose or sore throat.  SKIN:  No rash or itching.  CARDIOVASCULAR:  No chest pain, chest pressure or chest discomfort. No palpitations or edema.  RESPIRATORY:  No shortness of breath, cough or sputum.  GASTROINTESTINAL:  No anorexia, nausea, vomiting or diarrhea. No abdominal pain or blood.  GENITOURINARY:  Denies hematuria, dysuria.   NEUROLOGICAL:  No headache, dizziness, syncope, paralysis, ataxia, numbness or tingling in the extremities. No change in bowel or bladder control.  MUSCULOSKELETAL:  No muscle, back pain, joint pain or stiffness.  HEMATOLOGIC:  No anemia, bleeding or bruising.  LYMPHATICS:  No enlarged nodes. No history of splenectomy.  PSYCHIATRIC:  No history of depression or anxiety.  ENDOCRINOLOGIC:  No reports of sweating, cold or heat intolerance. No polyuria or polydipsia.  ALLERGIES:  No history of asthma, hives, eczema or rhinitis. CONSTITUTIONAL:  No weight loss, fever, chills, weakness or fatigue.  HEENT:  Eyes:  No visual loss, blurred vision, double vision or yellow sclerae. Ears, Nose, Throat:  No hearing loss, sneezing, congestion, runny nose or sore throat.  SKIN:  No rash or itching.  CARDIOVASCULAR: + CP, No chest pressure or chest discomfort. No palpitations or edema.  RESPIRATORY:  No shortness of breath, cough or sputum.  GASTROINTESTINAL: +melena in ostomy, anorexia, nausea, vomiting, and abdominal pain   GENITOURINARY:  Denies hematuria, dysuria.   NEUROLOGICAL:  No headache, dizziness, syncope, paralysis, ataxia, numbness or tingling in the extremities. No change in bowel or bladder control.  MUSCULOSKELETAL:  No muscle, back pain, joint pain or stiffness.  LYMPHATICS:  No enlarged nodes. No history of splenectomy.  PSYCHIATRIC:  No history of depression or anxiety.  ENDOCRINOLOGIC:  No reports of sweating, cold or heat intolerance. No polyuria or polydipsia.  ALLERGIES:  No history of asthma, hives, eczema or rhinitis.

## 2019-10-15 NOTE — CHART NOTE - NSCHARTNOTEFT_GEN_A_CORE
Called Dr. Dilan Garcia (general surgeon) who performed most recent surgery on pt ([phone]) regarding possibly accepting the patient onto his service as per surgery here. He stated that he did not believe this issue had anything to do w/ the surgery that was performed 2 weeks ago or the abscess that was drained as a result of the surgery. He stated that he was not willing to take the patient and that he recommended GI to be involved.    Harris Hall  EM/IM  PGY-2

## 2019-10-15 NOTE — PROGRESS NOTE ADULT - PROBLEM SELECTOR PLAN 3
-Admits to left sided CP likely 2/2 symptomatic anemia; however, on physical exam CP is reproducible   -Trops 95 --> 93; elevated likely 2/2 type II NSTEMI (demand ischemia)  -EKG revealed sinus, tachycardic, RBBB (RBBB was also found on ekg 9/2019)  -CTA negative for PE

## 2019-10-15 NOTE — PROGRESS NOTE ADULT - SUBJECTIVE AND OBJECTIVE BOX
ISAURA AMAYA  59y Male  [id number]    Patient is a 59y old  Male who presents with a chief complaint of abdominal pain (15 Oct 2019 05:27)      INTERVAL HPI/OVERNIGHT EVENTS:      REVIEW OF SYSTEMS:  Review of Systems: CONSTITUTIONAL:  No weight loss, fever, chills, weakness or fatigue.  HEENT:  Eyes:  No visual loss, blurred vision, double vision or yellow sclerae. Ears, Nose, Throat:  No hearing loss, sneezing, congestion, runny nose or sore throat.  SKIN:  No rash or itching.  CARDIOVASCULAR: + CP, No chest pressure or chest discomfort. No palpitations or edema.  RESPIRATORY:  No shortness of breath, cough or sputum.  GASTROINTESTINAL: +melena in ostomy, anorexia, nausea, vomiting, and abdominal pain   GENITOURINARY:  Denies hematuria, dysuria.   NEUROLOGICAL:  No headache, dizziness, syncope, paralysis, ataxia, numbness or tingling in the extremities. No change in bowel or bladder control.  MUSCULOSKELETAL:  No muscle, back pain, joint pain or stiffness.  LYMPHATICS:  No enlarged nodes. No history of splenectomy.  PSYCHIATRIC:  No history of depression or anxiety.  ENDOCRINOLOGIC:  No reports of sweating, cold or heat intolerance. No polyuria or polydipsia. ALLERGIES:  No history of asthma, hives, eczema or rhinitis.	    darunavir 800 milliGRAM(s) Oral daily  epoetin marsha Injectable 6000 Unit(s) IV Push <User Schedule>  etravirine 200 milliGRAM(s) Oral two times a day after meals  pantoprazole Infusion 8 mG/Hr IV Continuous <Continuous>  raltegravir Tablet 400 milliGRAM(s) Oral two times a day  ritonavir Tablet 100 milliGRAM(s) Oral daily      Allergies:  ciprofloxacin (Rash)  Levaquin (Rash)      T(F): 98 (10-15-19 @ 09:14), Max: 99.9 (10-14-19 @ 11:33)  HR: 111 (10-15-19 @ 09:14) (102 - 120)  BP: 110/72 (10-15-19 @ 09:14) (89/50 - 149/49)  RR: 16 (10-15-19 @ 09:14) (16 - 20)  SpO2: 98% (10-15-19 @ 09:14) (95% - 98%)      PHYSICAL EXAM:  Physical Exam: PHYSICAL EXAM:  GENERAL: NAD, well-developed  HEAD:  Atraumatic, Normocephalic  EYES: EOMI, PERRLA, conjunctiva and sclera clear  NECK: Supple, No JVD  CHEST/LUNG: Tenderness to palpation lateral left to sternum, clear to auscultation bilaterally; No wheeze  HEART: S1 and S2, Regular rhythm, tachycardic; No murmurs, rubs, or gallops  ABDOMEN: Soft, tenderness to palpation of LUQ, Nondistended; hypoactive bowel sounds, +colostomy bag with minimal stool some dark  EXTREMITIES:  2+ Peripheral Pulses, No clubbing, cyanosis, or edema, left AV fistula in place   PSYCH: AAOx3  NEUROLOGY: non-focal SKIN: No rashes or lesions	      LABS:                        7.9    7.42  )-----------( 232      ( 15 Oct 2019 09:07 )             25.6     Hgb Trend: 7.9<--, 6.7<--, 6.1<--  10-14    137  |  90<L>  |  15  ----------------------------<  91  3.7   |  28  |  5.17<H>    Ca    9.1      14 Oct 2019 14:26    TPro  8.0  /  Alb  3.6  /  TBili  0.3  /  DBili  x   /  AST  20  /  ALT  5   /  AlkPhos  80  10-14    Creatinine Trend: 5.17<--, 10.80<--, 6.31<--, 9.00<--, 6.67<--, 9.68<--  PT/INR - ( 14 Oct 2019 14:26 )   PT: 13.7 SEC;   INR: 1.23          PTT - ( 14 Oct 2019 14:26 )  PTT:20.0 SEC      RADIOLOGY & ADDITIONAL TESTS:

## 2019-10-15 NOTE — PROGRESS NOTE ADULT - PROBLEM SELECTOR PLAN 9
Hold DVT prophylaxis in setting of bleed  NPO for possible procedure  -hold ASA in setting of bleed  -Needs medication reconciliation as med rec from previous hospitalization does not match med rec from dialysis center in chart    Dispo: clinical improvement

## 2019-10-15 NOTE — PROGRESS NOTE ADULT - PROBLEM SELECTOR PLAN 1
-P/w abdominal pain and melena in ostomy, + FOBT from ostomy bag likely 2/2 upper GI bleed; patient was hypotensive in the ED, MICU cslt x2. differential includes bleeding ulcer vs. post op complication - collection noted on CT - surgery consulted, rec transfer back to [place] where surgery was performed   -Contacted Dr. Dilan Garcia (surgeon at [facility] [phone]), he stated this is not a post-op complication, he believed this is a seroma, a normal reaction after hernia repair, he does not want the patient transferred. He said he is not currently at the hospital   -anemic to hgb 6.1 s/p 2 U pRBC  -monitor CBC q8h   -Maintain MAP >65, active type and screen, Transfuse for hgb <7 or for symptomatic anemia   -c/w PPI gtt  -GI consulted (via e-mail); appreciate recs. Will remain NPO for now

## 2019-10-15 NOTE — H&P ADULT - PROBLEM SELECTOR PLAN 7
-PMH HIV,  VL 1703, CD4 201 from 9/19/19  -Currently on darunavir 800 daily, etraviirine 200 q12h, raltegravir 400 q12h, and ritonavir 100 daily

## 2019-10-15 NOTE — H&P ADULT - HISTORY OF PRESENT ILLNESS
Patient is a 59 y.o M w/ PMH ESRD on HD (MWF), HIV on HAART, VL 1703, CD4 201), hepatitis C infection, HLD, and HTN who p/w abdominal pain.     Of note, recent hospitalized from 9/18-10/1 for perforated sigmoid diverticulitis s/p ex lap, MAYDA,SBR, sigmoid colon RCXN w/ creation of colostomy tube,drainage of intra-abdominal abscess. He was discharged on 10 days of zyvox BID, augmentin daily, and diflucan daily.     In the ED, vitals T max 99.4, -119, BP 90/66-99/65, RR 17-19, SPO2 95-97% on RA. Notable labs: hgb 6.1, lactate 2.2, FOBT from ostomy +. S/p 2 U pRBC, morphine 4 mg IV x2, zofran x1,  cc bolus IV x1. Patient is a 59 y.o M w/ PMH ESRD on HD (MWF), HIV on HAART, VL 1703, CD4 201), hepatitis C infection, HLD, and HTN who p/w 10/10 diffuse, abdominal pain. Of note, pt was recently hospitalized from 9/18-10/1 for perforated sigmoid diverticulitis s/p ex lap, MAYDA,SBR, sigmoid colon RCXN w/ creation of colostomy tube,drainage of intra-abdominal abscess. He states that his abdominal pain started shortly after he was discharged and also endorsed "black stools" coming from his ostomy daily. He reports associated left sided CP and N/V that began around the same time as well. He reports having NBNB emesis after every time he ate something and as a result decreased his PO intake. He did not notice any changes in the amount of output in his ostomy, denies fever/chills, or SOB.     In the ED, vitals T max 99.4, -119, BP 90/66-99/65, RR 17-19, SPO2 95-97% on RA. Notable labs: hgb 6.1, lactate 2.2, FOBT from ostomy +. S/p 2 U pRBC, morphine 4 mg IV x2, zofran x1,  cc bolus IV x1.

## 2019-10-15 NOTE — H&P ADULT - PROBLEM SELECTOR PLAN 4
PMH ESRD, HD via AV fistula PMH ESRD, HD via AV fistula M,W,F  -Last HD 10/14/19 at [place] PMH ESRD, HD via AV fistula M,W,F  -Last HD 10/14/19 at [place]  -Nephrology consulted; next HD is 10/16   -Electorlytes MEAGAN

## 2019-10-15 NOTE — H&P ADULT - PROBLEM SELECTOR PLAN 3
-Admits to left sided CP likely 2/2 symptomatic anemia; however, on physical exam CP is reproducible   -Trops 95 --> 93; elevated likely 2/2 type II NSTEMI (demand ischemia)  -EKG revealed sinus, tachycardic, -Admits to left sided CP likely 2/2 symptomatic anemia; however, on physical exam CP is reproducible   -Trops 95 --> 93; elevated likely 2/2 type II NSTEMI (demand ischemia)  -EKG revealed sinus, tachycardic, RBBB (RBBB was also found on ekg 9/2019)  -CTA negative for PE

## 2019-10-15 NOTE — H&P ADULT - PROBLEM SELECTOR PLAN 5
-on CT abdomen found to have splenic masses 6.2 x 4.9 cm; unclear   -last CT from -on CT abdomen w/ IV contrast found to have splenic masses 6.2 x 4.9 cm; unclear   -last CT w/o IV contrast from 9/18/19 showed a 5.7 cm splenic mass   -Differential includes -on CT abdomen w/ IV contrast found to have splenic masses 6.2 x 4.9 cm; unclear   -last CT w/o IV contrast from 9/18/19 showed a 5.7 cm splenic mass   -consider further work up? -on CT abdomen w/ IV contrast found to have splenic masses 6.2 x 4.9 cm; unclear   -last CT w/o IV contrast from 9/18/19 showed a 5.7 cm splenic mass   -consider further work up? GI recs appreciated

## 2019-10-15 NOTE — H&P ADULT - PROBLEM SELECTOR PLAN 2
-Found to have hgb 6.1 2/2 acute blood loss anemia 2/2 upper GI bleed  -baseline hgb ~9-10   -hgb corrected from 6.1 to 6.7 after 1 U pRBC, currently receiving another U pRBC  -f/u post transfusion CBC -Found to have hgb 6.1 2/2 acute blood loss anemia 2/2 upper GI bleed  -baseline hgb ~9-10   -hgb corrected from 6.1 to 6.7 after 1 U pRBC which is an inappropriate response,  currently receiving another U pRBC  -f/u post transfusion CBC -Found to have hgb 6.1 2/2 acute blood loss anemia 2/2 upper GI bleed  -baseline hgb ~9-10   -hgb corrected from 6.1 to 6.7 after 1 U pRBC which is an inappropriate response but may that blood was drawn when pt's blood has not equilibrated in body.   Currently receiving another U pRBC  -Patient is ESRD patient, please assess fluid status after receiving 2nd unit pRBC to ensure he is not fluid overloaded   -f/u post transfusion CBC -Found to have hgb 6.1 2/2 acute blood loss anemia 2/2 likely upper GI bleed  -baseline hgb ~9-10   -hgb corrected from 6.1 to 6.7 after 1 U pRBC which is an inappropriate response but may that blood was drawn when pt's blood has not equilibrated in body.   Currently receiving another U pRBC  -Patient is ESRD patient, please assess fluid status after receiving 2nd unit pRBC to ensure he is not fluid overloaded   -f/u post transfusion CBC

## 2019-10-16 LAB
ANION GAP SERPL CALC-SCNC: 14 MMO/L — SIGNIFICANT CHANGE UP (ref 7–14)
BUN SERPL-MCNC: 35 MG/DL — HIGH (ref 7–23)
CALCIUM SERPL-MCNC: 8.8 MG/DL — SIGNIFICANT CHANGE UP (ref 8.4–10.5)
CHLORIDE SERPL-SCNC: 94 MMOL/L — LOW (ref 98–107)
CK MB BLD-MCNC: 1.53 NG/ML — SIGNIFICANT CHANGE UP (ref 1–6.6)
CK SERPL-CCNC: 34 U/L — SIGNIFICANT CHANGE UP (ref 30–200)
CO2 SERPL-SCNC: 26 MMOL/L — SIGNIFICANT CHANGE UP (ref 22–31)
CREAT SERPL-MCNC: 8.5 MG/DL — HIGH (ref 0.5–1.3)
GLUCOSE SERPL-MCNC: 99 MG/DL — SIGNIFICANT CHANGE UP (ref 70–99)
HBA1C BLD-MCNC: 5.6 % — SIGNIFICANT CHANGE UP (ref 4–5.6)
HBV SURFACE AG SER-ACNC: NEGATIVE — SIGNIFICANT CHANGE UP
HCT VFR BLD CALC: 25.5 % — LOW (ref 39–50)
HGB BLD-MCNC: 7.7 G/DL — LOW (ref 13–17)
INR BLD: 1.16 — SIGNIFICANT CHANGE UP (ref 0.88–1.17)
MCHC RBC-ENTMCNC: 26.3 PG — LOW (ref 27–34)
MCHC RBC-ENTMCNC: 30.2 % — LOW (ref 32–36)
MCV RBC AUTO: 87 FL — SIGNIFICANT CHANGE UP (ref 80–100)
NRBC # FLD: 0 K/UL — SIGNIFICANT CHANGE UP (ref 0–0)
PLATELET # BLD AUTO: 195 K/UL — SIGNIFICANT CHANGE UP (ref 150–400)
PMV BLD: 10.3 FL — SIGNIFICANT CHANGE UP (ref 7–13)
POTASSIUM SERPL-MCNC: 4.2 MMOL/L — SIGNIFICANT CHANGE UP (ref 3.5–5.3)
POTASSIUM SERPL-SCNC: 4.2 MMOL/L — SIGNIFICANT CHANGE UP (ref 3.5–5.3)
PROTHROM AB SERPL-ACNC: 13.3 SEC — HIGH (ref 9.8–13.1)
RBC # BLD: 2.93 M/UL — LOW (ref 4.2–5.8)
RBC # FLD: 17.8 % — HIGH (ref 10.3–14.5)
SODIUM SERPL-SCNC: 134 MMOL/L — LOW (ref 135–145)
TROPONIN T, HIGH SENSITIVITY: 109 NG/L — CRITICAL HIGH (ref ?–14)
WBC # BLD: 6.27 K/UL — SIGNIFICANT CHANGE UP (ref 3.8–10.5)
WBC # FLD AUTO: 6.27 K/UL — SIGNIFICANT CHANGE UP (ref 3.8–10.5)

## 2019-10-16 PROCEDURE — 99232 SBSQ HOSP IP/OBS MODERATE 35: CPT | Mod: GC

## 2019-10-16 PROCEDURE — 99233 SBSQ HOSP IP/OBS HIGH 50: CPT

## 2019-10-16 PROCEDURE — 99232 SBSQ HOSP IP/OBS MODERATE 35: CPT

## 2019-10-16 RX ADMIN — RALTEGRAVIR 400 MILLIGRAM(S): 400 TABLET, FILM COATED ORAL at 05:35

## 2019-10-16 RX ADMIN — PANTOPRAZOLE SODIUM 10 MG/HR: 20 TABLET, DELAYED RELEASE ORAL at 02:01

## 2019-10-16 RX ADMIN — HYDROMORPHONE HYDROCHLORIDE 0.4 MILLIGRAM(S): 2 INJECTION INTRAMUSCULAR; INTRAVENOUS; SUBCUTANEOUS at 17:13

## 2019-10-16 RX ADMIN — HYDROMORPHONE HYDROCHLORIDE 0.4 MILLIGRAM(S): 2 INJECTION INTRAMUSCULAR; INTRAVENOUS; SUBCUTANEOUS at 08:48

## 2019-10-16 RX ADMIN — PANTOPRAZOLE SODIUM 10 MG/HR: 20 TABLET, DELAYED RELEASE ORAL at 14:20

## 2019-10-16 RX ADMIN — HYDROMORPHONE HYDROCHLORIDE 0.4 MILLIGRAM(S): 2 INJECTION INTRAMUSCULAR; INTRAVENOUS; SUBCUTANEOUS at 17:35

## 2019-10-16 RX ADMIN — ERYTHROPOIETIN 6000 UNIT(S): 10000 INJECTION, SOLUTION INTRAVENOUS; SUBCUTANEOUS at 08:33

## 2019-10-16 RX ADMIN — ETRAVIRINE 200 MILLIGRAM(S): 200 TABLET ORAL at 17:17

## 2019-10-16 RX ADMIN — HYDROMORPHONE HYDROCHLORIDE 0.4 MILLIGRAM(S): 2 INJECTION INTRAMUSCULAR; INTRAVENOUS; SUBCUTANEOUS at 09:15

## 2019-10-16 RX ADMIN — RALTEGRAVIR 400 MILLIGRAM(S): 400 TABLET, FILM COATED ORAL at 17:17

## 2019-10-16 RX ADMIN — DARUNAVIR 800 MILLIGRAM(S): 75 TABLET, FILM COATED ORAL at 17:17

## 2019-10-16 RX ADMIN — RITONAVIR 100 MILLIGRAM(S): 100 TABLET, FILM COATED ORAL at 17:18

## 2019-10-16 NOTE — PROGRESS NOTE ADULT - PROBLEM SELECTOR PLAN 1
-P/w abdominal pain and melena in ostomy, + FOBT from ostomy bag likely 2/2 upper GI bleed; patient was hypotensive in the ED, MICU cslt x2. differential includes bleeding ulcer vs. post op complication - collection noted on CT - surgery consulted, rec transfer back to [place] where surgery was performed   -Contacted Dr. Dilan Garcia (surgeon at [facility] [phone]), he stated this is not a post-op complication, he believed this is a seroma, a normal reaction after hernia repair, he does not want the patient transferred. He said he is not currently at the hospital   -anemic to hgb 6.1 s/p 2 U pRBC  -monitor CBC q8h   -Maintain MAP >65, active type and screen, Transfuse for hgb <7 or for symptomatic anemia   -c/w PPI gtt  -appreciate GI recs: EGD 10/16

## 2019-10-16 NOTE — PROGRESS NOTE ADULT - ASSESSMENT
ASSESSMENT: Patient is a 59M with recent Dee's, SBR, and incisional hernia repair with mesh. Subcutaneous collections could be seromas vs small abscesses, but there are no skin changes or areas of fluctuance and patient is afebrile or tender in those areas, now with upper GI bleed and CP.    PLAN:    - If patient requires any surgical intervention, would recommend transfer back to original hospital with original surgeon.  - Recommend EGD with GI to evaluate for upper GI bleed, pending cardiology clearance.   - Trend H/H and transfuse as needed.     Derrick RASHID-Team surgery  #[id number]

## 2019-10-16 NOTE — CONSULT NOTE ADULT - CONSULT REASON
Risk stratification
ESRD on HD
GI bleed
GI bleed in ostomy, abdominal pain after prior surgery
subcutaneous collections along length of incision. concern for upper GI bleed.

## 2019-10-16 NOTE — PROGRESS NOTE ADULT - SUBJECTIVE AND OBJECTIVE BOX
[facility] Division of Kidney Diseases & Hypertension  FOLLOW UP NOTE  --------------------------------------------------------------------------------  Chief Complaint:    24 hour events/subjective:    Dialyzed today   feels ok with no complaints         PAST HISTORY  --------------------------------------------------------------------------------  No significant changes to PMH, PSH, FHx, SHx, unless otherwise noted    ALLERGIES & MEDICATIONS  --------------------------------------------------------------------------------  Allergies    ciprofloxacin (Rash)  Levaquin (Rash)    Intolerances      Standing Inpatient Medications  darunavir 800 milliGRAM(s) Oral daily  epoetin marsha Injectable 6000 Unit(s) IV Push <User Schedule>  etravirine 200 milliGRAM(s) Oral two times a day after meals  pantoprazole Infusion 8 mG/Hr IV Continuous <Continuous>  raltegravir Tablet 400 milliGRAM(s) Oral two times a day  ritonavir Tablet 100 milliGRAM(s) Oral daily    PRN Inpatient Medications  HYDROmorphone  Injectable 0.4 milliGRAM(s) IV Push every 6 hours PRN      REVIEW OF SYSTEMS  --------------------------------------------------------------------------------  Gen: No weight changes, fatigue, fevers/chills, weakness  Skin: No rashes  Head/Eyes/Ears/Mouth: No headache; Normal hearing; Normal vision w/o blurriness; No sinus pain/discomfort, sore throat  Respiratory: No dyspnea, cough, wheezing, hemoptysis  CV: No chest pain, PND, orthopnea  GI: No abdominal pain, diarrhea, constipation, nausea, vomiting, melena, hematochezia  : No increased frequency, dysuria, hematuria, nocturia  MSK: No joint pain/swelling; no back pain; no edema  Neuro: No dizziness/lightheadedness, weakness, seizures, numbness, tingling  Heme: No easy bruising or bleeding  Endo: No heat/cold intolerance  Psych: No significant nervousness, anxiety, stress, depression    All other systems were reviewed and are negative, except as noted.    VITALS/PHYSICAL EXAM  --------------------------------------------------------------------------------  T(C): 36.2 (10-16-19 @ 14:10), Max: 37.4 (10-15-19 @ 19:45)  HR: 107 (10-16-19 @ 14:10) (94 - 110)  BP: 121/82 (10-16-19 @ 14:10) (101/59 - 158/77)  RR: 16 (10-16-19 @ 14:10) (16 - 18)  SpO2: 100% (10-16-19 @ 14:10) (93% - 100%)  Wt(kg): --  Height (cm): 167.6 (10-15-19 @ 18:26)  Weight (kg): 104.7 (10-15-19 @ 18:26)  BMI (kg/m2): 37.3 (10-15-19 @ 18:26)  BSA (m2): 2.13 (10-15-19 @ 18:26)      10-16-19 @ 07:01  -  10-16-19 @ 14:37  --------------------------------------------------------  IN: 400 mL / OUT: 2000 mL / NET: -1600 mL      Physical Exam:  	Gen: NAD, well-appearing  	HEENT: supple neck, clear oropharynx  	Pulm: CTA B/L  	CV: RRR, S1S2; no rub  	Back: No spinal or CVA tenderness; no sacral edema  	Abd: +BS, soft, nontender/nondistended  	: No suprapubic tenderness  	UE: Warm, no edema; no asterixis  	LE: Warm, no clubbing, intact strength; no edema  	    LABS/STUDIES  --------------------------------------------------------------------------------              7.7    6.27  >-----------<  195      [10-16-19 @ 04:20]              25.5     134  |  94  |  35  ----------------------------<  99      [10-16-19 @ 04:20]  4.2   |  26  |  8.50        Ca     8.8     [10-16-19 @ 04:20]      PT/INR: PT 13.3 , INR 1.16       [10-16-19 @ 04:20]    CK 34      [10-16-19 @ 00:15]    Creatinine Trend:  SCr 8.50 [10-16 @ 04:20]  SCr 5.17 [10-14 @ 14:26]  SCr 10.80 [09-30 @ 10:29]  SCr 6.31 [09-28 @ 07:23]  SCr 9.00 [09-27 @ 10:29]        HbA1c 5.6      [10-16-19 @ 04:20]    HBsAg NEGATIVE      [10-16-19 @ 06:45]

## 2019-10-16 NOTE — CONSULT NOTE ADULT - ASSESSMENT
59 yo M with known history of HTN, ESRD (M,W,F), HIV, hepatitis C, drug abuse (patient states he quit cocaine 20 years prior) who presents with recurrent GI bleed, cardiology requested for pre-procedure risk stratification for planned endoscopy.    #Risk stratification  - Patient with recent LHC 4/2019 with normal coronaries  - Troponin elevated, CKMB negative, likely 2/2 ESRD, low suspicion for ACS  - Patient at low risk for low risk procedure, no additional cardiac workup necessary    Please await cardiology attending attestation.    Isabel Dyer MD  Cardiology Fellow  [phone]  All Cardiology service information can be found 24/7 on [email or web address], password: [email or web address] 59 yo M with known history of HTN, ESRD (M,W,F), HIV, hepatitis C, drug abuse (patient states he quit cocaine 20 years prior) who presents with recurrent GI bleed, cardiology requested for pre-procedure risk stratification for planned endoscopy.    #Risk stratification  #Troponinemia  - Patient with recent LHC 4/2019 with normal coronaries  - Patient describing atypical chest pain, troponin elevated 113->109, CKMB negative, likely 2/2 ESRD, low suspicion for ACS  - Unable to determine METS secondary to baseline decreased exercise tolerance, RCRI 1 (ESRD), patient at low risk for low risk procedure, no additional cardiac workup necessary    #HTN  - Patient currently hypotensive in setting of GI bleed, agree with holding home antihypertensives at this time    #Mild aortic stenosis  - Seen on 9/2019 TTE  - Patient asymptomatic, continue to monitor clinically at this time    Please await cardiology attending attestation.    Isabel Dyer MD  Cardiology Fellow  [phone]  All Cardiology service information can be found 24/7 on [email or web address], password: [email or web address]

## 2019-10-16 NOTE — PROGRESS NOTE ADULT - SUBJECTIVE AND OBJECTIVE BOX
SUBJECTIVE:  Patient had chest pain last night and was tachycardic.  Troponins were elevated to 113, which were slightly higher than his previous troponin level of 93.  He is currently still having chest pain and is getting HD.    Vital Signs Last 24 Hrs  T(C): 36.7 (15 Oct 2019 09:14), Max: 37.4 (15 Oct 2019 05:02)  T(F): 98 (15 Oct 2019 09:14), Max: 99.4 (15 Oct 2019 05:02)  HR: 111 (15 Oct 2019 09:14) (104 - 120)  BP: 110/72 (15 Oct 2019 09:14) (89/50 - 149/49)  BP(mean): --  RR: 16 (15 Oct 2019 09:14) (16 - 20)  SpO2: 98% (15 Oct 2019 09:14) (95% - 98%)    I&O's Detail      Physical Exam:  General Appearance: Communicating appropriately.  Respiratory: No acute resp distress, no accessory muscle use  Thorax: Chest pain is reproducible with palpation.  Abdomen: Soft, tender in the epigastric area. Midline incision with no signs of erythema or drainage of pus.  Colostomy in place with melanotic stool in bag.    Extremities: Grossly symmetric    LABS:                        7.9    7.42  )-----------( 232      ( 15 Oct 2019 09:07 )             25.6     10-14    137  |  90<L>  |  15  ----------------------------<  91  3.7   |  28  |  5.17<H>    Ca    9.1      14 Oct 2019 14:26    TPro  8.0  /  Alb  3.6  /  TBili  0.3  /  DBili  x   /  AST  20  /  ALT  5   /  AlkPhos  80  10-14    PT/INR - ( 14 Oct 2019 14:26 )   PT: 13.7 SEC;   INR: 1.23          PTT - ( 14 Oct 2019 14:26 )  PTT:20.0 SEC      RADIOLOGY & ADDITIONAL STUDIES:

## 2019-10-16 NOTE — PROVIDER CONTACT NOTE (CRITICAL VALUE NOTIFICATION) - RECOMMENDATIONS
IVP pain medications administered. VS taken, see flowsheet. EKG completed, see pt chart. MD at the bedside to evaluated.

## 2019-10-16 NOTE — PROGRESS NOTE ADULT - SUBJECTIVE AND OBJECTIVE BOX
ISAURA AMAYA  59y Male  [id number]    Patient is a 59y old  Male who presents with a chief complaint of abdominal pain (16 Oct 2019 08:27)      INTERVAL HPI/OVERNIGHT EVENTS: complaint of cp, 2nd repeat EKG unchanged, Trop 109 down trended to 103. Pain in the epigastrium, black tarry stool in the ostomy bag. patient request to be full out for the EGD     Review of Systems: CONSTITUTIONAL:  No weight loss, fever, chills, weakness or fatigue.  	HEENT:  Eyes:  No visual loss, blurred vision, double vision or yellow sclerae. Ears, Nose, Throat:  No hearing loss, sneezing, congestion, runny nose or sore throat.  	SKIN:  No rash or itching.  	CARDIOVASCULAR: + CP, No chest pressure or chest discomfort. No palpitations or edema.  	RESPIRATORY:  No shortness of breath, cough or sputum.  	GASTROINTESTINAL: +melena in ostomy, anorexia, nausea, vomiting, and abdominal pain   	GENITOURINARY:  Denies hematuria, dysuria.   	NEUROLOGICAL:  No headache, dizziness, syncope, paralysis, ataxia, numbness or tingling in the extremities. No change in bowel or bladder control.  	MUSCULOSKELETAL:  No muscle, back pain, joint pain or stiffness.  	LYMPHATICS:  No enlarged nodes. No history of splenectomy.  	PSYCHIATRIC:  No history of depression or anxiety.  	ENDOCRINOLOGIC:  No reports of sweating, cold or heat intolerance. No polyuria or polydipsia.  ALLERGIES:  No history of asthma, hives, eczema or rhinitis.    darunavir 800 milliGRAM(s) Oral daily  epoetin marsha Injectable 6000 Unit(s) IV Push <User Schedule>  etravirine 200 milliGRAM(s) Oral two times a day after meals  HYDROmorphone  Injectable 0.4 milliGRAM(s) IV Push every 6 hours PRN  pantoprazole Infusion 8 mG/Hr IV Continuous <Continuous>  raltegravir Tablet 400 milliGRAM(s) Oral two times a day  ritonavir Tablet 100 milliGRAM(s) Oral daily      Allergies:  ciprofloxacin (Rash)  Levaquin (Rash)      T(F): 97.6 (10-16-19 @ 12:36), Max: 99.3 (10-15-19 @ 19:45)  HR: 104 (10-16-19 @ 12:36) (94 - 110)  BP: 129/77 (10-16-19 @ 12:36) (101/59 - 158/77)  RR: 18 (10-16-19 @ 12:36) (16 - 18)  SpO2: 100% (10-16-19 @ 12:36) (93% - 100%)    10-16-19 @ 07:01  -  10-16-19 @ 13:50  --------------------------------------------------------  IN: 400 mL / OUT: 2000 mL / NET: -1600 mL      Physical Exam: PHYSICAL EXAM:  	GENERAL: NAD, well-developed  	HEAD:  Atraumatic, Normocephalic  	EYES: EOMI, PERRLA, conjunctiva and sclera clear  	NECK: Supple, No JVD  	CHEST/LUNG: Tenderness to palpation lateral left to sternum, clear to auscultation bilaterally; No wheeze  	HEART: S1 and S2, Regular rhythm, tachycardic; No murmurs, rubs, or gallops  	ABDOMEN: Soft, tenderness to palpation in the epigastrium, nondistended, no rebound; hypoactive bowel sounds, +colostomy bag with black tarry stool.   	EXTREMITIES:  2+ Peripheral Pulses, No clubbing, cyanosis, or edema, left AV fistula in place   	PSYCH: AAOx3  	NEUROLOGY: non-focal  SKIN: No rashes or lesions    LABS:                        7.7    6.27  )-----------( 195      ( 16 Oct 2019 04:20 )             25.5     Hgb Trend: 7.7<--, 7.6<--, 7.9<--, 6.7<--, 6.1<--  10-16    134<L>  |  94<L>  |  35<H>  ----------------------------<  99  4.2   |  26  |  8.50<H>    Ca    8.8      16 Oct 2019 04:20    TPro  8.0  /  Alb  3.6  /  TBili  0.3  /  DBili  x   /  AST  20  /  ALT  5   /  AlkPhos  80  10-14    Creatinine Trend: 8.50<--, 5.17<--, 10.80<--, 6.31<--, 9.00<--, 6.67<--  PT/INR - ( 16 Oct 2019 04:20 )   PT: 13.3 SEC;   INR: 1.16          PTT - ( 14 Oct 2019 14:26 )  PTT:20.0 SEC      RADIOLOGY & ADDITIONAL TESTS:

## 2019-10-16 NOTE — CHART NOTE - NSCHARTNOTEFT_GEN_A_CORE
Patient complaining of chest pain prior to EGD. He reports this is his chronic chest pain.  Patient has hx of CAD s/p stents. Troponins elevated during admission  Hb stable at this time. Vitals stable  Please obtain cardiology clearance prior to procedure  GI will continue to follow

## 2019-10-16 NOTE — PROGRESS NOTE ADULT - ASSESSMENT
Patient is a 59 y.o M w/ PMH ESRD on HD (MWF), HIV on HAART, VL 1703, CD4 201), hepatitis C infection, HLD, HTN, and recent hospitalization from 9/18-10/1 for perforated sigmoid diverticulitis s/p ex lap, MAYDA, SBR, sigmoi colon resection with colostomy creation c/b intra-abdominal abscess s/p drainage who p/w 10/10 diffuse, abdominal pain, found to be anemic to hgb 6.1 in setting of melena in ostomy, concerning for upper GI bleed vs surgical complication. Patient is a 59 y.o M w/ PMH ESRD on HD (MWF), HIV on HAART, VL 1703, CD4 201), hepatitis C infection, HLD, HTN, and recent hospitalization from 9/18-10/1 for perforated sigmoid diverticulitis s/p ex lap, MAYDA, SBR, sigmoi colon resection with colostomy creation c/b intra-abdominal abscess s/p drainage who p/w 10/10 diffuse, abdominal pain, found to be anemic to hgb 6.1 in setting of melena in ostomy, concerning for upper GI bleed vs surgical complication.     Spoke with cardiology fellow, doesn't think patient requires a card clearance. Patient recently underwent a abdominal surgery, no EKG changes, recent Angio in April with no occlusion. TTE with mild AS.  RCRI risk of 1 for ESRD. unable to assess METs given patient had recent surgery and has abdominal pain. Patient low risk for a low risk procedure.

## 2019-10-16 NOTE — PROGRESS NOTE ADULT - PROBLEM SELECTOR PLAN 3
-Admits to left sided CP likely 2/2 symptomatic anemia; however, on physical exam CP is reproducible   -Trops 109-103; elevated likely 2/2 type II NSTEMI (demand ischemia)  -EKG revealed sinus, tachycardic, RBBB (RBBB was also found on ekg 9/2019)  -CTA negative for PE

## 2019-10-16 NOTE — PROGRESS NOTE ADULT - PROBLEM SELECTOR PLAN 6
-Reported hx of T2DM, med rec reveals patient is not on anti -diabetic medication  -f/u Hgb A1c  -NPO for now for upper endoscopy; FS q6h while NPO  -Once initiate diet, switch to FS before meals and at bedtime, lss

## 2019-10-16 NOTE — PROGRESS NOTE ADULT - PROBLEM SELECTOR PLAN 2
-Found to have hgb 6.1 2/2 acute blood loss anemia 2/2 likely upper GI bleed  -baseline hgb ~9-10   -hgb corrected from 6.1 to 7.9 after 2 U pRBC, down trending, will transfuse 1u 10/16   -appreciate GI cslt: as above
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
## 2019-10-17 LAB
ANION GAP SERPL CALC-SCNC: 18 MMO/L — HIGH (ref 7–14)
BLD GP AB SCN SERPL QL: NEGATIVE — SIGNIFICANT CHANGE UP
BUN SERPL-MCNC: 19 MG/DL — SIGNIFICANT CHANGE UP (ref 7–23)
CALCIUM SERPL-MCNC: 9.3 MG/DL — SIGNIFICANT CHANGE UP (ref 8.4–10.5)
CHLORIDE SERPL-SCNC: 95 MMOL/L — LOW (ref 98–107)
CO2 SERPL-SCNC: 24 MMOL/L — SIGNIFICANT CHANGE UP (ref 22–31)
CREAT SERPL-MCNC: 6.09 MG/DL — HIGH (ref 0.5–1.3)
GLUCOSE SERPL-MCNC: 109 MG/DL — HIGH (ref 70–99)
HCT VFR BLD CALC: 30.3 % — LOW (ref 39–50)
HGB BLD-MCNC: 9.4 G/DL — LOW (ref 13–17)
MCHC RBC-ENTMCNC: 26.7 PG — LOW (ref 27–34)
MCHC RBC-ENTMCNC: 31 % — LOW (ref 32–36)
MCV RBC AUTO: 86.1 FL — SIGNIFICANT CHANGE UP (ref 80–100)
NRBC # FLD: 0.04 K/UL — SIGNIFICANT CHANGE UP (ref 0–0)
PLATELET # BLD AUTO: 204 K/UL — SIGNIFICANT CHANGE UP (ref 150–400)
PMV BLD: 10.2 FL — SIGNIFICANT CHANGE UP (ref 7–13)
POTASSIUM SERPL-MCNC: 3.8 MMOL/L — SIGNIFICANT CHANGE UP (ref 3.5–5.3)
POTASSIUM SERPL-SCNC: 3.8 MMOL/L — SIGNIFICANT CHANGE UP (ref 3.5–5.3)
RBC # BLD: 3.52 M/UL — LOW (ref 4.2–5.8)
RBC # FLD: 17.3 % — HIGH (ref 10.3–14.5)
RH IG SCN BLD-IMP: POSITIVE — SIGNIFICANT CHANGE UP
SODIUM SERPL-SCNC: 137 MMOL/L — SIGNIFICANT CHANGE UP (ref 135–145)
WBC # BLD: 8.53 K/UL — SIGNIFICANT CHANGE UP (ref 3.8–10.5)
WBC # FLD AUTO: 8.53 K/UL — SIGNIFICANT CHANGE UP (ref 3.8–10.5)

## 2019-10-17 PROCEDURE — 99233 SBSQ HOSP IP/OBS HIGH 50: CPT

## 2019-10-17 PROCEDURE — 99222 1ST HOSP IP/OBS MODERATE 55: CPT

## 2019-10-17 PROCEDURE — 99232 SBSQ HOSP IP/OBS MODERATE 35: CPT | Mod: GC

## 2019-10-17 RX ORDER — HYDROMORPHONE HYDROCHLORIDE 2 MG/ML
0.4 INJECTION INTRAMUSCULAR; INTRAVENOUS; SUBCUTANEOUS EVERY 6 HOURS
Refills: 0 | Status: DISCONTINUED | OUTPATIENT
Start: 2019-10-17 | End: 2019-10-23

## 2019-10-17 RX ORDER — SIMETHICONE 80 MG/1
80 TABLET, CHEWABLE ORAL ONCE
Refills: 0 | Status: COMPLETED | OUTPATIENT
Start: 2019-10-17 | End: 2019-10-17

## 2019-10-17 RX ORDER — ACETAMINOPHEN 500 MG
650 TABLET ORAL ONCE
Refills: 0 | Status: DISCONTINUED | OUTPATIENT
Start: 2019-10-17 | End: 2019-10-23

## 2019-10-17 RX ADMIN — RALTEGRAVIR 400 MILLIGRAM(S): 400 TABLET, FILM COATED ORAL at 17:21

## 2019-10-17 RX ADMIN — HYDROMORPHONE HYDROCHLORIDE 0.4 MILLIGRAM(S): 2 INJECTION INTRAMUSCULAR; INTRAVENOUS; SUBCUTANEOUS at 05:59

## 2019-10-17 RX ADMIN — PANTOPRAZOLE SODIUM 10 MG/HR: 20 TABLET, DELAYED RELEASE ORAL at 10:14

## 2019-10-17 RX ADMIN — ETRAVIRINE 200 MILLIGRAM(S): 200 TABLET ORAL at 17:21

## 2019-10-17 RX ADMIN — HYDROMORPHONE HYDROCHLORIDE 0.4 MILLIGRAM(S): 2 INJECTION INTRAMUSCULAR; INTRAVENOUS; SUBCUTANEOUS at 17:35

## 2019-10-17 RX ADMIN — RALTEGRAVIR 400 MILLIGRAM(S): 400 TABLET, FILM COATED ORAL at 06:01

## 2019-10-17 RX ADMIN — DARUNAVIR 800 MILLIGRAM(S): 75 TABLET, FILM COATED ORAL at 10:13

## 2019-10-17 RX ADMIN — HYDROMORPHONE HYDROCHLORIDE 0.4 MILLIGRAM(S): 2 INJECTION INTRAMUSCULAR; INTRAVENOUS; SUBCUTANEOUS at 17:20

## 2019-10-17 RX ADMIN — ETRAVIRINE 200 MILLIGRAM(S): 200 TABLET ORAL at 10:12

## 2019-10-17 RX ADMIN — HYDROMORPHONE HYDROCHLORIDE 0.4 MILLIGRAM(S): 2 INJECTION INTRAMUSCULAR; INTRAVENOUS; SUBCUTANEOUS at 06:14

## 2019-10-17 RX ADMIN — SIMETHICONE 80 MILLIGRAM(S): 80 TABLET, CHEWABLE ORAL at 06:01

## 2019-10-17 RX ADMIN — RITONAVIR 100 MILLIGRAM(S): 100 TABLET, FILM COATED ORAL at 10:13
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
## 2019-10-17 NOTE — PROGRESS NOTE ADULT - PROBLEM SELECTOR PLAN 3
-Admits to left sided CP likely 2/2 symptomatic anemia; however, on physical exam CP is reproducible   -Trops 109-103; elevated likely 2/2 type II NSTEMI (demand ischemia)  -EKG revealed sinus, tachycardic, RBBB (RBBB was also found on ekg 9/2019)  -patient continue to complaint of intermittent CP, will transfuse to maintain hgb goal >8 to see if symptoms improves   -CTA negative for PE

## 2019-10-17 NOTE — PROGRESS NOTE ADULT - ASSESSMENT
Patient is a 59 y.o M w/ PMH ESRD on HD (MWF), HIV on HAART, VL 1703, CD4 201), hepatitis C infection, HLD, HTN, and recent hospitalization from 9/18-10/1 for perforated sigmoid diverticulitis s/p ex lap, MAYDA, SBR, sigmoi colon resection with colostomy creation c/b intra-abdominal abscess s/p drainage who p/w 10/10 diffuse, abdominal pain, found to be anemic to hgb 6.1 in setting of melena in ostomy, concerning for upper GI bleed vs surgical complication.     Spoke with cardiology fellow, doesn't think patient requires a card clearance. Patient recently underwent a abdominal surgery, no EKG changes, recent Angio in April with no occlusion. TTE with mild AS.  RCRI risk of 1 for ESRD. unable to assess METs given patient had recent surgery and has abdominal pain. Patient low risk for a low risk procedure.

## 2019-10-17 NOTE — PROGRESS NOTE ADULT - PROBLEM SELECTOR PLAN 2
-Found to have hgb 6.1 2/2 acute blood loss anemia 2/2 likely upper GI bleed  -baseline hgb ~9-10   -hgb corrected from 6.1 to 7.9 after 2 U pRBC, down trending, s/p transfusion 1u 10/16, will maintain a hgb goal of >8   -appreciate GI cslt: as above

## 2019-10-17 NOTE — PROGRESS NOTE ADULT - SUBJECTIVE AND OBJECTIVE BOX
ISAURA AMAYA  59y Male  [id number]    Patient is a 59y old  Male who presents with a chief complaint of abdominal pain (16 Oct 2019 08:27)      INTERVAL HPI/OVERNIGHT EVENTS: EGD was cancelled yesterday because anesthesiology request an official card clearance.   Patient really hard stick per nursing   endorsed no CP/SOB today, still have tarry stool       Review of Systems: CONSTITUTIONAL:  No weight loss, fever, chills, weakness or fatigue.  	HEENT:  Eyes:  No visual loss, blurred vision, double vision or yellow sclerae. Ears, Nose, Throat:  No hearing loss, sneezing, congestion, runny nose or sore throat.  	SKIN:  No rash or itching.  	CARDIOVASCULAR: + CP, No chest pressure or chest discomfort. No palpitations or edema.  	RESPIRATORY:  No shortness of breath, cough or sputum.  	GASTROINTESTINAL: +melena in ostomy, anorexia, nausea, vomiting, and abdominal pain   	GENITOURINARY:  Denies hematuria, dysuria.   	NEUROLOGICAL:  No headache, dizziness, syncope, paralysis, ataxia, numbness or tingling in the extremities. No change in bowel or bladder control.  	MUSCULOSKELETAL:  No muscle, back pain, joint pain or stiffness.  	LYMPHATICS:  No enlarged nodes. No history of splenectomy.  	PSYCHIATRIC:  No history of depression or anxiety.  	ENDOCRINOLOGIC:  No reports of sweating, cold or heat intolerance. No polyuria or polydipsia.  ALLERGIES:  No history of asthma, hives, eczema or rhinitis.    darunavir 800 milliGRAM(s) Oral daily  epoetin marsha Injectable 6000 Unit(s) IV Push <User Schedule>  etravirine 200 milliGRAM(s) Oral two times a day after meals  HYDROmorphone  Injectable 0.4 milliGRAM(s) IV Push every 6 hours PRN  pantoprazole Infusion 8 mG/Hr IV Continuous <Continuous>  raltegravir Tablet 400 milliGRAM(s) Oral two times a day  ritonavir Tablet 100 milliGRAM(s) Oral daily      Allergies:  ciprofloxacin (Rash)  Levaquin (Rash)    Vital Signs Last 24 Hrs  T(C): 36.7 (17 Oct 2019 05:30), Max: 36.9 (16 Oct 2019 21:30)  T(F): 98 (17 Oct 2019 05:30), Max: 98.4 (16 Oct 2019 21:30)  HR: 107 (17 Oct 2019 06:14) (93 - 109)  BP: 138/79 (17 Oct 2019 06:14) (114/76 - 142/89)  BP(mean): --  RR: 15 (17 Oct 2019 06:14) (15 - 18)  SpO2: 98% (17 Oct 2019 06:14) (98% - 100%)  Daily     Daily Weight in k.5 (16 Oct 2019 10:10)  I&O's Detail    16 Oct 2019 07:01  -  17 Oct 2019 07:00  --------------------------------------------------------  IN:    Other: 400 mL  Total IN: 400 mL    OUT:    Other: 2000 mL  Total OUT: 2000 mL    Total NET: -1600 mL          Physical Exam: PHYSICAL EXAM:  	GENERAL: NAD, well-developed  	HEAD:  Atraumatic, Normocephalic  	EYES: EOMI, PERRLA, conjunctiva and sclera clear  	NECK: Supple, No JVD  	CHEST/LUNG: Tenderness to palpation lateral left to sternum, clear to auscultation bilaterally; No wheeze  	HEART: S1 and S2, Regular rhythm, tachycardic; No murmurs, rubs, or gallops  	ABDOMEN: Soft, tenderness to palpation in the epigastrium, nondistended, no rebound; hypoactive bowel sounds, +colostomy bag with black tarry stool.   	EXTREMITIES:  2+ Peripheral Pulses, No clubbing, cyanosis, or edema, left AV fistula in place   	PSYCH: AAOx3  	NEUROLOGY: non-focal  SKIN: No rashes or lesions    LABS:                        7.7    6.27  )-----------( 195      ( 16 Oct 2019 04:20 )             25.5     Hgb Trend: 7.7<--, 7.6<--, 7.9<--, 6.7<--, 6.1<--  10-16    134<L>  |  94<L>  |  35<H>  ----------------------------<  99  4.2   |  26  |  8.50<H>    Ca    8.8      16 Oct 2019 04:20    TPro  8.0  /  Alb  3.6  /  TBili  0.3  /  DBili  x   /  AST  20  /  ALT  5   /  AlkPhos  80  10-14    Creatinine Trend: 8.50<--, 5.17<--, 10.80<--, 6.31<--, 9.00<--, 6.67<--  PT/INR - ( 16 Oct 2019 04:20 )   PT: 13.3 SEC;   INR: 1.16          PTT - ( 14 Oct 2019 14:26 )  PTT:20.0 SEC      RADIOLOGY & ADDITIONAL TESTS:
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
## 2019-10-17 NOTE — PROGRESS NOTE ADULT - PROBLEM SELECTOR PLAN 1
-P/w abdominal pain and melena in ostomy, + FOBT from ostomy bag likely 2/2 upper GI bleed; patient was hypotensive in the ED, MICU cslt x2. differential includes bleeding ulcer vs. post op complication - collection noted on CT - surgery consulted, rec transfer back to [place] where surgery was performed   -Contacted Dr. Dilan Garcia (surgeon at [facility] [phone]), he stated this is not a post-op complication, he believed this is a seroma, a normal reaction after hernia repair, he does not want the patient transferred. He said he is not currently at the hospital   -anemic to hgb 6.1 s/p 2 U pRBC  -monitor CBC q24h  -Maintain MAP >65, active type and screen, Transfuse for hgb <7 or for symptomatic anemia   -switch to PPI oral   -appreciate GI recs: EGD

## 2019-10-18 ENCOUNTER — TRANSCRIPTION ENCOUNTER (OUTPATIENT)
Age: 59
End: 2019-10-18

## 2019-10-18 LAB
ALBUMIN SERPL ELPH-MCNC: 3.4 G/DL — SIGNIFICANT CHANGE UP (ref 3.3–5)
ALP SERPL-CCNC: 83 U/L — SIGNIFICANT CHANGE UP (ref 40–120)
ALT FLD-CCNC: 10 U/L — SIGNIFICANT CHANGE UP (ref 4–41)
ANION GAP SERPL CALC-SCNC: 12 MMO/L — SIGNIFICANT CHANGE UP (ref 7–14)
ANION GAP SERPL CALC-SCNC: 13 MMO/L — SIGNIFICANT CHANGE UP (ref 7–14)
AST SERPL-CCNC: 14 U/L — SIGNIFICANT CHANGE UP (ref 4–40)
BILIRUB SERPL-MCNC: 0.4 MG/DL — SIGNIFICANT CHANGE UP (ref 0.2–1.2)
BUN SERPL-MCNC: 12 MG/DL — SIGNIFICANT CHANGE UP (ref 7–23)
BUN SERPL-MCNC: 23 MG/DL — SIGNIFICANT CHANGE UP (ref 7–23)
CALCIUM SERPL-MCNC: 8.8 MG/DL — SIGNIFICANT CHANGE UP (ref 8.4–10.5)
CALCIUM SERPL-MCNC: 9.2 MG/DL — SIGNIFICANT CHANGE UP (ref 8.4–10.5)
CHLORIDE SERPL-SCNC: 100 MMOL/L — SIGNIFICANT CHANGE UP (ref 98–107)
CHLORIDE SERPL-SCNC: 98 MMOL/L — SIGNIFICANT CHANGE UP (ref 98–107)
CO2 SERPL-SCNC: 25 MMOL/L — SIGNIFICANT CHANGE UP (ref 22–31)
CO2 SERPL-SCNC: 26 MMOL/L — SIGNIFICANT CHANGE UP (ref 22–31)
CREAT SERPL-MCNC: 5.06 MG/DL — HIGH (ref 0.5–1.3)
CREAT SERPL-MCNC: 8.26 MG/DL — HIGH (ref 0.5–1.3)
GLUCOSE SERPL-MCNC: 103 MG/DL — HIGH (ref 70–99)
GLUCOSE SERPL-MCNC: 119 MG/DL — HIGH (ref 70–99)
HCT VFR BLD CALC: 26.2 % — LOW (ref 39–50)
HCT VFR BLD CALC: 33.4 % — LOW (ref 39–50)
HGB BLD-MCNC: 8.1 G/DL — LOW (ref 13–17)
HGB BLD-MCNC: 9.9 G/DL — LOW (ref 13–17)
INR BLD: 1.14 — SIGNIFICANT CHANGE UP (ref 0.88–1.17)
MCHC RBC-ENTMCNC: 26.1 PG — LOW (ref 27–34)
MCHC RBC-ENTMCNC: 26.7 PG — LOW (ref 27–34)
MCHC RBC-ENTMCNC: 29.6 % — LOW (ref 32–36)
MCHC RBC-ENTMCNC: 30.9 % — LOW (ref 32–36)
MCV RBC AUTO: 86.5 FL — SIGNIFICANT CHANGE UP (ref 80–100)
MCV RBC AUTO: 87.9 FL — SIGNIFICANT CHANGE UP (ref 80–100)
NRBC # FLD: 0.06 K/UL — SIGNIFICANT CHANGE UP (ref 0–0)
NRBC # FLD: 0.09 K/UL — SIGNIFICANT CHANGE UP (ref 0–0)
NRBC FLD-RTO: 1 — SIGNIFICANT CHANGE UP
PLATELET # BLD AUTO: 202 K/UL — SIGNIFICANT CHANGE UP (ref 150–400)
PLATELET # BLD AUTO: 207 K/UL — SIGNIFICANT CHANGE UP (ref 150–400)
PMV BLD: 10.3 FL — SIGNIFICANT CHANGE UP (ref 7–13)
PMV BLD: 10.5 FL — SIGNIFICANT CHANGE UP (ref 7–13)
POTASSIUM SERPL-MCNC: 3.3 MMOL/L — LOW (ref 3.5–5.3)
POTASSIUM SERPL-MCNC: 3.5 MMOL/L — SIGNIFICANT CHANGE UP (ref 3.5–5.3)
POTASSIUM SERPL-SCNC: 3.3 MMOL/L — LOW (ref 3.5–5.3)
POTASSIUM SERPL-SCNC: 3.5 MMOL/L — SIGNIFICANT CHANGE UP (ref 3.5–5.3)
PROT SERPL-MCNC: 7.8 G/DL — SIGNIFICANT CHANGE UP (ref 6–8.3)
PROTHROM AB SERPL-ACNC: 13 SEC — SIGNIFICANT CHANGE UP (ref 9.8–13.1)
RBC # BLD: 3.03 M/UL — LOW (ref 4.2–5.8)
RBC # BLD: 3.8 M/UL — LOW (ref 4.2–5.8)
RBC # FLD: 17.2 % — HIGH (ref 10.3–14.5)
RBC # FLD: 17.2 % — HIGH (ref 10.3–14.5)
SODIUM SERPL-SCNC: 136 MMOL/L — SIGNIFICANT CHANGE UP (ref 135–145)
SODIUM SERPL-SCNC: 138 MMOL/L — SIGNIFICANT CHANGE UP (ref 135–145)
WBC # BLD: 7.94 K/UL — SIGNIFICANT CHANGE UP (ref 3.8–10.5)
WBC # BLD: 9.01 K/UL — SIGNIFICANT CHANGE UP (ref 3.8–10.5)
WBC # FLD AUTO: 7.94 K/UL — SIGNIFICANT CHANGE UP (ref 3.8–10.5)
WBC # FLD AUTO: 9.01 K/UL — SIGNIFICANT CHANGE UP (ref 3.8–10.5)

## 2019-10-18 PROCEDURE — 99233 SBSQ HOSP IP/OBS HIGH 50: CPT

## 2019-10-18 PROCEDURE — 99232 SBSQ HOSP IP/OBS MODERATE 35: CPT | Mod: GC

## 2019-10-18 RX ORDER — POTASSIUM CHLORIDE 20 MEQ
10 PACKET (EA) ORAL DAILY
Refills: 0 | Status: DISCONTINUED | OUTPATIENT
Start: 2019-10-18 | End: 2019-10-23

## 2019-10-18 RX ORDER — SODIUM CHLORIDE 9 MG/ML
1000 INJECTION INTRAMUSCULAR; INTRAVENOUS; SUBCUTANEOUS
Refills: 0 | Status: DISCONTINUED | OUTPATIENT
Start: 2019-10-18 | End: 2019-10-23

## 2019-10-18 RX ADMIN — HYDROMORPHONE HYDROCHLORIDE 0.4 MILLIGRAM(S): 2 INJECTION INTRAMUSCULAR; INTRAVENOUS; SUBCUTANEOUS at 10:25

## 2019-10-18 RX ADMIN — ETRAVIRINE 200 MILLIGRAM(S): 200 TABLET ORAL at 18:41

## 2019-10-18 RX ADMIN — RALTEGRAVIR 400 MILLIGRAM(S): 400 TABLET, FILM COATED ORAL at 05:52

## 2019-10-18 RX ADMIN — DARUNAVIR 800 MILLIGRAM(S): 75 TABLET, FILM COATED ORAL at 18:41

## 2019-10-18 RX ADMIN — ERYTHROPOIETIN 6000 UNIT(S): 10000 INJECTION, SOLUTION INTRAVENOUS; SUBCUTANEOUS at 08:52

## 2019-10-18 RX ADMIN — Medication 10 MILLIEQUIVALENT(S): at 10:25

## 2019-10-18 RX ADMIN — RITONAVIR 100 MILLIGRAM(S): 100 TABLET, FILM COATED ORAL at 18:42

## 2019-10-18 RX ADMIN — RALTEGRAVIR 400 MILLIGRAM(S): 400 TABLET, FILM COATED ORAL at 18:42

## 2019-10-18 RX ADMIN — HYDROMORPHONE HYDROCHLORIDE 0.4 MILLIGRAM(S): 2 INJECTION INTRAMUSCULAR; INTRAVENOUS; SUBCUTANEOUS at 10:40

## 2019-10-18 NOTE — PROVIDER CONTACT NOTE (OTHER) - REASON
BS 68- patient refusing immediate repeat
Pt reports chest pain
unable to obtain IV access at this time
CBC to be performed after 1 unit PRBCs
Pt BP 95/63
patient refusing fingersticks

## 2019-10-18 NOTE — PROGRESS NOTE ADULT - ASSESSMENT
ASSESSMENT: Patient is a 59M with recent Dee's, SBR, and incisional hernia repair with mesh. Subcutaneous collections could be seromas vs small abscesses, but there are no skin changes or areas of fluctuance and patient is afebrile or tender in those areas, now with resolving UGI bleed.    PLAN:    - If patient requires any surgical intervention, would recommend transfer back to original hospital with original surgeon.  - Recommend EGD with GI to evaluate for upper GI bleed, pending cardiology clearance.   - Trend H/H and transfuse as needed.   - Please call with questions    Derrick RASHID-Team surgery  #[id number]
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
## 2019-10-18 NOTE — PROVIDER CONTACT NOTE (OTHER) - ASSESSMENT
Pt BP 95/63
patient resting in the bed, s/p EGD- patient has been NPO all day. no s.s hypoglycemia noted
Pt reporting midsternal chest pain 6/10. VS taken, see flowsheet. Pt denies shortness of breath.
patient resting in the bed, recently back from endo- multiple attempts by MD in endo to get access. unable too
patient resting in the bed, s/p EGD- patient has been NPO all day. no s.s hypoglycemia noted
patient in the bed, no s.s distress noted. melena present in colostomy

## 2019-10-18 NOTE — PROVIDER CONTACT NOTE (OTHER) - RECOMMENDATIONS
OK to draw CBC after 1unit [facility]
RN continued to educate patient on the importance of performing repeat FS
4oz apple juice- RN continued to educate patient on the importance of performing repeat FS
Administer PRN medications. Continue tele monitoring and VSq4. EKG.
OK for no IV access at this time

## 2019-10-18 NOTE — PROVIDER CONTACT NOTE (OTHER) - DATE AND TIME:
16-Oct-2019 17:50
15-Oct-2019 19:45
16-Oct-2019 17:00
18-Oct-2019 18:00
16-Oct-2019 12:30
15-Oct-2019 14:02

## 2019-10-18 NOTE — PROVIDER CONTACT NOTE (OTHER) - ACTION/TREATMENT ORDERED:
continue to monitor
hold PRN dilaudid, hang fluids 25mL/hr
MD aware of BS and patients repeat refusal, MD to come see patient
EKG completed, see pt chart. VS as documented. IVP dilaudid administered. MD ePterson at the bedside to evaluate.
MD aware of BS and patients repeat refusal
will continue to monitor

## 2019-10-18 NOTE — PROGRESS NOTE ADULT - PROBLEM SELECTOR PLAN 1
-P/w abdominal pain and melena in ostomy, + FOBT from ostomy bag likely 2/2 upper GI bleed; patient was hypotensive in the ED, MICU cslt x2. differential includes bleeding ulcer vs. post op complication - collection noted on CT - surgery consulted, rec transfer back to [place] where surgery was performed   -Contacted Dr. Dilan Garcia (surgeon at [facility] [phone]), he stated this is not a post-op complication, he believed this is a seroma, a normal reaction after hernia repair, he does not want the patient transferred. He said he is not currently at the hospital   -anemic to hgb 6.1 s/p 2 U pRBC  -monitor CBC q24h  -Maintain MAP >65, active type and screen, Transfuse for hgb <7 or for symptomatic anemia   -c/w PPI oral   -appreciate GI recs: EGD

## 2019-10-18 NOTE — DISCHARGE NOTE PROVIDER - NSFOLLOWUPCLINICS_GEN_ALL_ED_FT
[facility] Gastroenterology  Gastroenterology  [address] [place] [address]  Phone: [phone]  Fax:   Follow Up Time: [facility] Gastroenterology  Gastroenterology  [address] [place] [address] [address]  [place] [address]  Phone: [phone]  Fax:     [facility]  Hematology/Oncology  [address] [place] [address]  Phone: [phone]  Fax:   Follow Up Time: [facility] Gastroenterology  Gastroenterology  [address] [place] [address], [facility] [address]  [place] [address]  Phone: [phone]  Fax:     [facility]  Hematology/Oncology  [address] [place] [address]  Phone: [phone]  Fax:     [facility]  General Surgery  [address] [place] [address]  Phone: [phone]  Fax:   Follow Up Time:

## 2019-10-18 NOTE — PROGRESS NOTE ADULT - PROBLEM SELECTOR PLAN 6
-Reported hx of T2DM, med rec reveals patient is not on anti -diabetic medication  -f/u Hgb A1c  -NPO for now for upper endoscopy; FS q6h while NPO  -Once initiate diet, switch to FS before meals and at bedtime, lss -Reported hx of T2DM, med rec reveals patient is not on anti -diabetic medication  - Hgb A1c 5.6   -NPO for now for upper endoscopy; FS q6h while NPO  -Once initiate diet, switch to FS before meals and at bedtime, lss -Reported hx of T2DM, med rec reveals patient is not on anti -diabetic medication  - Hgb A1c 5.6, FS 70s- 120s   - given current numbers, patient does not need insulin

## 2019-10-18 NOTE — PROVIDER CONTACT NOTE (OTHER) - BACKGROUND
Pt BP 95/63
patient admitted for GI bleed PMH- ESRD, no DM hx
Pt admitted with GI hemorrhage. PMH HTN, diabetes, ESRD, hepatitis C, anemia, TIA.
patient admitted for GI bleed PMH- ESRD
patient admitted for GI bleed PMH- ESRD, no DM hx
patient resting in the bed, s/p HD. plan for EGD today
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
## 2019-10-18 NOTE — PROGRESS NOTE ADULT - SUBJECTIVE AND OBJECTIVE BOX
[facility] DIVISION OF KIDNEY DISEASES AND HYPERTENSION -- FOLLOW UP NOTE  --------------------------------------------------------------------------------    24 hour events/subjective: Patient seen and examined at the bedside during HD. Endorses mild abdominal pain. No CP/SOB. VSS. Labs reviewed. Plan for EGD today        PAST HISTORY  --------------------------------------------------------------------------------  No significant changes to PMH, PSH, FHx, SHx, unless otherwise noted    ALLERGIES & MEDICATIONS  --------------------------------------------------------------------------------  Allergies    ciprofloxacin (Rash)  Levaquin (Rash)    Intolerances      Standing Inpatient Medications  darunavir 800 milliGRAM(s) Oral daily  epoetin marsha Injectable 6000 Unit(s) IV Push <User Schedule>  etravirine 200 milliGRAM(s) Oral two times a day after meals  pantoprazole Infusion 8 mG/Hr IV Continuous <Continuous>  potassium chloride    Tablet ER 10 milliEquivalent(s) Oral daily  raltegravir Tablet 400 milliGRAM(s) Oral two times a day  ritonavir Tablet 100 milliGRAM(s) Oral daily    PRN Inpatient Medications  acetaminophen   Tablet .. 650 milliGRAM(s) Oral once PRN  HYDROmorphone  Injectable 0.4 milliGRAM(s) IV Push every 6 hours PRN      REVIEW OF SYSTEMS  --------------------------------------------------------------------------------  Gen: No fevers/chills  Head/Eyes/Ears/Mouth: No headache; Normal hearing; Normal vision    Respiratory: No dyspnea, cough  CV: No chest pain  GI: +abdominal pain  MSK: No joint pain/swelling; no edema    All other systems were reviewed and are negative, except as noted.    >>> <<<    VITALS/PHYSICAL EXAM  --------------------------------------------------------------------------------  T(C): 36.8 (10-18-19 @ 07:15), Max: 36.8 (10-18-19 @ 07:15)  HR: 100 (10-18-19 @ 07:15) (100 - 108)  BP: 137/85 (10-18-19 @ 07:15) (110/69 - 145/89)  RR: 18 (10-18-19 @ 07:15) (16 - 18)  SpO2: 100% (10-18-19 @ 05:50) (96% - 100%)  Wt(kg): --        Physical Exam:    	Gen: NAD, well-appearing  	HEENT: Anicteric   	Pulm: CTA B/L  	CV: RRR, S1S2; no rub  	Abd: +BS, soft, slight TTP of midabdomen        	: No suprapubic tenderness  	MSK: Warm, no edema  	Neuro: No focal deficits, AOX3      	Vascular Access: HUGO PIZANO +joshua      LABS/STUDIES  --------------------------------------------------------------------------------              8.1    7.94  >-----------<  202      [10-18-19 @ 06:30]              26.2     136  |  98  |  23  ----------------------------<  119      [10-18-19 @ 06:30]  3.3   |  25  |  8.26        Ca     8.8     [10-18-19 @ 06:30]      PT/INR: PT 13.0 , INR 1.14       [10-18-19 @ 06:30]      Creatinine Trend:  SCr 8.26 [10-18 @ 06:30]  SCr 6.09 [10-17 @ 08:50]  SCr 8.50 [10-16 @ 04:20]  SCr 5.17 [10-14 @ 14:26]  SCr 10.80 [09-30 @ 10:29]        HbA1c 5.6      [10-16-19 @ 04:20]    HBsAb Nonreact      [09-25-19 @ 19:34]  HBsAg NEGATIVE      [10-16-19 @ 06:45]  HCV 0.17, Nonreact      [09-19-19 @ 09:07]

## 2019-10-18 NOTE — PROGRESS NOTE ADULT - SUBJECTIVE AND OBJECTIVE BOX
ISAURA AMAYA  59y Male  [id number]    Patient is a 59y old  Male who presents with a chief complaint of abdominal pain (17 Oct 2019 09:42)      INTERVAL HPI/OVERNIGHT EVENTS: no acute event o/n, no CP/sob, feels ready for EGD today. Still have tarry stool in the ostomy bag       REVIEW OF SYSTEMS:  CONSTITUTIONAL:  No weight loss, fever, chills, weakness or fatigue.  	HEENT:  Eyes:  No visual loss, blurred vision, double vision or yellow sclerae. Ears, Nose, Throat:  No hearing loss, sneezing, congestion, runny nose or sore throat.  	SKIN:  No rash or itching.  	CARDIOVASCULAR: no CP, No chest pressure or chest discomfort. No palpitations or edema.  	RESPIRATORY:  No shortness of breath, cough or sputum.  	GASTROINTESTINAL: +melena in ostomy, anorexia, nausea, vomiting, and abdominal pain   	GENITOURINARY:  Denies hematuria, dysuria.   	NEUROLOGICAL:  No headache, dizziness, syncope, paralysis, ataxia, numbness or tingling in the extremities. No change in bowel or bladder control.  	MUSCULOSKELETAL:  No muscle, back pain, joint pain or stiffness.  	LYMPHATICS:  No enlarged nodes. No history of splenectomy.  	PSYCHIATRIC:  No history of depression or anxiety.  	ENDOCRINOLOGIC:  No reports of sweating, cold or heat intolerance. No polyuria or polydipsia.  ALLERGIES:  No history of asthma, hives, eczema or rhinitis.    acetaminophen   Tablet .. 650 milliGRAM(s) Oral once PRN  darunavir 800 milliGRAM(s) Oral daily  epoetin marsha Injectable 6000 Unit(s) IV Push <User Schedule>  etravirine 200 milliGRAM(s) Oral two times a day after meals  HYDROmorphone  Injectable 0.4 milliGRAM(s) IV Push every 6 hours PRN  pantoprazole Infusion 8 mG/Hr IV Continuous <Continuous>  raltegravir Tablet 400 milliGRAM(s) Oral two times a day  ritonavir Tablet 100 milliGRAM(s) Oral daily      Allergies:  ciprofloxacin (Rash)  Levaquin (Rash)      T(F): 98.2 (10-18-19 @ 07:15), Max: 98.2 (10-18-19 @ 07:15)  HR: 100 (10-18-19 @ 07:15) (100 - 108)  BP: 137/85 (10-18-19 @ 07:15) (110/69 - 145/89)  RR: 18 (10-18-19 @ 07:15) (16 - 18)  SpO2: 100% (10-18-19 @ 05:50) (96% - 100%)      PHYSICAL EXAM:  GENERAL: NAD, well-developed  	HEAD:  Atraumatic, Normocephalic  	EYES: EOMI, PERRLA, conjunctiva and sclera clear  	NECK: Supple, No JVD  	CHEST/LUNG: Tenderness to palpation lateral left to sternum, clear to auscultation bilaterally; No wheeze  	HEART: S1 and S2, Regular rhythm, tachycardic; No murmurs, rubs, or gallops  	ABDOMEN: Soft, tenderness to palpation in the epigastrium, nondistended, no rebound; hypoactive bowel sounds, +colostomy bag with black tarry stool.   	EXTREMITIES:  2+ Peripheral Pulses, No clubbing, cyanosis, or edema, left AV fistula in place   PSYCH: AAOx3    LABS:                        8.1    7.94  )-----------( 202      ( 18 Oct 2019 06:30 )             26.2     Hgb Trend: 8.1<--, 9.4<--, 7.7<--, 7.6<--, 7.9<--  10-18    136  |  98  |  23  ----------------------------<  119<H>  3.3<L>   |  25  |  8.26<H>    Ca    8.8      18 Oct 2019 06:30      Creatinine Trend: 8.26<--, 6.09<--, 8.50<--, 5.17<--, 10.80<--, 6.31<--  PT/INR - ( 18 Oct 2019 06:30 )   PT: 13.0 SEC;   INR: 1.14            RADIOLOGY & ADDITIONAL TESTS:

## 2019-10-18 NOTE — PROVIDER CONTACT NOTE (OTHER) - SITUATION
patient refusing fingersticks- patient is stating not now- not in the mood
BS 68- patient refusing immediate repeat
Pt complaining of midsternal chest pain 6/10 at this time. Pt complaining of generalized pain.
unable to obtain IV access at this time
CBC to be performed after 1 unit PRBCs
Pt BP 95/63

## 2019-10-18 NOTE — DISCHARGE NOTE PROVIDER - NSDCCPCAREPLAN_GEN_ALL_CORE_FT
PRINCIPAL DISCHARGE DIAGNOSIS  Diagnosis: GI bleed  Assessment and Plan of Treatment:       SECONDARY DISCHARGE DIAGNOSES  Diagnosis: Demand ischemia  Assessment and Plan of Treatment: You had chest pain in the setting of acute blood loss and your heart wasn't getting enough blood. You were treated with blood transfusion. Please follow up with your outpatient cardiologist in a couple weeks for follow up. PRINCIPAL DISCHARGE DIAGNOSIS  Diagnosis: GI bleed  Assessment and Plan of Treatment: You were found to have upper GI bleeding. You were transfused multiple units of red blood cells, and underwent EGD (a camera looking through your upper GI track) and it showed you have an healed ulcer. You are no longer actively bleeding, please continue to take the Pantoprazole to prevent bleeding for a total of 8 weeks, and follow up with an GI doctor in 2 weeks after discharge.      SECONDARY DISCHARGE DIAGNOSES  Diagnosis: Demand ischemia  Assessment and Plan of Treatment: You had chest pain in the setting of acute blood loss and your heart wasn't getting enough blood. You were treated with blood transfusion. Please follow up with your outpatient cardiologist in a couple weeks for follow up.    Diagnosis: Splenic mass  Assessment and Plan of Treatment: You were found to have a mass in your spleen, which had increased in size from previously. It did not warrant a biopsy, however it needs a close follow up outpatient. Please follow up with your outpatient surgeon regarding this incidental finding in 1- 2 weeks after discharge.    Diagnosis: ESRD (end stage renal disease) on dialysis  Assessment and Plan of Treatment: You had dialysis Monday, Wed, and Friday PRINCIPAL DISCHARGE DIAGNOSIS  Diagnosis: GI bleed  Assessment and Plan of Treatment: You were found to have upper GI bleeding. You were transfused multiple units of red blood cells, and underwent EGD (a camera looking through your upper GI track) and it showed you have an healed ulcer. You are no longer actively bleeding, please continue to take the Pantoprazole to prevent bleeding for a total of 8 weeks, and follow up with an GI doctor in 2 weeks after discharge.      SECONDARY DISCHARGE DIAGNOSES  Diagnosis: Demand ischemia  Assessment and Plan of Treatment: You had chest pain in the setting of acute blood loss and your heart wasn't getting enough blood. You were treated with blood transfusion. Please follow up with your outpatient cardiologist in a couple weeks for follow up.    Diagnosis: Splenic mass  Assessment and Plan of Treatment: You were found to have a mass in your spleen. It did not warrant a biopsy, however it needs a close follow up outpatient. Please follow up with your outpatient surgeon regarding this finding in 1- 2 weeks after discharge, and a repeat CT of abdomen in 3 month.    Diagnosis: ESRD (end stage renal disease) on dialysis  Assessment and Plan of Treatment: You had dialysis Monday, Wed, and Friday while you were in the hospital

## 2019-10-18 NOTE — PROGRESS NOTE ADULT - PROBLEM SELECTOR PLAN 1
Pt. with ESRD on HD three times a week (MWF). Last HD was on 10/16/19 via AVF. Pt. clinically stable. Labs reviewed. Receiving maintenance HD today. Monitor labs  Plan for EGD today

## 2019-10-18 NOTE — CHART NOTE - NSCHARTNOTEFT_GEN_A_CORE
Pt cam to endoscopy for EGD. Pt IV access inadequate and unable to gain adequate access with multiple tries. Pt not able to have procedure performed for this reason. Pt to get radiology guided IV. Plan for endoscopy early next week. Can advance to clear liquid diet.    Farhat Corea

## 2019-10-19 LAB — SPECIMEN SOURCE: SIGNIFICANT CHANGE UP

## 2019-10-19 PROCEDURE — 99233 SBSQ HOSP IP/OBS HIGH 50: CPT

## 2019-10-19 RX ORDER — HEPARIN SODIUM 5000 [USP'U]/ML
5000 INJECTION INTRAVENOUS; SUBCUTANEOUS EVERY 8 HOURS
Refills: 0 | Status: DISCONTINUED | OUTPATIENT
Start: 2019-10-19 | End: 2019-10-23

## 2019-10-19 RX ADMIN — ETRAVIRINE 200 MILLIGRAM(S): 200 TABLET ORAL at 17:08

## 2019-10-19 RX ADMIN — RALTEGRAVIR 400 MILLIGRAM(S): 400 TABLET, FILM COATED ORAL at 06:31

## 2019-10-19 RX ADMIN — HYDROMORPHONE HYDROCHLORIDE 0.4 MILLIGRAM(S): 2 INJECTION INTRAMUSCULAR; INTRAVENOUS; SUBCUTANEOUS at 08:23

## 2019-10-19 RX ADMIN — RITONAVIR 100 MILLIGRAM(S): 100 TABLET, FILM COATED ORAL at 17:07

## 2019-10-19 RX ADMIN — HEPARIN SODIUM 5000 UNIT(S): 5000 INJECTION INTRAVENOUS; SUBCUTANEOUS at 17:07

## 2019-10-19 RX ADMIN — ETRAVIRINE 200 MILLIGRAM(S): 200 TABLET ORAL at 10:22

## 2019-10-19 RX ADMIN — PANTOPRAZOLE SODIUM 10 MG/HR: 20 TABLET, DELAYED RELEASE ORAL at 08:50

## 2019-10-19 RX ADMIN — HEPARIN SODIUM 5000 UNIT(S): 5000 INJECTION INTRAVENOUS; SUBCUTANEOUS at 22:11

## 2019-10-19 RX ADMIN — PANTOPRAZOLE SODIUM 10 MG/HR: 20 TABLET, DELAYED RELEASE ORAL at 17:06

## 2019-10-19 RX ADMIN — HYDROMORPHONE HYDROCHLORIDE 0.4 MILLIGRAM(S): 2 INJECTION INTRAMUSCULAR; INTRAVENOUS; SUBCUTANEOUS at 08:09

## 2019-10-19 RX ADMIN — RALTEGRAVIR 400 MILLIGRAM(S): 400 TABLET, FILM COATED ORAL at 17:07

## 2019-10-19 RX ADMIN — HYDROMORPHONE HYDROCHLORIDE 0.4 MILLIGRAM(S): 2 INJECTION INTRAMUSCULAR; INTRAVENOUS; SUBCUTANEOUS at 17:06

## 2019-10-19 RX ADMIN — DARUNAVIR 800 MILLIGRAM(S): 75 TABLET, FILM COATED ORAL at 17:07

## 2019-10-19 RX ADMIN — Medication 10 MILLIEQUIVALENT(S): at 17:07

## 2019-10-19 RX ADMIN — HYDROMORPHONE HYDROCHLORIDE 0.4 MILLIGRAM(S): 2 INJECTION INTRAMUSCULAR; INTRAVENOUS; SUBCUTANEOUS at 17:21

## 2019-10-19 RX ADMIN — SODIUM CHLORIDE 50 MILLILITER(S): 9 INJECTION INTRAMUSCULAR; INTRAVENOUS; SUBCUTANEOUS at 08:10

## 2019-10-19 NOTE — PROGRESS NOTE ADULT - PROBLEM SELECTOR PLAN 9
Hold DVT prophylaxis in setting of bleed  Regular diet, NPO after midnight on sunday for Monday EGD   -hold ASA in setting of bleed  Dispo: clinical improvement DVT: SQH  Diet: Regular diet, NPO after midnight on sunday for Monday EGD   Dispo: pending clinical improvement

## 2019-10-19 NOTE — PROGRESS NOTE ADULT - SUBJECTIVE AND OBJECTIVE BOX
AMAYAISAURA ALAN  59y Male  [id number]    Patient is a 59y old  Male who presents with a chief complaint of abdominal pain (18 Oct 2019 15:11)      INTERVAL HPI/OVERNIGHT EVENTS: patient very upset EGD cancelled yesterday.   IV replaced by medicine senior this morning       REVIEW OF SYSTEMS:  CONSTITUTIONAL: No fever, weight loss, or fatigue  EYES: No eye pain, visual disturbances, or discharge  ENMT:  No difficulty hearing, tinnitus, vertigo; No sinus or throat pain  NECK: No pain or stiffness  BREASTS: No pain, masses, or nipple discharge  RESPIRATORY: No cough, wheezing, chills or hemoptysis; No shortness of breath  CARDIOVASCULAR: same chest pain tender to palpation, no palpitations, dizziness, or leg swelling  GASTROINTESTINAL: No abdominal or epigastric pain. No nausea, vomiting, or hematemesis; No diarrhea or constipation. No melena or hematochezia. Stool brown in the ostomy bag   GENITOURINARY: No dysuria, frequency, hematuria, or incontinence  NEUROLOGICAL: No headaches, memory loss, loss of strength, numbness, or tremors  MUSCULOSKELETAL: No joint pain or swelling; No muscle, back, or extremity pain  SKIN: No itching, burning, rashes, or lesions   HEME/LYMPH: No easy bruising, or bleeding gums  LYMPH NODES: No enlarged glands  ENDOCRINE: No heat or cold intolerance; No hair loss  PSYCHIATRIC: No depression, anxiety, mood swings, or difficulty sleeping  ALLERY AND IMMUNOLOGIC: No hives or eczema    acetaminophen   Tablet .. 650 milliGRAM(s) Oral once PRN  darunavir 800 milliGRAM(s) Oral daily  epoetin marsha Injectable 6000 Unit(s) IV Push <User Schedule>  etravirine 200 milliGRAM(s) Oral two times a day after meals  HYDROmorphone  Injectable 0.4 milliGRAM(s) IV Push every 6 hours PRN  pantoprazole Infusion 8 mG/Hr IV Continuous <Continuous>  potassium chloride    Tablet ER 10 milliEquivalent(s) Oral daily  raltegravir Tablet 400 milliGRAM(s) Oral two times a day  ritonavir Tablet 100 milliGRAM(s) Oral daily  sodium chloride 0.9%. 1000 milliLiter(s) IV Continuous <Continuous>      Allergies:  ciprofloxacin (Rash)  Levaquin (Rash)      T(F): 98.9 (10-19-19 @ 06:29), Max: 98.9 (10-19-19 @ 06:29)  HR: 94 (10-19-19 @ 08:23) (94 - 108)  BP: 143/75 (10-19-19 @ 08:23) (103/73 - 145/85)  RR: 16 (10-19-19 @ 08:23) (16 - 18)  SpO2: 98% (10-19-19 @ 08:23) (95% - 100%)    10-18-19 @ 07:01  -  10-19-19 @ 07:00  --------------------------------------------------------  IN: 400 mL / OUT: 2217 mL / NET: -1817 mL        PHYSICAL EXAM:  GENERAL: NAD, well-developed  	HEAD:  Atraumatic, Normocephalic  	EYES: EOMI, PERRLA, conjunctiva and sclera clear  	NECK: Supple, No JVD  	CHEST/LUNG: Tenderness to palpation lateral left to sternum, clear to auscultation bilaterally; No wheeze  	HEART: S1 and S2, Regular rhythm, tachycardic; No murmurs, rubs, or gallops  	ABDOMEN: Soft, tenderness to palpation in the epigastrium, nondistended, no rebound; hypoactive bowel sounds, +colostomy bag with black tarry stool.   	EXTREMITIES:  2+ Peripheral Pulses, No clubbing, cyanosis, or edema, left AV fistula in place   PSYCH: AAOx3      LABS:                        9.9    9.01  )-----------( 207      ( 18 Oct 2019 12:01 )             33.4     Hgb Trend: 9.9<--, 8.1<--, 9.4<--, 7.7<--, 7.6<--  10-18    138  |  100  |  12  ----------------------------<  103<H>  3.5   |  26  |  5.06<H>    Ca    9.2      18 Oct 2019 12:01    TPro  7.8  /  Alb  3.4  /  TBili  0.4  /  DBili  x   /  AST  14  /  ALT  10  /  AlkPhos  83  10-18    Creatinine Trend: 5.06<--, 8.26<--, 6.09<--, 8.50<--, 5.17<--, 10.80<--  PT/INR - ( 18 Oct 2019 06:30 )   PT: 13.0 SEC;   INR: 1.14                RADIOLOGY & ADDITIONAL TESTS:

## 2019-10-19 NOTE — PROGRESS NOTE ADULT - PROBLEM SELECTOR PLAN 1
-P/w abdominal pain and melena in ostomy, + FOBT from ostomy bag likely 2/2 upper GI bleed; patient was hypotensive in the ED, MICU cslt x2. differential includes bleeding ulcer vs. post op complication - collection noted on CT - surgery consulted, rec transfer back to [place] where surgery was performed   -Contacted Dr. Dilan Garcia (surgeon at [facility] [phone]), he stated this is not a post-op complication, he believed this is a seroma, a normal reaction after hernia repair, he does not want the patient transferred. He said he is not currently at the hospital   -anemic to hgb 6.1 s/p 3 U pRBC  -Maintain MAP >65, active type and screen, Transfuse for hgb <7 or for symptomatic anemia   -c/w PPI oral   -appreciate GI recs: EGD

## 2019-10-19 NOTE — PROGRESS NOTE ADULT - PROBLEM SELECTOR PLAN 6
-Reported hx of T2DM, med rec reveals patient is not on anti -diabetic medication  - Hgb A1c 5.6, FS 70s- 120s   - given current numbers, patient does not need insulin

## 2019-10-19 NOTE — PROGRESS NOTE ADULT - PROBLEM SELECTOR PLAN 2
-Found to have hgb 6.1 2/2 acute blood loss anemia 2/2 likely upper GI bleed  -baseline hgb ~9-10   -hgb corrected from 6.1 to 7.9 after 2 U pRBC, down trending, s/p transfusion 1u 10/16, will maintain a hgb goal of >8   -appreciate GI cslt: as above -Found to have hgb 6.1 2/2 acute blood loss anemia 2/2 likely upper GI bleed  -baseline hgb ~9-10   -hgb corrected from 6.1 to 7.9 after 2 U pRBC, down trending, s/p transfusion 1u 10/16, will maintain a hgb goal of >8   -appreciate GI cslt: as above  -EGD Monday

## 2019-10-19 NOTE — CHART NOTE - NSCHARTNOTEFT_GEN_A_CORE
I placed an 18g IV in the patient's left forearm w/ good blood return and access. Currently receiving IV fluids and PPI from the IV. Placed using ultrasound guidance. Pt does have several accessible veins. No need for radiology guided IV.    Harris Hall  EM/IM  PGY-2

## 2019-10-20 LAB
ALBUMIN SERPL ELPH-MCNC: 3.1 G/DL — LOW (ref 3.3–5)
ALP SERPL-CCNC: 79 U/L — SIGNIFICANT CHANGE UP (ref 40–120)
ALT FLD-CCNC: 9 U/L — SIGNIFICANT CHANGE UP (ref 4–41)
ANION GAP SERPL CALC-SCNC: 14 MMO/L — SIGNIFICANT CHANGE UP (ref 7–14)
AST SERPL-CCNC: 13 U/L — SIGNIFICANT CHANGE UP (ref 4–40)
BACTERIA NPH CULT: SIGNIFICANT CHANGE UP
BILIRUB SERPL-MCNC: 0.4 MG/DL — SIGNIFICANT CHANGE UP (ref 0.2–1.2)
BUN SERPL-MCNC: 24 MG/DL — HIGH (ref 7–23)
CALCIUM SERPL-MCNC: 9.1 MG/DL — SIGNIFICANT CHANGE UP (ref 8.4–10.5)
CHLORIDE SERPL-SCNC: 98 MMOL/L — SIGNIFICANT CHANGE UP (ref 98–107)
CO2 SERPL-SCNC: 24 MMOL/L — SIGNIFICANT CHANGE UP (ref 22–31)
CREAT SERPL-MCNC: 8.66 MG/DL — HIGH (ref 0.5–1.3)
GLUCOSE SERPL-MCNC: 114 MG/DL — HIGH (ref 70–99)
HCT VFR BLD CALC: 28.1 % — LOW (ref 39–50)
HGB BLD-MCNC: 8.5 G/DL — LOW (ref 13–17)
MAGNESIUM SERPL-MCNC: 2.1 MG/DL — SIGNIFICANT CHANGE UP (ref 1.6–2.6)
MCHC RBC-ENTMCNC: 26.9 PG — LOW (ref 27–34)
MCHC RBC-ENTMCNC: 30.2 % — LOW (ref 32–36)
MCV RBC AUTO: 88.9 FL — SIGNIFICANT CHANGE UP (ref 80–100)
NRBC # FLD: 0.03 K/UL — SIGNIFICANT CHANGE UP (ref 0–0)
PHOSPHATE SERPL-MCNC: 4.2 MG/DL — SIGNIFICANT CHANGE UP (ref 2.5–4.5)
PLATELET # BLD AUTO: 176 K/UL — SIGNIFICANT CHANGE UP (ref 150–400)
PMV BLD: 10.5 FL — SIGNIFICANT CHANGE UP (ref 7–13)
POTASSIUM SERPL-MCNC: 4 MMOL/L — SIGNIFICANT CHANGE UP (ref 3.5–5.3)
POTASSIUM SERPL-SCNC: 4 MMOL/L — SIGNIFICANT CHANGE UP (ref 3.5–5.3)
PROT SERPL-MCNC: 7 G/DL — SIGNIFICANT CHANGE UP (ref 6–8.3)
RBC # BLD: 3.16 M/UL — LOW (ref 4.2–5.8)
RBC # FLD: 17.4 % — HIGH (ref 10.3–14.5)
SODIUM SERPL-SCNC: 136 MMOL/L — SIGNIFICANT CHANGE UP (ref 135–145)
WBC # BLD: 6.68 K/UL — SIGNIFICANT CHANGE UP (ref 3.8–10.5)
WBC # FLD AUTO: 6.68 K/UL — SIGNIFICANT CHANGE UP (ref 3.8–10.5)

## 2019-10-20 PROCEDURE — 99233 SBSQ HOSP IP/OBS HIGH 50: CPT

## 2019-10-20 PROCEDURE — 99232 SBSQ HOSP IP/OBS MODERATE 35: CPT | Mod: GC

## 2019-10-20 RX ADMIN — HEPARIN SODIUM 5000 UNIT(S): 5000 INJECTION INTRAVENOUS; SUBCUTANEOUS at 13:27

## 2019-10-20 RX ADMIN — ETRAVIRINE 200 MILLIGRAM(S): 200 TABLET ORAL at 17:35

## 2019-10-20 RX ADMIN — HEPARIN SODIUM 5000 UNIT(S): 5000 INJECTION INTRAVENOUS; SUBCUTANEOUS at 06:11

## 2019-10-20 RX ADMIN — PANTOPRAZOLE SODIUM 10 MG/HR: 20 TABLET, DELAYED RELEASE ORAL at 06:11

## 2019-10-20 RX ADMIN — PANTOPRAZOLE SODIUM 10 MG/HR: 20 TABLET, DELAYED RELEASE ORAL at 13:27

## 2019-10-20 RX ADMIN — ETRAVIRINE 200 MILLIGRAM(S): 200 TABLET ORAL at 13:28

## 2019-10-20 RX ADMIN — RITONAVIR 100 MILLIGRAM(S): 100 TABLET, FILM COATED ORAL at 13:28

## 2019-10-20 RX ADMIN — PANTOPRAZOLE SODIUM 10 MG/HR: 20 TABLET, DELAYED RELEASE ORAL at 17:33

## 2019-10-20 RX ADMIN — HEPARIN SODIUM 5000 UNIT(S): 5000 INJECTION INTRAVENOUS; SUBCUTANEOUS at 21:58

## 2019-10-20 RX ADMIN — SODIUM CHLORIDE 50 MILLILITER(S): 9 INJECTION INTRAMUSCULAR; INTRAVENOUS; SUBCUTANEOUS at 17:36

## 2019-10-20 RX ADMIN — RALTEGRAVIR 400 MILLIGRAM(S): 400 TABLET, FILM COATED ORAL at 17:34

## 2019-10-20 RX ADMIN — RALTEGRAVIR 400 MILLIGRAM(S): 400 TABLET, FILM COATED ORAL at 06:11

## 2019-10-20 RX ADMIN — PANTOPRAZOLE SODIUM 10 MG/HR: 20 TABLET, DELAYED RELEASE ORAL at 23:43

## 2019-10-20 RX ADMIN — SODIUM CHLORIDE 50 MILLILITER(S): 9 INJECTION INTRAMUSCULAR; INTRAVENOUS; SUBCUTANEOUS at 06:12

## 2019-10-20 RX ADMIN — SODIUM CHLORIDE 50 MILLILITER(S): 9 INJECTION INTRAMUSCULAR; INTRAVENOUS; SUBCUTANEOUS at 13:27

## 2019-10-20 RX ADMIN — DARUNAVIR 800 MILLIGRAM(S): 75 TABLET, FILM COATED ORAL at 13:28

## 2019-10-20 RX ADMIN — Medication 10 MILLIEQUIVALENT(S): at 13:27

## 2019-10-20 NOTE — PROGRESS NOTE ADULT - SUBJECTIVE AND OBJECTIVE BOX
[facility] DIVISION OF KIDNEY DISEASES AND HYPERTENSION -- FOLLOW UP NOTE  --------------------------------------------------------------------------------  HPI: 59-year-old male with medical history of ESRD on HD (M,W,F), HIV on HAART, Hepatitis C, HLD, HTN admitted with abdominal tenderness. Nephrology consulted for ESRD management. Pt. seen and examined at ER. Pt. presented with abdominal pain associated with left sided pleuritic chest pain that started 3 days before admission. Pt. has been on HD for > 10 years secondary to ? multiple abdominal surgeries. Pt. receives dialysis at [facility] and follows with Dr Dumont. Last HD 10/18/19 without issues.    24 hour events/subjective: Patient seen and examined at the bedside. Endorsing abdominal pain. Plan for EGD tomorrow. VSS. Labs reviewed. Plan for HD tomorrow AM        PAST HISTORY  --------------------------------------------------------------------------------  No significant changes to PMH, PSH, FHx, SHx, unless otherwise noted    ALLERGIES & MEDICATIONS  --------------------------------------------------------------------------------  Allergies    ciprofloxacin (Rash)  Levaquin (Rash)    Intolerances      Standing Inpatient Medications  darunavir 800 milliGRAM(s) Oral daily  epoetin marsha Injectable 6000 Unit(s) IV Push <User Schedule>  etravirine 200 milliGRAM(s) Oral two times a day after meals  heparin  Injectable 5000 Unit(s) SubCutaneous every 8 hours  pantoprazole Infusion 8 mG/Hr IV Continuous <Continuous>  potassium chloride    Tablet ER 10 milliEquivalent(s) Oral daily  raltegravir Tablet 400 milliGRAM(s) Oral two times a day  ritonavir Tablet 100 milliGRAM(s) Oral daily  sodium chloride 0.9%. 1000 milliLiter(s) IV Continuous <Continuous>    PRN Inpatient Medications  acetaminophen   Tablet .. 650 milliGRAM(s) Oral once PRN  HYDROmorphone  Injectable 0.4 milliGRAM(s) IV Push every 6 hours PRN      REVIEW OF SYSTEMS  --------------------------------------------------------------------------------  Gen: No lethargy  Respiratory: No dyspnea  CV: No chest pain  GI: + abdominal pain  MSK:  No edema  Neuro: No dizziness  Heme: No bleeding    All other systems were reviewed and are negative, except as noted.      >>> <<<    VITALS/PHYSICAL EXAM  --------------------------------------------------------------------------------  T(C): 36.6 (10-20-19 @ 06:32), Max: 37 (10-19-19 @ 17:04)  HR: 98 (10-20-19 @ 06:32) (94 - 99)  BP: 132/77 (10-20-19 @ 06:32) (132/77 - 145/71)  RR: 18 (10-20-19 @ 06:32) (16 - 18)  SpO2: 99% (10-20-19 @ 06:32) (98% - 99%)  Wt(kg): --        Physical Exam:    	Gen: NAD, well-appearing  	HEENT: Anicteric   	Pulm: CTA B/L  	CV: RRR, S1S2; no rub  	Abd: +BS, soft, TTP of midabdomen        	: No suprapubic tenderness  	MSK: Warm, no edema  	Neuro: Awake      	Vascular Access: LUE AVF +thrill      LABS/STUDIES  --------------------------------------------------------------------------------              9.9    9.01  >-----------<  207      [10-18-19 @ 12:01]              33.4     138  |  100  |  12  ----------------------------<  103      [10-18-19 @ 12:01]  3.5   |  26  |  5.06        Ca     9.2     [10-18-19 @ 12:01]    TPro  7.8  /  Alb  3.4  /  TBili  0.4  /  DBili  x   /  AST  14  /  ALT  10  /  AlkPhos  83  [10-18-19 @ 12:01]          Creatinine Trend:  SCr 5.06 [10-18 @ 12:01]  SCr 8.26 [10-18 @ 06:30]  SCr 6.09 [10-17 @ 08:50]  SCr 8.50 [10-16 @ 04:20]  SCr 5.17 [10-14 @ 14:26]        HbA1c 5.6      [10-16-19 @ 04:20]    HBsAb Nonreact      [09-25-19 @ 19:34]  HBsAg NEGATIVE      [10-16-19 @ 06:45] [facility] DIVISION OF KIDNEY DISEASES AND HYPERTENSION -- FOLLOW UP NOTE  --------------------------------------------------------------------------------  HPI: 59-year-old male with medical history of ESRD on HD (M,W,F), HIV on HAART, Hepatitis C, HLD, HTN admitted with abdominal tenderness. Nephrology consulted for ESRD management. Pt. seen and examined at ER. Pt. presented with abdominal pain associated with left sided pleuritic chest pain that started 3 days before admission. Pt. has been on HD for > 10 years secondary to ? multiple abdominal surgeries. Pt. receives dialysis at [facility] and follows with Dr Dumont. Last HD 10/18/19 without issues.    24 hour events/subjective: Patient seen and examined at the bedside. Endorsing abdominal pain. Plan for EGD tomorrow. VSS. Labs reviewed. Plan for HD tomorrow AM    PAST HISTORY  --------------------------------------------------------------------------------  No significant changes to PMH, PSH, FHx, SHx, unless otherwise noted    ALLERGIES & MEDICATIONS  --------------------------------------------------------------------------------  Allergies    ciprofloxacin (Rash)  Levaquin (Rash)    Intolerances    Standing Inpatient Medications  darunavir 800 milliGRAM(s) Oral daily  epoetin marsha Injectable 6000 Unit(s) IV Push <User Schedule>  etravirine 200 milliGRAM(s) Oral two times a day after meals  heparin  Injectable 5000 Unit(s) SubCutaneous every 8 hours  pantoprazole Infusion 8 mG/Hr IV Continuous <Continuous>  potassium chloride    Tablet ER 10 milliEquivalent(s) Oral daily  raltegravir Tablet 400 milliGRAM(s) Oral two times a day  ritonavir Tablet 100 milliGRAM(s) Oral daily  sodium chloride 0.9%. 1000 milliLiter(s) IV Continuous <Continuous>    PRN Inpatient Medications  acetaminophen   Tablet .. 650 milliGRAM(s) Oral once PRN  HYDROmorphone  Injectable 0.4 milliGRAM(s) IV Push every 6 hours PRN    REVIEW OF SYSTEMS  --------------------------------------------------------------------------------  Gen: No lethargy  Respiratory: No dyspnea  CV: No chest pain  GI: + abdominal pain  MSK:  No edema  Neuro: No dizziness  Heme: No bleeding    All other systems were reviewed and are negative, except as noted.    VITALS/PHYSICAL EXAM  --------------------------------------------------------------------------------  T(C): 36.6 (10-20-19 @ 06:32), Max: 37 (10-19-19 @ 17:04)  HR: 98 (10-20-19 @ 06:32) (94 - 99)  BP: 132/77 (10-20-19 @ 06:32) (132/77 - 145/71)  RR: 18 (10-20-19 @ 06:32) (16 - 18)  SpO2: 99% (10-20-19 @ 06:32) (98% - 99%)  Wt(kg): --    Physical Exam:  	Gen: NAD, well-appearing  	HEENT: Anicteric   	Pulm: CTA B/L  	CV: RRR, S1S2; no rub  	Abd: +BS, soft, TTP of midabdomen        	: No suprapubic tenderness  	MSK: Warm, no edema  	Neuro: Awake      	Vascular Access: LUE AVF +thrill      LABS/STUDIES  --------------------------------------------------------------------------------              9.9    9.01  >-----------<  207      [10-18-19 @ 12:01]              33.4     138  |  100  |  12  ----------------------------<  103      [10-18-19 @ 12:01]  3.5   |  26  |  5.06        Ca     9.2     [10-18-19 @ 12:01]    TPro  7.8  /  Alb  3.4  /  TBili  0.4  /  DBili  x   /  AST  14  /  ALT  10  /  AlkPhos  83  [10-18-19 @ 12:01]    Creatinine Trend:  SCr 5.06 [10-18 @ 12:01]  SCr 8.26 [10-18 @ 06:30]  SCr 6.09 [10-17 @ 08:50]  SCr 8.50 [10-16 @ 04:20]  SCr 5.17 [10-14 @ 14:26]    HBsAb Nonreact      [09-25-19 @ 19:34]  HBsAg NEGATIVE      [10-16-19 @ 06:45]

## 2019-10-20 NOTE — PROGRESS NOTE ADULT - PROBLEM SELECTOR PLAN 1
Pt. with ESRD on HD three times a week (MWF). Last HD was on 10/18/19 via AVF. Pt. clinically stable. Labs reviewed. Plan for maintenance HD tomorrow AM prior to EGD. Monitor labs  Monitor serum phosphorus level daily

## 2019-10-20 NOTE — PROGRESS NOTE ADULT - PROBLEM SELECTOR PLAN 9
DVT: SQH  Diet: Regular diet, NPO after midnight on sunday for Monday EGD   Dispo: pending clinical improvement

## 2019-10-20 NOTE — PROGRESS NOTE ADULT - PROBLEM SELECTOR PLAN 3
Pt. with anemia in setting ESRD. Hgb level below target. C/w DIVYA Pt. with anemia in setting ESRD. Hgb level below target. Pt. on DIVYA treatment. Monitor hemoglobin

## 2019-10-20 NOTE — PROGRESS NOTE ADULT - SUBJECTIVE AND OBJECTIVE BOX
AMAYAISAURA ALAN  59y Male  [id number]    Patient is a 59y old  Male who presents with a chief complaint of abdominal pain (19 Oct 2019 08:47)      INTERVAL HPI/OVERNIGHT EVENTS: no acute event overnight       CONSTITUTIONAL: No fever, weight loss, or fatigue  EYES: No eye pain, visual disturbances, or discharge  ENMT:  No difficulty hearing, tinnitus, vertigo; No sinus or throat pain  NECK: No pain or stiffness  BREASTS: No pain, masses, or nipple discharge  RESPIRATORY: No cough, wheezing, chills or hemoptysis; No shortness of breath  CARDIOVASCULAR: same chest pain tender to palpation, no palpitations, dizziness, or leg swelling  GASTROINTESTINAL: No abdominal or epigastric pain. No nausea, vomiting, or hematemesis; No diarrhea or constipation. No melena or hematochezia. Stool brown in the ostomy bag   GENITOURINARY: No dysuria, frequency, hematuria, or incontinence  NEUROLOGICAL: No headaches, memory loss, loss of strength, numbness, or tremors  MUSCULOSKELETAL: No joint pain or swelling; No muscle, back, or extremity pain  SKIN: No itching, burning, rashes, or lesions   HEME/LYMPH: No easy bruising, or bleeding gums  LYMPH NODES: No enlarged glands  ENDOCRINE: No heat or cold intolerance; No hair loss  PSYCHIATRIC: No depression, anxiety, mood swings, or difficulty sleeping  ALLERY AND IMMUNOLOGIC: No hives or eczema        acetaminophen   Tablet .. 650 milliGRAM(s) Oral once PRN  darunavir 800 milliGRAM(s) Oral daily  epoetin marsha Injectable 6000 Unit(s) IV Push <User Schedule>  etravirine 200 milliGRAM(s) Oral two times a day after meals  heparin  Injectable 5000 Unit(s) SubCutaneous every 8 hours  HYDROmorphone  Injectable 0.4 milliGRAM(s) IV Push every 6 hours PRN  pantoprazole Infusion 8 mG/Hr IV Continuous <Continuous>  potassium chloride    Tablet ER 10 milliEquivalent(s) Oral daily  raltegravir Tablet 400 milliGRAM(s) Oral two times a day  ritonavir Tablet 100 milliGRAM(s) Oral daily  sodium chloride 0.9%. 1000 milliLiter(s) IV Continuous <Continuous>      Allergies:  ciprofloxacin (Rash)  Levaquin (Rash)      T(F): 97.9 (10-20-19 @ 06:32), Max: 98.6 (10-19-19 @ 17:04)  HR: 98 (10-20-19 @ 06:32) (94 - 99)  BP: 132/77 (10-20-19 @ 06:32) (132/77 - 145/71)  RR: 18 (10-20-19 @ 06:32) (16 - 18)  SpO2: 99% (10-20-19 @ 06:32) (98% - 99%)      PHYSICAL EXAM:  GENERAL: NAD, well-developed  	HEAD:  Atraumatic, Normocephalic  	EYES: EOMI, PERRLA, conjunctiva and sclera clear  	NECK: Supple, No JVD  	CHEST/LUNG: Tenderness to palpation lateral left to sternum, clear to auscultation bilaterally; No wheeze  	HEART: S1 and S2, Regular rhythm, tachycardic; No murmurs, rubs, or gallops  	ABDOMEN: Soft, tenderness to palpation in the epigastrium, nondistended, no rebound; hypoactive bowel sounds, +colostomy bag with black tarry stool.   	EXTREMITIES:  2+ Peripheral Pulses, No clubbing, cyanosis, or edema, left AV fistula in place   PSYCH: AAOx3      LABS:                        9.9    9.01  )-----------( 207      ( 18 Oct 2019 12:01 )             33.4     Hgb Trend: 9.9<--, 8.1<--, 9.4<--, 7.7<--, 7.6<--  10-18    138  |  100  |  12  ----------------------------<  103<H>  3.5   |  26  |  5.06<H>    Ca    9.2      18 Oct 2019 12:01    TPro  7.8  /  Alb  3.4  /  TBili  0.4  /  DBili  x   /  AST  14  /  ALT  10  /  AlkPhos  83  10-18    Creatinine Trend: 5.06<--, 8.26<--, 6.09<--, 8.50<--, 5.17<--, 10.80<--        RADIOLOGY & ADDITIONAL TESTS:

## 2019-10-20 NOTE — PROGRESS NOTE ADULT - PROBLEM SELECTOR PLAN 2
BP at target range. Monitor BP on current BP medication. Low salt diet. BP in acceptable range today morning. Low salt diet. Monitor labs

## 2019-10-20 NOTE — PROGRESS NOTE ADULT - PROBLEM SELECTOR PLAN 1
-P/w abdominal pain and melena in ostomy, + FOBT from ostomy bag likely 2/2 upper GI bleed; patient was hypotensive in the ED, MICU cslt x2. differential includes bleeding ulcer vs. post op complication - collection noted on CT - surgery consulted, rec transfer back to [place] where surgery was performed   -Contacted Dr. Dilan Garcia (surgeon at [facility] [phone]), he stated this is not a post-op complication, he believed this is a seroma, a normal reaction after hernia repair, he does not want the patient transferred. He said he is not currently at the hospital   -anemic to hgb 6.1 s/p 3 U pRBC  -Maintain MAP >65, active type and screen, Transfuse for hgb <7 or for symptomatic anemia   -c/w PPI oral   -appreciate GI recs: EGD

## 2019-10-20 NOTE — PROGRESS NOTE ADULT - PROBLEM SELECTOR PLAN 2
-Found to have hgb 6.1 2/2 acute blood loss anemia 2/2 likely upper GI bleed  -baseline hgb ~9-10   -hgb corrected from 6.1 to 7.9 after 2 U pRBC, down trending, s/p transfusion 1u 10/16, will maintain a hgb goal of >8   -appreciate GI cslt: as above  -EGD 10/21

## 2019-10-21 DIAGNOSIS — N25.81 SECONDARY HYPERPARATHYROIDISM OF RENAL ORIGIN: ICD-10-CM

## 2019-10-21 DIAGNOSIS — E87.8 OTHER DISORDERS OF ELECTROLYTE AND FLUID BALANCE, NOT ELSEWHERE CLASSIFIED: ICD-10-CM

## 2019-10-21 LAB
ALBUMIN SERPL ELPH-MCNC: 2.9 G/DL — LOW (ref 3.3–5)
ALP SERPL-CCNC: 70 U/L — SIGNIFICANT CHANGE UP (ref 40–120)
ALT FLD-CCNC: 7 U/L — SIGNIFICANT CHANGE UP (ref 4–41)
ANION GAP SERPL CALC-SCNC: 15 MMO/L — HIGH (ref 7–14)
APTT BLD: 28 SEC — SIGNIFICANT CHANGE UP (ref 27.5–36.3)
AST SERPL-CCNC: 11 U/L — SIGNIFICANT CHANGE UP (ref 4–40)
BILIRUB SERPL-MCNC: 0.3 MG/DL — SIGNIFICANT CHANGE UP (ref 0.2–1.2)
BLD GP AB SCN SERPL QL: NEGATIVE — SIGNIFICANT CHANGE UP
BUN SERPL-MCNC: 33 MG/DL — HIGH (ref 7–23)
CALCIUM SERPL-MCNC: 8.8 MG/DL — SIGNIFICANT CHANGE UP (ref 8.4–10.5)
CHLORIDE SERPL-SCNC: 101 MMOL/L — SIGNIFICANT CHANGE UP (ref 98–107)
CO2 SERPL-SCNC: 21 MMOL/L — LOW (ref 22–31)
CREAT SERPL-MCNC: 10.28 MG/DL — HIGH (ref 0.5–1.3)
GLUCOSE SERPL-MCNC: 110 MG/DL — HIGH (ref 70–99)
HCT VFR BLD CALC: 26.5 % — LOW (ref 39–50)
HGB BLD-MCNC: 7.8 G/DL — LOW (ref 13–17)
INR BLD: 1.08 — SIGNIFICANT CHANGE UP (ref 0.88–1.17)
MAGNESIUM SERPL-MCNC: 2 MG/DL — SIGNIFICANT CHANGE UP (ref 1.6–2.6)
MCHC RBC-ENTMCNC: 26.1 PG — LOW (ref 27–34)
MCHC RBC-ENTMCNC: 29.4 % — LOW (ref 32–36)
MCV RBC AUTO: 88.6 FL — SIGNIFICANT CHANGE UP (ref 80–100)
NRBC # FLD: 0 K/UL — SIGNIFICANT CHANGE UP (ref 0–0)
PHOSPHATE SERPL-MCNC: 5 MG/DL — HIGH (ref 2.5–4.5)
PLATELET # BLD AUTO: 163 K/UL — SIGNIFICANT CHANGE UP (ref 150–400)
PMV BLD: 10.9 FL — SIGNIFICANT CHANGE UP (ref 7–13)
POTASSIUM SERPL-MCNC: 4 MMOL/L — SIGNIFICANT CHANGE UP (ref 3.5–5.3)
POTASSIUM SERPL-SCNC: 4 MMOL/L — SIGNIFICANT CHANGE UP (ref 3.5–5.3)
PROT SERPL-MCNC: 6.5 G/DL — SIGNIFICANT CHANGE UP (ref 6–8.3)
PROTHROM AB SERPL-ACNC: 12.3 SEC — SIGNIFICANT CHANGE UP (ref 9.8–13.1)
RBC # BLD: 2.99 M/UL — LOW (ref 4.2–5.8)
RBC # FLD: 17.6 % — HIGH (ref 10.3–14.5)
RH IG SCN BLD-IMP: POSITIVE — SIGNIFICANT CHANGE UP
SODIUM SERPL-SCNC: 137 MMOL/L — SIGNIFICANT CHANGE UP (ref 135–145)
WBC # BLD: 5.26 K/UL — SIGNIFICANT CHANGE UP (ref 3.8–10.5)
WBC # FLD AUTO: 5.26 K/UL — SIGNIFICANT CHANGE UP (ref 3.8–10.5)

## 2019-10-21 PROCEDURE — 99233 SBSQ HOSP IP/OBS HIGH 50: CPT | Mod: GC

## 2019-10-21 PROCEDURE — 99233 SBSQ HOSP IP/OBS HIGH 50: CPT

## 2019-10-21 RX ORDER — CHLORHEXIDINE GLUCONATE 213 G/1000ML
1 SOLUTION TOPICAL DAILY
Refills: 0 | Status: DISCONTINUED | OUTPATIENT
Start: 2019-10-21 | End: 2019-10-23

## 2019-10-21 RX ORDER — PANTOPRAZOLE SODIUM 20 MG/1
40 TABLET, DELAYED RELEASE ORAL EVERY 12 HOURS
Refills: 0 | Status: DISCONTINUED | OUTPATIENT
Start: 2019-10-21 | End: 2019-10-23

## 2019-10-21 RX ADMIN — HEPARIN SODIUM 5000 UNIT(S): 5000 INJECTION INTRAVENOUS; SUBCUTANEOUS at 21:01

## 2019-10-21 RX ADMIN — ETRAVIRINE 200 MILLIGRAM(S): 200 TABLET ORAL at 18:03

## 2019-10-21 RX ADMIN — ETRAVIRINE 200 MILLIGRAM(S): 200 TABLET ORAL at 10:00

## 2019-10-21 RX ADMIN — Medication 10 MILLIEQUIVALENT(S): at 13:13

## 2019-10-21 RX ADMIN — PANTOPRAZOLE SODIUM 40 MILLIGRAM(S): 20 TABLET, DELAYED RELEASE ORAL at 18:05

## 2019-10-21 RX ADMIN — HEPARIN SODIUM 5000 UNIT(S): 5000 INJECTION INTRAVENOUS; SUBCUTANEOUS at 13:11

## 2019-10-21 RX ADMIN — CHLORHEXIDINE GLUCONATE 1 APPLICATION(S): 213 SOLUTION TOPICAL at 21:02

## 2019-10-21 RX ADMIN — RITONAVIR 100 MILLIGRAM(S): 100 TABLET, FILM COATED ORAL at 13:13

## 2019-10-21 RX ADMIN — PANTOPRAZOLE SODIUM 10 MG/HR: 20 TABLET, DELAYED RELEASE ORAL at 13:12

## 2019-10-21 RX ADMIN — DARUNAVIR 800 MILLIGRAM(S): 75 TABLET, FILM COATED ORAL at 13:12

## 2019-10-21 RX ADMIN — RALTEGRAVIR 400 MILLIGRAM(S): 400 TABLET, FILM COATED ORAL at 05:18

## 2019-10-21 RX ADMIN — HEPARIN SODIUM 5000 UNIT(S): 5000 INJECTION INTRAVENOUS; SUBCUTANEOUS at 05:18

## 2019-10-21 RX ADMIN — RALTEGRAVIR 400 MILLIGRAM(S): 400 TABLET, FILM COATED ORAL at 18:03

## 2019-10-21 RX ADMIN — SODIUM CHLORIDE 50 MILLILITER(S): 9 INJECTION INTRAMUSCULAR; INTRAVENOUS; SUBCUTANEOUS at 13:12

## 2019-10-21 RX ADMIN — SODIUM CHLORIDE 50 MILLILITER(S): 9 INJECTION INTRAMUSCULAR; INTRAVENOUS; SUBCUTANEOUS at 05:18

## 2019-10-21 RX ADMIN — ERYTHROPOIETIN 6000 UNIT(S): 10000 INJECTION, SOLUTION INTRAVENOUS; SUBCUTANEOUS at 08:37

## 2019-10-21 NOTE — PROGRESS NOTE ADULT - PROBLEM SELECTOR PLAN 2
-Found to have hgb 6.1 2/2 acute blood loss anemia 2/2 likely upper GI bleed  -baseline hgb ~9-10   -hgb corrected from 6.1 to 7.9 after 2 U pRBC, down trending, s/p transfusion 1u 10/16, will maintain a hgb goal of >8   -appreciate GI cslt: as above  -EGD 10/22

## 2019-10-21 NOTE — PROGRESS NOTE ADULT - PROBLEM SELECTOR PLAN 9
DVT: SQH  Diet: Regular diet, NPO after midnight on monday for tuesday EGD   Dispo: pending clinical improvement

## 2019-10-21 NOTE — PROGRESS NOTE ADULT - PROBLEM SELECTOR PLAN 1
Pt. with ESRD on HD three times a week (MWF). Last HD was on 10/18/19 via AVF. Pt. clinically stable. Labs reviewed. Receiving maintenance HD today. Monitor labs  Plan for EGD today Pt. with ESRD on HD three times a week (MWF). Last HD was on 10/18/19 via AVF. Pt. clinically stable. Labs reviewed. Receiving maintenance HD today. Monitor labs  Plan for EGD today    > Seen during HD today. /83. Tolerating well  > Continue HD MWF basis

## 2019-10-21 NOTE — PROGRESS NOTE ADULT - SUBJECTIVE AND OBJECTIVE BOX
[facility] DIVISION OF KIDNEY DISEASES AND HYPERTENSION -- FOLLOW UP NOTE  --------------------------------------------------------------------------------    24 hour events/subjective: Patient seen and examined at the bedside in the HD unit. Currently endorsing abdominal pain. Denies CP/SOB. VSS. Labs reviewed.        PAST HISTORY  --------------------------------------------------------------------------------  No significant changes to PMH, PSH, FHx, SHx, unless otherwise noted    ALLERGIES & MEDICATIONS  --------------------------------------------------------------------------------  Allergies    ciprofloxacin (Rash)  Levaquin (Rash)    Intolerances      Standing Inpatient Medications  darunavir 800 milliGRAM(s) Oral daily  epoetin marsha Injectable 6000 Unit(s) IV Push <User Schedule>  etravirine 200 milliGRAM(s) Oral two times a day after meals  heparin  Injectable 5000 Unit(s) SubCutaneous every 8 hours  pantoprazole Infusion 8 mG/Hr IV Continuous <Continuous>  potassium chloride    Tablet ER 10 milliEquivalent(s) Oral daily  raltegravir Tablet 400 milliGRAM(s) Oral two times a day  ritonavir Tablet 100 milliGRAM(s) Oral daily  sodium chloride 0.9%. 1000 milliLiter(s) IV Continuous <Continuous>    PRN Inpatient Medications  acetaminophen   Tablet .. 650 milliGRAM(s) Oral once PRN  HYDROmorphone  Injectable 0.4 milliGRAM(s) IV Push every 6 hours PRN      REVIEW OF SYSTEMS  --------------------------------------------------------------------------------  Gen: No lethargy  Respiratory: No dyspnea  CV: No chest pain  GI: + Abdominal pain   MSK: + LE edema  Neuro: No dizziness  Heme: No bleeding    All other systems were reviewed and are negative, except as noted.      >>> <<<    VITALS/PHYSICAL EXAM  --------------------------------------------------------------------------------  T(C): 37.1 (10-21-19 @ 06:27), Max: 37.1 (10-20-19 @ 14:06)  HR: 98 (10-21-19 @ 06:27) (97 - 99)  BP: 158/93 (10-21-19 @ 06:27) (141/73 - 158/93)  RR: 18 (10-21-19 @ 06:27) (17 - 18)  SpO2: 100% (10-21-19 @ 05:23) (97% - 100%)  Wt(kg): --        Physical Exam:    	Gen: NAD, well-appearing  	HEENT: Anicteric   	Pulm: CTA B/L  	CV: RRR, S1S2; no rub  	Abd: +BS, soft, TTP of midabdomen        	: No suprapubic tenderness  	MSK: Warm, no edema  	Neuro: Awake      	Vascular Access: HUGO PIZANO +joshua    LABS/STUDIES  --------------------------------------------------------------------------------              7.8    5.26  >-----------<  163      [10-21-19 @ 06:20]              26.5     137  |  101  |  33  ----------------------------<  110      [10-21-19 @ 06:20]  4.0   |  21  |  10.28        Ca     8.8     [10-21-19 @ 06:20]      Mg     2.0     [10-21-19 @ 06:20]      Phos  5.0     [10-21-19 @ 06:20]    TPro  6.5  /  Alb  2.9  /  TBili  0.3  /  DBili  x   /  AST  11  /  ALT  7   /  AlkPhos  70  [10-21-19 @ 06:20]    PT/INR: PT 12.3 , INR 1.08       [10-21-19 @ 06:20]  PTT: 28.0       [10-21-19 @ 06:20]      Creatinine Trend:  SCr 10.28 [10-21 @ 06:20]  SCr 8.66 [10-20 @ 07:20]  SCr 5.06 [10-18 @ 12:01]  SCr 8.26 [10-18 @ 06:30]  SCr 6.09 [10-17 @ 08:50]        HbA1c 5.6      [10-16-19 @ 04:20]    HBsAb Nonreact      [09-25-19 @ 19:34]  HBsAg NEGATIVE      [10-16-19 @ 06:45]

## 2019-10-21 NOTE — PROGRESS NOTE ADULT - SUBJECTIVE AND OBJECTIVE BOX
AMAYAISAURA ALAN  59y Male  [id number]    Patient is a 59y old  Male who presents with a chief complaint of abdominal pain (21 Oct 2019 07:33)      INTERVAL HPI/OVERNIGHT EVENTS: No acute event o/n      CONSTITUTIONAL: No fever, weight loss, or fatigue  EYES: No eye pain, visual disturbances, or discharge  ENMT:  No difficulty hearing, tinnitus, vertigo; No sinus or throat pain  NECK: No pain or stiffness  BREASTS: No pain, masses, or nipple discharge  RESPIRATORY: No cough, wheezing, chills or hemoptysis; No shortness of breath  CARDIOVASCULAR: same chest pain tender to palpation, no palpitations, dizziness, or leg swelling  GASTROINTESTINAL: No abdominal or epigastric pain. No nausea, vomiting, or hematemesis; No diarrhea or constipation. No melena or hematochezia. Stool brown in the ostomy bag   GENITOURINARY: No dysuria, frequency, hematuria, or incontinence  NEUROLOGICAL: No headaches, memory loss, loss of strength, numbness, or tremors  MUSCULOSKELETAL: No joint pain or swelling; No muscle, back, or extremity pain  SKIN: No itching, burning, rashes, or lesions   HEME/LYMPH: No easy bruising, or bleeding gums  LYMPH NODES: No enlarged glands  ENDOCRINE: No heat or cold intolerance; No hair loss  PSYCHIATRIC: No depression, anxiety, mood swings, or difficulty sleeping  ALLERY AND IMMUNOLOGIC: No hives or eczema        acetaminophen   Tablet .. 650 milliGRAM(s) Oral once PRN  darunavir 800 milliGRAM(s) Oral daily  epoetin marsha Injectable 6000 Unit(s) IV Push <User Schedule>  etravirine 200 milliGRAM(s) Oral two times a day after meals  heparin  Injectable 5000 Unit(s) SubCutaneous every 8 hours  HYDROmorphone  Injectable 0.4 milliGRAM(s) IV Push every 6 hours PRN  pantoprazole Infusion 8 mG/Hr IV Continuous <Continuous>  potassium chloride    Tablet ER 10 milliEquivalent(s) Oral daily  raltegravir Tablet 400 milliGRAM(s) Oral two times a day  ritonavir Tablet 100 milliGRAM(s) Oral daily  sodium chloride 0.9%. 1000 milliLiter(s) IV Continuous <Continuous>      Allergies:  ciprofloxacin (Rash)  Levaquin (Rash)      T(F): 97.9 (10-21-19 @ 12:17), Max: 98.8 (10-20-19 @ 14:06)  HR: 103 (10-21-19 @ 12:17) (97 - 103)  BP: 129/76 (10-21-19 @ 12:17) (129/76 - 158/93)  RR: 18 (10-21-19 @ 12:17) (17 - 18)  SpO2: 100% (10-21-19 @ 12:17) (97% - 100%)    10-21-19 @ 07:01  -  10-21-19 @ 13:32  --------------------------------------------------------  IN: 400 mL / OUT: 2400 mL / NET: -2000 mL        PHYSICAL EXAM:  GENERAL: NAD, well-developed  	HEAD:  Atraumatic, Normocephalic  	EYES: EOMI, PERRLA, conjunctiva and sclera clear  	NECK: Supple, No JVD  	CHEST/LUNG: Tenderness to palpation lateral left to sternum, clear to auscultation bilaterally; No wheeze  	HEART: S1 and S2, Regular rhythm, tachycardic; No murmurs, rubs, or gallops  	ABDOMEN: Soft, tenderness to palpation in the epigastrium, nondistended, no rebound; hypoactive bowel sounds, +colostomy bag with black tarry stool.   	EXTREMITIES:  2+ Peripheral Pulses, No clubbing, cyanosis, or edema, left AV fistula in place   PSYCH: AAOx3      LABS:                        7.8    5.26  )-----------( 163      ( 21 Oct 2019 06:20 )             26.5     Hgb Trend: 7.8<--, 8.5<--, 9.9<--, 8.1<--, 9.4<--  10-21    137  |  101  |  33<H>  ----------------------------<  110<H>  4.0   |  21<L>  |  10.28<H>    Ca    8.8      21 Oct 2019 06:20  Phos  5.0     10-21  Mg     2.0     10-21    TPro  6.5  /  Alb  2.9<L>  /  TBili  0.3  /  DBili  x   /  AST  11  /  ALT  7   /  AlkPhos  70  10-21    Creatinine Trend: 10.28<--, 8.66<--, 5.06<--, 8.26<--, 6.09<--, 8.50<--  PT/INR - ( 21 Oct 2019 06:20 )   PT: 12.3 SEC;   INR: 1.08          PTT - ( 21 Oct 2019 06:20 )  PTT:28.0 SEC      RADIOLOGY & ADDITIONAL TESTS:

## 2019-10-21 NOTE — ADVANCED PRACTICE NURSE CONSULT - RECOMMEDATIONS
Stoma size: 5/8" x 1-1/4"  Appliance used:        Skin barrier ring- item # [id number]      One piece convex drainable pouch- item #[id number]      Belt- item # [id number]

## 2019-10-21 NOTE — PROGRESS NOTE ADULT - PROBLEM SELECTOR PLAN 2
BP elevated this AM, will reassess after HD with UF. Monitor BP on current BP medication. Low salt diet. BP elevated this AM, will reassess after HD with UF. Monitor BP on current BP medication. Low salt diet.    > BP within acceptable range during HD  > UF as tolerated

## 2019-10-21 NOTE — ADVANCED PRACTICE NURSE CONSULT - ASSESSMENT
Patient has difficult acces in right arm. Left arm has precautions due to AV Fistula. Attempted x3 via Ultrasound to access vein.   Large basilic vein accessed but unable to advance catheter.  Advised patient I would let him rest for today and try again in the am.   yKler, Primary RN made aware. GIORGIO Figueroa, RN
Patient seen for ostomy teaching-leakage of pouch. Removed pouch, cleansed skin with water, patted dry. Taught patient how to properly assess stoma viability and peristomal skin- verbalized understanding. Educated patient on different pouching systems and which one we were going to try that was best for his retracted stoma: one piece (flexible) convex pouch with ostomy belt. Patient actively participated in stoma measurement, creating template, cutting waffer, applying barrier ring, applying pouch. Reviewed how to open, close, and empty pouch. Reviewed s/s to report to MD.

## 2019-10-21 NOTE — PROGRESS NOTE ADULT - PROBLEM SELECTOR PLAN 1
-P/w abdominal pain and melena in ostomy, + FOBT from ostomy bag likely 2/2 upper GI bleed; patient was hypotensive in the ED, MICU cslt x2. differential includes bleeding ulcer vs. post op complication - collection noted on CT - surgery consulted, rec transfer back to [place] where surgery was performed   -Contacted Dr. Dilan Garcia (surgeon at [facility] [phone]), he stated this is not a post-op complication, he believed this is a seroma, a normal reaction after hernia repair, he does not want the patient transferred. He said he is not currently at the hospital   -anemic to hgb 6.1 s/p 3 U pRBC  -Maintain MAP >65, active type and screen, Transfuse for hgb <7 or for symptomatic anemia   -c/w PPI oral   -appreciate GI recs: EGD, rescheduled because monday for acute cases

## 2019-10-21 NOTE — ADVANCED PRACTICE NURSE CONSULT - REASON FOR CONSULT
Patient seen for ostomy care: reports having surgery for infection in bowel at OSH about 1 month ago and has had VNS in home setting. Currently using 2-piece pouch with belt at home and barrier ring.
IV Placement Requested

## 2019-10-22 ENCOUNTER — RESULT REVIEW (OUTPATIENT)
Age: 59
End: 2019-10-22

## 2019-10-22 LAB
ANION GAP SERPL CALC-SCNC: 13 MMO/L — SIGNIFICANT CHANGE UP (ref 7–14)
BUN SERPL-MCNC: 23 MG/DL — SIGNIFICANT CHANGE UP (ref 7–23)
CALCIUM SERPL-MCNC: 9.1 MG/DL — SIGNIFICANT CHANGE UP (ref 8.4–10.5)
CHLORIDE SERPL-SCNC: 98 MMOL/L — SIGNIFICANT CHANGE UP (ref 98–107)
CO2 SERPL-SCNC: 25 MMOL/L — SIGNIFICANT CHANGE UP (ref 22–31)
CREAT SERPL-MCNC: 7.21 MG/DL — HIGH (ref 0.5–1.3)
GLUCOSE SERPL-MCNC: 111 MG/DL — HIGH (ref 70–99)
HCT VFR BLD CALC: 28.5 % — LOW (ref 39–50)
HGB BLD-MCNC: 8.7 G/DL — LOW (ref 13–17)
INR BLD: 1.09 — SIGNIFICANT CHANGE UP (ref 0.88–1.17)
MCHC RBC-ENTMCNC: 26.8 PG — LOW (ref 27–34)
MCHC RBC-ENTMCNC: 30.5 % — LOW (ref 32–36)
MCV RBC AUTO: 87.7 FL — SIGNIFICANT CHANGE UP (ref 80–100)
NRBC # FLD: 0 K/UL — SIGNIFICANT CHANGE UP (ref 0–0)
PLATELET # BLD AUTO: 158 K/UL — SIGNIFICANT CHANGE UP (ref 150–400)
PMV BLD: 10.8 FL — SIGNIFICANT CHANGE UP (ref 7–13)
POTASSIUM SERPL-MCNC: 4 MMOL/L — SIGNIFICANT CHANGE UP (ref 3.5–5.3)
POTASSIUM SERPL-SCNC: 4 MMOL/L — SIGNIFICANT CHANGE UP (ref 3.5–5.3)
PROTHROM AB SERPL-ACNC: 12.1 SEC — SIGNIFICANT CHANGE UP (ref 9.8–13.1)
RBC # BLD: 3.25 M/UL — LOW (ref 4.2–5.8)
RBC # FLD: 17.4 % — HIGH (ref 10.3–14.5)
SODIUM SERPL-SCNC: 136 MMOL/L — SIGNIFICANT CHANGE UP (ref 135–145)
WBC # BLD: 4.83 K/UL — SIGNIFICANT CHANGE UP (ref 3.8–10.5)
WBC # FLD AUTO: 4.83 K/UL — SIGNIFICANT CHANGE UP (ref 3.8–10.5)

## 2019-10-22 PROCEDURE — 88305 TISSUE EXAM BY PATHOLOGIST: CPT | Mod: 26

## 2019-10-22 PROCEDURE — 88312 SPECIAL STAINS GROUP 1: CPT | Mod: 26

## 2019-10-22 PROCEDURE — 43239 EGD BIOPSY SINGLE/MULTIPLE: CPT | Mod: GC

## 2019-10-22 PROCEDURE — 99233 SBSQ HOSP IP/OBS HIGH 50: CPT

## 2019-10-22 RX ADMIN — RALTEGRAVIR 400 MILLIGRAM(S): 400 TABLET, FILM COATED ORAL at 05:10

## 2019-10-22 RX ADMIN — HEPARIN SODIUM 5000 UNIT(S): 5000 INJECTION INTRAVENOUS; SUBCUTANEOUS at 21:08

## 2019-10-22 RX ADMIN — Medication 10 MILLIEQUIVALENT(S): at 16:09

## 2019-10-22 RX ADMIN — CHLORHEXIDINE GLUCONATE 1 APPLICATION(S): 213 SOLUTION TOPICAL at 16:09

## 2019-10-22 RX ADMIN — PANTOPRAZOLE SODIUM 40 MILLIGRAM(S): 20 TABLET, DELAYED RELEASE ORAL at 05:10

## 2019-10-22 RX ADMIN — HEPARIN SODIUM 5000 UNIT(S): 5000 INJECTION INTRAVENOUS; SUBCUTANEOUS at 05:11

## 2019-10-22 RX ADMIN — RALTEGRAVIR 400 MILLIGRAM(S): 400 TABLET, FILM COATED ORAL at 17:57

## 2019-10-22 RX ADMIN — DARUNAVIR 800 MILLIGRAM(S): 75 TABLET, FILM COATED ORAL at 16:09

## 2019-10-22 RX ADMIN — PANTOPRAZOLE SODIUM 40 MILLIGRAM(S): 20 TABLET, DELAYED RELEASE ORAL at 17:57

## 2019-10-22 RX ADMIN — ETRAVIRINE 200 MILLIGRAM(S): 200 TABLET ORAL at 09:53

## 2019-10-22 RX ADMIN — ETRAVIRINE 200 MILLIGRAM(S): 200 TABLET ORAL at 17:57

## 2019-10-22 RX ADMIN — RITONAVIR 100 MILLIGRAM(S): 100 TABLET, FILM COATED ORAL at 16:09

## 2019-10-22 NOTE — CHART NOTE - NSCHARTNOTEFT_GEN_A_CORE
Pt identified via Length of stay screening   Pt 58 yo male with ESRD on dialysis, HIV on HAATR; Pt with admit diagnosis: GI Bleed.   RDN attempted to visit/assess, but Pt off the unit @ time of visit. Will reattempt to visit Pt at a later time as able. RDN remains available.

## 2019-10-22 NOTE — PROGRESS NOTE ADULT - PROBLEM SELECTOR PLAN 1
-P/w abdominal pain and melena in ostomy, + FOBT from ostomy bag likely 2/2 upper GI bleed; patient was hypotensive in the ED, MICU cslt x2. differential includes bleeding ulcer vs. post op complication - collection noted on CT - surgery consulted, rec transfer back to [place] where surgery was performed   -Contacted Dr. Dilan Garcia (surgeon at [facility] [phone]), he stated this is not a post-op complication, he believed this is a seroma, a normal reaction after hernia repair, he does not want the patient transferred. He said he is not currently at the hospital   -anemic to hgb 6.1 s/p 3 U pRBC  -Maintain MAP >65, active type and screen, Transfuse for hgb <7 or for symptomatic anemia   -c/w PPI oral   -appreciate GI recs: EGD, rescheduled to 10/22

## 2019-10-22 NOTE — CHART NOTE - NSCHARTNOTEFT_GEN_A_CORE
I placed another long 20g ultrasound guided IV in the patient's right forearm. Good blood return and flushes well w/o burning. One attempt; no complications.    Harris Hall  EM/IM  PGY-2

## 2019-10-22 NOTE — PROGRESS NOTE ADULT - ASSESSMENT
Patient is a 59 y.o M w/ PMH ESRD on HD (MWF), HIV on HAART, VL 1703, CD4 201), hepatitis C infection, HLD, HTN, and recent hospitalization from 9/18-10/1 for perforated sigmoid diverticulitis s/p ex lap, MAYDA, SBR, sigmoi colon resection with colostomy creation c/b intra-abdominal abscess s/p drainage who p/w 10/10 diffuse, abdominal pain, found to be anemic to hgb 6.1 in setting of melena in ostomy, concerning for upper GI bleed vs surgical complication. most likely upper GI bleed given black tarry stool in the ostomy bag, now with brown stool, patient is still pending EGD (rescheduled 3 times), otherwise patient is HDS had good PO intake

## 2019-10-22 NOTE — PROGRESS NOTE ADULT - SUBJECTIVE AND OBJECTIVE BOX
AMAYAISAURA ALAN  59y Male  [id number]    Patient is a 59y old  Male who presents with a chief complaint of abdominal pain (21 Oct 2019 07:33)      INTERVAL HPI/OVERNIGHT EVENTS: No acute event o/n, no functioning IV currently      CONSTITUTIONAL: No fever, weight loss, or fatigue  EYES: No eye pain, visual disturbances, or discharge  ENMT:  No difficulty hearing, tinnitus, vertigo; No sinus or throat pain  NECK: No pain or stiffness  BREASTS: No pain, masses, or nipple discharge  RESPIRATORY: No cough, wheezing, chills or hemoptysis; No shortness of breath  CARDIOVASCULAR: same chest pain tender to palpation, no palpitations, dizziness, or leg swelling  GASTROINTESTINAL: No abdominal or epigastric pain. No nausea, vomiting, or hematemesis; No diarrhea or constipation. No melena or hematochezia. Stool brown in the ostomy bag   GENITOURINARY: No dysuria, frequency, hematuria, or incontinence  NEUROLOGICAL: No headaches, memory loss, loss of strength, numbness, or tremors  MUSCULOSKELETAL: No joint pain or swelling; No muscle, back, or extremity pain  SKIN: No itching, burning, rashes, or lesions   HEME/LYMPH: No easy bruising, or bleeding gums  LYMPH NODES: No enlarged glands  ENDOCRINE: No heat or cold intolerance; No hair loss  PSYCHIATRIC: No depression, anxiety, mood swings, or difficulty sleeping  ALLERY AND IMMUNOLOGIC: No hives or eczema        acetaminophen   Tablet .. 650 milliGRAM(s) Oral once PRN  darunavir 800 milliGRAM(s) Oral daily  epoetin marsha Injectable 6000 Unit(s) IV Push <User Schedule>  etravirine 200 milliGRAM(s) Oral two times a day after meals  heparin  Injectable 5000 Unit(s) SubCutaneous every 8 hours  HYDROmorphone  Injectable 0.4 milliGRAM(s) IV Push every 6 hours PRN  pantoprazole Infusion 8 mG/Hr IV Continuous <Continuous>  potassium chloride    Tablet ER 10 milliEquivalent(s) Oral daily  raltegravir Tablet 400 milliGRAM(s) Oral two times a day  ritonavir Tablet 100 milliGRAM(s) Oral daily  sodium chloride 0.9%. 1000 milliLiter(s) IV Continuous <Continuous>      Allergies:  ciprofloxacin (Rash)  Levaquin (Rash)      T(F): 97.9 (10-21-19 @ 12:17), Max: 98.8 (10-20-19 @ 14:06)  HR: 103 (10-21-19 @ 12:17) (97 - 103)  BP: 129/76 (10-21-19 @ 12:17) (129/76 - 158/93)  RR: 18 (10-21-19 @ 12:17) (17 - 18)  SpO2: 100% (10-21-19 @ 12:17) (97% - 100%)    10-21-19 @ 07:01  -  10-21-19 @ 13:32  --------------------------------------------------------  IN: 400 mL / OUT: 2400 mL / NET: -2000 mL        PHYSICAL EXAM:  GENERAL: NAD, well-developed  	HEAD:  Atraumatic, Normocephalic  	EYES: EOMI, PERRLA, conjunctiva and sclera clear  	NECK: Supple, No JVD  	CHEST/LUNG: Tenderness to palpation lateral left to sternum, clear to auscultation bilaterally; No wheeze  	HEART: S1 and S2, Regular rhythm, tachycardic; No murmurs, rubs, or gallops  	ABDOMEN: Soft, tenderness to palpation in the epigastrium, nondistended, no rebound; hypoactive bowel sounds, +colostomy bag with black tarry stool.   	EXTREMITIES:  2+ Peripheral Pulses, No clubbing, cyanosis, or edema, left AV fistula in place   PSYCH: AAOx3      LABS:                        7.8    5.26  )-----------( 163      ( 21 Oct 2019 06:20 )             26.5     Hgb Trend: 7.8<--, 8.5<--, 9.9<--, 8.1<--, 9.4<--  10-21    137  |  101  |  33<H>  ----------------------------<  110<H>  4.0   |  21<L>  |  10.28<H>    Ca    8.8      21 Oct 2019 06:20  Phos  5.0     10-21  Mg     2.0     10-21    TPro  6.5  /  Alb  2.9<L>  /  TBili  0.3  /  DBili  x   /  AST  11  /  ALT  7   /  AlkPhos  70  10-21    Creatinine Trend: 10.28<--, 8.66<--, 5.06<--, 8.26<--, 6.09<--, 8.50<--  PT/INR - ( 21 Oct 2019 06:20 )   PT: 12.3 SEC;   INR: 1.08          PTT - ( 21 Oct 2019 06:20 )  PTT:28.0 SEC      RADIOLOGY & ADDITIONAL TESTS:

## 2019-10-22 NOTE — PROGRESS NOTE ADULT - PROBLEM SELECTOR PLAN 9
DVT: SQH  Diet: Regular diet, NPO after midnight on monday for tuesday EGD   Dispo: Home with prior services, no PT need

## 2019-10-23 ENCOUNTER — TRANSCRIPTION ENCOUNTER (OUTPATIENT)
Age: 59
End: 2019-10-23

## 2019-10-23 VITALS
DIASTOLIC BLOOD PRESSURE: 70 MMHG | OXYGEN SATURATION: 99 % | RESPIRATION RATE: 17 BRPM | SYSTOLIC BLOOD PRESSURE: 108 MMHG | TEMPERATURE: 98 F | HEART RATE: 103 BPM

## 2019-10-23 LAB
ANION GAP SERPL CALC-SCNC: 15 MMO/L — HIGH (ref 7–14)
BUN SERPL-MCNC: 34 MG/DL — HIGH (ref 7–23)
CALCIUM SERPL-MCNC: 9.2 MG/DL — SIGNIFICANT CHANGE UP (ref 8.4–10.5)
CHLORIDE SERPL-SCNC: 100 MMOL/L — SIGNIFICANT CHANGE UP (ref 98–107)
CO2 SERPL-SCNC: 22 MMOL/L — SIGNIFICANT CHANGE UP (ref 22–31)
CREAT SERPL-MCNC: 9.3 MG/DL — HIGH (ref 0.5–1.3)
GLUCOSE SERPL-MCNC: 112 MG/DL — HIGH (ref 70–99)
HBV CORE AB SER-ACNC: REACTIVE — SIGNIFICANT CHANGE UP
HBV SURFACE AB SER-ACNC: 5.7 MLU/ML — LOW
HCT VFR BLD CALC: 26.8 % — LOW (ref 39–50)
HGB BLD-MCNC: 8.1 G/DL — LOW (ref 13–17)
MCHC RBC-ENTMCNC: 26.5 PG — LOW (ref 27–34)
MCHC RBC-ENTMCNC: 30.2 % — LOW (ref 32–36)
MCV RBC AUTO: 87.6 FL — SIGNIFICANT CHANGE UP (ref 80–100)
NRBC # FLD: 0 K/UL — SIGNIFICANT CHANGE UP (ref 0–0)
PLATELET # BLD AUTO: 178 K/UL — SIGNIFICANT CHANGE UP (ref 150–400)
PMV BLD: 11.1 FL — SIGNIFICANT CHANGE UP (ref 7–13)
POTASSIUM SERPL-MCNC: 3.7 MMOL/L — SIGNIFICANT CHANGE UP (ref 3.5–5.3)
POTASSIUM SERPL-SCNC: 3.7 MMOL/L — SIGNIFICANT CHANGE UP (ref 3.5–5.3)
RBC # BLD: 3.06 M/UL — LOW (ref 4.2–5.8)
RBC # FLD: 17.6 % — HIGH (ref 10.3–14.5)
SODIUM SERPL-SCNC: 137 MMOL/L — SIGNIFICANT CHANGE UP (ref 135–145)
SURGICAL PATHOLOGY STUDY: SIGNIFICANT CHANGE UP
WBC # BLD: 4.77 K/UL — SIGNIFICANT CHANGE UP (ref 3.8–10.5)
WBC # FLD AUTO: 4.77 K/UL — SIGNIFICANT CHANGE UP (ref 3.8–10.5)

## 2019-10-23 PROCEDURE — 99239 HOSP IP/OBS DSCHRG MGMT >30: CPT

## 2019-10-23 PROCEDURE — 99233 SBSQ HOSP IP/OBS HIGH 50: CPT

## 2019-10-23 RX ORDER — HYDROMORPHONE HYDROCHLORIDE 2 MG/ML
1 INJECTION INTRAMUSCULAR; INTRAVENOUS; SUBCUTANEOUS
Qty: 16 | Refills: 0
Start: 2019-10-23 | End: 2019-10-26

## 2019-10-23 RX ORDER — PANTOPRAZOLE SODIUM 20 MG/1
1 TABLET, DELAYED RELEASE ORAL
Qty: 98 | Refills: 0
Start: 2019-10-23 | End: 2019-12-10

## 2019-10-23 RX ADMIN — RALTEGRAVIR 400 MILLIGRAM(S): 400 TABLET, FILM COATED ORAL at 05:06

## 2019-10-23 RX ADMIN — HEPARIN SODIUM 5000 UNIT(S): 5000 INJECTION INTRAVENOUS; SUBCUTANEOUS at 05:06

## 2019-10-23 RX ADMIN — CHLORHEXIDINE GLUCONATE 1 APPLICATION(S): 213 SOLUTION TOPICAL at 13:33

## 2019-10-23 RX ADMIN — ERYTHROPOIETIN 6000 UNIT(S): 10000 INJECTION, SOLUTION INTRAVENOUS; SUBCUTANEOUS at 08:51

## 2019-10-23 RX ADMIN — HYDROMORPHONE HYDROCHLORIDE 0.4 MILLIGRAM(S): 2 INJECTION INTRAMUSCULAR; INTRAVENOUS; SUBCUTANEOUS at 09:44

## 2019-10-23 RX ADMIN — RITONAVIR 100 MILLIGRAM(S): 100 TABLET, FILM COATED ORAL at 13:34

## 2019-10-23 RX ADMIN — ETRAVIRINE 200 MILLIGRAM(S): 200 TABLET ORAL at 13:33

## 2019-10-23 RX ADMIN — PANTOPRAZOLE SODIUM 40 MILLIGRAM(S): 20 TABLET, DELAYED RELEASE ORAL at 05:06

## 2019-10-23 RX ADMIN — DARUNAVIR 800 MILLIGRAM(S): 75 TABLET, FILM COATED ORAL at 13:33

## 2019-10-23 RX ADMIN — Medication 10 MILLIEQUIVALENT(S): at 13:34

## 2019-10-23 RX ADMIN — HEPARIN SODIUM 5000 UNIT(S): 5000 INJECTION INTRAVENOUS; SUBCUTANEOUS at 13:33

## 2019-10-23 RX ADMIN — HYDROMORPHONE HYDROCHLORIDE 0.4 MILLIGRAM(S): 2 INJECTION INTRAMUSCULAR; INTRAVENOUS; SUBCUTANEOUS at 09:59

## 2019-10-23 NOTE — PROGRESS NOTE ADULT - PROVIDER SPECIALTY LIST ADULT
Gastroenterology
Internal Medicine
Nephrology
Surgery
Gastroenterology
Internal Medicine
Nephrology
Nephrology
Internal Medicine

## 2019-10-23 NOTE — CHART NOTE - NSCHARTNOTEFT_GEN_A_CORE
Pt pathology returned: negative for H.pylori, negative for intestinal metaplasia/dysplasia, only notable for chronic inactive gastritis.       Surgical Pathology Report (10.22.19 @ 13:40)    Surgical Pathology Report:   ACCESSION No:  [id number]    ISAURA AMAYA                       1        Surgical Final Report          Final Diagnosis  Stomach, biopsy  - Gastric oxyntic mucosa with chronic inactive gastritis.  - Negative for Helicobacter microorganisms (special stain).  - Negative for intestinal metaplasia and dysplasia.    Verified by: Kayla Dixon M.D.  (Electronic Signature)  Reported on: 10/23/19 14:04 EDT, [address] [facility] [place] [address]  _________________________________________________________________    Clinical History  59-year-old male with melena    Specimen(s) Submitted  1-Gastric, biopsy    Gross Description  The specimen is received in formalin and the specimen container  is labeled: Gastric biopsy.  It consists of two pieces of pink-  white soft tissue each measuring 0.2 cm in greatest dimension.  Special stains are ordered.  Entirely submitted.  One cassette.    In addition to other data that may appear on the specimen  container, the label has been inspected toconfirm the presence  of the patient's name and date of birth.  Jalyn Tolentino 10/22/2019 17:52

## 2019-10-23 NOTE — PROGRESS NOTE ADULT - REASON FOR ADMISSION
abdominal pain

## 2019-10-23 NOTE — PROGRESS NOTE ADULT - ASSESSMENT
Patient is a 59 y.o M w/ PMH ESRD on HD (MWF), HIV on HAART, VL 1703, CD4 201), hepatitis C infection, HLD, HTN, and recent hospitalization from 9/18-10/1 for perforated sigmoid diverticulitis s/p ex lap, MAYDA, SBR, sigmoi colon resection with colostomy creation c/b intra-abdominal abscess s/p drainage who p/w 10/10 diffuse, abdominal pain, found to be anemic to hgb 6.1 in setting of melena in ostomy s/p EGD 10/22 found to have healed gastric ulcer and non bleeding erosive gastritis rec PPI x8 wks avoid NSAIDs

## 2019-10-23 NOTE — PROGRESS NOTE ADULT - PROBLEM SELECTOR PLAN 5
.  No critical e-, acid-base or volume derangement
-on CT abdomen w/ IV contrast found to have splenic masses 6.2 x 4.9 cm; unclear   -last CT w/o IV contrast from 9/18/19 showed a 5.7 cm splenic mass   -consider further work up? GI recs appreciated
.  No critical e-, acid-base or volume derangement
-on CT abdomen w/ IV contrast found to have splenic masses 6.2 x 4.9 cm; unclear   -last CT w/o IV contrast from 9/18/19 showed a 5.7 cm splenic mass   -Reviewed with radiology, less likely lymphoma given lesion well circumscribed, couldn't definitively r/o.   -need op f/u with surgery regarding splenectomy vs observation

## 2019-10-23 NOTE — PROGRESS NOTE ADULT - PROBLEM SELECTOR PROBLEM 2
Acute blood loss anemia
Acute blood loss anemia
HTN (hypertension)
Acute blood loss anemia
HTN (hypertension)
Acute blood loss anemia
Acute blood loss anemia
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
## 2019-10-23 NOTE — PROGRESS NOTE ADULT - PROBLEM SELECTOR PLAN 7
-PMH HIV,  VL 1703, CD4 201 from 9/19/19  -Currently on darunavir 800 daily, etravirine 200 q12h, raltegravir 400 q12h, and ritonavir 100 daily

## 2019-10-23 NOTE — DIETITIAN INITIAL EVALUATION ADULT. - DIET TYPE
PO diet renal replacement pts:no protein restr,no conc K & phos, low sodium/Nepro 1 can daily (provides additional ~425 Kcal, ~19 gm Protein);/supplement (specify)

## 2019-10-23 NOTE — PROGRESS NOTE ADULT - PROBLEM SELECTOR PLAN 1
-P/w abdominal pain and melena in ostomy, + FOBT from ostomy bag likely 2/2 upper GI bleed; patient was hypotensive in the ED, MICU cslt x2. differential includes bleeding ulcer vs. post op complication - collection noted on CT - surgery consulted, rec transfer back to [place] where surgery was performed   -Contacted Dr. Dilan Garcia (surgeon at [facility] [phone]), he stated this is not a post-op complication, he believed this is a seroma, a normal reaction after hernia repair, he does not want the patient transferred. He said he is not currently at the hospital   -anemic to hgb 6.1 s/p 3 U pRBC  -Maintain MAP >65, active type and screen, Transfuse for hgb <7 or for symptomatic anemia   -c/w PPI oral   -appreciate GI recs: EGD showed healed gastric ulcer and non bleeding erosive gastritis rec PPI x8 wks avoid NSAIDs

## 2019-10-23 NOTE — PROGRESS NOTE ADULT - PROBLEM SELECTOR PLAN 4
PMH ESRD, HD via AV fistula M,W,F  -outpatient HD 10/14/19 at [place]  -Nephrology consulted s/p HD 10/16   -Electrolytes replete prn
PMH ESRD, HD via AV fistula M,W,F  -Last HD 10/14/19 at [place]  -Nephrology consulted; next HD is 10/16   -Electrolytes WNL
PMH ESRD, HD via AV fistula M,W,F  -outpatient HD 10/14/19 at [place]  -Nephrology consulted s/p HD 10/16   -Electrolytes: K 3.3 today, will replete
PMH ESRD, HD via AV fistula M,W,F  -outpatient HD 10/14/19 at [place]  -Nephrology consulted s/p HD 10/16   -Electrolytes: K 3.3 today, will replete
PMH ESRD, HD via AV fistula M,W,F  -outpatient HD 10/14/19 at [place]  -Nephrology consulted s/p HD 10/16   -Electrolytes WNL
PMH ESRD, HD via AV fistula M,W,F  -outpatient HD 10/14/19 at [place]  -Nephrology consulted s/p HD 10/16   -Electrolytes WNL
PMH ESRD, HD via AV fistula M,W,F  -outpatient HD 10/14/19 at [place]  -Nephrology consulted s/p HD 10/16   -Electrolytes replete prn
PMH ESRD, HD via AV fistula M,W,F  -outpatient HD 10/14/19 at [place]  -Nephrology consulted s/p HD 10/16   -Electrolytes: K 3.3 today, will replete
.  > Ca, Mendel WNL
.  > Ca, Mendel WNL
PMH ESRD, HD via AV fistula M,W,F  -outpatient HD 10/14/19 at [place]  -Nephrology consulted s/p HD 10/16   -Electrolytes replete prn

## 2019-10-23 NOTE — PROGRESS NOTE ADULT - PROBLEM SELECTOR PROBLEM 7
HIV (human immunodeficiency virus infection)

## 2019-10-23 NOTE — PROGRESS NOTE ADULT - PROBLEM SELECTOR PLAN 2
-Found to have hgb 6.1 2/2 acute blood loss anemia 2/2 likely upper GI bleed  -baseline hgb ~9-10   -hgb corrected from 6.1 to 7.9 after 2 U pRBC, down trending, s/p transfusion 1u 10/16, will maintain a hgb goal of >8   -appreciate GI cslt: as above  -EGD 10/22, see above

## 2019-10-23 NOTE — PROGRESS NOTE ADULT - SUBJECTIVE AND OBJECTIVE BOX
[facility] DIVISION OF KIDNEY DISEASES AND HYPERTENSION -- FOLLOW UP NOTE  --------------------------------------------------------------------------------    24 hour events/subjective: Patient seen and examined at the bedside during HD. Patient still endorsing abdominal pain. Had EGD yesterday which showed  non-bleeding erosive gastropathy. Small area of scarring, likely healed ulcer. Erythematous duodenopathy. BP elevated during HD, 160/86        PAST HISTORY  --------------------------------------------------------------------------------  No significant changes to PMH, PSH, FHx, SHx, unless otherwise noted    ALLERGIES & MEDICATIONS  --------------------------------------------------------------------------------  Allergies    ciprofloxacin (Rash)  Levaquin (Rash)    Intolerances      Standing Inpatient Medications  chlorhexidine 4% Liquid 1 Application(s) Topical daily  darunavir 800 milliGRAM(s) Oral daily  epoetin marsha Injectable 6000 Unit(s) IV Push <User Schedule>  etravirine 200 milliGRAM(s) Oral two times a day after meals  heparin  Injectable 5000 Unit(s) SubCutaneous every 8 hours  pantoprazole    Tablet 40 milliGRAM(s) Oral every 12 hours  potassium chloride    Tablet ER 10 milliEquivalent(s) Oral daily  raltegravir Tablet 400 milliGRAM(s) Oral two times a day  ritonavir Tablet 100 milliGRAM(s) Oral daily  sodium chloride 0.9%. 1000 milliLiter(s) IV Continuous <Continuous>    PRN Inpatient Medications  acetaminophen   Tablet .. 650 milliGRAM(s) Oral once PRN  HYDROmorphone  Injectable 0.4 milliGRAM(s) IV Push every 6 hours PRN      REVIEW OF SYSTEMS  --------------------------------------------------------------------------------  Gen: No lethargy or fever  Respiratory: No dyspnea  CV: No chest pain  GI: + abdominal pain   MSK: + LE edema  Neuro: No dizziness  Heme: No bleeding    All other systems were reviewed and are negative, except as noted.      >>> <<<    VITALS/PHYSICAL EXAM  --------------------------------------------------------------------------------  T(C): 36.7 (10-23-19 @ 06:30), Max: 36.8 (10-22-19 @ 09:54)  HR: 96 (10-23-19 @ 06:30) (90 - 103)  BP: 160/86 (10-23-19 @ 06:30) (141/74 - 160/86)  RR: 17 (10-23-19 @ 06:30) (17 - 18)  SpO2: 100% (10-23-19 @ 04:47) (99% - 100%)  Wt(kg): --  Height (cm): 167.6 (10-22-19 @ 09:55)  Weight (kg): 104.7 (10-22-19 @ 09:55)  BMI (kg/m2): 37.3 (10-22-19 @ 09:55)  BSA (m2): 2.13 (10-22-19 @ 09:55)      Physical Exam:    	Gen: NAD, well-appearing  	HEENT: Anicteric   	Pulm: CTA B/L  	CV: RRR, S1S2; no rub  	Abd: +BS, soft, TTP of midabdomen        	: No suprapubic tenderness  	MSK: Warm, 1+ edema  	Neuro: Awake      	Vascular Access: LUE AVF +thrill      LABS/STUDIES  --------------------------------------------------------------------------------              8.1    4.77  >-----------<  178      [10-23-19 @ 06:30]              26.8     136  |  98  |  23  ----------------------------<  111      [10-22-19 @ 07:06]  4.0   |  25  |  7.21        Ca     9.1     [10-22-19 @ 07:06]      PT/INR: PT 12.1 , INR 1.09       [10-22-19 @ 07:06]      Creatinine Trend:  SCr 7.21 [10-22 @ 07:06]  SCr 10.28 [10-21 @ 06:20]  SCr 8.66 [10-20 @ 07:20]  SCr 5.06 [10-18 @ 12:01]  SCr 8.26 [10-18 @ 06:30]        HbA1c 5.6      [10-16-19 @ 04:20]    HBsAb Nonreact      [09-25-19 @ 19:34]  HBsAg NEGATIVE      [10-16-19 @ 06:45]

## 2019-10-23 NOTE — PROGRESS NOTE ADULT - PROBLEM SELECTOR PROBLEM 5
Splenic mass
Electrolyte and fluid disorder
Electrolyte and fluid disorder
Splenic mass

## 2019-10-23 NOTE — PROGRESS NOTE ADULT - PROBLEM SELECTOR PLAN 8
-Reported hx of HTN; hydralazine 10 TID, carvedilol 6. 25 q12h   -hold any anti hypertensive in setting of acute blood loss and hypotension

## 2019-10-23 NOTE — DIETITIAN INITIAL EVALUATION ADULT. - OTHER INFO
Pt 60 yo male with ESRD on dialysis. Pt appears alert, oriented. Per Pt his appetite not well for past ~1 month. Pt also stated in past few months he had lost weight, but unable to quantify. Of note Pt's weights: 230.8# (10/15/19); 253.5# (9/18/19) ->> indicating weight loss of 22.7# in ~1 month. Food preferences discussed. No report of chewing/swallowing difficulty; no report of nausea, vomiting or diarrhea @ this time. +Ostomy in place -> semisolid out put (per nurse). Per Pt his height: 66"; but Pt not sure about his body weight "I do not weigh my self." At home Pt tries to follow renal diet reported; Aide helps his in food preparation reported as well. Renal diet discussed with Pt including better food choices, foods to avoid. Written materials on Renal diet also provided. RDN remains available, Pt made aware. Case discussed with nurse. Plan for Pt to be discharged today.

## 2019-10-23 NOTE — DIETITIAN INITIAL EVALUATION ADULT. - PERTINENT LABORATORY DATA
(10/23) H/H 8.1/26.8 L, BUN 34 H, Creat 9.30 H, Glu 112 H;   (10/21) Albumin 2.9 L, phosphorus 5.0 H

## 2019-10-23 NOTE — PROGRESS NOTE ADULT - SUBJECTIVE AND OBJECTIVE BOX
AMAYAISAURA ALAN  59y Male  [id number]    Patient is a 59y old  Male who presents with a chief complaint of abdominal pain (21 Oct 2019 07:33)      INTERVAL HPI/OVERNIGHT EVENTS: No acute event o/n, s/p EGD yesterday showed non bleeding healed gastric ulcer     CONSTITUTIONAL: No fever, weight loss, or fatigue  EYES: No eye pain, visual disturbances, or discharge  ENMT:  No difficulty hearing, tinnitus, vertigo; No sinus or throat pain  NECK: No pain or stiffness  BREASTS: No pain, masses, or nipple discharge  RESPIRATORY: No cough, wheezing, chills or hemoptysis; No shortness of breath  CARDIOVASCULAR: same chest pain tender to palpation, no palpitations, dizziness, or leg swelling  GASTROINTESTINAL: same abdominal, epigastric pain. No nausea, vomiting, or hematemesis; No diarrhea or constipation. No melena or hematochezia. Stool brown in the ostomy bag   GENITOURINARY: No dysuria, frequency, hematuria, or incontinence  NEUROLOGICAL: No headaches, memory loss, loss of strength, numbness, or tremors  MUSCULOSKELETAL: No joint pain or swelling; No muscle, back, or extremity pain  SKIN: No itching, burning, rashes, or lesions   HEME/LYMPH: No easy bruising, or bleeding gums  LYMPH NODES: No enlarged glands  ENDOCRINE: No heat or cold intolerance; No hair loss  PSYCHIATRIC: No depression, anxiety, mood swings, or difficulty sleeping  ALLERY AND IMMUNOLOGIC: No hives or eczema        acetaminophen   Tablet .. 650 milliGRAM(s) Oral once PRN  darunavir 800 milliGRAM(s) Oral daily  epoetin marsha Injectable 6000 Unit(s) IV Push <User Schedule>  etravirine 200 milliGRAM(s) Oral two times a day after meals  heparin  Injectable 5000 Unit(s) SubCutaneous every 8 hours  HYDROmorphone  Injectable 0.4 milliGRAM(s) IV Push every 6 hours PRN  pantoprazole Infusion 8 mG/Hr IV Continuous <Continuous>  potassium chloride    Tablet ER 10 milliEquivalent(s) Oral daily  raltegravir Tablet 400 milliGRAM(s) Oral two times a day  ritonavir Tablet 100 milliGRAM(s) Oral daily  sodium chloride 0.9%. 1000 milliLiter(s) IV Continuous <Continuous>      Allergies:  ciprofloxacin (Rash)  Levaquin (Rash)      T(F): 97.9 (10-21-19 @ 12:17), Max: 98.8 (10-20-19 @ 14:06)  HR: 103 (10-21-19 @ 12:17) (97 - 103)  BP: 129/76 (10-21-19 @ 12:17) (129/76 - 158/93)  RR: 18 (10-21-19 @ 12:17) (17 - 18)  SpO2: 100% (10-21-19 @ 12:17) (97% - 100%)    10-21-19 @ 07:01  -  10-21-19 @ 13:32  --------------------------------------------------------  IN: 400 mL / OUT: 2400 mL / NET: -2000 mL        PHYSICAL EXAM:  GENERAL: NAD, well-developed  	HEAD:  Atraumatic, Normocephalic  	EYES: EOMI, PERRLA, conjunctiva and sclera clear  	NECK: Supple, No JVD  	CHEST/LUNG: Tenderness to palpation lateral left to sternum, clear to auscultation bilaterally; No wheeze  	HEART: S1 and S2, Regular rhythm, tachycardic; No murmurs, rubs, or gallops  	ABDOMEN: Soft, tenderness to palpation in the epigastrium, nondistended, no rebound; hypoactive bowel sounds, +colostomy bag with black tarry stool.   	EXTREMITIES:  2+ Peripheral Pulses, No clubbing, cyanosis, or edema, left AV fistula in place   PSYCH: AAOx3        LABS:  cret                        8.1    4.77  )-----------( 178      ( 23 Oct 2019 06:30 )             26.8     10-23    137  |  100  |  34<H>  ----------------------------<  112<H>  3.7   |  22  |  9.30<H>    Ca    9.2      23 Oct 2019 06:30      PT/INR - ( 22 Oct 2019 07:06 )   PT: 12.1 SEC;   INR: 1.09

## 2019-10-23 NOTE — PROGRESS NOTE ADULT - PROBLEM SELECTOR PLAN 1
Pt. with ESRD on HD three times a week (MWF). Last HD was on 10/21/19 via AVF. Pt. clinically stable. Labs reviewed. Receiving maintenance HD today. Monitor labs      > Seen during HD today. /83. Tolerating well  > Continue HD MWF basis

## 2019-10-23 NOTE — DIETITIAN INITIAL EVALUATION ADULT. - ADD RECOMMEND
1. Encourage & assist Pt with meals; Monitor PO diet tolerance;              2. Recommend: Nephro-manoj 1 cap daily;                 3. Monitor labs, hydration status;

## 2019-10-23 NOTE — PROGRESS NOTE ADULT - PROBLEM SELECTOR PLAN 3
-Admits to left sided CP likely 2/2 symptomatic anemia; however, on physical exam CP is reproducible   -Trops 109-103; elevated likely 2/2 type II NSTEMI (demand ischemia)  -EKG revealed sinus, tachycardic, RBBB (RBBB was also found on ekg 9/2019)  -patient continue to complaint of intermittent CP, will transfuse to maintain hgb goal >8 to see if symptoms improves   -CTA negative for PE  -CP unchanged

## 2019-10-23 NOTE — PROGRESS NOTE ADULT - PROBLEM SELECTOR PROBLEM 4
ESRD (end stage renal disease) on dialysis
Secondary hyperparathyroidism of renal origin
Secondary hyperparathyroidism of renal origin
ESRD (end stage renal disease) on dialysis

## 2019-10-23 NOTE — PROGRESS NOTE ADULT - PROBLEM SELECTOR PROBLEM 6
Type 2 diabetes mellitus

## 2019-10-23 NOTE — DISCHARGE NOTE NURSING/CASE MANAGEMENT/SOCIAL WORK - NSDCCRNAME_GEN_ALL_CORE_FT
[facility] at [address] [place] [address]  p. [phone]  f. [phone]

## 2019-10-23 NOTE — PROGRESS NOTE ADULT - PROBLEM SELECTOR PROBLEM 1
Upper GI bleed
ESRD (end stage renal disease) on dialysis
Upper GI bleed
ESRD (end stage renal disease) on dialysis
Upper GI bleed
ESRD (end stage renal disease) on dialysis
Upper GI bleed
Upper GI bleed

## 2019-10-23 NOTE — PROGRESS NOTE ADULT - PROBLEM SELECTOR PROBLEM 3
Demand ischemia
Demand ischemia
Anemia
Anemia
Demand ischemia
Anemia
Anemia
Demand ischemia
Anemia
Demand ischemia

## 2019-11-17 ENCOUNTER — INPATIENT (INPATIENT)
Facility: HOSPITAL | Age: 59
LOS: 11 days | Discharge: NURSING, REHAB OR LONG-TERM CARE | End: 2019-11-29
Attending: SURGERY | Admitting: SURGERY
Payer: MEDICARE

## 2019-11-17 VITALS
HEART RATE: 110 BPM | OXYGEN SATURATION: 100 % | TEMPERATURE: 98 F | HEIGHT: 66 IN | RESPIRATION RATE: 20 BRPM | SYSTOLIC BLOOD PRESSURE: 93 MMHG | DIASTOLIC BLOOD PRESSURE: 57 MMHG

## 2019-11-17 DIAGNOSIS — B20 HUMAN IMMUNODEFICIENCY VIRUS [HIV] DISEASE: ICD-10-CM

## 2019-11-17 DIAGNOSIS — K63.2 FISTULA OF INTESTINE: ICD-10-CM

## 2019-11-17 DIAGNOSIS — B19.20 UNSPECIFIED VIRAL HEPATITIS C WITHOUT HEPATIC COMA: ICD-10-CM

## 2019-11-17 DIAGNOSIS — N18.6 END STAGE RENAL DISEASE: ICD-10-CM

## 2019-11-17 DIAGNOSIS — I10 ESSENTIAL (PRIMARY) HYPERTENSION: ICD-10-CM

## 2019-11-17 LAB
ALBUMIN SERPL ELPH-MCNC: 2.3 G/DL — LOW (ref 3.3–5)
ALP SERPL-CCNC: 99 U/L — SIGNIFICANT CHANGE UP (ref 40–120)
ALT FLD-CCNC: 15 U/L — SIGNIFICANT CHANGE UP (ref 12–78)
ANION GAP SERPL CALC-SCNC: 12 MMOL/L — SIGNIFICANT CHANGE UP (ref 5–17)
APTT BLD: 29.8 SEC — SIGNIFICANT CHANGE UP (ref 27.5–36.3)
AST SERPL-CCNC: 13 U/L — LOW (ref 15–37)
BASOPHILS # BLD AUTO: 0.03 K/UL — SIGNIFICANT CHANGE UP (ref 0–0.2)
BASOPHILS NFR BLD AUTO: 0.3 % — SIGNIFICANT CHANGE UP (ref 0–2)
BILIRUB SERPL-MCNC: 0.6 MG/DL — SIGNIFICANT CHANGE UP (ref 0.2–1.2)
BUN SERPL-MCNC: 65 MG/DL — HIGH (ref 7–23)
CALCIUM SERPL-MCNC: 10.4 MG/DL — HIGH (ref 8.5–10.1)
CHLORIDE SERPL-SCNC: 96 MMOL/L — SIGNIFICANT CHANGE UP (ref 96–108)
CO2 SERPL-SCNC: 30 MMOL/L — SIGNIFICANT CHANGE UP (ref 22–31)
CREAT SERPL-MCNC: 14 MG/DL — HIGH (ref 0.5–1.3)
EOSINOPHIL # BLD AUTO: 0.05 K/UL — SIGNIFICANT CHANGE UP (ref 0–0.5)
EOSINOPHIL NFR BLD AUTO: 0.4 % — SIGNIFICANT CHANGE UP (ref 0–6)
GLUCOSE SERPL-MCNC: 96 MG/DL — SIGNIFICANT CHANGE UP (ref 70–99)
HCT VFR BLD CALC: 31.4 % — LOW (ref 39–50)
HGB BLD-MCNC: 9.5 G/DL — LOW (ref 13–17)
IMM GRANULOCYTES NFR BLD AUTO: 0.5 % — SIGNIFICANT CHANGE UP (ref 0–1.5)
INR BLD: 1.38 RATIO — HIGH (ref 0.88–1.16)
LACTATE SERPL-SCNC: 1.1 MMOL/L — SIGNIFICANT CHANGE UP (ref 0.7–2)
LYMPHOCYTES # BLD AUTO: 1.36 K/UL — SIGNIFICANT CHANGE UP (ref 1–3.3)
LYMPHOCYTES # BLD AUTO: 11.5 % — LOW (ref 13–44)
MCHC RBC-ENTMCNC: 26.3 PG — LOW (ref 27–34)
MCHC RBC-ENTMCNC: 30.3 GM/DL — LOW (ref 32–36)
MCV RBC AUTO: 87 FL — SIGNIFICANT CHANGE UP (ref 80–100)
MONOCYTES # BLD AUTO: 0.94 K/UL — HIGH (ref 0–0.9)
MONOCYTES NFR BLD AUTO: 7.9 % — SIGNIFICANT CHANGE UP (ref 2–14)
NEUTROPHILS # BLD AUTO: 9.43 K/UL — HIGH (ref 1.8–7.4)
NEUTROPHILS NFR BLD AUTO: 79.4 % — HIGH (ref 43–77)
NRBC # BLD: 0 /100 WBCS — SIGNIFICANT CHANGE UP (ref 0–0)
PLATELET # BLD AUTO: 330 K/UL — SIGNIFICANT CHANGE UP (ref 150–400)
POTASSIUM SERPL-MCNC: 3.9 MMOL/L — SIGNIFICANT CHANGE UP (ref 3.5–5.3)
POTASSIUM SERPL-SCNC: 3.9 MMOL/L — SIGNIFICANT CHANGE UP (ref 3.5–5.3)
PROT SERPL-MCNC: 8.1 GM/DL — SIGNIFICANT CHANGE UP (ref 6–8.3)
PROTHROM AB SERPL-ACNC: 15.6 SEC — HIGH (ref 10–12.9)
RBC # BLD: 3.61 M/UL — LOW (ref 4.2–5.8)
RBC # FLD: 17.2 % — HIGH (ref 10.3–14.5)
SODIUM SERPL-SCNC: 138 MMOL/L — SIGNIFICANT CHANGE UP (ref 135–145)
WBC # BLD: 11.87 K/UL — HIGH (ref 3.8–10.5)
WBC # FLD AUTO: 11.87 K/UL — HIGH (ref 3.8–10.5)

## 2019-11-17 PROCEDURE — 99285 EMERGENCY DEPT VISIT HI MDM: CPT

## 2019-11-17 PROCEDURE — 74176 CT ABD & PELVIS W/O CONTRAST: CPT | Mod: 26

## 2019-11-17 PROCEDURE — 93010 ELECTROCARDIOGRAM REPORT: CPT

## 2019-11-17 RX ORDER — HYDRALAZINE HCL 50 MG
1 TABLET ORAL
Qty: 0 | Refills: 0 | DISCHARGE

## 2019-11-17 RX ORDER — ASPIRIN/CALCIUM CARB/MAGNESIUM 324 MG
1 TABLET ORAL
Qty: 0 | Refills: 0 | DISCHARGE

## 2019-11-17 RX ORDER — PIPERACILLIN AND TAZOBACTAM 4; .5 G/20ML; G/20ML
3.38 INJECTION, POWDER, LYOPHILIZED, FOR SOLUTION INTRAVENOUS EVERY 12 HOURS
Refills: 0 | Status: DISCONTINUED | OUTPATIENT
Start: 2019-11-18 | End: 2019-11-25

## 2019-11-17 RX ORDER — ISOSORBIDE DINITRATE 5 MG/1
1 TABLET ORAL
Qty: 0 | Refills: 0 | DISCHARGE

## 2019-11-17 RX ORDER — DARUNAVIR 75 MG/1
1 TABLET, FILM COATED ORAL
Qty: 0 | Refills: 0 | DISCHARGE

## 2019-11-17 RX ORDER — SUCROFERRIC OXYHYDROXIDE 500 MG/1
1 TABLET, CHEWABLE ORAL
Qty: 0 | Refills: 0 | DISCHARGE

## 2019-11-17 RX ORDER — PIPERACILLIN AND TAZOBACTAM 4; .5 G/20ML; G/20ML
3.38 INJECTION, POWDER, LYOPHILIZED, FOR SOLUTION INTRAVENOUS ONCE
Refills: 0 | Status: COMPLETED | OUTPATIENT
Start: 2019-11-17 | End: 2019-11-17

## 2019-11-17 RX ORDER — RITONAVIR 100 MG/1
1 TABLET, FILM COATED ORAL
Qty: 0 | Refills: 0 | DISCHARGE

## 2019-11-17 RX ORDER — SODIUM CHLORIDE 9 MG/ML
500 INJECTION INTRAMUSCULAR; INTRAVENOUS; SUBCUTANEOUS ONCE
Refills: 0 | Status: COMPLETED | OUTPATIENT
Start: 2019-11-17 | End: 2019-11-17

## 2019-11-17 RX ORDER — RALTEGRAVIR 400 MG/1
1 TABLET, FILM COATED ORAL
Qty: 0 | Refills: 0 | DISCHARGE

## 2019-11-17 RX ORDER — PANTOPRAZOLE SODIUM 20 MG/1
40 TABLET, DELAYED RELEASE ORAL DAILY
Refills: 0 | Status: DISCONTINUED | OUTPATIENT
Start: 2019-11-17 | End: 2019-11-26

## 2019-11-17 RX ORDER — IOHEXOL 300 MG/ML
30 INJECTION, SOLUTION INTRAVENOUS ONCE
Refills: 0 | Status: COMPLETED | OUTPATIENT
Start: 2019-11-17 | End: 2019-11-17

## 2019-11-17 RX ORDER — HEPARIN SODIUM 5000 [USP'U]/ML
5000 INJECTION INTRAVENOUS; SUBCUTANEOUS EVERY 12 HOURS
Refills: 0 | Status: DISCONTINUED | OUTPATIENT
Start: 2019-11-17 | End: 2019-11-29

## 2019-11-17 RX ORDER — SODIUM CHLORIDE 9 MG/ML
1000 INJECTION, SOLUTION INTRAVENOUS
Refills: 0 | Status: DISCONTINUED | OUTPATIENT
Start: 2019-11-17 | End: 2019-11-22

## 2019-11-17 RX ORDER — ETRAVIRINE 200 MG/1
1 TABLET ORAL
Qty: 0 | Refills: 0 | DISCHARGE

## 2019-11-17 RX ADMIN — PIPERACILLIN AND TAZOBACTAM 3.38 GRAM(S): 4; .5 INJECTION, POWDER, LYOPHILIZED, FOR SOLUTION INTRAVENOUS at 15:39

## 2019-11-17 RX ADMIN — PANTOPRAZOLE SODIUM 40 MILLIGRAM(S): 20 TABLET, DELAYED RELEASE ORAL at 18:54

## 2019-11-17 RX ADMIN — PIPERACILLIN AND TAZOBACTAM 200 GRAM(S): 4; .5 INJECTION, POWDER, LYOPHILIZED, FOR SOLUTION INTRAVENOUS at 15:09

## 2019-11-17 RX ADMIN — SODIUM CHLORIDE 500 MILLILITER(S): 9 INJECTION INTRAMUSCULAR; INTRAVENOUS; SUBCUTANEOUS at 15:08

## 2019-11-17 RX ADMIN — SODIUM CHLORIDE 75 MILLILITER(S): 9 INJECTION, SOLUTION INTRAVENOUS at 23:33

## 2019-11-17 RX ADMIN — IOHEXOL 30 MILLILITER(S): 300 INJECTION, SOLUTION INTRAVENOUS at 15:10

## 2019-11-17 NOTE — ED PROVIDER NOTE - CONSULTING PHYSICIAN
Dr. Garcia pt's surgeon is notified and sts start iv antibiotic and call him back with ct of abd/pelvis report

## 2019-11-17 NOTE — ED ADULT NURSE NOTE - OBJECTIVE STATEMENT
pt received c/o abdominal pain, pt  had a colostomy done in october , now surgical site is opening up, pt was on, dialysis patient , on M. W, F.

## 2019-11-17 NOTE — ED PROVIDER NOTE - NONTENDER LOCATION
left lower quadrant/dehiscent opened vertical sx wound opened in three different spot/right lower quadrant/left upper quadrant/umbilical/right costovertebral angle/right upper quadrant/periumbilical/suprapubic/left costovertebral angle
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
## 2019-11-17 NOTE — ED PROVIDER NOTE - CONSTITUTIONAL, MLM
normal... Well appearing, well nourished, awake, alert, oriented to person, place, time/situation and in no apparent distress. Speaking in clear full sentences no nasal flaring no shoulders retractions no diaphoresis, appears very comfortable sitting up in the stretcher in a bright light room

## 2019-11-17 NOTE — H&P ADULT - NSHPPHYSICALEXAM_GEN_ALL_CORE
Gen: NAD, WDWN  Neuro: A&Ox3  HEENT: NCAT, EOMI  Neck: supple  Chest: +S1S2  Lung: Nonlabored respirations, good aeration b/l  Abd: obese, nondistended, soft and nontender. Colostomy viable with solid stool in bag. Midline wound with multiple skin openings >3cm in width/depth with active copious foul-smelling purulent drainage  Ext: no calf tenderness b/l  Skin: warm, dry PHYSICAL EXAM:-    CONSTITIONAL/GENERAL: NAD, well-groomed, well-developed  HEAD:  Atraumatic, Normocephalic  VISUAL/EYES: EOMI, PERRL, conjunctiva and sclera clear  ENMT: Moist mucous membranes, Good dentition, No lesions  NECK: Supple, No JVD  NERVOUS SYSTEM:  Alert & Oriented X3, Good concentration  RESPIRATORY/CHEST: Clear to percussion bilaterally; Normal respiratory effort  CARDIOVASCULAR/HEART: Regular rate and rhythm  GENITOURINARY: normal external genitalia  BREASTS: no breast lumps  MUSCULOSKELETAL/EXTREMITIES:  No clubbing, cyanosis, or edema  VASCULAR: equal peripheral pulses  LYMPHATIC: No lymphadenopathy noted  SKIN: No rashes or lesions  PSYCHIATRIC: normal affect  Abd: obese, nondistended, soft and nontender. Colostomy viable with solid stool in bag. Midline wound with multiple skin openings >3cm in width/depth with active copious foul-smelling purulent drainage

## 2019-11-17 NOTE — ED PROVIDER NOTE - CARE PLAN
Principal Discharge DX:	Abdominal pain  Secondary Diagnosis:	ESRD (end stage renal disease) on dialysis
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
## 2019-11-17 NOTE — H&P ADULT - PROBLEM SELECTOR PLAN 1
as discussed with Dr Garcia, admit, NPO, IVF  IV zosyn  Follow up wound culture result  local wound care per surgical team  GI consult for TPN Admit to Surgery  Wet to Dry Dressings  Culture wound  iv abx

## 2019-11-17 NOTE — H&P ADULT - NSHPLABSRESULTS_GEN_ALL_CORE
9.5    11.87 )-----------( 330      ( 17 Nov 2019 14:39 )             31.4   11-17    138  |  96  |  65<H>  ----------------------------<  96  3.9   |  30  |  14.00<H>    Ca    10.4<H>      17 Nov 2019 14:39    TPro  8.1  /  Alb  2.3<L>  /  TBili  0.6  /  DBili  x   /  AST  13<L>  /  ALT  15  /  AlkPhos  99  11-17  < from: CT Abdomen and Pelvis w/ Oral Cont (11.17.19 @ 16:59) >      CT abdomen and pelvis only oral contrast. Prior 10/14/2019.  Open abdominal wound midline lower abdomen with a small fluid and gas   collection immediately adjacent. This collection has decreased in size   now measures 1.9 cm previously 2.8 cm . Adjacent fluid collection in the   midline anterior abdominal wall is approximately 3 cm has not changed   significantly in size. More superiorly in the mid to lower abdomen in the   midline there is a additional open wound. Scattered subcutaneous droplets   and induration noted in this area without a discrete drainable   collection. There isquestion of some oral contrast subcutaneously near   this open wound (2, 79) raising the possibility of an enterocutaneous   fistula.  In the upper abdomen to the right of the midline there is a ventral   hernia reidentified with a small knuckle of nonobstructive bowel as   discussed time  There are no unusual intraperitoneal fluid or gas collections. Left lower   quadrant ostomy unchanged in appearance. Anterior abdominal wall hernia   mesh unchanged.  No obstructed or acutely inflamed bowel.  Remainder of nonacute findings are unchanged.    Impression: Status post left lower quadrant ostomy.  Anterior abdominal wall small fluid collections as described.  Open abdominal wounds as described. Subcutaneous gas droplets with   question of oral contrast tracking into the anterior abdominal wall   raising possibility of enterocutaneous fistula. Correlate clinically  Right upper quadrant ventral hernia containing a knuckle of   nonobstructive bowel.      < end of copied text >
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
## 2019-11-17 NOTE — ED ADULT TRIAGE NOTE - CHIEF COMPLAINT QUOTE
pt states, " I had a colostomy done in october , now surgical site is opening up, pt was on, dialysis patient , on M. W, F.

## 2019-11-17 NOTE — ED PROVIDER NOTE - OBJECTIVE STATEMENT
59 years old male by ems c/o opened wound to the surgical site for two weeks. Pt sts he had colostomy last month. Pt sts his last full hemodialysis was two days ago and next is tomorrow. Pt denies headache, dizziness, blurred visions, light sensitivities, focal/distal weakness or numbness, cough, sob, chest pain, nausea, vomiting, abd pain.

## 2019-11-17 NOTE — ED ADULT NURSE NOTE - NSIMPLEMENTINTERV_GEN_ALL_ED
Implemented All Fall Risk Interventions:  [place] to call system. Call bell, personal items and telephone within reach. Instruct patient to call for assistance. Room bathroom lighting operational. Non-slip footwear when patient is off stretcher. Physically safe environment: no spills, clutter or unnecessary equipment. Stretcher in lowest position, wheels locked, appropriate side rails in place. Provide visual cue, wrist band, yellow gown, etc. Monitor gait and stability. Monitor for mental status changes and reorient to person, place, and time. Review medications for side effects contributing to fall risk. Reinforce activity limits and safety measures with patient and family.

## 2019-11-18 ENCOUNTER — APPOINTMENT (OUTPATIENT)
Dept: GASTROENTEROLOGY | Facility: CLINIC | Age: 59
End: 2019-11-18

## 2019-11-18 DIAGNOSIS — R10.9 UNSPECIFIED ABDOMINAL PAIN: ICD-10-CM

## 2019-11-18 LAB
ANION GAP SERPL CALC-SCNC: 11 MMOL/L — SIGNIFICANT CHANGE UP (ref 5–17)
BUN SERPL-MCNC: 73 MG/DL — HIGH (ref 7–23)
CALCIUM SERPL-MCNC: 10.1 MG/DL — SIGNIFICANT CHANGE UP (ref 8.5–10.1)
CHLORIDE SERPL-SCNC: 99 MMOL/L — SIGNIFICANT CHANGE UP (ref 96–108)
CK MB BLD-MCNC: <0.9 % — SIGNIFICANT CHANGE UP (ref 0–3.5)
CK MB CFR SERPL CALC: <1 NG/ML — SIGNIFICANT CHANGE UP (ref 0.5–3.6)
CK SERPL-CCNC: 116 U/L — SIGNIFICANT CHANGE UP (ref 26–308)
CO2 SERPL-SCNC: 28 MMOL/L — SIGNIFICANT CHANGE UP (ref 22–31)
CREAT SERPL-MCNC: 14.7 MG/DL — HIGH (ref 0.5–1.3)
GLUCOSE SERPL-MCNC: 105 MG/DL — HIGH (ref 70–99)
HCT VFR BLD CALC: 30.6 % — LOW (ref 39–50)
HGB BLD-MCNC: 9.1 G/DL — LOW (ref 13–17)
MAGNESIUM SERPL-MCNC: 2.6 MG/DL — SIGNIFICANT CHANGE UP (ref 1.6–2.6)
MCHC RBC-ENTMCNC: 26.4 PG — LOW (ref 27–34)
MCHC RBC-ENTMCNC: 29.7 GM/DL — LOW (ref 32–36)
MCV RBC AUTO: 88.7 FL — SIGNIFICANT CHANGE UP (ref 80–100)
NRBC # BLD: 0 /100 WBCS — SIGNIFICANT CHANGE UP (ref 0–0)
PHOSPHATE SERPL-MCNC: 6.4 MG/DL — HIGH (ref 2.5–4.5)
PLATELET # BLD AUTO: 318 K/UL — SIGNIFICANT CHANGE UP (ref 150–400)
POTASSIUM SERPL-MCNC: 3.7 MMOL/L — SIGNIFICANT CHANGE UP (ref 3.5–5.3)
POTASSIUM SERPL-SCNC: 3.7 MMOL/L — SIGNIFICANT CHANGE UP (ref 3.5–5.3)
RBC # BLD: 3.45 M/UL — LOW (ref 4.2–5.8)
RBC # FLD: 17.3 % — HIGH (ref 10.3–14.5)
SODIUM SERPL-SCNC: 138 MMOL/L — SIGNIFICANT CHANGE UP (ref 135–145)
TROPONIN I SERPL-MCNC: 0.04 NG/ML — SIGNIFICANT CHANGE UP (ref 0.01–0.04)
TROPONIN I SERPL-MCNC: 0.1 NG/ML — HIGH (ref 0.01–0.04)
WBC # BLD: 10.69 K/UL — HIGH (ref 3.8–10.5)
WBC # FLD AUTO: 10.69 K/UL — HIGH (ref 3.8–10.5)

## 2019-11-18 PROCEDURE — 93010 ELECTROCARDIOGRAM REPORT: CPT | Mod: 77

## 2019-11-18 PROCEDURE — 99223 1ST HOSP IP/OBS HIGH 75: CPT

## 2019-11-18 PROCEDURE — 93010 ELECTROCARDIOGRAM REPORT: CPT

## 2019-11-18 RX ORDER — CARVEDILOL PHOSPHATE 80 MG/1
6.25 CAPSULE, EXTENDED RELEASE ORAL EVERY 12 HOURS
Refills: 0 | Status: DISCONTINUED | OUTPATIENT
Start: 2019-11-18 | End: 2019-11-20

## 2019-11-18 RX ORDER — DILTIAZEM HCL 120 MG
2.5 CAPSULE, EXT RELEASE 24 HR ORAL
Qty: 125 | Refills: 0 | Status: DISCONTINUED | OUTPATIENT
Start: 2019-11-18 | End: 2019-11-20

## 2019-11-18 RX ORDER — SODIUM CHLORIDE 9 MG/ML
500 INJECTION INTRAMUSCULAR; INTRAVENOUS; SUBCUTANEOUS ONCE
Refills: 0 | Status: COMPLETED | OUTPATIENT
Start: 2019-11-18 | End: 2019-11-18

## 2019-11-18 RX ORDER — INFLUENZA VIRUS VACCINE 15; 15; 15; 15 UG/.5ML; UG/.5ML; UG/.5ML; UG/.5ML
0.5 SUSPENSION INTRAMUSCULAR ONCE
Refills: 0 | Status: DISCONTINUED | OUTPATIENT
Start: 2019-11-18 | End: 2019-11-29

## 2019-11-18 RX ORDER — ACETAMINOPHEN 500 MG
650 TABLET ORAL EVERY 6 HOURS
Refills: 0 | Status: DISCONTINUED | OUTPATIENT
Start: 2019-11-18 | End: 2019-11-29

## 2019-11-18 RX ORDER — DILTIAZEM HCL 120 MG
5 CAPSULE, EXT RELEASE 24 HR ORAL
Qty: 125 | Refills: 0 | Status: DISCONTINUED | OUTPATIENT
Start: 2019-11-18 | End: 2019-11-18

## 2019-11-18 RX ADMIN — PIPERACILLIN AND TAZOBACTAM 25 GRAM(S): 4; .5 INJECTION, POWDER, LYOPHILIZED, FOR SOLUTION INTRAVENOUS at 15:06

## 2019-11-18 RX ADMIN — Medication 2.5 MG/HR: at 16:43

## 2019-11-18 RX ADMIN — SODIUM CHLORIDE 500 MILLILITER(S): 9 INJECTION INTRAMUSCULAR; INTRAVENOUS; SUBCUTANEOUS at 12:29

## 2019-11-18 RX ADMIN — SODIUM CHLORIDE 75 MILLILITER(S): 9 INJECTION, SOLUTION INTRAVENOUS at 10:26

## 2019-11-18 RX ADMIN — CARVEDILOL PHOSPHATE 6.25 MILLIGRAM(S): 80 CAPSULE, EXTENDED RELEASE ORAL at 13:15

## 2019-11-18 RX ADMIN — SODIUM CHLORIDE 75 MILLILITER(S): 9 INJECTION, SOLUTION INTRAVENOUS at 13:21

## 2019-11-18 RX ADMIN — PIPERACILLIN AND TAZOBACTAM 25 GRAM(S): 4; .5 INJECTION, POWDER, LYOPHILIZED, FOR SOLUTION INTRAVENOUS at 05:38

## 2019-11-18 RX ADMIN — HEPARIN SODIUM 5000 UNIT(S): 5000 INJECTION INTRAVENOUS; SUBCUTANEOUS at 05:39

## 2019-11-18 RX ADMIN — PANTOPRAZOLE SODIUM 40 MILLIGRAM(S): 20 TABLET, DELAYED RELEASE ORAL at 12:32

## 2019-11-18 RX ADMIN — HEPARIN SODIUM 5000 UNIT(S): 5000 INJECTION INTRAVENOUS; SUBCUTANEOUS at 17:27

## 2019-11-18 NOTE — PROGRESS NOTE ADULT - ASSESSMENT
58 yo male here for possible enterocutaneous fistula in draining abd wound.  Found to have elevated pulses, ECG shows SVT with (old) CRBBB.  Being transferred to telemetry.  Appears to be hemodynamically stable at present.  On low dose bbl but Coreg not ideal for rate control.  Would treat with IV Diltiazem for now. Consider Adenosine if decompensating.

## 2019-11-18 NOTE — CHART NOTE - NSCHARTNOTEFT_GEN_A_CORE
House- Medicine PA:    Patient is a 59y old  Male who presents with a chief complaint of abdominal wound (18 Nov 2019 17:52)  Patient was recently placed on tele unit for episodes of SVT while on the med/surg floors. He was evaluated by Cardiology and placed on Cardizem Drip. Patients most recent BP was 87/61, so cardizem drip was decreased to 2.5 based on parameters.     Called by RN to assess patient for complaint of Chest Pain. Patient states the pain started a few days ago and is a little worse today. He states it feels like something is sitting on his chest, and like his heart is beating very fast. He denies SOB, nausea, vomiting, diaphoresis, dyspnea.       T(F): 98.4 (11-18-19 @ 11:30)  HR: 106 (11-18-19 @ 17:28)  BP: 87/61 (11-18-19 @ 17:28)  RR: 18 (11-18-19 @ 17:28)  SpO2: 94% (11-18-19 @ 17:28)          GENERAL: NAD, well-groomed, well-developed  HEAD:  Atraumatic, Normocephalic  NECK: Supple, No JVD, Normal thyroid  NERVOUS SYSTEM:  Alert & Oriented X3, Good concentration;  CHEST/LUNG: Clear to auscultation bilaterally; No rales, rhonchi, wheezing, or rubs  HEART: Slightly Tachycardic; No murmurs appreciated.      A/P: 59 year old male with complaint of chest pain most likely attributed to prolonged tachycardia. Troponins .036 today.    - EKG Stat  - Tylenol PRN for pain, will hold off on Nitro due to patient's low BP  - Dr. Paulino aware  - Will inform Cardiologist on call. House- Medicine PA:    Patient is a 59y old  Male who presents with a chief complaint of abdominal wound (18 Nov 2019 17:52)  Patient was recently placed on tele unit for episodes of SVT while on the med/surg floors. He was evaluated by Cardiology and placed on Cardizem Drip. Patients most recent BP was 87/61, so cardizem drip was decreased to 2.5 based on parameters.     Called by RN to assess patient for complaint of Chest Pain. Patient states the pain started a few days ago and is a little worse today. He states it feels like something is sitting on his chest, and like his heart is beating very fast. He denies SOB, nausea, vomiting, diaphoresis, dyspnea.       T(F): 98.4 (11-18-19 @ 11:30)  HR: 106 (11-18-19 @ 17:28)  BP: 87/61 (11-18-19 @ 17:28)  RR: 18 (11-18-19 @ 17:28)  SpO2: 94% (11-18-19 @ 17:28)          GENERAL: NAD, well-groomed, well-developed  HEAD:  Atraumatic, Normocephalic  NECK: Supple, No JVD, Normal thyroid  NERVOUS SYSTEM:  Alert & Oriented X3, Good concentration;  CHEST/LUNG: Clear to auscultation bilaterally; No rales, rhonchi, wheezing, or rubs  HEART: Slightly Tachycardic; No murmurs appreciated.      A/P: 59 year old male with complaint of chest pain most likely attributed to prolonged tachycardia. Troponins .036 today.    - EKG Stat  - Tylenol PRN for pain, will hold off on Nitro due to patient's low BP  - Repeat Troponins  - Dr. Paulino aware  - Dr. Aceves, cardiologist aware
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
## 2019-11-18 NOTE — CONSULT NOTE ADULT - ASSESSMENT
abdominal fistula   usu fistulas dont require antibiotics but patient hypotensive appears very dry ?? from dehydration as well  agree with zosyn but OR culture from 9/18 grew vancomycin resistant enterococcus and candida albicans   vl [id number] on 9/19/2019 abdominal fistula   usu fistulas dont require antibiotics but patient hypotensive appears very dry ?? from dehydration as well  agree with zosyn but OR culture from 9/18 grew vancomycin resistant enterococcus and candida albicans   vl 1703 on 9/19/2019  september iD notes seen   at that point patient said that he was noncompliant as had diarrhea took every other days   now says missed only 2 days   will repeat viral load TCells ; no genotype done september   will do now   NPO now noted   to be discussed with sx if can give meds  will follow with you thanks
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
## 2019-11-18 NOTE — PROGRESS NOTE ADULT - SUBJECTIVE AND OBJECTIVE BOX
Patient is a 59y old  Male who presents with a chief complaint of abdominal wall collections (18 Nov 2019 01:50)      INTERVAL HPI/OVERNIGHT EVENTS:  no new episode  MEDICATIONS  (STANDING):  dextrose 5% + sodium chloride 0.9%. 1000 milliLiter(s) (75 mL/Hr) IV Continuous <Continuous>  heparin  Injectable 5000 Unit(s) SubCutaneous every 12 hours  influenza   Vaccine 0.5 milliLiter(s) IntraMuscular once  pantoprazole  Injectable 40 milliGRAM(s) IV Push daily  piperacillin/tazobactam IVPB.. 3.375 Gram(s) IV Intermittent every 12 hours    MEDICATIONS  (PRN):      Allergies    ciprofloxacin (Rash)  Levaquin (Rash)    Intolerances        REVIEW OF SYSTEMS:  CONSTITUTIONAL: No fever, weight loss, or fatigue  EYES: No eye pain, visual disturbances, or discharge  ENMT:  No difficulty hearing, tinnitus, vertigo; No sinus or throat pain  NECK: No pain or stiffness  BREASTS: No pain, masses, or nipple discharge  RESPIRATORY: No cough, wheezing, chills or hemoptysis; No shortness of breath  CARDIOVASCULAR: No chest pain, palpitations, dizziness, or leg swelling  GASTROINTESTINAL: No abdominal or epigastric pain. No nausea, vomiting, or hematemesis; No diarrhea or constipation. No melena or hematochezia.  GENITOURINARY: No dysuria, frequency, hematuria, or incontinence  NEUROLOGICAL: No headaches, memory loss, loss of strength, numbness, or tremors  SKIN: No itching, burning, rashes, or lesions   LYMPH NODES: No enlarged glands  ENDOCRINE: No heat or cold intolerance; No hair loss  MUSCULOSKELETAL: No joint pain or swelling; No muscle, back, or extremity pain  PSYCHIATRIC: No depression, anxiety, mood swings, or difficulty sleeping  HEME/LYMPH: No easy bruising, or bleeding gums  ALLERGY AND IMMUNOLOGIC: No hives or eczema    Vital Signs Last 24 Hrs  T(C): 36.8 (18 Nov 2019 06:04), Max: 37.6 (17 Nov 2019 23:39)  T(F): 98.2 (18 Nov 2019 06:04), Max: 99.7 (17 Nov 2019 23:39)  HR: 109 (18 Nov 2019 06:04) (98 - 110)  BP: 110/59 (18 Nov 2019 06:04) (93/57 - 128/65)  BP(mean): --  RR: 17 (18 Nov 2019 06:04) (17 - 20)  SpO2: 95% (18 Nov 2019 06:04) (95% - 100%)    PHYSICAL EXAM:  GENERAL: NAD, well-groomed, well-developed  HEAD:  Atraumatic, Normocephalic  EYES: EOMI, PERRLA, conjunctiva and sclera clear  ENMT: No tonsillar erythema, exudates, or enlargement; Moist mucous membranes, Good dentition, No lesions  NECK: Supple, No JVD, Normal thyroid  NERVOUS SYSTEM:  Alert & Oriented X3, Good concentration; Motor Strength 5/5 B/L upper and lower extremities; DTRs 2+ intact and symmetric  CHEST/LUNG: Clear to percussion bilaterally; No rales, rhonchi, wheezing, or rubs  HEART: Regular rate and rhythm; No murmurs, rubs, or gallops  ABDOMEN: Soft, Nontender, Nondistended; Bowel sounds present  EXTREMITIES:  2+ Peripheral Pulses, No clubbing, cyanosis, or edema  LYMPH: No lymphadenopathy noted  SKIN: No rashes or lesions    LABS:                        9.1    10.69 )-----------( 318      ( 18 Nov 2019 07:56 )             30.6     11-18    138  |  99  |  73<H>  ----------------------------<  105<H>  3.7   |  28  |  14.70<H>    Ca    10.1      18 Nov 2019 07:56  Phos  6.4     11-18  Mg     2.6     11-18    TPro  8.1  /  Alb  2.3<L>  /  TBili  0.6  /  DBili  x   /  AST  13<L>  /  ALT  15  /  AlkPhos  99  11-17    PT/INR - ( 17 Nov 2019 14:39 )   PT: 15.6 sec;   INR: 1.38 ratio         PTT - ( 17 Nov 2019 14:39 )  PTT:29.8 sec    CAPILLARY BLOOD GLUCOSE        CULTURES:    HEMOGLOBIN A1C:    CHOLESTEROL:        RADIOLOGY & ADDITIONAL TESTS:

## 2019-11-19 LAB
4/8 RATIO: 0.4 RATIO — LOW (ref 0.9–3.6)
ABS CD8: 832 /UL — HIGH (ref 142–740)
ANION GAP SERPL CALC-SCNC: 12 MMOL/L — SIGNIFICANT CHANGE UP (ref 5–17)
BUN SERPL-MCNC: 76 MG/DL — HIGH (ref 7–23)
CALCIUM SERPL-MCNC: 9.9 MG/DL — SIGNIFICANT CHANGE UP (ref 8.5–10.1)
CD3 BLASTS SPEC-ACNC: 1229 /UL — SIGNIFICANT CHANGE UP (ref 672–1870)
CD3 BLASTS SPEC-ACNC: 70 % — SIGNIFICANT CHANGE UP (ref 59–83)
CD4 %: 19 % — LOW (ref 30–62)
CD8 %: 47 % — HIGH (ref 12–36)
CHLORIDE SERPL-SCNC: 105 MMOL/L — SIGNIFICANT CHANGE UP (ref 96–108)
CK MB BLD-MCNC: <3 % — SIGNIFICANT CHANGE UP (ref 0–3.5)
CK MB CFR SERPL CALC: <1 NG/ML — SIGNIFICANT CHANGE UP (ref 0.5–3.6)
CK SERPL-CCNC: 33 U/L — SIGNIFICANT CHANGE UP (ref 26–308)
CO2 SERPL-SCNC: 24 MMOL/L — SIGNIFICANT CHANGE UP (ref 22–31)
CREAT SERPL-MCNC: 15.8 MG/DL — HIGH (ref 0.5–1.3)
GLUCOSE SERPL-MCNC: 109 MG/DL — HIGH (ref 70–99)
HCT VFR BLD CALC: 27.2 % — LOW (ref 39–50)
HGB BLD-MCNC: 8 G/DL — LOW (ref 13–17)
MAGNESIUM SERPL-MCNC: 2.4 MG/DL — SIGNIFICANT CHANGE UP (ref 1.6–2.6)
MCHC RBC-ENTMCNC: 26 PG — LOW (ref 27–34)
MCHC RBC-ENTMCNC: 29.4 GM/DL — LOW (ref 32–36)
MCV RBC AUTO: 88.3 FL — SIGNIFICANT CHANGE UP (ref 80–100)
NRBC # BLD: 0 /100 WBCS — SIGNIFICANT CHANGE UP (ref 0–0)
PHOSPHATE SERPL-MCNC: 6.7 MG/DL — HIGH (ref 2.5–4.5)
PLATELET # BLD AUTO: 338 K/UL — SIGNIFICANT CHANGE UP (ref 150–400)
POTASSIUM SERPL-MCNC: 3.8 MMOL/L — SIGNIFICANT CHANGE UP (ref 3.5–5.3)
POTASSIUM SERPL-SCNC: 3.8 MMOL/L — SIGNIFICANT CHANGE UP (ref 3.5–5.3)
RBC # BLD: 3.08 M/UL — LOW (ref 4.2–5.8)
RBC # FLD: 17.5 % — HIGH (ref 10.3–14.5)
SODIUM SERPL-SCNC: 141 MMOL/L — SIGNIFICANT CHANGE UP (ref 135–145)
T-CELL CD4 SUBSET PNL BLD: 335 /UL — LOW (ref 489–1457)
TROPONIN I SERPL-MCNC: 0.08 NG/ML — HIGH (ref 0.01–0.04)
TROPONIN I SERPL-MCNC: 0.08 NG/ML — HIGH (ref 0.01–0.04)
TROPONIN I SERPL-MCNC: 0.12 NG/ML — HIGH (ref 0.01–0.04)
WBC # BLD: 9.63 K/UL — SIGNIFICANT CHANGE UP (ref 3.8–10.5)
WBC # FLD AUTO: 9.63 K/UL — SIGNIFICANT CHANGE UP (ref 3.8–10.5)

## 2019-11-19 PROCEDURE — 99232 SBSQ HOSP IP/OBS MODERATE 35: CPT

## 2019-11-19 RX ORDER — ELECTROLYTE SOLUTION,INJ
1 VIAL (ML) INTRAVENOUS
Refills: 0 | Status: DISCONTINUED | OUTPATIENT
Start: 2019-11-19 | End: 2019-11-19

## 2019-11-19 RX ORDER — NITROGLYCERIN 6.5 MG
0.4 CAPSULE, EXTENDED RELEASE ORAL
Refills: 0 | Status: DISCONTINUED | OUTPATIENT
Start: 2019-11-19 | End: 2019-11-29

## 2019-11-19 RX ORDER — CHLORHEXIDINE GLUCONATE 213 G/1000ML
1 SOLUTION TOPICAL
Refills: 0 | Status: DISCONTINUED | OUTPATIENT
Start: 2019-11-19 | End: 2019-11-29

## 2019-11-19 RX ADMIN — PANTOPRAZOLE SODIUM 40 MILLIGRAM(S): 20 TABLET, DELAYED RELEASE ORAL at 11:43

## 2019-11-19 RX ADMIN — Medication 1 EACH: at 22:06

## 2019-11-19 RX ADMIN — SODIUM CHLORIDE 75 MILLILITER(S): 9 INJECTION, SOLUTION INTRAVENOUS at 01:42

## 2019-11-19 RX ADMIN — Medication 0.4 MILLIGRAM(S): at 19:29

## 2019-11-19 RX ADMIN — HEPARIN SODIUM 5000 UNIT(S): 5000 INJECTION INTRAVENOUS; SUBCUTANEOUS at 05:46

## 2019-11-19 RX ADMIN — PIPERACILLIN AND TAZOBACTAM 25 GRAM(S): 4; .5 INJECTION, POWDER, LYOPHILIZED, FOR SOLUTION INTRAVENOUS at 03:01
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
## 2019-11-19 NOTE — PROGRESS NOTE ADULT - ASSESSMENT
58 yo male here for possible enterocutaneous fistula in draining abd wound.  Found to have elevated pulses, ECG shows SVT with (old) CRBBB.  Continue with Tele monitoring   Appears to be hemodynamically stable at present.  On low dose bbl but Coreg not ideal for rate control  Continue with IV Diltiazem gtt for now. Consider Adenosine if decompensating. 58 yo male here for possible enterocutaneous fistula in draining abd wound.  Found to have elevated pulses, ECG shows SVT with (old) CRBBB.   Appears to be hemodynamically stable at present.      -Continue with Tele monitoring   -2d Echo EF 60-65% Gr1 DD, mild OS, mod pHTN  -monitor electrolytes/daily weight, Potassium >4, Phos>3, Mag >2  -On low dose bbl but Coreg not ideal for rate control  -Continue with IV Diltiazem gtt for now.  May change to PO when goal HR less than 90 is maintained. Consider Adenosine if decompensating.  -renal following for ESRD  -monitor HGB  -TSH in AM/Lipid panel/Mag/Phos
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
## 2019-11-19 NOTE — PROGRESS NOTE ADULT - SUBJECTIVE AND OBJECTIVE BOX
Patient is a 59y old  Male who presents with a chief complaint of Abdominal Pain, ESRD (19 Nov 2019 15:03)      INTERVAL HPI/OVERNIGHT EVENTS:  pt with no acute distress  MEDICATIONS  (STANDING):  carvedilol 6.25 milliGRAM(s) Oral every 12 hours  chlorhexidine 4% Liquid 1 Application(s) Topical <User Schedule>  dextrose 5% + sodium chloride 0.9%. 1000 milliLiter(s) (75 mL/Hr) IV Continuous <Continuous>  diltiazem Infusion 2.5 mG/Hr (2.5 mL/Hr) IV Continuous <Continuous>  heparin  Injectable 5000 Unit(s) SubCutaneous every 12 hours  influenza   Vaccine 0.5 milliLiter(s) IntraMuscular once  pantoprazole  Injectable 40 milliGRAM(s) IV Push daily  Parenteral Nutrition - Adult 1 Each (42 mL/Hr) TPN Continuous <Continuous>  piperacillin/tazobactam IVPB.. 3.375 Gram(s) IV Intermittent every 12 hours    MEDICATIONS  (PRN):  acetaminophen   Tablet .. 650 milliGRAM(s) Oral every 6 hours PRN Moderate Pain (4 - 6)  nitroglycerin     SubLingual 0.4 milliGRAM(s) SubLingual every 5 minutes PRN Chest Pain      Allergies    ciprofloxacin (Rash)  Levaquin (Rash)    Intolerances        REVIEW OF SYSTEMS:  CONSTITUTIONAL: No fever, weight loss, or fatigue  EYES: No eye pain, visual disturbances, or discharge  ENMT:  No difficulty hearing, tinnitus, vertigo; No sinus or throat pain  NECK: No pain or stiffness  BREASTS: No pain, masses, or nipple discharge  RESPIRATORY: No cough, wheezing, chills or hemoptysis; No shortness of breath  CARDIOVASCULAR: No chest pain, palpitations, dizziness, or leg swelling  GASTROINTESTINAL: No abdominal or epigastric pain. No nausea, vomiting, or hematemesis; No diarrhea or constipation. No melena or hematochezia.  GENITOURINARY: No dysuria, frequency, hematuria, or incontinence  NEUROLOGICAL: No headaches, memory loss, loss of strength, numbness, or tremors  SKIN: No itching, burning, rashes, or lesions   LYMPH NODES: No enlarged glands  ENDOCRINE: No heat or cold intolerance; No hair loss  MUSCULOSKELETAL: No joint pain or swelling; No muscle, back, or extremity pain  PSYCHIATRIC: No depression, anxiety, mood swings, or difficulty sleeping  HEME/LYMPH: No easy bruising, or bleeding gums  ALLERGY AND IMMUNOLOGIC: No hives or eczema    Vital Signs Last 24 Hrs  T(C): 36.7 (19 Nov 2019 18:45), Max: 37.3 (19 Nov 2019 02:19)  T(F): 98 (19 Nov 2019 18:45), Max: 99.1 (19 Nov 2019 02:19)  HR: 103 (19 Nov 2019 18:45) (103 - 108)  BP: 124/78 (19 Nov 2019 20:09) (98/60 - 136/83)  BP(mean): --  RR: 18 (19 Nov 2019 20:09) (16 - 18)  SpO2: 98% (19 Nov 2019 20:09) (92% - 98%)    PHYSICAL EXAM:  GENERAL: NAD, well-groomed, well-developed  HEAD:  Atraumatic, Normocephalic  EYES: EOMI, PERRLA, conjunctiva and sclera clear  ENMT: No tonsillar erythema, exudates, or enlargement; Moist mucous membranes, Good dentition, No lesions  NECK: Supple, No JVD, Normal thyroid  NERVOUS SYSTEM:  Alert & Oriented X3, Good concentration; Motor Strength 5/5 B/L upper and lower extremities; DTRs 2+ intact and symmetric  CHEST/LUNG: Clear to percussion bilaterally; No rales, rhonchi, wheezing, or rubs  HEART: Regular rate and rhythm; No murmurs, rubs, or gallops  ABDOMEN: Soft, Nontender, Nondistended; Bowel sounds present  EXTREMITIES:  2+ Peripheral Pulses, No clubbing, cyanosis, or edema  LYMPH: No lymphadenopathy noted  SKIN: No rashes or lesions    LABS:                        8.0    9.63  )-----------( 338      ( 19 Nov 2019 10:12 )             27.2     11-19    141  |  105  |  76<H>  ----------------------------<  109<H>  3.8   |  24  |  15.80<H>    Ca    9.9      19 Nov 2019 10:12  Phos  6.7     11-19  Mg     2.4     11-19          CAPILLARY BLOOD GLUCOSE        CULTURES:  Culture Results:   Numerous Staphylococcus aureus Susceptibility to follow.  Moderate Escherichia coli Susceptibility to follow.  Few Klebsiella pneumoniae Susceptibility to follow. (11-18 @ 09:14)  Culture Results:   Culture yields growth of greater than 3 colony types of  bacteria,  which may indicate contamination and normal sabiha  Call client services within 7 days if further workup is clinically  indicated.  Culture includes  Numerous Staphylococcus aureus Susceptibility to follow. (11-18 @ 09:13)  Culture Results:   No growth to date. (11-17 @ 16:26)  Culture Results:   No growth to date. (11-17 @ 16:26)    HEMOGLOBIN A1C:    CHOLESTEROL:        RADIOLOGY & ADDITIONAL TESTS:
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
## 2019-11-20 DIAGNOSIS — E11.9 TYPE 2 DIABETES MELLITUS WITHOUT COMPLICATIONS: ICD-10-CM

## 2019-11-20 LAB
-  AMIKACIN: SIGNIFICANT CHANGE UP
-  AMIKACIN: SIGNIFICANT CHANGE UP
-  AMOXICILLIN/CLAVULANIC ACID: SIGNIFICANT CHANGE UP
-  AMOXICILLIN/CLAVULANIC ACID: SIGNIFICANT CHANGE UP
-  AMPICILLIN/SULBACTAM: SIGNIFICANT CHANGE UP
-  AMPICILLIN: SIGNIFICANT CHANGE UP
-  AMPICILLIN: SIGNIFICANT CHANGE UP
-  AZTREONAM: SIGNIFICANT CHANGE UP
-  AZTREONAM: SIGNIFICANT CHANGE UP
-  CEFAZOLIN: SIGNIFICANT CHANGE UP
-  CEFEPIME: SIGNIFICANT CHANGE UP
-  CEFEPIME: SIGNIFICANT CHANGE UP
-  CEFOXITIN: SIGNIFICANT CHANGE UP
-  CEFOXITIN: SIGNIFICANT CHANGE UP
-  CEFTRIAXONE: SIGNIFICANT CHANGE UP
-  CEFTRIAXONE: SIGNIFICANT CHANGE UP
-  CIPROFLOXACIN: SIGNIFICANT CHANGE UP
-  CIPROFLOXACIN: SIGNIFICANT CHANGE UP
-  CLINDAMYCIN: SIGNIFICANT CHANGE UP
-  CLINDAMYCIN: SIGNIFICANT CHANGE UP
-  ERTAPENEM: SIGNIFICANT CHANGE UP
-  ERTAPENEM: SIGNIFICANT CHANGE UP
-  ERYTHROMYCIN: SIGNIFICANT CHANGE UP
-  ERYTHROMYCIN: SIGNIFICANT CHANGE UP
-  GENTAMICIN: SIGNIFICANT CHANGE UP
-  IMIPENEM: SIGNIFICANT CHANGE UP
-  IMIPENEM: SIGNIFICANT CHANGE UP
-  LEVOFLOXACIN: SIGNIFICANT CHANGE UP
-  LEVOFLOXACIN: SIGNIFICANT CHANGE UP
-  MEROPENEM: SIGNIFICANT CHANGE UP
-  MEROPENEM: SIGNIFICANT CHANGE UP
-  OXACILLIN: SIGNIFICANT CHANGE UP
-  OXACILLIN: SIGNIFICANT CHANGE UP
-  PENICILLIN: SIGNIFICANT CHANGE UP
-  PENICILLIN: SIGNIFICANT CHANGE UP
-  PIPERACILLIN/TAZOBACTAM: SIGNIFICANT CHANGE UP
-  PIPERACILLIN/TAZOBACTAM: SIGNIFICANT CHANGE UP
-  RIFAMPIN: SIGNIFICANT CHANGE UP
-  RIFAMPIN: SIGNIFICANT CHANGE UP
-  TETRACYCLINE: SIGNIFICANT CHANGE UP
-  TETRACYCLINE: SIGNIFICANT CHANGE UP
-  TOBRAMYCIN: SIGNIFICANT CHANGE UP
-  TOBRAMYCIN: SIGNIFICANT CHANGE UP
-  TRIMETHOPRIM/SULFAMETHOXAZOLE: SIGNIFICANT CHANGE UP
-  VANCOMYCIN: SIGNIFICANT CHANGE UP
-  VANCOMYCIN: SIGNIFICANT CHANGE UP
ANION GAP SERPL CALC-SCNC: 8 MMOL/L — SIGNIFICANT CHANGE UP (ref 5–17)
BUN SERPL-MCNC: 36 MG/DL — HIGH (ref 7–23)
CALCIUM SERPL-MCNC: 9.8 MG/DL — SIGNIFICANT CHANGE UP (ref 8.5–10.1)
CHLORIDE SERPL-SCNC: 106 MMOL/L — SIGNIFICANT CHANGE UP (ref 96–108)
CHOLEST SERPL-MCNC: 79 MG/DL — SIGNIFICANT CHANGE UP (ref 10–199)
CO2 SERPL-SCNC: 29 MMOL/L — SIGNIFICANT CHANGE UP (ref 22–31)
CREAT SERPL-MCNC: 9.41 MG/DL — HIGH (ref 0.5–1.3)
CULTURE RESULTS: SIGNIFICANT CHANGE UP
CULTURE RESULTS: SIGNIFICANT CHANGE UP
GLUCOSE SERPL-MCNC: 96 MG/DL — SIGNIFICANT CHANGE UP (ref 70–99)
HDLC SERPL-MCNC: 26 MG/DL — LOW
HIV-1 VIRAL LOAD RESULT: ABNORMAL
HIV1 RNA # SERPL NAA+PROBE: 206 — SIGNIFICANT CHANGE UP
HIV1 RNA SER-IMP: SIGNIFICANT CHANGE UP
HIV1 RNA SERPL NAA+PROBE-ACNC: ABNORMAL
HIV1 RNA SERPL NAA+PROBE-LOG#: 2.31 — SIGNIFICANT CHANGE UP
LIPID PNL WITH DIRECT LDL SERPL: 38 MG/DL — SIGNIFICANT CHANGE UP
MAGNESIUM SERPL-MCNC: 2.2 MG/DL — SIGNIFICANT CHANGE UP (ref 1.6–2.6)
METHOD TYPE: SIGNIFICANT CHANGE UP
ORGANISM # SPEC MICROSCOPIC CNT: SIGNIFICANT CHANGE UP
PHOSPHATE SERPL-MCNC: 5 MG/DL — HIGH (ref 2.5–4.5)
POTASSIUM SERPL-MCNC: 3.7 MMOL/L — SIGNIFICANT CHANGE UP (ref 3.5–5.3)
POTASSIUM SERPL-SCNC: 3.7 MMOL/L — SIGNIFICANT CHANGE UP (ref 3.5–5.3)
SODIUM SERPL-SCNC: 143 MMOL/L — SIGNIFICANT CHANGE UP (ref 135–145)
SPECIMEN SOURCE: SIGNIFICANT CHANGE UP
SPECIMEN SOURCE: SIGNIFICANT CHANGE UP
TOTAL CHOLESTEROL/HDL RATIO MEASUREMENT: 3 RATIO — LOW (ref 3.4–9.6)
TRIGL SERPL-MCNC: 75 MG/DL — SIGNIFICANT CHANGE UP (ref 10–149)
TSH SERPL-MCNC: <0.005 UIU/ML — LOW (ref 0.36–3.74)
TSH SERPL-MCNC: <0.005 UU/ML — LOW (ref 0.36–3.74)

## 2019-11-20 PROCEDURE — 76937 US GUIDE VASCULAR ACCESS: CPT | Mod: 26

## 2019-11-20 PROCEDURE — 99232 SBSQ HOSP IP/OBS MODERATE 35: CPT

## 2019-11-20 PROCEDURE — 93010 ELECTROCARDIOGRAM REPORT: CPT

## 2019-11-20 PROCEDURE — 77001 FLUOROGUIDE FOR VEIN DEVICE: CPT | Mod: 26

## 2019-11-20 PROCEDURE — 36558 INSERT TUNNELED CV CATH: CPT

## 2019-11-20 RX ORDER — I.V. FAT EMULSION 20 G/100ML
8 EMULSION INTRAVENOUS
Qty: 50 | Refills: 0 | Status: DISCONTINUED | OUTPATIENT
Start: 2019-11-20 | End: 2019-11-21

## 2019-11-20 RX ORDER — ELECTROLYTE SOLUTION,INJ
1 VIAL (ML) INTRAVENOUS
Refills: 0 | Status: DISCONTINUED | OUTPATIENT
Start: 2019-11-20 | End: 2019-11-20

## 2019-11-20 RX ORDER — METOPROLOL TARTRATE 50 MG
2.5 TABLET ORAL EVERY 6 HOURS
Refills: 0 | Status: DISCONTINUED | OUTPATIENT
Start: 2019-11-20 | End: 2019-11-26

## 2019-11-20 RX ADMIN — PIPERACILLIN AND TAZOBACTAM 25 GRAM(S): 4; .5 INJECTION, POWDER, LYOPHILIZED, FOR SOLUTION INTRAVENOUS at 16:22

## 2019-11-20 RX ADMIN — HEPARIN SODIUM 5000 UNIT(S): 5000 INJECTION INTRAVENOUS; SUBCUTANEOUS at 05:43

## 2019-11-20 RX ADMIN — Medication 2.5 MILLIGRAM(S): at 23:45

## 2019-11-20 RX ADMIN — PANTOPRAZOLE SODIUM 40 MILLIGRAM(S): 20 TABLET, DELAYED RELEASE ORAL at 11:24

## 2019-11-20 RX ADMIN — I.V. FAT EMULSION 10.42 GM/KG/DAY: 20 EMULSION INTRAVENOUS at 22:01

## 2019-11-20 RX ADMIN — CHLORHEXIDINE GLUCONATE 1 APPLICATION(S): 213 SOLUTION TOPICAL at 05:45

## 2019-11-20 RX ADMIN — HEPARIN SODIUM 5000 UNIT(S): 5000 INJECTION INTRAVENOUS; SUBCUTANEOUS at 17:22

## 2019-11-20 RX ADMIN — Medication 2.5 MILLIGRAM(S): at 17:22

## 2019-11-20 RX ADMIN — Medication 1 EACH: at 22:01
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
## 2019-11-20 NOTE — DIETITIAN INITIAL EVALUATION ADULT. - PERTINENT LABORATORY DATA
11-20 Na143 mmol/L Glu 96 mg/dL K+ 3.7 mmol/L Cr  9.41 mg/dL<H> BUN 36 mg/dL<H> 11-20 Phos 5.0 mg/dL<H> 11-17 Alb 2.3 g/dL<L> 11-20 Chol 79 mg/dL LDL 38 mg/dL HDL 26 mg/dL<L> Trig 75 mg/dL11-17 ALT 15 U/L AST 13 U/L<L> Alkaline Phosphatase 99 U/L
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
## 2019-11-20 NOTE — DIETITIAN INITIAL EVALUATION ADULT. - ENERGY NEEDS
Height (cm): 167.6 (11-19)  Weight (kg): 107.8 (11-19)  BMI (kg/m2): 38.4 (11-19)  IBW: 64.4 kg        % IBW: 167%            UBW:  122.4 kg (last year)          %UBW: 88%,   wt. fluctuations since adm noted, w/o urine output

## 2019-11-20 NOTE — CHART NOTE - NSCHARTNOTEFT_GEN_A_CORE
Upon Nutritional Assessment by the Registered Dietitian your patient was determined to meet criteria / has evidence of the following diagnosis/diagnoses:          [ ]  Mild Protein Calorie Malnutrition        [x ]  Moderate Protein Calorie Malnutrition        [ ] Severe Protein Calorie Malnutrition        [ ] Unspecified Protein Calorie Malnutrition        [ ] Underweight / BMI <19        [ ] Morbid Obesity / BMI > 40      Findings as based on:  •  Comprehensive nutrition assessment and consultation  •  Calorie counts (nutrient intake analysis)  •  Food acceptance and intake status from observations by staff  •  Follow up  •  Patient education  •  Intervention secondary to interdisciplinary rounds  •   concerns      Treatment:    The following diet has been recommended:  Continue c NPO, PPN->TPN as per plan of care      PROVIDER Section:     By signing this assessment you are acknowledging and agree with the diagnosis/diagnoses assigned by the Registered Dietitian    Comments:

## 2019-11-20 NOTE — PROGRESS NOTE ADULT - SUBJECTIVE AND OBJECTIVE BOX
Patient is a 59y old  Male who presents with a chief complaint of gi bleed (19 Nov 2019 18:20)      INTERVAL HPI/OVERNIGHT EVENTS:  pt with no new episode  MEDICATIONS  (STANDING):  carvedilol 6.25 milliGRAM(s) Oral every 12 hours  chlorhexidine 4% Liquid 1 Application(s) Topical <User Schedule>  dextrose 5% + sodium chloride 0.9%. 1000 milliLiter(s) (75 mL/Hr) IV Continuous <Continuous>  diltiazem Infusion 2.5 mG/Hr (2.5 mL/Hr) IV Continuous <Continuous>  fat emulsion (Plant Based) 20% Infusion 8 Gm/kG/Day (10.42 mL/Hr) IV Continuous <Continuous>  heparin  Injectable 5000 Unit(s) SubCutaneous every 12 hours  influenza   Vaccine 0.5 milliLiter(s) IntraMuscular once  pantoprazole  Injectable 40 milliGRAM(s) IV Push daily  Parenteral Nutrition - Adult 1 Each (42 mL/Hr) TPN Continuous <Continuous>  Parenteral Nutrition - Adult 1 Each (42 mL/Hr) TPN Continuous <Continuous>  piperacillin/tazobactam IVPB.. 3.375 Gram(s) IV Intermittent every 12 hours    MEDICATIONS  (PRN):  acetaminophen   Tablet .. 650 milliGRAM(s) Oral every 6 hours PRN Moderate Pain (4 - 6)  nitroglycerin     SubLingual 0.4 milliGRAM(s) SubLingual every 5 minutes PRN Chest Pain      Allergies    ciprofloxacin (Rash)  Levaquin (Rash)    Intolerances        REVIEW OF SYSTEMS:  CONSTITUTIONAL: No fever, weight loss, or fatigue  EYES: No eye pain, visual disturbances, or discharge  ENMT:  No difficulty hearing, tinnitus, vertigo; No sinus or throat pain  NECK: No pain or stiffness  BREASTS: No pain, masses, or nipple discharge  RESPIRATORY: No cough, wheezing, chills or hemoptysis; No shortness of breath  CARDIOVASCULAR: No chest pain, palpitations, dizziness, or leg swelling  GASTROINTESTINAL: No abdominal or epigastric pain. No nausea, vomiting, or hematemesis; No diarrhea or constipation. No melena or hematochezia.  GENITOURINARY: No dysuria, frequency, hematuria, or incontinence  NEUROLOGICAL: No headaches, memory loss, loss of strength, numbness, or tremors  SKIN: No itching, burning, rashes, or lesions   LYMPH NODES: No enlarged glands  ENDOCRINE: No heat or cold intolerance; No hair loss  MUSCULOSKELETAL: No joint pain or swelling; No muscle, back, or extremity pain  PSYCHIATRIC: No depression, anxiety, mood swings, or difficulty sleeping  HEME/LYMPH: No easy bruising, or bleeding gums  ALLERGY AND IMMUNOLOGIC: No hives or eczema    Vital Signs Last 24 Hrs  T(C): 36.7 (20 Nov 2019 08:21), Max: 37.9 (19 Nov 2019 23:57)  T(F): 98 (20 Nov 2019 08:21), Max: 100.3 (19 Nov 2019 23:57)  HR: 111 (20 Nov 2019 08:21) (103 - 119)  BP: 108/67 (20 Nov 2019 08:21) (108/67 - 136/83)  BP(mean): --  RR: 18 (20 Nov 2019 08:21) (17 - 18)  SpO2: 97% (20 Nov 2019 08:21) (94% - 98%)    PHYSICAL EXAM:  GENERAL: NAD, well-groomed, well-developed  HEAD:  Atraumatic, Normocephalic  EYES: EOMI, PERRLA, conjunctiva and sclera clear  ENMT: No tonsillar erythema, exudates, or enlargement; Moist mucous membranes, Good dentition, No lesions  NECK: Supple, No JVD, Normal thyroid  NERVOUS SYSTEM:  Alert & Oriented X3, Good concentration; Motor Strength 5/5 B/L upper and lower extremities; DTRs 2+ intact and symmetric  CHEST/LUNG: Clear to percussion bilaterally; No rales, rhonchi, wheezing, or rubs  HEART: Regular rate and rhythm; No murmurs, rubs, or gallops  ABDOMEN: Soft, Nontender, Nondistended; Bowel sounds present  EXTREMITIES:  2+ Peripheral Pulses, No clubbing, cyanosis, or edema  LYMPH: No lymphadenopathy noted  SKIN: No rashes or lesions    LABS:                        8.0    9.63  )-----------( 338      ( 19 Nov 2019 10:12 )             27.2     11-20    143  |  106  |  36<H>  ----------------------------<  96  3.7   |  29  |  9.41<H>    Ca    9.8      20 Nov 2019 10:30  Phos  5.0     11-20  Mg     2.2     11-20          CAPILLARY BLOOD GLUCOSE        CULTURES:  Culture Results:   Numerous Staphylococcus aureus  Moderate Escherichia coli  Few Klebsiella pneumoniae (11-18 @ 09:14)  Culture Results:   Culture yields growth of greater than 3 colony types of  bacteria,  which may indicate contamination and normal sabiha  Call client services within 7 days if further workup is clinically  indicated.  Culture includes  Numerous Staphylococcus aureus (11-18 @ 09:13)  Culture Results:   No growth to date. (11-17 @ 16:26)  Culture Results:   No growth to date. (11-17 @ 16:26)    HEMOGLOBIN A1C:    CHOLESTEROL:        RADIOLOGY & ADDITIONAL TESTS:

## 2019-11-20 NOTE — DIETITIAN INITIAL EVALUATION ADULT. - PERTINENT MEDS FT
MEDICATIONS  (STANDING):  carvedilol 6.25 milliGRAM(s) Oral every 12 hours  chlorhexidine 4% Liquid 1 Application(s) Topical <User Schedule>  dextrose 5% + sodium chloride 0.9%. 1000 milliLiter(s) (75 mL/Hr) IV Continuous <Continuous>  fat emulsion (Plant Based) 20% Infusion 8 Gm/kG/Day (10.42 mL/Hr) IV Continuous <Continuous>  heparin  Injectable 5000 Unit(s) SubCutaneous every 12 hours  influenza   Vaccine 0.5 milliLiter(s) IntraMuscular once  pantoprazole  Injectable 40 milliGRAM(s) IV Push daily  Parenteral Nutrition - Adult 1 Each (42 mL/Hr) TPN Continuous <Continuous>  Parenteral Nutrition - Adult 1 Each (42 mL/Hr) TPN Continuous <Continuous>  piperacillin/tazobactam IVPB.. 3.375 Gram(s) IV Intermittent every 12 hours    MEDICATIONS  (PRN):  acetaminophen   Tablet .. 650 milliGRAM(s) Oral every 6 hours PRN Moderate Pain (4 - 6)  nitroglycerin     SubLingual 0.4 milliGRAM(s) SubLingual every 5 minutes PRN Chest Pain
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
## 2019-11-20 NOTE — DIETITIAN INITIAL EVALUATION ADULT. - PHYSICAL APPEARANCE
obese/other (specify)/BMI= 38.4(11/19/19)for wt. of 107.8 kg/237 LBS, 1+ edema of feet noted Pt not receptive to RD to conduct nutrition focus physical exam.

## 2019-11-20 NOTE — DIETITIAN INITIAL EVALUATION ADULT. - PROBLEM SELECTOR PLAN 2
?Possible ECF  NPO, IVF  IV zosyn  Follow up wound culture result  local wound care per surgical team  GI consult for TPN

## 2019-11-21 LAB
ANION GAP SERPL CALC-SCNC: 8 MMOL/L — SIGNIFICANT CHANGE UP (ref 5–17)
BASOPHILS # BLD AUTO: 0 K/UL — SIGNIFICANT CHANGE UP (ref 0–0.2)
BASOPHILS NFR BLD AUTO: 0 % — SIGNIFICANT CHANGE UP (ref 0–2)
BUN SERPL-MCNC: 45 MG/DL — HIGH (ref 7–23)
CALCIUM SERPL-MCNC: 9.7 MG/DL — SIGNIFICANT CHANGE UP (ref 8.5–10.1)
CHLORIDE SERPL-SCNC: 108 MMOL/L — SIGNIFICANT CHANGE UP (ref 96–108)
CO2 SERPL-SCNC: 27 MMOL/L — SIGNIFICANT CHANGE UP (ref 22–31)
CREAT SERPL-MCNC: 10.9 MG/DL — HIGH (ref 0.5–1.3)
EOSINOPHIL # BLD AUTO: 0.08 K/UL — SIGNIFICANT CHANGE UP (ref 0–0.5)
EOSINOPHIL NFR BLD AUTO: 1 % — SIGNIFICANT CHANGE UP (ref 0–6)
GLUCOSE BLDC GLUCOMTR-MCNC: 112 MG/DL — HIGH (ref 70–99)
GLUCOSE SERPL-MCNC: 132 MG/DL — HIGH (ref 70–99)
HCT VFR BLD CALC: 27.7 % — LOW (ref 39–50)
HGB BLD-MCNC: 8 G/DL — LOW (ref 13–17)
LYMPHOCYTES # BLD AUTO: 1.72 K/UL — SIGNIFICANT CHANGE UP (ref 1–3.3)
LYMPHOCYTES # BLD AUTO: 21 % — SIGNIFICANT CHANGE UP (ref 13–44)
MAGNESIUM SERPL-MCNC: 2.1 MG/DL — SIGNIFICANT CHANGE UP (ref 1.6–2.6)
MCHC RBC-ENTMCNC: 26 PG — LOW (ref 27–34)
MCHC RBC-ENTMCNC: 28.9 GM/DL — LOW (ref 32–36)
MCV RBC AUTO: 89.9 FL — SIGNIFICANT CHANGE UP (ref 80–100)
MONOCYTES # BLD AUTO: 0.57 K/UL — SIGNIFICANT CHANGE UP (ref 0–0.9)
MONOCYTES NFR BLD AUTO: 7 % — SIGNIFICANT CHANGE UP (ref 2–14)
NEUTROPHILS # BLD AUTO: 5.66 K/UL — SIGNIFICANT CHANGE UP (ref 1.8–7.4)
NEUTROPHILS NFR BLD AUTO: 69 % — SIGNIFICANT CHANGE UP (ref 43–77)
NRBC # BLD: SIGNIFICANT CHANGE UP /100 WBCS (ref 0–0)
PHOSPHATE SERPL-MCNC: 5 MG/DL — HIGH (ref 2.5–4.5)
PLATELET # BLD AUTO: 331 K/UL — SIGNIFICANT CHANGE UP (ref 150–400)
POTASSIUM SERPL-MCNC: 3.3 MMOL/L — LOW (ref 3.5–5.3)
POTASSIUM SERPL-SCNC: 3.3 MMOL/L — LOW (ref 3.5–5.3)
RBC # BLD: 3.08 M/UL — LOW (ref 4.2–5.8)
RBC # FLD: 17.5 % — HIGH (ref 10.3–14.5)
SODIUM SERPL-SCNC: 143 MMOL/L — SIGNIFICANT CHANGE UP (ref 135–145)
T3 SERPL-MCNC: 95 NG/DL — SIGNIFICANT CHANGE UP (ref 80–200)
T4 AB SER-ACNC: 7.8 UG/DL — SIGNIFICANT CHANGE UP (ref 4.6–12)
T4 FREE SERPL-MCNC: 2.2 NG/DL — HIGH (ref 0.9–1.8)
WBC # BLD: 8.21 K/UL — SIGNIFICANT CHANGE UP (ref 3.8–10.5)
WBC # FLD AUTO: 8.21 K/UL — SIGNIFICANT CHANGE UP (ref 3.8–10.5)

## 2019-11-21 PROCEDURE — 93010 ELECTROCARDIOGRAM REPORT: CPT

## 2019-11-21 PROCEDURE — 74177 CT ABD & PELVIS W/CONTRAST: CPT | Mod: 26

## 2019-11-21 PROCEDURE — 99232 SBSQ HOSP IP/OBS MODERATE 35: CPT

## 2019-11-21 RX ORDER — ELECTROLYTE SOLUTION,INJ
1 VIAL (ML) INTRAVENOUS
Refills: 0 | Status: DISCONTINUED | OUTPATIENT
Start: 2019-11-21 | End: 2019-11-21

## 2019-11-21 RX ORDER — SODIUM CHLORIDE 9 MG/ML
1000 INJECTION, SOLUTION INTRAVENOUS
Refills: 0 | Status: DISCONTINUED | OUTPATIENT
Start: 2019-11-21 | End: 2019-11-29

## 2019-11-21 RX ORDER — RALTEGRAVIR 400 MG/1
400 TABLET, FILM COATED ORAL
Refills: 0 | Status: DISCONTINUED | OUTPATIENT
Start: 2019-11-21 | End: 2019-11-29

## 2019-11-21 RX ORDER — DEXTROSE 50 % IN WATER 50 %
15 SYRINGE (ML) INTRAVENOUS ONCE
Refills: 0 | Status: DISCONTINUED | OUTPATIENT
Start: 2019-11-21 | End: 2019-11-29

## 2019-11-21 RX ORDER — DEXTROSE 50 % IN WATER 50 %
25 SYRINGE (ML) INTRAVENOUS ONCE
Refills: 0 | Status: DISCONTINUED | OUTPATIENT
Start: 2019-11-21 | End: 2019-11-29

## 2019-11-21 RX ORDER — ETRAVIRINE 200 MG/1
200 TABLET ORAL
Refills: 0 | Status: DISCONTINUED | OUTPATIENT
Start: 2019-11-21 | End: 2019-11-29

## 2019-11-21 RX ORDER — INSULIN LISPRO 100/ML
VIAL (ML) SUBCUTANEOUS AT BEDTIME
Refills: 0 | Status: DISCONTINUED | OUTPATIENT
Start: 2019-11-21 | End: 2019-11-29

## 2019-11-21 RX ORDER — GLUCAGON INJECTION, SOLUTION 0.5 MG/.1ML
1 INJECTION, SOLUTION SUBCUTANEOUS ONCE
Refills: 0 | Status: DISCONTINUED | OUTPATIENT
Start: 2019-11-21 | End: 2019-11-29

## 2019-11-21 RX ORDER — INSULIN LISPRO 100/ML
VIAL (ML) SUBCUTANEOUS
Refills: 0 | Status: DISCONTINUED | OUTPATIENT
Start: 2019-11-21 | End: 2019-11-29

## 2019-11-21 RX ORDER — RITONAVIR 100 MG/1
100 TABLET, FILM COATED ORAL DAILY
Refills: 0 | Status: DISCONTINUED | OUTPATIENT
Start: 2019-11-21 | End: 2019-11-29

## 2019-11-21 RX ORDER — DEXTROSE 50 % IN WATER 50 %
12.5 SYRINGE (ML) INTRAVENOUS ONCE
Refills: 0 | Status: DISCONTINUED | OUTPATIENT
Start: 2019-11-21 | End: 2019-11-29

## 2019-11-21 RX ORDER — DARUNAVIR 75 MG/1
800 TABLET, FILM COATED ORAL DAILY
Refills: 0 | Status: DISCONTINUED | OUTPATIENT
Start: 2019-11-21 | End: 2019-11-29

## 2019-11-21 RX ORDER — IOHEXOL 300 MG/ML
30 INJECTION, SOLUTION INTRAVENOUS ONCE
Refills: 0 | Status: COMPLETED | OUTPATIENT
Start: 2019-11-21 | End: 2019-11-21

## 2019-11-21 RX ADMIN — Medication 30 MILLILITER(S): at 23:27

## 2019-11-21 RX ADMIN — DARUNAVIR 800 MILLIGRAM(S): 75 TABLET, FILM COATED ORAL at 18:29

## 2019-11-21 RX ADMIN — HEPARIN SODIUM 5000 UNIT(S): 5000 INJECTION INTRAVENOUS; SUBCUTANEOUS at 18:02

## 2019-11-21 RX ADMIN — CHLORHEXIDINE GLUCONATE 1 APPLICATION(S): 213 SOLUTION TOPICAL at 05:11

## 2019-11-21 RX ADMIN — Medication 2.5 MILLIGRAM(S): at 18:02

## 2019-11-21 RX ADMIN — Medication 2.5 MILLIGRAM(S): at 05:11

## 2019-11-21 RX ADMIN — ETRAVIRINE 200 MILLIGRAM(S): 200 TABLET ORAL at 18:29

## 2019-11-21 RX ADMIN — Medication 1 EACH: at 22:34

## 2019-11-21 RX ADMIN — Medication 2.5 MILLIGRAM(S): at 11:12

## 2019-11-21 RX ADMIN — PIPERACILLIN AND TAZOBACTAM 25 GRAM(S): 4; .5 INJECTION, POWDER, LYOPHILIZED, FOR SOLUTION INTRAVENOUS at 16:08

## 2019-11-21 RX ADMIN — PANTOPRAZOLE SODIUM 40 MILLIGRAM(S): 20 TABLET, DELAYED RELEASE ORAL at 11:12

## 2019-11-21 RX ADMIN — HEPARIN SODIUM 5000 UNIT(S): 5000 INJECTION INTRAVENOUS; SUBCUTANEOUS at 05:11

## 2019-11-21 RX ADMIN — PIPERACILLIN AND TAZOBACTAM 25 GRAM(S): 4; .5 INJECTION, POWDER, LYOPHILIZED, FOR SOLUTION INTRAVENOUS at 04:10

## 2019-11-21 RX ADMIN — IOHEXOL 30 MILLILITER(S): 300 INJECTION, SOLUTION INTRAVENOUS at 18:04

## 2019-11-21 RX ADMIN — RITONAVIR 100 MILLIGRAM(S): 100 TABLET, FILM COATED ORAL at 18:29

## 2019-11-21 RX ADMIN — SODIUM CHLORIDE 75 MILLILITER(S): 9 INJECTION, SOLUTION INTRAVENOUS at 23:27

## 2019-11-21 RX ADMIN — RALTEGRAVIR 400 MILLIGRAM(S): 400 TABLET, FILM COATED ORAL at 18:29

## 2019-11-21 RX ADMIN — Medication 650 MILLIGRAM(S): at 23:27
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
## 2019-11-21 NOTE — PROGRESS NOTE ADULT - SUBJECTIVE AND OBJECTIVE BOX
Subjective: depressed.       MEDICATIONS  (STANDING):  chlorhexidine 4% Liquid 1 Application(s) Topical <User Schedule>  dextrose 5% + sodium chloride 0.9%. 1000 milliLiter(s) (75 mL/Hr) IV Continuous <Continuous>  fat emulsion (Plant Based) 20% Infusion 8 Gm/kG/Day (10.42 mL/Hr) IV Continuous <Continuous>  heparin  Injectable 5000 Unit(s) SubCutaneous every 12 hours  influenza   Vaccine 0.5 milliLiter(s) IntraMuscular once  metoprolol tartrate Injectable 2.5 milliGRAM(s) IV Push every 6 hours  pantoprazole  Injectable 40 milliGRAM(s) IV Push daily  Parenteral Nutrition - Adult 1 Each (42 mL/Hr) TPN Continuous <Continuous>  piperacillin/tazobactam IVPB.. 3.375 Gram(s) IV Intermittent every 12 hours    MEDICATIONS  (PRN):  acetaminophen   Tablet .. 650 milliGRAM(s) Oral every 6 hours PRN Moderate Pain (4 - 6)  nitroglycerin     SubLingual 0.4 milliGRAM(s) SubLingual every 5 minutes PRN Chest Pain          T(C): 36.9 (11-21-19 @ 05:05), Max: 36.9 (11-20-19 @ 16:43)  HR: 108 (11-21-19 @ 05:05) (108 - 115)  BP: 129/71 (11-21-19 @ 05:05) (125/72 - 129/71)  RR: 18 (11-21-19 @ 05:05) (16 - 18)  SpO2: 94% (11-21-19 @ 05:05) (94% - 95%)  Wt(kg): --        I&O's Detail    20 Nov 2019 07:01  -  21 Nov 2019 07:00  --------------------------------------------------------  IN:    dextrose 5% + sodium chloride 0.9%.: 900 mL    fat emulsion (Plant Based) 20% Infusion: 90 mL    Solution: 100 mL    TPN (Total Parenteral Nutrition): 504 mL  Total IN: 1594 mL    OUT:    Colostomy: 75 mL  Total OUT: 75 mL    Total NET: 1519 mL               PHYSICAL EXAM:    GENERAL: depressed.   EYES: EOMI, PERRLA, conjunctiva and sclera clear  NECK: Supple, no inc in JVP  CHEST/LUNG: Clear  HEART: S1S2  ABDOMEN: midline dressing, ostomy  EXTREMITIES:  min edema.   NEURO: no asterixis  L AVF pos thrill, bruit      LABS:  CBC Full  -  ( 21 Nov 2019 10:31 )  WBC Count : 8.21 K/uL  RBC Count : 3.08 M/uL  Hemoglobin : 8.0 g/dL  Hematocrit : 27.7 %  Platelet Count - Automated : 331 K/uL  Mean Cell Volume : 89.9 fl  Mean Cell Hemoglobin : 26.0 pg  Mean Cell Hemoglobin Concentration : 28.9 gm/dL  Auto Neutrophil # : x  Auto Lymphocyte # : x  Auto Monocyte # : x  Auto Eosinophil # : x  Auto Basophil # : x  Auto Neutrophil % : x  Auto Lymphocyte % : x  Auto Monocyte % : x  Auto Eosinophil % : x  Auto Basophil % : x    11-21    143  |  108  |  45<H>  ----------------------------<  132<H>  3.3<L>   |  27  |  10.90<H>    Ca    9.7      21 Nov 2019 10:31  Phos  5.0     11-21  Mg     2.1     11-21            Impression:  * HD dependent ESRD  * Draining abdominal wounds, ? enterocutaneous fistula  * Recent exp lap, sigm colon resection, perit lavage, I&D of abscess for perfed viscus    Recommendations:   * For maintenance HD today as Rxed.   * 3K/4K bath with HD. UF goal 1.5-2kg
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
## 2019-11-21 NOTE — PROGRESS NOTE ADULT - SUBJECTIVE AND OBJECTIVE BOX
Patient is a 59y old  Male who presents with a chief complaint of gi bleed (21 Nov 2019 08:50)      INTERVAL HPI/OVERNIGHT EVENTS:  pt with no complaint  MEDICATIONS  (STANDING):  chlorhexidine 4% Liquid 1 Application(s) Topical <User Schedule>  dextrose 5% + sodium chloride 0.9%. 1000 milliLiter(s) (75 mL/Hr) IV Continuous <Continuous>  fat emulsion (Plant Based) 20% Infusion 8 Gm/kG/Day (10.42 mL/Hr) IV Continuous <Continuous>  heparin  Injectable 5000 Unit(s) SubCutaneous every 12 hours  influenza   Vaccine 0.5 milliLiter(s) IntraMuscular once  metoprolol tartrate Injectable 2.5 milliGRAM(s) IV Push every 6 hours  pantoprazole  Injectable 40 milliGRAM(s) IV Push daily  Parenteral Nutrition - Adult 1 Each (42 mL/Hr) TPN Continuous <Continuous>  Parenteral Nutrition - Adult 1 Each (42 mL/Hr) TPN Continuous <Continuous>  piperacillin/tazobactam IVPB.. 3.375 Gram(s) IV Intermittent every 12 hours    MEDICATIONS  (PRN):  acetaminophen   Tablet .. 650 milliGRAM(s) Oral every 6 hours PRN Moderate Pain (4 - 6)  nitroglycerin     SubLingual 0.4 milliGRAM(s) SubLingual every 5 minutes PRN Chest Pain      Allergies    ciprofloxacin (Rash)  Levaquin (Rash)    Intolerances        REVIEW OF SYSTEMS:  CONSTITUTIONAL: No fever, weight loss, or fatigue  EYES: No eye pain, visual disturbances, or discharge  ENMT:  No difficulty hearing, tinnitus, vertigo; No sinus or throat pain  NECK: No pain or stiffness  BREASTS: No pain, masses, or nipple discharge  RESPIRATORY: No cough, wheezing, chills or hemoptysis; No shortness of breath  CARDIOVASCULAR: No chest pain, palpitations, dizziness, or leg swelling  GASTROINTESTINAL: No abdominal or epigastric pain. No nausea, vomiting, or hematemesis; No diarrhea or constipation. No melena or hematochezia.  GENITOURINARY: No dysuria, frequency, hematuria, or incontinence  NEUROLOGICAL: No headaches, memory loss, loss of strength, numbness, or tremors  SKIN: No itching, burning, rashes, or lesions   LYMPH NODES: No enlarged glands  ENDOCRINE: No heat or cold intolerance; No hair loss  MUSCULOSKELETAL: No joint pain or swelling; No muscle, back, or extremity pain  PSYCHIATRIC: No depression, anxiety, mood swings, or difficulty sleeping  HEME/LYMPH: No easy bruising, or bleeding gums  ALLERGY AND IMMUNOLOGIC: No hives or eczema    Vital Signs Last 24 Hrs  T(C): 36.7 (21 Nov 2019 11:14), Max: 36.9 (20 Nov 2019 16:43)  T(F): 98.1 (21 Nov 2019 11:14), Max: 98.5 (20 Nov 2019 16:43)  HR: 104 (21 Nov 2019 11:14) (104 - 115)  BP: 114/68 (21 Nov 2019 11:14) (114/68 - 129/71)  BP(mean): --  RR: 18 (21 Nov 2019 11:14) (16 - 18)  SpO2: 93% (21 Nov 2019 11:14) (93% - 95%)    PHYSICAL EXAM:  GENERAL: NAD, well-groomed, well-developed  HEAD:  Atraumatic, Normocephalic  EYES: EOMI, PERRLA, conjunctiva and sclera clear  ENMT: No tonsillar erythema, exudates, or enlargement; Moist mucous membranes, Good dentition, No lesions  NECK: Supple, No JVD, Normal thyroid  NERVOUS SYSTEM:  Alert & Oriented X3, Good concentration; Motor Strength 5/5 B/L upper and lower extremities; DTRs 2+ intact and symmetric  CHEST/LUNG: Clear to percussion bilaterally; No rales, rhonchi, wheezing, or rubs  HEART: Regular rate and rhythm; No murmurs, rubs, or gallops  ABDOMEN: Soft, Nontender, Nondistended; Bowel sounds present  EXTREMITIES:  2+ Peripheral Pulses, No clubbing, cyanosis, or edema  LYMPH: No lymphadenopathy noted  SKIN: No rashes or lesions    LABS:                        8.0    8.21  )-----------( 331      ( 21 Nov 2019 10:31 )             27.7     11-21    143  |  108  |  45<H>  ----------------------------<  132<H>  3.3<L>   |  27  |  10.90<H>    Ca    9.7      21 Nov 2019 10:31  Phos  5.0     11-21  Mg     2.1     11-21          CAPILLARY BLOOD GLUCOSE        CULTURES:    HEMOGLOBIN A1C:    CHOLESTEROL:  Cholesterol, Serum: 79 mg/dL (11-20-19 @ 13:07)  HDL Cholesterol, Serum: 26 mg/dL (11-20-19 @ 13:07)  Triglycerides, Serum: 75 mg/dL (11-20-19 @ 13:07)        RADIOLOGY & ADDITIONAL TESTS:
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
## 2019-11-22 DIAGNOSIS — E05.90 THYROTOXICOSIS, UNSPECIFIED WITHOUT THYROTOXIC CRISIS OR STORM: ICD-10-CM

## 2019-11-22 LAB
ALBUMIN SERPL ELPH-MCNC: 1.6 G/DL — LOW (ref 3.3–5)
ALP SERPL-CCNC: 69 U/L — SIGNIFICANT CHANGE UP (ref 40–120)
ALT FLD-CCNC: 8 U/L — LOW (ref 12–78)
ANION GAP SERPL CALC-SCNC: 8 MMOL/L — SIGNIFICANT CHANGE UP (ref 5–17)
AST SERPL-CCNC: 13 U/L — LOW (ref 15–37)
BILIRUB SERPL-MCNC: 0.3 MG/DL — SIGNIFICANT CHANGE UP (ref 0.2–1.2)
BUN SERPL-MCNC: 20 MG/DL — SIGNIFICANT CHANGE UP (ref 7–23)
CALCIUM SERPL-MCNC: 9.1 MG/DL — SIGNIFICANT CHANGE UP (ref 8.5–10.1)
CHLORIDE SERPL-SCNC: 102 MMOL/L — SIGNIFICANT CHANGE UP (ref 96–108)
CO2 SERPL-SCNC: 29 MMOL/L — SIGNIFICANT CHANGE UP (ref 22–31)
CREAT SERPL-MCNC: 6.15 MG/DL — HIGH (ref 0.5–1.3)
CULTURE RESULTS: SIGNIFICANT CHANGE UP
CULTURE RESULTS: SIGNIFICANT CHANGE UP
GLUCOSE BLDC GLUCOMTR-MCNC: 124 MG/DL — HIGH (ref 70–99)
GLUCOSE BLDC GLUCOMTR-MCNC: 130 MG/DL — HIGH (ref 70–99)
GLUCOSE BLDC GLUCOMTR-MCNC: 135 MG/DL — HIGH (ref 70–99)
GLUCOSE BLDC GLUCOMTR-MCNC: 152 MG/DL — HIGH (ref 70–99)
GLUCOSE SERPL-MCNC: 125 MG/DL — HIGH (ref 70–99)
HCT VFR BLD CALC: 26.8 % — LOW (ref 39–50)
HGB BLD-MCNC: 7.8 G/DL — LOW (ref 13–17)
MAGNESIUM SERPL-MCNC: 1.8 MG/DL — SIGNIFICANT CHANGE UP (ref 1.6–2.6)
MCHC RBC-ENTMCNC: 26.1 PG — LOW (ref 27–34)
MCHC RBC-ENTMCNC: 29.1 GM/DL — LOW (ref 32–36)
MCV RBC AUTO: 89.6 FL — SIGNIFICANT CHANGE UP (ref 80–100)
NRBC # BLD: 0 /100 WBCS — SIGNIFICANT CHANGE UP (ref 0–0)
PHOSPHATE SERPL-MCNC: 3.4 MG/DL — SIGNIFICANT CHANGE UP (ref 2.5–4.5)
PLATELET # BLD AUTO: 325 K/UL — SIGNIFICANT CHANGE UP (ref 150–400)
POTASSIUM SERPL-MCNC: 3.1 MMOL/L — LOW (ref 3.5–5.3)
POTASSIUM SERPL-SCNC: 3.1 MMOL/L — LOW (ref 3.5–5.3)
PROT SERPL-MCNC: 6.4 GM/DL — SIGNIFICANT CHANGE UP (ref 6–8.3)
RBC # BLD: 2.99 M/UL — LOW (ref 4.2–5.8)
RBC # FLD: 17.4 % — HIGH (ref 10.3–14.5)
SODIUM SERPL-SCNC: 139 MMOL/L — SIGNIFICANT CHANGE UP (ref 135–145)
SPECIMEN SOURCE: SIGNIFICANT CHANGE UP
SPECIMEN SOURCE: SIGNIFICANT CHANGE UP
WBC # BLD: 8.36 K/UL — SIGNIFICANT CHANGE UP (ref 3.8–10.5)
WBC # FLD AUTO: 8.36 K/UL — SIGNIFICANT CHANGE UP (ref 3.8–10.5)

## 2019-11-22 PROCEDURE — 99232 SBSQ HOSP IP/OBS MODERATE 35: CPT

## 2019-11-22 RX ORDER — I.V. FAT EMULSION 20 G/100ML
0.5 EMULSION INTRAVENOUS
Qty: 50 | Refills: 0 | Status: DISCONTINUED | OUTPATIENT
Start: 2019-11-22 | End: 2019-11-22

## 2019-11-22 RX ORDER — OXYCODONE HYDROCHLORIDE 5 MG/1
5 TABLET ORAL ONCE
Refills: 0 | Status: DISCONTINUED | OUTPATIENT
Start: 2019-11-22 | End: 2019-11-22

## 2019-11-22 RX ORDER — POTASSIUM CHLORIDE 20 MEQ
10 PACKET (EA) ORAL
Refills: 0 | Status: DISCONTINUED | OUTPATIENT
Start: 2019-11-22 | End: 2019-11-22

## 2019-11-22 RX ORDER — ELECTROLYTE SOLUTION,INJ
1 VIAL (ML) INTRAVENOUS
Refills: 0 | Status: DISCONTINUED | OUTPATIENT
Start: 2019-11-22 | End: 2019-11-22

## 2019-11-22 RX ORDER — POTASSIUM CHLORIDE 20 MEQ
20 PACKET (EA) ORAL ONCE
Refills: 0 | Status: COMPLETED | OUTPATIENT
Start: 2019-11-22 | End: 2019-11-22

## 2019-11-22 RX ORDER — ERYTHROPOIETIN 10000 [IU]/ML
20000 INJECTION, SOLUTION INTRAVENOUS; SUBCUTANEOUS ONCE
Refills: 0 | Status: COMPLETED | OUTPATIENT
Start: 2019-11-22 | End: 2019-11-22

## 2019-11-22 RX ADMIN — PIPERACILLIN AND TAZOBACTAM 25 GRAM(S): 4; .5 INJECTION, POWDER, LYOPHILIZED, FOR SOLUTION INTRAVENOUS at 14:51

## 2019-11-22 RX ADMIN — Medication 1 EACH: at 22:09

## 2019-11-22 RX ADMIN — RALTEGRAVIR 400 MILLIGRAM(S): 400 TABLET, FILM COATED ORAL at 17:22

## 2019-11-22 RX ADMIN — OXYCODONE HYDROCHLORIDE 5 MILLIGRAM(S): 5 TABLET ORAL at 01:16

## 2019-11-22 RX ADMIN — Medication 2.5 MILLIGRAM(S): at 12:31

## 2019-11-22 RX ADMIN — CHLORHEXIDINE GLUCONATE 1 APPLICATION(S): 213 SOLUTION TOPICAL at 05:10

## 2019-11-22 RX ADMIN — DARUNAVIR 800 MILLIGRAM(S): 75 TABLET, FILM COATED ORAL at 12:30

## 2019-11-22 RX ADMIN — ERYTHROPOIETIN 20000 UNIT(S): 10000 INJECTION, SOLUTION INTRAVENOUS; SUBCUTANEOUS at 14:50

## 2019-11-22 RX ADMIN — Medication 2.5 MILLIGRAM(S): at 05:10

## 2019-11-22 RX ADMIN — Medication 2.5 MILLIGRAM(S): at 17:22

## 2019-11-22 RX ADMIN — Medication 20 MILLIEQUIVALENT(S): at 12:30

## 2019-11-22 RX ADMIN — RALTEGRAVIR 400 MILLIGRAM(S): 400 TABLET, FILM COATED ORAL at 05:09

## 2019-11-22 RX ADMIN — PANTOPRAZOLE SODIUM 40 MILLIGRAM(S): 20 TABLET, DELAYED RELEASE ORAL at 12:31

## 2019-11-22 RX ADMIN — OXYCODONE HYDROCHLORIDE 5 MILLIGRAM(S): 5 TABLET ORAL at 02:16

## 2019-11-22 RX ADMIN — HEPARIN SODIUM 5000 UNIT(S): 5000 INJECTION INTRAVENOUS; SUBCUTANEOUS at 17:22

## 2019-11-22 RX ADMIN — Medication 650 MILLIGRAM(S): at 00:27

## 2019-11-22 RX ADMIN — I.V. FAT EMULSION 11.23 GM/KG/DAY: 20 EMULSION INTRAVENOUS at 17:21

## 2019-11-22 RX ADMIN — ETRAVIRINE 200 MILLIGRAM(S): 200 TABLET ORAL at 12:30

## 2019-11-22 RX ADMIN — PIPERACILLIN AND TAZOBACTAM 25 GRAM(S): 4; .5 INJECTION, POWDER, LYOPHILIZED, FOR SOLUTION INTRAVENOUS at 02:20

## 2019-11-22 RX ADMIN — ETRAVIRINE 200 MILLIGRAM(S): 200 TABLET ORAL at 17:35

## 2019-11-22 RX ADMIN — Medication 2.5 MILLIGRAM(S): at 01:16

## 2019-11-22 RX ADMIN — HEPARIN SODIUM 5000 UNIT(S): 5000 INJECTION INTRAVENOUS; SUBCUTANEOUS at 05:09

## 2019-11-22 RX ADMIN — RITONAVIR 100 MILLIGRAM(S): 100 TABLET, FILM COATED ORAL at 12:30
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
## 2019-11-22 NOTE — PHYSICAL THERAPY INITIAL EVALUATION ADULT - PLANNED THERAPY INTERVENTIONS, PT EVAL
neuromuscular re-education/transfer training/balance training/gait training/strengthening/bed mobility training
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
## 2019-11-22 NOTE — PROGRESS NOTE ADULT - SUBJECTIVE AND OBJECTIVE BOX
Patient is a 59y old  Male who presents with a chief complaint of gi bleed (21 Nov 2019 08:50)      INTERVAL HPI/OVERNIGHT EVENTS:  pt is doing better  MEDICATIONS  (STANDING):  chlorhexidine 4% Liquid 1 Application(s) Topical <User Schedule>  darunavir 800 milliGRAM(s) Oral daily  dextrose 5% + sodium chloride 0.9%. 1000 milliLiter(s) (75 mL/Hr) IV Continuous <Continuous>  dextrose 5%. 1000 milliLiter(s) (50 mL/Hr) IV Continuous <Continuous>  dextrose 50% Injectable 12.5 Gram(s) IV Push once  dextrose 50% Injectable 25 Gram(s) IV Push once  dextrose 50% Injectable 25 Gram(s) IV Push once  etravirine 200 milliGRAM(s) Oral two times a day after meals  fat emulsion (Plant Based) 20% Infusion 0.5 Gm/kG/Day (11.229 mL/Hr) IV Continuous <Continuous>  heparin  Injectable 5000 Unit(s) SubCutaneous every 12 hours  influenza   Vaccine 0.5 milliLiter(s) IntraMuscular once  insulin lispro (HumaLOG) corrective regimen sliding scale   SubCutaneous three times a day before meals  insulin lispro (HumaLOG) corrective regimen sliding scale   SubCutaneous at bedtime  metoprolol tartrate Injectable 2.5 milliGRAM(s) IV Push every 6 hours  pantoprazole  Injectable 40 milliGRAM(s) IV Push daily  Parenteral Nutrition - Adult 1 Each (42 mL/Hr) TPN Continuous <Continuous>  Parenteral Nutrition - Adult 1 Each (63 mL/Hr) TPN Continuous <Continuous>  piperacillin/tazobactam IVPB.. 3.375 Gram(s) IV Intermittent every 12 hours  potassium chloride    Tablet ER 20 milliEquivalent(s) Oral once  raltegravir Tablet 400 milliGRAM(s) Oral two times a day  ritonavir Tablet 100 milliGRAM(s) Oral daily    MEDICATIONS  (PRN):  acetaminophen   Tablet .. 650 milliGRAM(s) Oral every 6 hours PRN Moderate Pain (4 - 6)  aluminum hydroxide/magnesium hydroxide/simethicone Suspension 30 milliLiter(s) Oral every 6 hours PRN Dyspepsia  dextrose 40% Gel 15 Gram(s) Oral once PRN Blood Glucose LESS THAN 70 milliGRAM(s)/deciliter  glucagon  Injectable 1 milliGRAM(s) IntraMuscular once PRN Glucose LESS THAN 70 milligrams/deciliter  nitroglycerin     SubLingual 0.4 milliGRAM(s) SubLingual every 5 minutes PRN Chest Pain      Allergies    ciprofloxacin (Rash)  Levaquin (Rash)    Intolerances        REVIEW OF SYSTEMS:  CONSTITUTIONAL: No fever, weight loss, or fatigue  EYES: No eye pain, visual disturbances, or discharge  ENMT:  No difficulty hearing, tinnitus, vertigo; No sinus or throat pain  NECK: No pain or stiffness  BREASTS: No pain, masses, or nipple discharge  RESPIRATORY: No cough, wheezing, chills or hemoptysis; No shortness of breath  CARDIOVASCULAR: No chest pain, palpitations, dizziness, or leg swelling  GASTROINTESTINAL: No abdominal or epigastric pain. No nausea, vomiting, or hematemesis; No diarrhea or constipation. No melena or hematochezia.  GENITOURINARY: No dysuria, frequency, hematuria, or incontinence  NEUROLOGICAL: No headaches, memory loss, loss of strength, numbness, or tremors  SKIN: No itching, burning, rashes, or lesions   LYMPH NODES: No enlarged glands  ENDOCRINE: No heat or cold intolerance; No hair loss  MUSCULOSKELETAL: No joint pain or swelling; No muscle, back, or extremity pain  PSYCHIATRIC: No depression, anxiety, mood swings, or difficulty sleeping  HEME/LYMPH: No easy bruising, or bleeding gums  ALLERGY AND IMMUNOLOGIC: No hives or eczema    Vital Signs Last 24 Hrs  T(C): 36.2 (22 Nov 2019 11:34), Max: 37.1 (21 Nov 2019 23:37)  T(F): 97.2 (22 Nov 2019 11:34), Max: 98.8 (21 Nov 2019 23:37)  HR: 97 (22 Nov 2019 11:34) (93 - 108)  BP: 123/69 (22 Nov 2019 11:34) (109/65 - 146/78)  BP(mean): --  RR: 18 (22 Nov 2019 11:34) (16 - 20)  SpO2: 95% (22 Nov 2019 11:34) (94% - 100%)    PHYSICAL EXAM:  GENERAL: NAD, well-groomed, well-developed  HEAD:  Atraumatic, Normocephalic  EYES: EOMI, PERRLA, conjunctiva and sclera clear  ENMT: No tonsillar erythema, exudates, or enlargement; Moist mucous membranes, Good dentition, No lesions  NECK: Supple, No JVD, Normal thyroid  NERVOUS SYSTEM:  Alert & Oriented X3, Good concentration; Motor Strength 5/5 B/L upper and lower extremities; DTRs 2+ intact and symmetric  CHEST/LUNG: Clear to percussion bilaterally; No rales, rhonchi, wheezing, or rubs  HEART: Regular rate and rhythm; No murmurs, rubs, or gallops  ABDOMEN: Soft, Nontender, Nondistended; Bowel sounds present  EXTREMITIES:  2+ Peripheral Pulses, No clubbing, cyanosis, or edema  LYMPH: No lymphadenopathy noted  SKIN: No rashes or lesions    LABS:                        7.8    8.36  )-----------( 325      ( 22 Nov 2019 09:56 )             26.8     11-22    139  |  102  |  20  ----------------------------<  125<H>  3.1<L>   |  29  |  6.15<H>    Ca    9.1      22 Nov 2019 09:56  Phos  3.4     11-22  Mg     1.8     11-22    TPro  6.4  /  Alb  1.6<L>  /  TBili  0.3  /  DBili  x   /  AST  13<L>  /  ALT  8<L>  /  AlkPhos  69  11-22        CAPILLARY BLOOD GLUCOSE      POCT Blood Glucose.: 152 mg/dL (22 Nov 2019 08:13)  POCT Blood Glucose.: 112 mg/dL (21 Nov 2019 21:50)    CULTURES:    HEMOGLOBIN A1C:    CHOLESTEROL:  Cholesterol, Serum: 79 mg/dL (11-20-19 @ 13:07)  HDL Cholesterol, Serum: 26 mg/dL (11-20-19 @ 13:07)  Triglycerides, Serum: 75 mg/dL (11-20-19 @ 13:07)        RADIOLOGY & ADDITIONAL TESTS:
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
## 2019-11-23 DIAGNOSIS — E46 UNSPECIFIED PROTEIN-CALORIE MALNUTRITION: ICD-10-CM

## 2019-11-23 DIAGNOSIS — E11.9 TYPE 2 DIABETES MELLITUS WITHOUT COMPLICATIONS: ICD-10-CM

## 2019-11-23 LAB
ANION GAP SERPL CALC-SCNC: 8 MMOL/L — SIGNIFICANT CHANGE UP (ref 5–17)
BUN SERPL-MCNC: 26 MG/DL — HIGH (ref 7–23)
CALCIUM SERPL-MCNC: 9.6 MG/DL — SIGNIFICANT CHANGE UP (ref 8.5–10.1)
CHLORIDE SERPL-SCNC: 101 MMOL/L — SIGNIFICANT CHANGE UP (ref 96–108)
CO2 SERPL-SCNC: 28 MMOL/L — SIGNIFICANT CHANGE UP (ref 22–31)
CREAT SERPL-MCNC: 7.79 MG/DL — HIGH (ref 0.5–1.3)
FERRITIN SERPL-MCNC: 1399 NG/ML — HIGH (ref 30–400)
GLUCOSE BLDC GLUCOMTR-MCNC: 141 MG/DL — HIGH (ref 70–99)
GLUCOSE BLDC GLUCOMTR-MCNC: 143 MG/DL — HIGH (ref 70–99)
GLUCOSE BLDC GLUCOMTR-MCNC: 149 MG/DL — HIGH (ref 70–99)
GLUCOSE SERPL-MCNC: 129 MG/DL — HIGH (ref 70–99)
HCT VFR BLD CALC: 26.8 % — LOW (ref 39–50)
HGB BLD-MCNC: 7.8 G/DL — LOW (ref 13–17)
IRON SATN MFR SERPL: 34 % — SIGNIFICANT CHANGE UP (ref 16–55)
IRON SATN MFR SERPL: 49 UG/DL — SIGNIFICANT CHANGE UP (ref 45–165)
MAGNESIUM SERPL-MCNC: 1.9 MG/DL — SIGNIFICANT CHANGE UP (ref 1.6–2.6)
MCHC RBC-ENTMCNC: 26.1 PG — LOW (ref 27–34)
MCHC RBC-ENTMCNC: 29.1 GM/DL — LOW (ref 32–36)
MCV RBC AUTO: 89.6 FL — SIGNIFICANT CHANGE UP (ref 80–100)
NRBC # BLD: 0 /100 WBCS — SIGNIFICANT CHANGE UP (ref 0–0)
PHOSPHATE SERPL-MCNC: 3.8 MG/DL — SIGNIFICANT CHANGE UP (ref 2.5–4.5)
PLATELET # BLD AUTO: 349 K/UL — SIGNIFICANT CHANGE UP (ref 150–400)
POTASSIUM SERPL-MCNC: 3.2 MMOL/L — LOW (ref 3.5–5.3)
POTASSIUM SERPL-SCNC: 3.2 MMOL/L — LOW (ref 3.5–5.3)
RBC # BLD: 2.99 M/UL — LOW (ref 4.2–5.8)
RBC # FLD: 17.2 % — HIGH (ref 10.3–14.5)
SODIUM SERPL-SCNC: 137 MMOL/L — SIGNIFICANT CHANGE UP (ref 135–145)
TIBC SERPL-MCNC: 144 UG/DL — LOW (ref 220–430)
UIBC SERPL-MCNC: 95 UG/DL — LOW (ref 110–370)
WBC # BLD: 8.44 K/UL — SIGNIFICANT CHANGE UP (ref 3.8–10.5)
WBC # FLD AUTO: 8.44 K/UL — SIGNIFICANT CHANGE UP (ref 3.8–10.5)

## 2019-11-23 RX ORDER — ELECTROLYTE SOLUTION,INJ
1 VIAL (ML) INTRAVENOUS
Refills: 0 | Status: DISCONTINUED | OUTPATIENT
Start: 2019-11-23 | End: 2019-11-23

## 2019-11-23 RX ORDER — POTASSIUM CHLORIDE 20 MEQ
40 PACKET (EA) ORAL ONCE
Refills: 0 | Status: COMPLETED | OUTPATIENT
Start: 2019-11-23 | End: 2019-11-23

## 2019-11-23 RX ADMIN — Medication 40 MILLIEQUIVALENT(S): at 19:37

## 2019-11-23 RX ADMIN — RITONAVIR 100 MILLIGRAM(S): 100 TABLET, FILM COATED ORAL at 15:31

## 2019-11-23 RX ADMIN — CHLORHEXIDINE GLUCONATE 1 APPLICATION(S): 213 SOLUTION TOPICAL at 06:11

## 2019-11-23 RX ADMIN — PIPERACILLIN AND TAZOBACTAM 25 GRAM(S): 4; .5 INJECTION, POWDER, LYOPHILIZED, FOR SOLUTION INTRAVENOUS at 15:42

## 2019-11-23 RX ADMIN — Medication 2.5 MILLIGRAM(S): at 00:20

## 2019-11-23 RX ADMIN — ETRAVIRINE 200 MILLIGRAM(S): 200 TABLET ORAL at 15:30

## 2019-11-23 RX ADMIN — PIPERACILLIN AND TAZOBACTAM 25 GRAM(S): 4; .5 INJECTION, POWDER, LYOPHILIZED, FOR SOLUTION INTRAVENOUS at 03:02

## 2019-11-23 RX ADMIN — HEPARIN SODIUM 5000 UNIT(S): 5000 INJECTION INTRAVENOUS; SUBCUTANEOUS at 05:39

## 2019-11-23 RX ADMIN — DARUNAVIR 800 MILLIGRAM(S): 75 TABLET, FILM COATED ORAL at 15:31

## 2019-11-23 RX ADMIN — PANTOPRAZOLE SODIUM 40 MILLIGRAM(S): 20 TABLET, DELAYED RELEASE ORAL at 15:32

## 2019-11-23 RX ADMIN — HEPARIN SODIUM 5000 UNIT(S): 5000 INJECTION INTRAVENOUS; SUBCUTANEOUS at 19:37

## 2019-11-23 RX ADMIN — RALTEGRAVIR 400 MILLIGRAM(S): 400 TABLET, FILM COATED ORAL at 19:39

## 2019-11-23 RX ADMIN — Medication 2.5 MILLIGRAM(S): at 05:38

## 2019-11-23 RX ADMIN — RALTEGRAVIR 400 MILLIGRAM(S): 400 TABLET, FILM COATED ORAL at 05:40

## 2019-11-23 RX ADMIN — Medication 2.5 MILLIGRAM(S): at 19:37

## 2019-11-23 RX ADMIN — Medication 1 EACH: at 22:50
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
## 2019-11-23 NOTE — PROGRESS NOTE ADULT - SUBJECTIVE AND OBJECTIVE BOX
Patient is a 59y old  Male who presents with a chief complaint of gi bleed (23 Nov 2019 20:17)      Interval History: finger sticks are stable   also Methimazole started - 10 mg   TSI Pending     MEDICATIONS  (STANDING):  chlorhexidine 4% Liquid 1 Application(s) Topical <User Schedule>  darunavir 800 milliGRAM(s) Oral daily  dextrose 5%. 1000 milliLiter(s) (50 mL/Hr) IV Continuous <Continuous>  dextrose 50% Injectable 12.5 Gram(s) IV Push once  dextrose 50% Injectable 25 Gram(s) IV Push once  dextrose 50% Injectable 25 Gram(s) IV Push once  etravirine 200 milliGRAM(s) Oral two times a day after meals  fat emulsion (Plant Based) 20% Infusion 0.5 Gm/kG/Day (11.229 mL/Hr) IV Continuous <Continuous>  heparin  Injectable 5000 Unit(s) SubCutaneous every 12 hours  influenza   Vaccine 0.5 milliLiter(s) IntraMuscular once  insulin lispro (HumaLOG) corrective regimen sliding scale   SubCutaneous three times a day before meals  insulin lispro (HumaLOG) corrective regimen sliding scale   SubCutaneous at bedtime  methimazole 10 milliGRAM(s) Oral daily  metoprolol tartrate Injectable 2.5 milliGRAM(s) IV Push every 6 hours  pantoprazole  Injectable 40 milliGRAM(s) IV Push daily  Parenteral Nutrition - Adult 1 Each (63 mL/Hr) TPN Continuous <Continuous>  Parenteral Nutrition - Adult 1 Each (63 mL/Hr) TPN Continuous <Continuous>  piperacillin/tazobactam IVPB.. 3.375 Gram(s) IV Intermittent every 12 hours  raltegravir Tablet 400 milliGRAM(s) Oral two times a day  ritonavir Tablet 100 milliGRAM(s) Oral daily    MEDICATIONS  (PRN):  acetaminophen   Tablet .. 650 milliGRAM(s) Oral every 6 hours PRN Moderate Pain (4 - 6)  aluminum hydroxide/magnesium hydroxide/simethicone Suspension 30 milliLiter(s) Oral every 6 hours PRN Dyspepsia  dextrose 40% Gel 15 Gram(s) Oral once PRN Blood Glucose LESS THAN 70 milliGRAM(s)/deciliter  glucagon  Injectable 1 milliGRAM(s) IntraMuscular once PRN Glucose LESS THAN 70 milligrams/deciliter  nitroglycerin     SubLingual 0.4 milliGRAM(s) SubLingual every 5 minutes PRN Chest Pain      Allergies    ciprofloxacin (Rash)  Levaquin (Rash)    Intolerances        REVIEW OF SYSTEMS:  CONSTITUTIONAL: no changes  EYES: No eye pain, visual disturbances, or discharge  ENMT:  No difficulty hearing, No sinus or throat pain  NECK: No pain or stiffness  RESPIRATORY: No cough, wheezing, chills or hemoptysis; No shortness of breath  CARDIOVASCULAR: No chest pain, palpitations or leg swelling  GASTROINTESTINAL: No abdominal or epigastric pain. No nausea, vomiting, or hematemesis; No diarrhea or constipation. No melena or hematochezia.  GENITOURINARY: No dysuria, frequency, hematuria, or incontinence  NEUROLOGICAL: No headaches, memory loss, loss of strength, numbness, or tremors  SKIN: No itching, burning, rashes, or lesions   ENDOCRINE: No heat or cold intolerance; No hair loss  MUSCULOSKELETAL: No joint pain or swelling; No muscle, back, or extremity pain  PSYCHIATRIC: No depression, anxiety, mood swings, or difficulty sleeping  HEME/LYMPH: No easy bruising, or bleeding gums  ALLERY AND IMMUNOLOGIC: No hives or eczema    Vital Signs Last 24 Hrs  T(C): 36.2 (23 Nov 2019 17:29), Max: 36.8 (23 Nov 2019 10:05)  T(F): 97.2 (23 Nov 2019 17:29), Max: 98.2 (23 Nov 2019 10:05)  HR: 97 (23 Nov 2019 17:29) (83 - 98)  BP: 127/68 (23 Nov 2019 17:29) (115/76 - 150/87)  BP(mean): --  RR: 17 (23 Nov 2019 17:29) (17 - 18)  SpO2: 100% (23 Nov 2019 17:29) (98% - 100%)    PHYSICAL EXAM:  GENERAL:   HEAD: Atraumatic, Normocephalic  EYES: PERRLA, conjunctiva and sclera clear  ENMT: No tonsillar erythema, exudates, or enlargement; Moist mucous membranes, Good dentition, No lesions  NECK: Supple, No JVD, Normal thyroid  NERVOUS SYSTEM:  Alert & Oriented X3, Good concentration; Motor Strength 5/5 B/L upper and lower extremities  CHEST/LUNG: Clear to auscultaion bilaterally; No rales, rhonchi, wheezing, or rubs  HEART: Regular rate and rhythm; No murmurs, rubs, or gallops  ABDOMEN: Soft, Nontender, Nondistended; Bowel sounds present  EXTREMITIES:  2+ Peripheral Pulses, no edema  SKIN: No rashes or lesions    LABS:        CAPILLARY BLOOD GLUCOSE      POCT Blood Glucose.: 141 mg/dL (23 Nov 2019 21:16)  POCT Blood Glucose.: 149 mg/dL (23 Nov 2019 16:34)  POCT Blood Glucose.: 143 mg/dL (23 Nov 2019 08:06)    Lipid panel:           Thyroid:  Diabetes Tests:  Parathyroid Panel:  Adrenals:  RADIOLOGY & ADDITIONAL TESTS:    Imaging Personally Reviewed:  [ ] YES  [ ] NO    Consultant(s) Notes Reviewed:  [ ] YES  [ ] NO    Care Discussed with Consultants/Other Providers [ ] YES  [ ] NO
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
## 2019-11-23 NOTE — PROGRESS NOTE ADULT - SUBJECTIVE AND OBJECTIVE BOX
Patient is a 59y old  Male who presents with a chief complaint of gi bleed (21 Nov 2019 08:50)      INTERVAL HPI/OVERNIGHT EVENTS:  pt with no acute distress  MEDICATIONS  (STANDING):  chlorhexidine 4% Liquid 1 Application(s) Topical <User Schedule>  darunavir 800 milliGRAM(s) Oral daily  dextrose 5%. 1000 milliLiter(s) (50 mL/Hr) IV Continuous <Continuous>  dextrose 50% Injectable 12.5 Gram(s) IV Push once  dextrose 50% Injectable 25 Gram(s) IV Push once  dextrose 50% Injectable 25 Gram(s) IV Push once  etravirine 200 milliGRAM(s) Oral two times a day after meals  fat emulsion (Plant Based) 20% Infusion 0.5 Gm/kG/Day (11.229 mL/Hr) IV Continuous <Continuous>  heparin  Injectable 5000 Unit(s) SubCutaneous every 12 hours  influenza   Vaccine 0.5 milliLiter(s) IntraMuscular once  insulin lispro (HumaLOG) corrective regimen sliding scale   SubCutaneous three times a day before meals  insulin lispro (HumaLOG) corrective regimen sliding scale   SubCutaneous at bedtime  methimazole 10 milliGRAM(s) Oral daily  metoprolol tartrate Injectable 2.5 milliGRAM(s) IV Push every 6 hours  pantoprazole  Injectable 40 milliGRAM(s) IV Push daily  Parenteral Nutrition - Adult 1 Each (63 mL/Hr) TPN Continuous <Continuous>  Parenteral Nutrition - Adult 1 Each (63 mL/Hr) TPN Continuous <Continuous>  piperacillin/tazobactam IVPB.. 3.375 Gram(s) IV Intermittent every 12 hours  raltegravir Tablet 400 milliGRAM(s) Oral two times a day  ritonavir Tablet 100 milliGRAM(s) Oral daily    MEDICATIONS  (PRN):  acetaminophen   Tablet .. 650 milliGRAM(s) Oral every 6 hours PRN Moderate Pain (4 - 6)  aluminum hydroxide/magnesium hydroxide/simethicone Suspension 30 milliLiter(s) Oral every 6 hours PRN Dyspepsia  dextrose 40% Gel 15 Gram(s) Oral once PRN Blood Glucose LESS THAN 70 milliGRAM(s)/deciliter  glucagon  Injectable 1 milliGRAM(s) IntraMuscular once PRN Glucose LESS THAN 70 milligrams/deciliter  nitroglycerin     SubLingual 0.4 milliGRAM(s) SubLingual every 5 minutes PRN Chest Pain      Allergies    ciprofloxacin (Rash)  Levaquin (Rash)    Intolerances        REVIEW OF SYSTEMS:  CONSTITUTIONAL: No fever, weight loss, or fatigue  EYES: No eye pain, visual disturbances, or discharge  ENMT:  No difficulty hearing, tinnitus, vertigo; No sinus or throat pain  NECK: No pain or stiffness  BREASTS: No pain, masses, or nipple discharge  RESPIRATORY: No cough, wheezing, chills or hemoptysis; No shortness of breath  CARDIOVASCULAR: No chest pain, palpitations, dizziness, or leg swelling  GASTROINTESTINAL: No abdominal or epigastric pain. No nausea, vomiting, or hematemesis; No diarrhea or constipation. No melena or hematochezia.  GENITOURINARY: No dysuria, frequency, hematuria, or incontinence  NEUROLOGICAL: No headaches, memory loss, loss of strength, numbness, or tremors  SKIN: No itching, burning, rashes, or lesions   LYMPH NODES: No enlarged glands  ENDOCRINE: No heat or cold intolerance; No hair loss  MUSCULOSKELETAL: No joint pain or swelling; No muscle, back, or extremity pain  PSYCHIATRIC: No depression, anxiety, mood swings, or difficulty sleeping  HEME/LYMPH: No easy bruising, or bleeding gums  ALLERGY AND IMMUNOLOGIC: No hives or eczema    Vital Signs Last 24 Hrs  T(C): 36.8 (23 Nov 2019 10:05), Max: 36.8 (23 Nov 2019 10:05)  T(F): 98.2 (23 Nov 2019 10:05), Max: 98.2 (23 Nov 2019 10:05)  HR: 92 (23 Nov 2019 10:05) (92 - 98)  BP: 150/87 (23 Nov 2019 10:05) (122/76 - 150/87)  BP(mean): --  RR: 18 (23 Nov 2019 10:05) (18 - 20)  SpO2: 98% (23 Nov 2019 10:05) (96% - 100%)    PHYSICAL EXAM:  GENERAL: NAD, well-groomed, well-developed  HEAD:  Atraumatic, Normocephalic  EYES: EOMI, PERRLA, conjunctiva and sclera clear  ENMT: No tonsillar erythema, exudates, or enlargement; Moist mucous membranes, Good dentition, No lesions  NECK: Supple, No JVD, Normal thyroid  NERVOUS SYSTEM:  Alert & Oriented X3, Good concentration; Motor Strength 5/5 B/L upper and lower extremities; DTRs 2+ intact and symmetric  CHEST/LUNG: Clear to percussion bilaterally; No rales, rhonchi, wheezing, or rubs  HEART: Regular rate and rhythm; No murmurs, rubs, or gallops  ABDOMEN: Soft, Nontender, Nondistended; Bowel sounds present  EXTREMITIES:  2+ Peripheral Pulses, No clubbing, cyanosis, or edema  LYMPH: No lymphadenopathy noted  SKIN: No rashes or lesions    LABS:                        7.8    8.44  )-----------( 349      ( 23 Nov 2019 08:53 )             26.8     11-23    137  |  101  |  26<H>  ----------------------------<  129<H>  3.2<L>   |  28  |  7.79<H>    Ca    9.6      23 Nov 2019 08:53  Phos  3.8     11-23  Mg     1.9     11-23    TPro  6.4  /  Alb  1.6<L>  /  TBili  0.3  /  DBili  x   /  AST  13<L>  /  ALT  8<L>  /  AlkPhos  69  11-22        CAPILLARY BLOOD GLUCOSE      POCT Blood Glucose.: 143 mg/dL (23 Nov 2019 08:06)  POCT Blood Glucose.: 124 mg/dL (22 Nov 2019 22:01)  POCT Blood Glucose.: 130 mg/dL (22 Nov 2019 16:58)  POCT Blood Glucose.: 135 mg/dL (22 Nov 2019 12:11)    CULTURES:    HEMOGLOBIN A1C:    CHOLESTEROL:  Cholesterol, Serum: 79 mg/dL (11-20-19 @ 13:07)  HDL Cholesterol, Serum: 26 mg/dL (11-20-19 @ 13:07)  Triglycerides, Serum: 75 mg/dL (11-20-19 @ 13:07)        RADIOLOGY & ADDITIONAL TESTS:
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
## 2019-11-23 NOTE — PROGRESS NOTE ADULT - SUBJECTIVE AND OBJECTIVE BOX
Gastroenterology Coverage For Dr Morales   Patient seen and examined bedside resting comfortably.  No complaints offered. Abdominal pain is well controlled.  Denies nausea and vomiting. Tolerating ice chips.  Normal flatus/BM.     T(F): 98.2 (11-23-19 @ 10:05), Max: 98.2 (11-23-19 @ 10:05)  HR: 92 (11-23-19 @ 10:05) (92 - 98)  BP: 150/87 (11-23-19 @ 10:05) (122/76 - 150/87)  RR: 18 (11-23-19 @ 10:05) (18 - 20)  SpO2: 98% (11-23-19 @ 10:05) (96% - 100%)  Wt(kg): --  CAPILLARY BLOOD GLUCOSE      POCT Blood Glucose.: 143 mg/dL (23 Nov 2019 08:06)  POCT Blood Glucose.: 124 mg/dL (22 Nov 2019 22:01)  POCT Blood Glucose.: 130 mg/dL (22 Nov 2019 16:58)      PHYSICAL EXAM:  General: NAD, WDWN.   Neuro:  Alert & responsive  HEENT: NCAT, EOMI, conjunctiva clear  CV: +S1+S2   Lung: clear to ausculation bilaterally, respirations nonlabored, good inspiratory effort  ABDOMEN: Wound vac in place with good seal. Colostomy stoma pink and viable, with brown stool in bag. Nondistended, Nontender, no guarding  Extremities: no pedal edema or calf tenderness noted     LABS:                        7.8    8.44  )-----------( 349      ( 23 Nov 2019 08:53 )             26.8     11-23    137  |  101  |  26<H>  ----------------------------<  129<H>  3.2<L>   |  28  |  7.79<H>    Ca    9.6      23 Nov 2019 08:53  Phos  3.8     11-23  Mg     1.9     11-23    TPro  6.4  /  Alb  1.6<L>  /  TBili  0.3  /  DBili  x   /  AST  13<L>  /  ALT  8<L>  /  AlkPhos  69  11-22    LIVER FUNCTIONS - ( 22 Nov 2019 09:56 )  Alb: 1.6 g/dL / Pro: 6.4 gm/dL / ALK PHOS: 69 U/L / ALT: 8 U/L / AST: 13 U/L / GGT: x             I&O's Detail    22 Nov 2019 07:01  -  23 Nov 2019 07:00  --------------------------------------------------------  IN:    fat emulsion (Plant Based) 20% Infusion: 144 mL    Solution: 200 mL    TPN (Total Parenteral Nutrition): 1008 mL  Total IN: 1352 mL    OUT:    Colostomy: 90 mL  Total OUT: 90 mL    Total NET: 1262 mL

## 2019-11-24 LAB
ANION GAP SERPL CALC-SCNC: 7 MMOL/L — SIGNIFICANT CHANGE UP (ref 5–17)
BUN SERPL-MCNC: 14 MG/DL — SIGNIFICANT CHANGE UP (ref 7–23)
CALCIUM SERPL-MCNC: 9.4 MG/DL — SIGNIFICANT CHANGE UP (ref 8.5–10.1)
CHLORIDE SERPL-SCNC: 101 MMOL/L — SIGNIFICANT CHANGE UP (ref 96–108)
CO2 SERPL-SCNC: 30 MMOL/L — SIGNIFICANT CHANGE UP (ref 22–31)
CREAT SERPL-MCNC: 4.74 MG/DL — HIGH (ref 0.5–1.3)
GLUCOSE BLDC GLUCOMTR-MCNC: 116 MG/DL — HIGH (ref 70–99)
GLUCOSE BLDC GLUCOMTR-MCNC: 160 MG/DL — HIGH (ref 70–99)
GLUCOSE BLDC GLUCOMTR-MCNC: 194 MG/DL — HIGH (ref 70–99)
GLUCOSE SERPL-MCNC: 133 MG/DL — HIGH (ref 70–99)
HCT VFR BLD CALC: 29.6 % — LOW (ref 39–50)
HGB BLD-MCNC: 8.6 G/DL — LOW (ref 13–17)
MAGNESIUM SERPL-MCNC: 1.7 MG/DL — SIGNIFICANT CHANGE UP (ref 1.6–2.6)
MCHC RBC-ENTMCNC: 26.1 PG — LOW (ref 27–34)
MCHC RBC-ENTMCNC: 29.1 GM/DL — LOW (ref 32–36)
MCV RBC AUTO: 90 FL — SIGNIFICANT CHANGE UP (ref 80–100)
NRBC # BLD: 0 /100 WBCS — SIGNIFICANT CHANGE UP (ref 0–0)
PHOSPHATE SERPL-MCNC: 2.2 MG/DL — LOW (ref 2.5–4.5)
PLATELET # BLD AUTO: 335 K/UL — SIGNIFICANT CHANGE UP (ref 150–400)
POTASSIUM SERPL-MCNC: 3.5 MMOL/L — SIGNIFICANT CHANGE UP (ref 3.5–5.3)
POTASSIUM SERPL-SCNC: 3.5 MMOL/L — SIGNIFICANT CHANGE UP (ref 3.5–5.3)
RBC # BLD: 3.29 M/UL — LOW (ref 4.2–5.8)
RBC # FLD: 17.2 % — HIGH (ref 10.3–14.5)
SODIUM SERPL-SCNC: 138 MMOL/L — SIGNIFICANT CHANGE UP (ref 135–145)
WBC # BLD: 9.2 K/UL — SIGNIFICANT CHANGE UP (ref 3.8–10.5)
WBC # FLD AUTO: 9.2 K/UL — SIGNIFICANT CHANGE UP (ref 3.8–10.5)

## 2019-11-24 RX ORDER — ELECTROLYTE SOLUTION,INJ
1 VIAL (ML) INTRAVENOUS
Refills: 0 | Status: DISCONTINUED | OUTPATIENT
Start: 2019-11-24 | End: 2019-11-24

## 2019-11-24 RX ADMIN — HEPARIN SODIUM 5000 UNIT(S): 5000 INJECTION INTRAVENOUS; SUBCUTANEOUS at 17:37

## 2019-11-24 RX ADMIN — ETRAVIRINE 200 MILLIGRAM(S): 200 TABLET ORAL at 12:31

## 2019-11-24 RX ADMIN — RALTEGRAVIR 400 MILLIGRAM(S): 400 TABLET, FILM COATED ORAL at 17:37

## 2019-11-24 RX ADMIN — CHLORHEXIDINE GLUCONATE 1 APPLICATION(S): 213 SOLUTION TOPICAL at 05:37

## 2019-11-24 RX ADMIN — RALTEGRAVIR 400 MILLIGRAM(S): 400 TABLET, FILM COATED ORAL at 06:39

## 2019-11-24 RX ADMIN — DARUNAVIR 800 MILLIGRAM(S): 75 TABLET, FILM COATED ORAL at 12:31

## 2019-11-24 RX ADMIN — Medication 2.5 MILLIGRAM(S): at 00:56

## 2019-11-24 RX ADMIN — Medication 2: at 07:36

## 2019-11-24 RX ADMIN — Medication 1 EACH: at 22:43

## 2019-11-24 RX ADMIN — HEPARIN SODIUM 5000 UNIT(S): 5000 INJECTION INTRAVENOUS; SUBCUTANEOUS at 05:39

## 2019-11-24 RX ADMIN — RITONAVIR 100 MILLIGRAM(S): 100 TABLET, FILM COATED ORAL at 12:31

## 2019-11-24 RX ADMIN — ETRAVIRINE 200 MILLIGRAM(S): 200 TABLET ORAL at 21:04

## 2019-11-24 RX ADMIN — PANTOPRAZOLE SODIUM 40 MILLIGRAM(S): 20 TABLET, DELAYED RELEASE ORAL at 12:32

## 2019-11-24 RX ADMIN — Medication 2.5 MILLIGRAM(S): at 17:37

## 2019-11-24 NOTE — PROGRESS NOTE ADULT - SUBJECTIVE AND OBJECTIVE BOX
Patient is a 59y old  Male who presents with a chief complaint of gi bleed (23 Nov 2019 20:17)      Interval History: stable finger sticks and no diabetic medications   also on Methimazole 10 mg , tolerating well     MEDICATIONS  (STANDING):  chlorhexidine 4% Liquid 1 Application(s) Topical <User Schedule>  darunavir 800 milliGRAM(s) Oral daily  dextrose 5%. 1000 milliLiter(s) (50 mL/Hr) IV Continuous <Continuous>  dextrose 50% Injectable 12.5 Gram(s) IV Push once  dextrose 50% Injectable 25 Gram(s) IV Push once  dextrose 50% Injectable 25 Gram(s) IV Push once  etravirine 200 milliGRAM(s) Oral two times a day after meals  heparin  Injectable 5000 Unit(s) SubCutaneous every 12 hours  influenza   Vaccine 0.5 milliLiter(s) IntraMuscular once  insulin lispro (HumaLOG) corrective regimen sliding scale   SubCutaneous three times a day before meals  insulin lispro (HumaLOG) corrective regimen sliding scale   SubCutaneous at bedtime  methimazole 10 milliGRAM(s) Oral daily  metoprolol tartrate Injectable 2.5 milliGRAM(s) IV Push every 6 hours  pantoprazole  Injectable 40 milliGRAM(s) IV Push daily  Parenteral Nutrition - Adult 1 Each (63 mL/Hr) TPN Continuous <Continuous>  Parenteral Nutrition - Adult 1 Each (63 mL/Hr) TPN Continuous <Continuous>  piperacillin/tazobactam IVPB.. 3.375 Gram(s) IV Intermittent every 12 hours  raltegravir Tablet 400 milliGRAM(s) Oral two times a day  ritonavir Tablet 100 milliGRAM(s) Oral daily    MEDICATIONS  (PRN):  acetaminophen   Tablet .. 650 milliGRAM(s) Oral every 6 hours PRN Moderate Pain (4 - 6)  aluminum hydroxide/magnesium hydroxide/simethicone Suspension 30 milliLiter(s) Oral every 6 hours PRN Dyspepsia  dextrose 40% Gel 15 Gram(s) Oral once PRN Blood Glucose LESS THAN 70 milliGRAM(s)/deciliter  glucagon  Injectable 1 milliGRAM(s) IntraMuscular once PRN Glucose LESS THAN 70 milligrams/deciliter  nitroglycerin     SubLingual 0.4 milliGRAM(s) SubLingual every 5 minutes PRN Chest Pain      Allergies    ciprofloxacin (Rash)  Levaquin (Rash)    Intolerances        REVIEW OF SYSTEMS:  CONSTITUTIONAL: no changes  EYES: No eye pain, visual disturbances, or discharge  ENMT:  No difficulty hearing, No sinus or throat pain  NECK: No pain or stiffness  RESPIRATORY: No cough, wheezing, chills or hemoptysis; No shortness of breath  CARDIOVASCULAR: No chest pain, palpitations or leg swelling  GASTROINTESTINAL: No abdominal or epigastric pain. No nausea, vomiting, or hematemesis; No diarrhea or constipation. No melena or hematochezia.  GENITOURINARY: No dysuria, frequency, hematuria, or incontinence  NEUROLOGICAL: No headaches, memory loss, loss of strength, numbness, or tremors  SKIN: No itching, burning, rashes, or lesions   ENDOCRINE: No heat or cold intolerance; No hair loss  MUSCULOSKELETAL: No joint pain or swelling; No muscle, back, or extremity pain  PSYCHIATRIC: No depression, anxiety, mood swings, or difficulty sleeping  HEME/LYMPH: No easy bruising, or bleeding gums  ALLERY AND IMMUNOLOGIC: No hives or eczema    Vital Signs Last 24 Hrs  T(C): 36.2 (24 Nov 2019 16:30), Max: 36.4 (24 Nov 2019 04:54)  T(F): 97.2 (24 Nov 2019 16:30), Max: 97.6 (24 Nov 2019 04:54)  HR: 103 (24 Nov 2019 16:30) (97 - 103)  BP: 130/77 (24 Nov 2019 16:30) (102/63 - 130/77)  BP(mean): --  RR: 16 (24 Nov 2019 16:30) (16 - 18)  SpO2: 97% (24 Nov 2019 16:30) (97% - 99%)    PHYSICAL EXAM:  GENERAL:   HEAD: Atraumatic, Normocephalic  EYES: PERRLA, conjunctiva and sclera clear  ENMT: No tonsillar erythema, exudates, or enlargement; Moist mucous membranes, Good dentition, No lesions  NECK: Supple, No JVD, Normal thyroid  NERVOUS SYSTEM:  Alert & Oriented X3, Good concentration; Motor Strength 5/5 B/L upper and lower extremities  CHEST/LUNG: Clear to auscultaion bilaterally; No rales, rhonchi, wheezing, or rubs  HEART: Regular rate and rhythm; No murmurs, rubs, or gallops  ABDOMEN: Soft, Nontender, Nondistended; Bowel sounds present  EXTREMITIES:  2+ Peripheral Pulses, no edema  SKIN: No rashes or lesions    LABS:        CAPILLARY BLOOD GLUCOSE      POCT Blood Glucose.: 116 mg/dL (24 Nov 2019 11:59)  POCT Blood Glucose.: 160 mg/dL (24 Nov 2019 07:33)  POCT Blood Glucose.: 141 mg/dL (23 Nov 2019 21:16)    Lipid panel:           Thyroid:  Diabetes Tests:  Parathyroid Panel:  Adrenals:  RADIOLOGY & ADDITIONAL TESTS:    Imaging Personally Reviewed:  [ ] YES  [ ] NO    Consultant(s) Notes Reviewed:  [ ] YES  [ ] NO    Care Discussed with Consultants/Other Providers [ ] YES  [ ] NO

## 2019-11-24 NOTE — PROGRESS NOTE ADULT - SUBJECTIVE AND OBJECTIVE BOX
Patient is a 59y old  Male who presents with a chief complaint of gi bleed (23 Nov 2019 20:17)      INTERVAL HPI/OVERNIGHT EVENTS:  no new episode  MEDICATIONS  (STANDING):  chlorhexidine 4% Liquid 1 Application(s) Topical <User Schedule>  darunavir 800 milliGRAM(s) Oral daily  dextrose 5%. 1000 milliLiter(s) (50 mL/Hr) IV Continuous <Continuous>  dextrose 50% Injectable 12.5 Gram(s) IV Push once  dextrose 50% Injectable 25 Gram(s) IV Push once  dextrose 50% Injectable 25 Gram(s) IV Push once  etravirine 200 milliGRAM(s) Oral two times a day after meals  heparin  Injectable 5000 Unit(s) SubCutaneous every 12 hours  influenza   Vaccine 0.5 milliLiter(s) IntraMuscular once  insulin lispro (HumaLOG) corrective regimen sliding scale   SubCutaneous three times a day before meals  insulin lispro (HumaLOG) corrective regimen sliding scale   SubCutaneous at bedtime  methimazole 10 milliGRAM(s) Oral daily  metoprolol tartrate Injectable 2.5 milliGRAM(s) IV Push every 6 hours  pantoprazole  Injectable 40 milliGRAM(s) IV Push daily  Parenteral Nutrition - Adult 1 Each (63 mL/Hr) TPN Continuous <Continuous>  Parenteral Nutrition - Adult 1 Each (63 mL/Hr) TPN Continuous <Continuous>  piperacillin/tazobactam IVPB.. 3.375 Gram(s) IV Intermittent every 12 hours  raltegravir Tablet 400 milliGRAM(s) Oral two times a day  ritonavir Tablet 100 milliGRAM(s) Oral daily    MEDICATIONS  (PRN):  acetaminophen   Tablet .. 650 milliGRAM(s) Oral every 6 hours PRN Moderate Pain (4 - 6)  aluminum hydroxide/magnesium hydroxide/simethicone Suspension 30 milliLiter(s) Oral every 6 hours PRN Dyspepsia  dextrose 40% Gel 15 Gram(s) Oral once PRN Blood Glucose LESS THAN 70 milliGRAM(s)/deciliter  glucagon  Injectable 1 milliGRAM(s) IntraMuscular once PRN Glucose LESS THAN 70 milligrams/deciliter  nitroglycerin     SubLingual 0.4 milliGRAM(s) SubLingual every 5 minutes PRN Chest Pain      Allergies    ciprofloxacin (Rash)  Levaquin (Rash)    Intolerances        REVIEW OF SYSTEMS:  CONSTITUTIONAL: No fever, weight loss, or fatigue  EYES: No eye pain, visual disturbances, or discharge  ENMT:  No difficulty hearing, tinnitus, vertigo; No sinus or throat pain  NECK: No pain or stiffness  BREASTS: No pain, masses, or nipple discharge  RESPIRATORY: No cough, wheezing, chills or hemoptysis; No shortness of breath  CARDIOVASCULAR: No chest pain, palpitations, dizziness, or leg swelling  GASTROINTESTINAL: No abdominal or epigastric pain. No nausea, vomiting, or hematemesis; No diarrhea or constipation. No melena or hematochezia.  GENITOURINARY: No dysuria, frequency, hematuria, or incontinence  NEUROLOGICAL: No headaches, memory loss, loss of strength, numbness, or tremors  SKIN: No itching, burning, rashes, or lesions   LYMPH NODES: No enlarged glands  ENDOCRINE: No heat or cold intolerance; No hair loss  MUSCULOSKELETAL: No joint pain or swelling; No muscle, back, or extremity pain  PSYCHIATRIC: No depression, anxiety, mood swings, or difficulty sleeping  HEME/LYMPH: No easy bruising, or bleeding gums  ALLERGY AND IMMUNOLOGIC: No hives or eczema    Vital Signs Last 24 Hrs  T(C): 36.4 (24 Nov 2019 04:54), Max: 36.7 (23 Nov 2019 14:19)  T(F): 97.6 (24 Nov 2019 04:54), Max: 98 (23 Nov 2019 14:19)  HR: 101 (24 Nov 2019 04:54) (94 - 101)  BP: 102/63 (24 Nov 2019 04:54) (102/63 - 127/68)  BP(mean): --  RR: 16 (24 Nov 2019 04:54) (16 - 18)  SpO2: 97% (24 Nov 2019 04:54) (97% - 100%)    PHYSICAL EXAM:  GENERAL: NAD, well-groomed, well-developed  HEAD:  Atraumatic, Normocephalic  EYES: EOMI, PERRLA, conjunctiva and sclera clear  ENMT: No tonsillar erythema, exudates, or enlargement; Moist mucous membranes, Good dentition, No lesions  NECK: Supple, No JVD, Normal thyroid  NERVOUS SYSTEM:  Alert & Oriented X3, Good concentration; Motor Strength 5/5 B/L upper and lower extremities; DTRs 2+ intact and symmetric  CHEST/LUNG: Clear to percussion bilaterally; No rales, rhonchi, wheezing, or rubs  HEART: Regular rate and rhythm; No murmurs, rubs, or gallops  ABDOMEN: Soft, Nontender, Nondistended; Bowel sounds present  EXTREMITIES:  2+ Peripheral Pulses, No clubbing, cyanosis, or edema  LYMPH: No lymphadenopathy noted  SKIN: No rashes or lesions    LABS:                        8.6    9.20  )-----------( 335      ( 24 Nov 2019 07:55 )             29.6     11-24    138  |  101  |  14  ----------------------------<  133<H>  3.5   |  30  |  4.74<H>    Ca    9.4      24 Nov 2019 07:55  Phos  2.2     11-24  Mg     1.7     11-24          CAPILLARY BLOOD GLUCOSE      POCT Blood Glucose.: 160 mg/dL (24 Nov 2019 07:33)  POCT Blood Glucose.: 141 mg/dL (23 Nov 2019 21:16)  POCT Blood Glucose.: 149 mg/dL (23 Nov 2019 16:34)    CULTURES:    HEMOGLOBIN A1C:    CHOLESTEROL:        RADIOLOGY & ADDITIONAL TESTS:

## 2019-11-24 NOTE — PROGRESS NOTE ADULT - SUBJECTIVE AND OBJECTIVE BOX
Gastroenterology Coverage For Dr Morales   Patient seen and examined bedside resting comfortably.  No complaints offered. Abdominal pain is well controlled.  Denies nausea and vomiting. Tolerating ice chips.  Normal flatus/BM.     T(F): 97.6 (11-24-19 @ 04:54), Max: 98 (11-23-19 @ 14:19)  HR: 101 (11-24-19 @ 04:54) (94 - 101)  BP: 102/63 (11-24-19 @ 04:54) (102/63 - 127/68)  RR: 16 (11-24-19 @ 04:54) (16 - 18)  SpO2: 97% (11-24-19 @ 04:54) (97% - 100%)  Wt(kg): --  CAPILLARY BLOOD GLUCOSE      POCT Blood Glucose.: 160 mg/dL (24 Nov 2019 07:33)  POCT Blood Glucose.: 141 mg/dL (23 Nov 2019 21:16)  POCT Blood Glucose.: 149 mg/dL (23 Nov 2019 16:34)    PHYSICAL EXAM:  General: NAD, WDWN.   Neuro:  Alert & responsive  HEENT: NCAT, EOMI, conjunctiva clear  CV: +S1+S2   Lung: clear to ausculation bilaterally, respirations nonlabored, good inspiratory effort  ABDOMEN: Wound vac in place with good seal. Colostomy stoma pink and viable, with brown stool in bag. Nondistended, Nontender, no guarding  Extremities: no pedal edema or calf tenderness noted     LABS:                        8.6    9.20  )-----------( 335      ( 24 Nov 2019 07:55 )             29.6     11-24    138  |  101  |  14  ----------------------------<  133<H>  3.5   |  30  |  4.74<H>    Ca    9.4      24 Nov 2019 07:55  Phos  2.2     11-24  Mg     1.7     11-24          I&O's Detail    23 Nov 2019 07:01  -  24 Nov 2019 07:00  --------------------------------------------------------  IN:    Solution: 100 mL    TPN (Total Parenteral Nutrition): 1512 mL  Total IN: 1612 mL    OUT:    Colostomy: 400 mL    Other: 1500 mL  Total OUT: 1900 mL    Total NET: -288 mL
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
## 2019-11-25 ENCOUNTER — TRANSCRIPTION ENCOUNTER (OUTPATIENT)
Age: 59
End: 2019-11-25

## 2019-11-25 LAB
ANION GAP SERPL CALC-SCNC: 7 MMOL/L — SIGNIFICANT CHANGE UP (ref 5–17)
BUN SERPL-MCNC: 20 MG/DL — SIGNIFICANT CHANGE UP (ref 7–23)
CALCIUM SERPL-MCNC: 9.9 MG/DL — SIGNIFICANT CHANGE UP (ref 8.5–10.1)
CHLORIDE SERPL-SCNC: 98 MMOL/L — SIGNIFICANT CHANGE UP (ref 96–108)
CO2 SERPL-SCNC: 28 MMOL/L — SIGNIFICANT CHANGE UP (ref 22–31)
CREAT SERPL-MCNC: 6.43 MG/DL — HIGH (ref 0.5–1.3)
GLUCOSE BLDC GLUCOMTR-MCNC: 125 MG/DL — HIGH (ref 70–99)
GLUCOSE BLDC GLUCOMTR-MCNC: 126 MG/DL — HIGH (ref 70–99)
GLUCOSE BLDC GLUCOMTR-MCNC: 159 MG/DL — HIGH (ref 70–99)
GLUCOSE BLDC GLUCOMTR-MCNC: 164 MG/DL — HIGH (ref 70–99)
GLUCOSE SERPL-MCNC: 169 MG/DL — HIGH (ref 70–99)
HCT VFR BLD CALC: 27.2 % — LOW (ref 39–50)
HGB BLD-MCNC: 8.2 G/DL — LOW (ref 13–17)
MAGNESIUM SERPL-MCNC: 1.6 MG/DL — SIGNIFICANT CHANGE UP (ref 1.6–2.6)
MCHC RBC-ENTMCNC: 26.5 PG — LOW (ref 27–34)
MCHC RBC-ENTMCNC: 30.1 GM/DL — LOW (ref 32–36)
MCV RBC AUTO: 87.7 FL — SIGNIFICANT CHANGE UP (ref 80–100)
NRBC # BLD: 0 /100 WBCS — SIGNIFICANT CHANGE UP (ref 0–0)
PHOSPHATE SERPL-MCNC: 2.7 MG/DL — SIGNIFICANT CHANGE UP (ref 2.5–4.5)
PLATELET # BLD AUTO: 330 K/UL — SIGNIFICANT CHANGE UP (ref 150–400)
POTASSIUM SERPL-MCNC: 3.3 MMOL/L — LOW (ref 3.5–5.3)
POTASSIUM SERPL-SCNC: 3.3 MMOL/L — LOW (ref 3.5–5.3)
RBC # BLD: 3.1 M/UL — LOW (ref 4.2–5.8)
RBC # FLD: 17.4 % — HIGH (ref 10.3–14.5)
SODIUM SERPL-SCNC: 133 MMOL/L — LOW (ref 135–145)
TSI ACT/NOR SER: <0.1 IU/L — SIGNIFICANT CHANGE UP (ref 0–0.55)
WBC # BLD: 8.46 K/UL — SIGNIFICANT CHANGE UP (ref 3.8–10.5)
WBC # FLD AUTO: 8.46 K/UL — SIGNIFICANT CHANGE UP (ref 3.8–10.5)

## 2019-11-25 PROCEDURE — 99232 SBSQ HOSP IP/OBS MODERATE 35: CPT

## 2019-11-25 RX ORDER — METRONIDAZOLE 500 MG
500 TABLET ORAL THREE TIMES A DAY
Refills: 0 | Status: DISCONTINUED | OUTPATIENT
Start: 2019-11-25 | End: 2019-11-29

## 2019-11-25 RX ORDER — DEXTROSE 10 % IN WATER 10 %
1000 INTRAVENOUS SOLUTION INTRAVENOUS
Refills: 0 | Status: DISCONTINUED | OUTPATIENT
Start: 2019-11-25 | End: 2019-11-27

## 2019-11-25 RX ORDER — ONDANSETRON 8 MG/1
4 TABLET, FILM COATED ORAL ONCE
Refills: 0 | Status: DISCONTINUED | OUTPATIENT
Start: 2019-11-25 | End: 2019-11-29

## 2019-11-25 RX ORDER — SODIUM CHLORIDE 9 MG/ML
1000 INJECTION, SOLUTION INTRAVENOUS
Refills: 0 | Status: DISCONTINUED | OUTPATIENT
Start: 2019-11-26 | End: 2019-11-27

## 2019-11-25 RX ADMIN — RITONAVIR 100 MILLIGRAM(S): 100 TABLET, FILM COATED ORAL at 16:52

## 2019-11-25 RX ADMIN — Medication 2.5 MILLIGRAM(S): at 18:04

## 2019-11-25 RX ADMIN — Medication 1 TABLET(S): at 16:55

## 2019-11-25 RX ADMIN — Medication 2: at 16:55

## 2019-11-25 RX ADMIN — ETRAVIRINE 200 MILLIGRAM(S): 200 TABLET ORAL at 08:30

## 2019-11-25 RX ADMIN — Medication 2: at 08:30

## 2019-11-25 RX ADMIN — Medication 20 MILLILITER(S): at 21:00

## 2019-11-25 RX ADMIN — DARUNAVIR 800 MILLIGRAM(S): 75 TABLET, FILM COATED ORAL at 16:53

## 2019-11-25 RX ADMIN — RALTEGRAVIR 400 MILLIGRAM(S): 400 TABLET, FILM COATED ORAL at 18:03

## 2019-11-25 RX ADMIN — ETRAVIRINE 200 MILLIGRAM(S): 200 TABLET ORAL at 16:52

## 2019-11-25 RX ADMIN — RALTEGRAVIR 400 MILLIGRAM(S): 400 TABLET, FILM COATED ORAL at 05:20

## 2019-11-25 RX ADMIN — CHLORHEXIDINE GLUCONATE 1 APPLICATION(S): 213 SOLUTION TOPICAL at 05:26

## 2019-11-25 RX ADMIN — Medication 2.5 MILLIGRAM(S): at 01:08

## 2019-11-25 RX ADMIN — PANTOPRAZOLE SODIUM 40 MILLIGRAM(S): 20 TABLET, DELAYED RELEASE ORAL at 14:41

## 2019-11-25 RX ADMIN — HEPARIN SODIUM 5000 UNIT(S): 5000 INJECTION INTRAVENOUS; SUBCUTANEOUS at 05:21

## 2019-11-25 RX ADMIN — Medication 2.5 MILLIGRAM(S): at 14:41

## 2019-11-25 RX ADMIN — HEPARIN SODIUM 5000 UNIT(S): 5000 INJECTION INTRAVENOUS; SUBCUTANEOUS at 16:55

## 2019-11-25 RX ADMIN — Medication 2.5 MILLIGRAM(S): at 05:23
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
## 2019-11-25 NOTE — DISCHARGE NOTE PROVIDER - NSDCMRMEDTOKEN_GEN_ALL_CORE_FT
Aspir 81 oral delayed release tablet: 1 tab(s) orally once a day  darunavir 800 mg oral tablet: 1 tab(s) orally once a day  etravirine 200 mg oral tablet: 1 tab(s) orally 2 times a day  HYDROmorphone 2 mg oral tablet: 1 tab(s) orally every 6 hours MDD:4 tabs  Isentress 400 mg oral tablet: 1 tab(s) orally 2 times a day  Isordil 10 mg sublingual tablet: 1 tab(s) sublingual 3 times a day  Norvir 100 mg oral tablet: 1 tab(s) orally once a day  pantoprazole 40 mg oral delayed release tablet: 1 tab(s) orally every 12 hours  Pravachol 20 mg oral tablet: 1 tab(s) orally once a day  Velphoro 2500 mg (500 mg elemental iron) oral tablet, chewable: 1 tab(s) orally 3 times a day (with meals) amoxicillin-clavulanate 500 mg-125 mg oral tablet: 1 tab(s) orally every 12 hours for 7 days  Aspir 81 oral delayed release tablet: 1 tab(s) orally once a day  darunavir 800 mg oral tablet: 1 tab(s) orally once a day  etravirine 200 mg oral tablet: 1 tab(s) orally 2 times a day  Isentress 400 mg oral tablet: 1 tab(s) orally 2 times a day  Isordil 10 mg sublingual tablet: 1 tab(s) sublingual 3 times a day  metroNIDAZOLE 500 mg oral tablet: 1 tab(s) orally 3 times a day x 7days  Norvir 100 mg oral tablet: 1 tab(s) orally once a day  pantoprazole 40 mg oral delayed release tablet: 1 tab(s) orally every 12 hours  Pravachol 20 mg oral tablet: 1 tab(s) orally once a day  Velphoro 2500 mg (500 mg elemental iron) oral tablet, chewable: 1 tab(s) orally 3 times a day (with meals)
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
## 2019-11-25 NOTE — DISCHARGE NOTE PROVIDER - CARE PROVIDER_API CALL
Dilan Garcia)  Surgery  [address] [place] [address]  Phone: [phone]  Fax: [phone]  Follow Up Time:
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
## 2019-11-25 NOTE — PROGRESS NOTE ADULT - ATTENDING COMMENTS
Telemetry monitor strips reviewed, no further dysrhythmias, continue rate control rx, hopefully change to oral bbl tomorrow.
Advancing diet as per surgery. Still having runs of PSVT, continue telemetry.  Transition to oral meds if tolerating PO.

## 2019-11-25 NOTE — PROGRESS NOTE ADULT - SUBJECTIVE AND OBJECTIVE BOX
Patient is a 59y old  Male who presents with a chief complaint of gi bleed (25 Nov 2019 20:51)      Interval History: only on Lispro sliding scale coverage with meals   finger sticks are in 100's   well controlled     MEDICATIONS  (STANDING):  amoxicillin  500 milliGRAM(s)/clavulanate 1 Tablet(s) Oral every 12 hours  chlorhexidine 4% Liquid 1 Application(s) Topical <User Schedule>  darunavir 800 milliGRAM(s) Oral daily  dextrose 10%. 1000 milliLiter(s) (20 mL/Hr) IV Continuous <Continuous>  dextrose 5%. 1000 milliLiter(s) (50 mL/Hr) IV Continuous <Continuous>  dextrose 50% Injectable 12.5 Gram(s) IV Push once  dextrose 50% Injectable 25 Gram(s) IV Push once  dextrose 50% Injectable 25 Gram(s) IV Push once  etravirine 200 milliGRAM(s) Oral two times a day after meals  heparin  Injectable 5000 Unit(s) SubCutaneous every 12 hours  influenza   Vaccine 0.5 milliLiter(s) IntraMuscular once  insulin lispro (HumaLOG) corrective regimen sliding scale   SubCutaneous three times a day before meals  insulin lispro (HumaLOG) corrective regimen sliding scale   SubCutaneous at bedtime  methimazole 10 milliGRAM(s) Oral daily  metoprolol tartrate Injectable 2.5 milliGRAM(s) IV Push every 6 hours  pantoprazole  Injectable 40 milliGRAM(s) IV Push daily  Parenteral Nutrition - Adult 1 Each (63 mL/Hr) TPN Continuous <Continuous>  raltegravir Tablet 400 milliGRAM(s) Oral two times a day  ritonavir Tablet 100 milliGRAM(s) Oral daily    MEDICATIONS  (PRN):  acetaminophen   Tablet .. 650 milliGRAM(s) Oral every 6 hours PRN Moderate Pain (4 - 6)  aluminum hydroxide/magnesium hydroxide/simethicone Suspension 30 milliLiter(s) Oral every 6 hours PRN Dyspepsia  dextrose 40% Gel 15 Gram(s) Oral once PRN Blood Glucose LESS THAN 70 milliGRAM(s)/deciliter  glucagon  Injectable 1 milliGRAM(s) IntraMuscular once PRN Glucose LESS THAN 70 milligrams/deciliter  nitroglycerin     SubLingual 0.4 milliGRAM(s) SubLingual every 5 minutes PRN Chest Pain      Allergies    ciprofloxacin (Rash)  Levaquin (Rash)    Intolerances        REVIEW OF SYSTEMS: Could not be assessed   CONSTITUTIONAL: no changes    Vital Signs Last 24 Hrs  T(C): 36.3 (25 Nov 2019 15:01), Max: 36.7 (25 Nov 2019 13:30)  T(F): 97.3 (25 Nov 2019 15:01), Max: 98 (25 Nov 2019 13:30)  HR: 97 (25 Nov 2019 15:01) (96 - 105)  BP: 116/72 (25 Nov 2019 15:01) (116/72 - 126/66)  BP(mean): --  RR: 17 (25 Nov 2019 15:01) (16 - 18)  SpO2: 100% (25 Nov 2019 15:01) (100% - 100%)    PHYSICAL EXAM:  GENERAL: no changes   HEAD: Atraumatic, Normocephalic  EYES:conjunctiva and sclera clear  CHEST/LUNG: Clear to auscultaion bilaterally; No rales, rhonchi, wheezing, or rubs  HEART: Regular rate and rhythm; No murmurs, rubs, or gallops  ABDOMEN: Soft, Nontender, Nondistended; Bowel sounds present  EXTREMITIES:  2+ Peripheral Pulses, no edema  SKIN: No rashes or lesions    LABS:        CAPILLARY BLOOD GLUCOSE      POCT Blood Glucose.: 125 mg/dL (25 Nov 2019 21:18)  POCT Blood Glucose.: 159 mg/dL (25 Nov 2019 16:49)  POCT Blood Glucose.: 126 mg/dL (25 Nov 2019 12:40)  POCT Blood Glucose.: 164 mg/dL (25 Nov 2019 08:13)  POCT Blood Glucose.: 194 mg/dL (24 Nov 2019 22:19)    Lipid panel:           Thyroid:  Diabetes Tests:  Parathyroid Panel:  Adrenals:  RADIOLOGY & ADDITIONAL TESTS:    Imaging Personally Reviewed:  [ ] YES  [ ] NO    Consultant(s) Notes Reviewed:  [ ] YES  [ ] NO    Care Discussed with Consultants/Other Providers [ ] YES  [ ] NO

## 2019-11-25 NOTE — DISCHARGE NOTE PROVIDER - NSDCCPCAREPLAN_GEN_ALL_CORE_FT
PRINCIPAL DISCHARGE DIAGNOSIS  Diagnosis: Wound, open with complication  Assessment and Plan of Treatment: You will be discharged to rehab with your wound vac.   Please continue your antibiotics as prescribed.   Please return to the emergency room if you experience worsening of symptoms, or onset of new symptoms such as fever, chills, chest pain, shortness of breath, intractable pain, no passage of stool via colostomy.      SECONDARY DISCHARGE DIAGNOSES  Diagnosis: ESRD (end stage renal disease) on dialysis  Assessment and Plan of Treatment:
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
## 2019-11-25 NOTE — PROGRESS NOTE ADULT - SUBJECTIVE AND OBJECTIVE BOX
Patient is a 59y old  Male who presents with a chief complaint of wound infection (25 Nov 2019 07:15)      INTERVAL HPI/OVERNIGHT EVENTS:  pt is doing better  MEDICATIONS  (STANDING):  amoxicillin  500 milliGRAM(s)/clavulanate 1 Tablet(s) Oral every 12 hours  chlorhexidine 4% Liquid 1 Application(s) Topical <User Schedule>  darunavir 800 milliGRAM(s) Oral daily  dextrose 5%. 1000 milliLiter(s) (50 mL/Hr) IV Continuous <Continuous>  dextrose 50% Injectable 12.5 Gram(s) IV Push once  dextrose 50% Injectable 25 Gram(s) IV Push once  dextrose 50% Injectable 25 Gram(s) IV Push once  etravirine 200 milliGRAM(s) Oral two times a day after meals  heparin  Injectable 5000 Unit(s) SubCutaneous every 12 hours  influenza   Vaccine 0.5 milliLiter(s) IntraMuscular once  insulin lispro (HumaLOG) corrective regimen sliding scale   SubCutaneous three times a day before meals  insulin lispro (HumaLOG) corrective regimen sliding scale   SubCutaneous at bedtime  methimazole 10 milliGRAM(s) Oral daily  metoprolol tartrate Injectable 2.5 milliGRAM(s) IV Push every 6 hours  pantoprazole  Injectable 40 milliGRAM(s) IV Push daily  Parenteral Nutrition - Adult 1 Each (63 mL/Hr) TPN Continuous <Continuous>  raltegravir Tablet 400 milliGRAM(s) Oral two times a day  ritonavir Tablet 100 milliGRAM(s) Oral daily    MEDICATIONS  (PRN):  acetaminophen   Tablet .. 650 milliGRAM(s) Oral every 6 hours PRN Moderate Pain (4 - 6)  aluminum hydroxide/magnesium hydroxide/simethicone Suspension 30 milliLiter(s) Oral every 6 hours PRN Dyspepsia  dextrose 40% Gel 15 Gram(s) Oral once PRN Blood Glucose LESS THAN 70 milliGRAM(s)/deciliter  glucagon  Injectable 1 milliGRAM(s) IntraMuscular once PRN Glucose LESS THAN 70 milligrams/deciliter  nitroglycerin     SubLingual 0.4 milliGRAM(s) SubLingual every 5 minutes PRN Chest Pain      Allergies    ciprofloxacin (Rash)  Levaquin (Rash)    Intolerances        REVIEW OF SYSTEMS:  CONSTITUTIONAL: No fever, weight loss, or fatigue  EYES: No eye pain, visual disturbances, or discharge  ENMT:  No difficulty hearing, tinnitus, vertigo; No sinus or throat pain  NECK: No pain or stiffness  BREASTS: No pain, masses, or nipple discharge  RESPIRATORY: No cough, wheezing, chills or hemoptysis; No shortness of breath  CARDIOVASCULAR: No chest pain, palpitations, dizziness, or leg swelling  GASTROINTESTINAL: No abdominal or epigastric pain. No nausea, vomiting, or hematemesis; No diarrhea or constipation. No melena or hematochezia.  GENITOURINARY: No dysuria, frequency, hematuria, or incontinence  NEUROLOGICAL: No headaches, memory loss, loss of strength, numbness, or tremors  SKIN: No itching, burning, rashes, or lesions   LYMPH NODES: No enlarged glands  ENDOCRINE: No heat or cold intolerance; No hair loss  MUSCULOSKELETAL: No joint pain or swelling; No muscle, back, or extremity pain  PSYCHIATRIC: No depression, anxiety, mood swings, or difficulty sleeping  HEME/LYMPH: No easy bruising, or bleeding gums  ALLERGY AND IMMUNOLOGIC: No hives or eczema    Vital Signs Last 24 Hrs  T(C): 36.6 (25 Nov 2019 09:55), Max: 36.6 (25 Nov 2019 09:55)  T(F): 97.8 (25 Nov 2019 09:55), Max: 97.8 (25 Nov 2019 09:55)  HR: 98 (25 Nov 2019 09:55) (97 - 105)  BP: 126/66 (25 Nov 2019 09:55) (114/63 - 130/77)  BP(mean): --  RR: 18 (25 Nov 2019 09:55) (16 - 18)  SpO2: 100% (25 Nov 2019 09:55) (97% - 100%)    PHYSICAL EXAM:  GENERAL: NAD, well-groomed, well-developed  HEAD:  Atraumatic, Normocephalic  EYES: EOMI, PERRLA, conjunctiva and sclera clear  ENMT: No tonsillar erythema, exudates, or enlargement; Moist mucous membranes, Good dentition, No lesions  NECK: Supple, No JVD, Normal thyroid  NERVOUS SYSTEM:  Alert & Oriented X3, Good concentration; Motor Strength 5/5 B/L upper and lower extremities; DTRs 2+ intact and symmetric  CHEST/LUNG: Clear to percussion bilaterally; No rales, rhonchi, wheezing, or rubs  HEART: Regular rate and rhythm; No murmurs, rubs, or gallops  ABDOMEN: Soft, Nontender, Nondistended; Bowel sounds present  EXTREMITIES:  2+ Peripheral Pulses, No clubbing, cyanosis, or edema  LYMPH: No lymphadenopathy noted  SKIN: No rashes or lesions    LABS:                        8.2    8.46  )-----------( 330      ( 25 Nov 2019 11:05 )             27.2     11-25    133<L>  |  98  |  20  ----------------------------<  169<H>  3.3<L>   |  28  |  6.43<H>    Ca    9.9      25 Nov 2019 11:05  Phos  2.7     11-25  Mg     1.6     11-25          CAPILLARY BLOOD GLUCOSE      POCT Blood Glucose.: 164 mg/dL (25 Nov 2019 08:13)  POCT Blood Glucose.: 194 mg/dL (24 Nov 2019 22:19)    CULTURES:    HEMOGLOBIN A1C:    CHOLESTEROL:        RADIOLOGY & ADDITIONAL TESTS:
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
## 2019-11-26 LAB
GLUCOSE BLDC GLUCOMTR-MCNC: 105 MG/DL — HIGH (ref 70–99)
GLUCOSE BLDC GLUCOMTR-MCNC: 106 MG/DL — HIGH (ref 70–99)
GLUCOSE BLDC GLUCOMTR-MCNC: 111 MG/DL — HIGH (ref 70–99)
GLUCOSE BLDC GLUCOMTR-MCNC: 96 MG/DL — SIGNIFICANT CHANGE UP (ref 70–99)

## 2019-11-26 PROCEDURE — 74018 RADEX ABDOMEN 1 VIEW: CPT | Mod: 26

## 2019-11-26 PROCEDURE — 71045 X-RAY EXAM CHEST 1 VIEW: CPT | Mod: 26

## 2019-11-26 PROCEDURE — 99232 SBSQ HOSP IP/OBS MODERATE 35: CPT

## 2019-11-26 RX ORDER — METOPROLOL TARTRATE 50 MG
25 TABLET ORAL
Refills: 0 | Status: DISCONTINUED | OUTPATIENT
Start: 2019-11-26 | End: 2019-11-29

## 2019-11-26 RX ORDER — PANTOPRAZOLE SODIUM 20 MG/1
40 TABLET, DELAYED RELEASE ORAL
Refills: 0 | Status: DISCONTINUED | OUTPATIENT
Start: 2019-11-26 | End: 2019-11-29

## 2019-11-26 RX ADMIN — SODIUM CHLORIDE 42 MILLILITER(S): 9 INJECTION, SOLUTION INTRAVENOUS at 00:31

## 2019-11-26 RX ADMIN — Medication 25 MILLIGRAM(S): at 17:14

## 2019-11-26 RX ADMIN — RALTEGRAVIR 400 MILLIGRAM(S): 400 TABLET, FILM COATED ORAL at 17:14

## 2019-11-26 RX ADMIN — Medication 2.5 MILLIGRAM(S): at 05:37

## 2019-11-26 RX ADMIN — Medication 1 TABLET(S): at 17:14

## 2019-11-26 RX ADMIN — Medication 1 TABLET(S): at 05:37

## 2019-11-26 RX ADMIN — ETRAVIRINE 200 MILLIGRAM(S): 200 TABLET ORAL at 17:15

## 2019-11-26 RX ADMIN — ETRAVIRINE 200 MILLIGRAM(S): 200 TABLET ORAL at 12:19

## 2019-11-26 RX ADMIN — RITONAVIR 100 MILLIGRAM(S): 100 TABLET, FILM COATED ORAL at 12:19

## 2019-11-26 RX ADMIN — Medication 500 MILLIGRAM(S): at 21:23

## 2019-11-26 RX ADMIN — HEPARIN SODIUM 5000 UNIT(S): 5000 INJECTION INTRAVENOUS; SUBCUTANEOUS at 17:14

## 2019-11-26 RX ADMIN — Medication 500 MILLIGRAM(S): at 05:37

## 2019-11-26 RX ADMIN — PANTOPRAZOLE SODIUM 40 MILLIGRAM(S): 20 TABLET, DELAYED RELEASE ORAL at 12:13

## 2019-11-26 RX ADMIN — RALTEGRAVIR 400 MILLIGRAM(S): 400 TABLET, FILM COATED ORAL at 05:37

## 2019-11-26 RX ADMIN — HEPARIN SODIUM 5000 UNIT(S): 5000 INJECTION INTRAVENOUS; SUBCUTANEOUS at 05:38

## 2019-11-26 RX ADMIN — DARUNAVIR 800 MILLIGRAM(S): 75 TABLET, FILM COATED ORAL at 12:20

## 2019-11-26 RX ADMIN — Medication 500 MILLIGRAM(S): at 13:31

## 2019-11-26 RX ADMIN — CHLORHEXIDINE GLUCONATE 1 APPLICATION(S): 213 SOLUTION TOPICAL at 05:38

## 2019-11-26 RX ADMIN — Medication 2.5 MILLIGRAM(S): at 00:23

## 2019-11-26 RX ADMIN — Medication 2.5 MILLIGRAM(S): at 12:15

## 2019-11-26 NOTE — PROGRESS NOTE ADULT - SUBJECTIVE AND OBJECTIVE BOX
Patient is a 59y old  Male who presents with a chief complaint of Abdominal Pain (26 Nov 2019 16:30)      Interval History: finger sticks are stable on current diabetic regimen   also on Methimazole low dose     MEDICATIONS  (STANDING):  amoxicillin  500 milliGRAM(s)/clavulanate 1 Tablet(s) Oral every 12 hours  chlorhexidine 4% Liquid 1 Application(s) Topical <User Schedule>  darunavir 800 milliGRAM(s) Oral daily  dextrose 10%. 1000 milliLiter(s) (20 mL/Hr) IV Continuous <Continuous>  dextrose 5% + sodium chloride 0.45%. 1000 milliLiter(s) (42 mL/Hr) IV Continuous <Continuous>  dextrose 5%. 1000 milliLiter(s) (50 mL/Hr) IV Continuous <Continuous>  dextrose 50% Injectable 12.5 Gram(s) IV Push once  dextrose 50% Injectable 25 Gram(s) IV Push once  dextrose 50% Injectable 25 Gram(s) IV Push once  etravirine 200 milliGRAM(s) Oral two times a day after meals  heparin  Injectable 5000 Unit(s) SubCutaneous every 12 hours  influenza   Vaccine 0.5 milliLiter(s) IntraMuscular once  insulin lispro (HumaLOG) corrective regimen sliding scale   SubCutaneous three times a day before meals  insulin lispro (HumaLOG) corrective regimen sliding scale   SubCutaneous at bedtime  methimazole 10 milliGRAM(s) Oral daily  metoprolol tartrate 25 milliGRAM(s) Oral two times a day  metroNIDAZOLE    Tablet 500 milliGRAM(s) Oral three times a day  ondansetron Injectable 4 milliGRAM(s) IV Push once  pantoprazole    Tablet 40 milliGRAM(s) Oral before breakfast  raltegravir Tablet 400 milliGRAM(s) Oral two times a day  ritonavir Tablet 100 milliGRAM(s) Oral daily    MEDICATIONS  (PRN):  acetaminophen   Tablet .. 650 milliGRAM(s) Oral every 6 hours PRN Moderate Pain (4 - 6)  aluminum hydroxide/magnesium hydroxide/simethicone Suspension 30 milliLiter(s) Oral every 6 hours PRN Dyspepsia  dextrose 40% Gel 15 Gram(s) Oral once PRN Blood Glucose LESS THAN 70 milliGRAM(s)/deciliter  glucagon  Injectable 1 milliGRAM(s) IntraMuscular once PRN Glucose LESS THAN 70 milligrams/deciliter  nitroglycerin     SubLingual 0.4 milliGRAM(s) SubLingual every 5 minutes PRN Chest Pain      Allergies    ciprofloxacin (Rash)  Levaquin (Rash)    Intolerances        REVIEW OF SYSTEMS:  CONSTITUTIONAL: no changes  EYES: No eye pain, visual disturbances, or discharge  ENMT:  No difficulty hearing, No sinus or throat pain  NECK: No pain or stiffness  RESPIRATORY: No cough, wheezing, chills or hemoptysis; No shortness of breath  CARDIOVASCULAR: No chest pain, palpitations or leg swelling  GASTROINTESTINAL: No abdominal or epigastric pain. No nausea, vomiting, or hematemesis; No diarrhea or constipation. No melena or hematochezia.  GENITOURINARY: No dysuria, frequency, hematuria, or incontinence  NEUROLOGICAL: No headaches, memory loss, loss of strength, numbness, or tremors  SKIN: No itching, burning, rashes, or lesions   ENDOCRINE: No heat or cold intolerance; No hair loss  MUSCULOSKELETAL: No joint pain or swelling; No muscle, back, or extremity pain  PSYCHIATRIC: No depression, anxiety, mood swings, or difficulty sleeping  HEME/LYMPH: No easy bruising, or bleeding gums  ALLERY AND IMMUNOLOGIC: No hives or eczema    Vital Signs Last 24 Hrs  T(C): 36.7 (26 Nov 2019 16:21), Max: 37.1 (26 Nov 2019 05:01)  T(F): 98 (26 Nov 2019 16:21), Max: 98.8 (26 Nov 2019 05:01)  HR: 105 (26 Nov 2019 16:21) (100 - 106)  BP: 142/76 (26 Nov 2019 16:21) (115/72 - 142/76)  BP(mean): --  RR: 17 (26 Nov 2019 16:21) (17 - 18)  SpO2: 98% (26 Nov 2019 16:21) (97% - 99%)    PHYSICAL EXAM:  GENERAL:   HEAD: Atraumatic, Normocephalic  EYES: PERRLA, conjunctiva and sclera clear  ENMT: No tonsillar erythema, exudates, or enlargement; Moist mucous membranes, Good dentition, No lesions  NECK: Supple, No JVD, Normal thyroid  NERVOUS SYSTEM:  Alert & Oriented X3, Good concentration; Motor Strength 5/5 B/L upper and lower extremities  CHEST/LUNG: Clear to auscultaion bilaterally; No rales, rhonchi, wheezing, or rubs  HEART: Regular rate and rhythm; No murmurs, rubs, or gallops  ABDOMEN: Soft, Nontender, Nondistended; Bowel sounds present  EXTREMITIES:  2+ Peripheral Pulses, no edema  SKIN: No rashes or lesions    LABS:        CAPILLARY BLOOD GLUCOSE      POCT Blood Glucose.: 96 mg/dL (26 Nov 2019 16:28)  POCT Blood Glucose.: 106 mg/dL (26 Nov 2019 11:09)  POCT Blood Glucose.: 111 mg/dL (26 Nov 2019 07:26)    Lipid panel:           Thyroid:  Diabetes Tests:  Parathyroid Panel:  Adrenals:  RADIOLOGY & ADDITIONAL TESTS:    Imaging Personally Reviewed:  [ ] YES  [ ] NO    Consultant(s) Notes Reviewed:  [ ] YES  [ ] NO    Care Discussed with Consultants/Other Providers [ ] YES  [ ] NO
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
## 2019-11-26 NOTE — PROGRESS NOTE ADULT - SUBJECTIVE AND OBJECTIVE BOX
Patient is a 59y old  Male who presents with a chief complaint of Abdominal Pain (26 Nov 2019 16:30)      INTERVAL HPI/OVERNIGHT EVENTS:  Patient seen and examined.  Patient stable  MEDICATIONS  (STANDING):  amoxicillin  500 milliGRAM(s)/clavulanate 1 Tablet(s) Oral every 12 hours  chlorhexidine 4% Liquid 1 Application(s) Topical <User Schedule>  darunavir 800 milliGRAM(s) Oral daily  dextrose 10%. 1000 milliLiter(s) (20 mL/Hr) IV Continuous <Continuous>  dextrose 5% + sodium chloride 0.45%. 1000 milliLiter(s) (42 mL/Hr) IV Continuous <Continuous>  dextrose 5%. 1000 milliLiter(s) (50 mL/Hr) IV Continuous <Continuous>  dextrose 50% Injectable 12.5 Gram(s) IV Push once  dextrose 50% Injectable 25 Gram(s) IV Push once  dextrose 50% Injectable 25 Gram(s) IV Push once  etravirine 200 milliGRAM(s) Oral two times a day after meals  heparin  Injectable 5000 Unit(s) SubCutaneous every 12 hours  influenza   Vaccine 0.5 milliLiter(s) IntraMuscular once  insulin lispro (HumaLOG) corrective regimen sliding scale   SubCutaneous three times a day before meals  insulin lispro (HumaLOG) corrective regimen sliding scale   SubCutaneous at bedtime  methimazole 10 milliGRAM(s) Oral daily  metoprolol tartrate 25 milliGRAM(s) Oral two times a day  metroNIDAZOLE    Tablet 500 milliGRAM(s) Oral three times a day  ondansetron Injectable 4 milliGRAM(s) IV Push once  pantoprazole    Tablet 40 milliGRAM(s) Oral before breakfast  raltegravir Tablet 400 milliGRAM(s) Oral two times a day  ritonavir Tablet 100 milliGRAM(s) Oral daily    MEDICATIONS  (PRN):  acetaminophen   Tablet .. 650 milliGRAM(s) Oral every 6 hours PRN Moderate Pain (4 - 6)  aluminum hydroxide/magnesium hydroxide/simethicone Suspension 30 milliLiter(s) Oral every 6 hours PRN Dyspepsia  dextrose 40% Gel 15 Gram(s) Oral once PRN Blood Glucose LESS THAN 70 milliGRAM(s)/deciliter  glucagon  Injectable 1 milliGRAM(s) IntraMuscular once PRN Glucose LESS THAN 70 milligrams/deciliter  nitroglycerin     SubLingual 0.4 milliGRAM(s) SubLingual every 5 minutes PRN Chest Pain      Allergies    ciprofloxacin (Rash)  Levaquin (Rash)    Intolerances        REVIEW OF SYSTEMS:  CONSTITUTIONAL: No fever, weight loss, or fatigue  EYES: No eye pain, visual disturbances, or discharge  ENMT:  No difficulty hearing, tinnitus, vertigo; No sinus or throat pain  NECK: No pain or stiffness  BREASTS: No pain, masses, or nipple discharge  RESPIRATORY: No cough, wheezing, chills or hemoptysis; No shortness of breath  CARDIOVASCULAR: No chest pain, palpitations, dizziness, or leg swelling  GASTROINTESTINAL: No abdominal or epigastric pain. No nausea, vomiting, or hematemesis; No diarrhea or constipation. No melena or hematochezia.  GENITOURINARY: No dysuria, frequency, hematuria, or incontinence  NEUROLOGICAL: No headaches, memory loss, loss of strength, numbness, or tremors  SKIN: No itching, burning, rashes, or lesions   LYMPH NODES: No enlarged glands  ENDOCRINE: No heat or cold intolerance; No hair loss  MUSCULOSKELETAL: No joint pain or swelling; No muscle, back, or extremity pain  PSYCHIATRIC: No depression, anxiety, mood swings, or difficulty sleeping  HEME/LYMPH: No easy bruising, or bleeding gums  ALLERGY AND IMMUNOLOGIC: No hives or eczema    Vital Signs Last 24 Hrs  T(C): 36.7 (26 Nov 2019 16:21), Max: 37.1 (26 Nov 2019 05:01)  T(F): 98 (26 Nov 2019 16:21), Max: 98.8 (26 Nov 2019 05:01)  HR: 105 (26 Nov 2019 16:21) (100 - 106)  BP: 142/76 (26 Nov 2019 16:21) (115/72 - 142/76)  BP(mean): --  RR: 17 (26 Nov 2019 16:21) (17 - 18)  SpO2: 98% (26 Nov 2019 16:21) (97% - 99%)    PHYSICAL EXAM:  GENERAL: NAD, well-groomed, well-developed  HEAD:  Atraumatic, Normocephalic  EYES: EOMI, PERRLA, conjunctiva and sclera clear  ENMT: No tonsillar erythema, exudates, or enlargement; Moist mucous membranes, Good dentition, No lesions  NECK: Supple, No JVD, Normal thyroid  NERVOUS SYSTEM:  Alert & Oriented X3, Good concentration; Motor Strength 5/5 B/L upper and lower extremities; DTRs 2+ intact and symmetric  CHEST/LUNG: Clear to percussion bilaterally; No rales, rhonchi, wheezing, or rubs  HEART: Regular rate and rhythm; No murmurs, rubs, or gallops  ABDOMEN: Soft, Nontender, Nondistended; Bowel sounds present  EXTREMITIES:  2+ Peripheral Pulses, No clubbing, cyanosis, or edema  LYMPH: No lymphadenopathy noted  SKIN: No rashes or lesions    LABS:                        8.2    8.46  )-----------( 330      ( 25 Nov 2019 11:05 )             27.2     11-25    133<L>  |  98  |  20  ----------------------------<  169<H>  3.3<L>   |  28  |  6.43<H>    Ca    9.9      25 Nov 2019 11:05  Phos  2.7     11-25  Mg     1.6     11-25          CAPILLARY BLOOD GLUCOSE      POCT Blood Glucose.: 96 mg/dL (26 Nov 2019 16:28)  POCT Blood Glucose.: 106 mg/dL (26 Nov 2019 11:09)  POCT Blood Glucose.: 111 mg/dL (26 Nov 2019 07:26)  POCT Blood Glucose.: 125 mg/dL (25 Nov 2019 21:18)    CULTURES:    HEMOGLOBIN A1C:    CHOLESTEROL:        RADIOLOGY & ADDITIONAL TESTS:
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
## 2019-11-26 NOTE — PROCEDURE NOTE - ADDITIONAL PROCEDURE DETAILS
pt s/p tunneled picc line removal.  Removed in its entirety without incident.  Pt does not need TPN any more.  Venotomy site pressure held x 10 minutes until hemostasis.  Hemostatic gauze covering insertion site.  -nursing to monitor right neck/chest
pt s/p TUNNELED PICC, pt has a h/o renal failure, perf'd tic s/p colostomy,  needs TPN for nutrition.  Catheter tunneled under skin into left external jugular vein.  Catheter sutured down.  DSD applied    -Catheter can be accessed  -PT WILL NEED TO RETURN TO IR FOR REMOVAL OF PICC.
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
## 2019-11-27 LAB
GLUCOSE BLDC GLUCOMTR-MCNC: 110 MG/DL — HIGH (ref 70–99)
GLUCOSE BLDC GLUCOMTR-MCNC: 120 MG/DL — HIGH (ref 70–99)
GLUCOSE BLDC GLUCOMTR-MCNC: 148 MG/DL — HIGH (ref 70–99)

## 2019-11-27 PROCEDURE — 93010 ELECTROCARDIOGRAM REPORT: CPT

## 2019-11-27 RX ORDER — MORPHINE SULFATE 50 MG/1
2 CAPSULE, EXTENDED RELEASE ORAL ONCE
Refills: 0 | Status: DISCONTINUED | OUTPATIENT
Start: 2019-11-27 | End: 2019-11-27

## 2019-11-27 RX ADMIN — Medication 25 MILLIGRAM(S): at 17:22

## 2019-11-27 RX ADMIN — Medication 1 TABLET(S): at 17:18

## 2019-11-27 RX ADMIN — Medication 1 TABLET(S): at 05:13

## 2019-11-27 RX ADMIN — ETRAVIRINE 200 MILLIGRAM(S): 200 TABLET ORAL at 08:47

## 2019-11-27 RX ADMIN — HEPARIN SODIUM 5000 UNIT(S): 5000 INJECTION INTRAVENOUS; SUBCUTANEOUS at 17:21

## 2019-11-27 RX ADMIN — PANTOPRAZOLE SODIUM 40 MILLIGRAM(S): 20 TABLET, DELAYED RELEASE ORAL at 06:40

## 2019-11-27 RX ADMIN — Medication 500 MILLIGRAM(S): at 21:39

## 2019-11-27 RX ADMIN — RALTEGRAVIR 400 MILLIGRAM(S): 400 TABLET, FILM COATED ORAL at 05:13

## 2019-11-27 RX ADMIN — MORPHINE SULFATE 2 MILLIGRAM(S): 50 CAPSULE, EXTENDED RELEASE ORAL at 07:11

## 2019-11-27 RX ADMIN — RALTEGRAVIR 400 MILLIGRAM(S): 400 TABLET, FILM COATED ORAL at 17:19

## 2019-11-27 RX ADMIN — HEPARIN SODIUM 5000 UNIT(S): 5000 INJECTION INTRAVENOUS; SUBCUTANEOUS at 06:39

## 2019-11-27 RX ADMIN — ETRAVIRINE 200 MILLIGRAM(S): 200 TABLET ORAL at 18:30

## 2019-11-27 RX ADMIN — Medication 500 MILLIGRAM(S): at 05:13

## 2019-11-27 RX ADMIN — MORPHINE SULFATE 2 MILLIGRAM(S): 50 CAPSULE, EXTENDED RELEASE ORAL at 06:37

## 2019-11-27 RX ADMIN — Medication 25 MILLIGRAM(S): at 05:13
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
## 2019-11-27 NOTE — PROGRESS NOTE ADULT - SUBJECTIVE AND OBJECTIVE BOX
Patient is a 59y old  Male who presents with a chief complaint of gi bleed (27 Nov 2019 10:13)      INTERVAL HPI/OVERNIGHT EVENTS:  Morbidly obese patient with multiple comorbities and post op complications on dialysis.  Liv followed by ID.  MEDICATIONS  (STANDING):  amoxicillin  500 milliGRAM(s)/clavulanate 1 Tablet(s) Oral every 12 hours  chlorhexidine 4% Liquid 1 Application(s) Topical <User Schedule>  darunavir 800 milliGRAM(s) Oral daily  dextrose 5%. 1000 milliLiter(s) (50 mL/Hr) IV Continuous <Continuous>  dextrose 50% Injectable 12.5 Gram(s) IV Push once  dextrose 50% Injectable 25 Gram(s) IV Push once  dextrose 50% Injectable 25 Gram(s) IV Push once  etravirine 200 milliGRAM(s) Oral two times a day after meals  heparin  Injectable 5000 Unit(s) SubCutaneous every 12 hours  influenza   Vaccine 0.5 milliLiter(s) IntraMuscular once  insulin lispro (HumaLOG) corrective regimen sliding scale   SubCutaneous three times a day before meals  insulin lispro (HumaLOG) corrective regimen sliding scale   SubCutaneous at bedtime  methimazole 10 milliGRAM(s) Oral daily  metoprolol tartrate 25 milliGRAM(s) Oral two times a day  metroNIDAZOLE    Tablet 500 milliGRAM(s) Oral three times a day  ondansetron Injectable 4 milliGRAM(s) IV Push once  pantoprazole    Tablet 40 milliGRAM(s) Oral before breakfast  raltegravir Tablet 400 milliGRAM(s) Oral two times a day  ritonavir Tablet 100 milliGRAM(s) Oral daily    MEDICATIONS  (PRN):  acetaminophen   Tablet .. 650 milliGRAM(s) Oral every 6 hours PRN Moderate Pain (4 - 6)  aluminum hydroxide/magnesium hydroxide/simethicone Suspension 30 milliLiter(s) Oral every 6 hours PRN Dyspepsia  dextrose 40% Gel 15 Gram(s) Oral once PRN Blood Glucose LESS THAN 70 milliGRAM(s)/deciliter  glucagon  Injectable 1 milliGRAM(s) IntraMuscular once PRN Glucose LESS THAN 70 milligrams/deciliter  nitroglycerin     SubLingual 0.4 milliGRAM(s) SubLingual every 5 minutes PRN Chest Pain      Allergies    ciprofloxacin (Rash)  Levaquin (Rash)    Intolerances        REVIEW OF SYSTEMS:  CONSTITUTIONAL: No fever, weight loss, or fatigue  EYES: No eye pain, visual disturbances, or discharge  ENMT:  No difficulty hearing, tinnitus, vertigo; No sinus or throat pain  NECK: No pain or stiffness  BREASTS: No pain, masses, or nipple discharge  RESPIRATORY: No cough, wheezing, chills or hemoptysis; No shortness of breath  CARDIOVASCULAR: No chest pain, palpitations, dizziness, or leg swelling  GASTROINTESTINAL: No abdominal or epigastric pain. No nausea, vomiting, or hematemesis; No diarrhea or constipation. No melena or hematochezia.  GENITOURINARY: No dysuria, frequency, hematuria, or incontinence  NEUROLOGICAL: No headaches, memory loss, loss of strength, numbness, or tremors  SKIN: No itching, burning, rashes, or lesions   LYMPH NODES: No enlarged glands  ENDOCRINE: No heat or cold intolerance; No hair loss  MUSCULOSKELETAL: No joint pain or swelling; No muscle, back, or extremity pain  PSYCHIATRIC: No depression, anxiety, mood swings, or difficulty sleeping  HEME/LYMPH: No easy bruising, or bleeding gums  ALLERGY AND IMMUNOLOGIC: No hives or eczema    Vital Signs Last 24 Hrs  T(C): 36.7 (27 Nov 2019 12:45), Max: 36.8 (26 Nov 2019 23:39)  T(F): 98 (27 Nov 2019 12:45), Max: 98.2 (26 Nov 2019 23:39)  HR: 91 (27 Nov 2019 12:45) (89 - 105)  BP: 113/65 (27 Nov 2019 12:45) (101/59 - 155/86)  BP(mean): --  RR: 18 (27 Nov 2019 12:45) (16 - 18)  SpO2: 98% (27 Nov 2019 12:45) (97% - 98%)    PHYSICAL EXAM:  GENERAL: NAD, well-groomed, well-developed  HEAD:  Atraumatic, Normocephalic  EYES: EOMI, PERRLA, conjunctiva and sclera clear  ENMT: No tonsillar erythema, exudates, or enlargement; Moist mucous membranes, Good dentition, No lesions  NECK: Supple, No JVD, Normal thyroid  NERVOUS SYSTEM:  Alert & Oriented X3, Good concentration; Motor Strength 5/5 B/L upper and lower extremities; DTRs 2+ intact and symmetric  CHEST/LUNG: Clear to percussion bilaterally; No rales, rhonchi, wheezing, or rubs  HEART: Regular rate and rhythm; No murmurs, rubs, or gallops  ABDOMEN: Soft, Nontender, Nondistended; Bowel sounds present  EXTREMITIES:  2+ Peripheral Pulses, No clubbing, cyanosis, or edema  LYMPH: No lymphadenopathy noted  SKIN: No rashes or lesions    LABS:              CAPILLARY BLOOD GLUCOSE      POCT Blood Glucose.: 120 mg/dL (27 Nov 2019 11:55)  POCT Blood Glucose.: 110 mg/dL (27 Nov 2019 07:38)  POCT Blood Glucose.: 105 mg/dL (26 Nov 2019 21:58)  POCT Blood Glucose.: 96 mg/dL (26 Nov 2019 16:28)    CULTURES:    HEMOGLOBIN A1C:    CHOLESTEROL:        RADIOLOGY & ADDITIONAL TESTS:
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
## 2019-11-27 NOTE — PROGRESS NOTE ADULT - REASON FOR ADMISSION
ESRD
abdominal wall collections
Abdominal Pain
Abdominal Pain, ESRD
Abdominal pain
gi bleed

## 2019-11-27 NOTE — PROGRESS NOTE ADULT - SUBJECTIVE AND OBJECTIVE BOX
Patient is a 59y old  Male who presents with a chief complaint of gi bleed (27 Nov 2019 10:13)      Interval History: continued on Methimazole     MEDICATIONS  (STANDING):  amoxicillin  500 milliGRAM(s)/clavulanate 1 Tablet(s) Oral every 12 hours  chlorhexidine 4% Liquid 1 Application(s) Topical <User Schedule>  darunavir 800 milliGRAM(s) Oral daily  dextrose 5%. 1000 milliLiter(s) (50 mL/Hr) IV Continuous <Continuous>  dextrose 50% Injectable 12.5 Gram(s) IV Push once  dextrose 50% Injectable 25 Gram(s) IV Push once  dextrose 50% Injectable 25 Gram(s) IV Push once  etravirine 200 milliGRAM(s) Oral two times a day after meals  heparin  Injectable 5000 Unit(s) SubCutaneous every 12 hours  influenza   Vaccine 0.5 milliLiter(s) IntraMuscular once  insulin lispro (HumaLOG) corrective regimen sliding scale   SubCutaneous three times a day before meals  insulin lispro (HumaLOG) corrective regimen sliding scale   SubCutaneous at bedtime  methimazole 10 milliGRAM(s) Oral daily  metoprolol tartrate 25 milliGRAM(s) Oral two times a day  metroNIDAZOLE    Tablet 500 milliGRAM(s) Oral three times a day  ondansetron Injectable 4 milliGRAM(s) IV Push once  pantoprazole    Tablet 40 milliGRAM(s) Oral before breakfast  raltegravir Tablet 400 milliGRAM(s) Oral two times a day  ritonavir Tablet 100 milliGRAM(s) Oral daily    MEDICATIONS  (PRN):  acetaminophen   Tablet .. 650 milliGRAM(s) Oral every 6 hours PRN Moderate Pain (4 - 6)  aluminum hydroxide/magnesium hydroxide/simethicone Suspension 30 milliLiter(s) Oral every 6 hours PRN Dyspepsia  dextrose 40% Gel 15 Gram(s) Oral once PRN Blood Glucose LESS THAN 70 milliGRAM(s)/deciliter  glucagon  Injectable 1 milliGRAM(s) IntraMuscular once PRN Glucose LESS THAN 70 milligrams/deciliter  nitroglycerin     SubLingual 0.4 milliGRAM(s) SubLingual every 5 minutes PRN Chest Pain      Allergies    ciprofloxacin (Rash)  Levaquin (Rash)    Intolerances        REVIEW OF SYSTEMS:  CONSTITUTIONAL: no changes  EYES: No eye pain, visual disturbances, or discharge  ENMT:  No difficulty hearing, No sinus or throat pain  NECK: No pain or stiffness  RESPIRATORY: No cough, wheezing, chills or hemoptysis; No shortness of breath  CARDIOVASCULAR: No chest pain, palpitations or leg swelling  GASTROINTESTINAL: No abdominal or epigastric pain. No nausea, vomiting, or hematemesis; No diarrhea or constipation. No melena or hematochezia.  GENITOURINARY: No dysuria, frequency, hematuria, or incontinence  NEUROLOGICAL: No headaches, memory loss, loss of strength, numbness, or tremors  SKIN: No itching, burning, rashes, or lesions   ENDOCRINE: No heat or cold intolerance; No hair loss  MUSCULOSKELETAL: No joint pain or swelling; No muscle, back, or extremity pain  PSYCHIATRIC: No depression, anxiety, mood swings, or difficulty sleeping  HEME/LYMPH: No easy bruising, or bleeding gums  ALLERY AND IMMUNOLOGIC: No hives or eczema    Vital Signs Last 24 Hrs  T(C): 36.3 (27 Nov 2019 16:55), Max: 36.8 (26 Nov 2019 23:39)  T(F): 97.3 (27 Nov 2019 16:55), Max: 98.2 (26 Nov 2019 23:39)  HR: 99 (27 Nov 2019 16:55) (89 - 99)  BP: 121/63 (27 Nov 2019 16:55) (101/59 - 155/86)  BP(mean): --  RR: 19 (27 Nov 2019 16:55) (16 - 19)  SpO2: 100% (27 Nov 2019 16:55) (97% - 100%)    PHYSICAL EXAM:  GENERAL:   HEAD: Atraumatic, Normocephalic  EYES: PERRLA, conjunctiva and sclera clear  ENMT: No tonsillar erythema, exudates, or enlargement; Moist mucous membranes, Good dentition, No lesions  NECK: Supple, No JVD, Normal thyroid  NERVOUS SYSTEM:  Alert & Oriented X3, Good concentration; Motor Strength 5/5 B/L upper and lower extremities  CHEST/LUNG: Clear to auscultaion bilaterally; No rales, rhonchi, wheezing, or rubs  HEART: Regular rate and rhythm; No murmurs, rubs, or gallops  ABDOMEN: Soft, Nontender, Nondistended; Bowel sounds present  EXTREMITIES:  2+ Peripheral Pulses, no edema  SKIN: No rashes or lesions    LABS:        CAPILLARY BLOOD GLUCOSE      POCT Blood Glucose.: 148 mg/dL (27 Nov 2019 21:31)  POCT Blood Glucose.: 120 mg/dL (27 Nov 2019 11:55)  POCT Blood Glucose.: 110 mg/dL (27 Nov 2019 07:38)    Lipid panel:           Thyroid:  Diabetes Tests:  Parathyroid Panel:  Adrenals:  RADIOLOGY & ADDITIONAL TESTS:    Imaging Personally Reviewed:  [ ] YES  [ ] NO    Consultant(s) Notes Reviewed:  [ ] YES  [ ] NO    Care Discussed with Consultants/Other Providers [ ] YES  [ ] NO
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
## 2019-11-28 RX ADMIN — RALTEGRAVIR 400 MILLIGRAM(S): 400 TABLET, FILM COATED ORAL at 05:59

## 2019-11-28 RX ADMIN — Medication 500 MILLIGRAM(S): at 21:56

## 2019-11-28 RX ADMIN — DARUNAVIR 800 MILLIGRAM(S): 75 TABLET, FILM COATED ORAL at 12:03

## 2019-11-28 RX ADMIN — CHLORHEXIDINE GLUCONATE 1 APPLICATION(S): 213 SOLUTION TOPICAL at 05:59

## 2019-11-28 RX ADMIN — PANTOPRAZOLE SODIUM 40 MILLIGRAM(S): 20 TABLET, DELAYED RELEASE ORAL at 05:59

## 2019-11-28 RX ADMIN — Medication 500 MILLIGRAM(S): at 14:13

## 2019-11-28 RX ADMIN — RITONAVIR 100 MILLIGRAM(S): 100 TABLET, FILM COATED ORAL at 12:03

## 2019-11-28 RX ADMIN — Medication 1 TABLET(S): at 17:09

## 2019-11-28 RX ADMIN — RALTEGRAVIR 400 MILLIGRAM(S): 400 TABLET, FILM COATED ORAL at 17:10

## 2019-11-28 RX ADMIN — HEPARIN SODIUM 5000 UNIT(S): 5000 INJECTION INTRAVENOUS; SUBCUTANEOUS at 05:59

## 2019-11-28 RX ADMIN — HEPARIN SODIUM 5000 UNIT(S): 5000 INJECTION INTRAVENOUS; SUBCUTANEOUS at 17:09

## 2019-11-28 RX ADMIN — Medication 1 TABLET(S): at 05:59

## 2019-11-28 RX ADMIN — Medication 500 MILLIGRAM(S): at 05:59

## 2019-11-28 RX ADMIN — ETRAVIRINE 200 MILLIGRAM(S): 200 TABLET ORAL at 09:44
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
## 2019-11-28 NOTE — PROGRESS NOTE ADULT - SUBJECTIVE AND OBJECTIVE BOX
Patient is a 59y old  Male who presents with a chief complaint of gi bleed (27 Nov 2019 10:13)      INTERVAL HPI/OVERNIGHT EVENTS:  pt with no complaints    MEDICATIONS  (STANDING):  amoxicillin  500 milliGRAM(s)/clavulanate 1 Tablet(s) Oral every 12 hours  chlorhexidine 4% Liquid 1 Application(s) Topical <User Schedule>  darunavir 800 milliGRAM(s) Oral daily  dextrose 5%. 1000 milliLiter(s) (50 mL/Hr) IV Continuous <Continuous>  dextrose 50% Injectable 12.5 Gram(s) IV Push once  dextrose 50% Injectable 25 Gram(s) IV Push once  dextrose 50% Injectable 25 Gram(s) IV Push once  etravirine 200 milliGRAM(s) Oral two times a day after meals  heparin  Injectable 5000 Unit(s) SubCutaneous every 12 hours  influenza   Vaccine 0.5 milliLiter(s) IntraMuscular once  insulin lispro (HumaLOG) corrective regimen sliding scale   SubCutaneous three times a day before meals  insulin lispro (HumaLOG) corrective regimen sliding scale   SubCutaneous at bedtime  methimazole 10 milliGRAM(s) Oral daily  metoprolol tartrate 25 milliGRAM(s) Oral two times a day  metroNIDAZOLE    Tablet 500 milliGRAM(s) Oral three times a day  ondansetron Injectable 4 milliGRAM(s) IV Push once  pantoprazole    Tablet 40 milliGRAM(s) Oral before breakfast  raltegravir Tablet 400 milliGRAM(s) Oral two times a day  ritonavir Tablet 100 milliGRAM(s) Oral daily    MEDICATIONS  (PRN):  acetaminophen   Tablet .. 650 milliGRAM(s) Oral every 6 hours PRN Moderate Pain (4 - 6)  aluminum hydroxide/magnesium hydroxide/simethicone Suspension 30 milliLiter(s) Oral every 6 hours PRN Dyspepsia  dextrose 40% Gel 15 Gram(s) Oral once PRN Blood Glucose LESS THAN 70 milliGRAM(s)/deciliter  glucagon  Injectable 1 milliGRAM(s) IntraMuscular once PRN Glucose LESS THAN 70 milligrams/deciliter  nitroglycerin     SubLingual 0.4 milliGRAM(s) SubLingual every 5 minutes PRN Chest Pain      REVIEW OF SYSTEMS:  CONSTITUTIONAL: No fever, weight loss, or fatigue  RESPIRATORY: No cough, wheezing, chills or hemoptysis; No shortness of breath  CARDIOVASCULAR: No chest pain, palpitations, dizziness, or leg swelling  GASTROINTESTINAL: No abdominal or epigastric pain. No nausea, vomiting, or hematemesis; No diarrhea or constipation. No melena or hematochezia.  ENDOCRINE: No heat or cold intolerance; No hair loss      Vital Signs Last 24 Hrs  T(C): 36.9 (28 Nov 2019 05:25), Max: 36.9 (27 Nov 2019 23:57)  T(F): 98.4 (28 Nov 2019 05:25), Max: 98.5 (27 Nov 2019 23:57)  HR: 98 (28 Nov 2019 06:07) (89 - 103)  BP: 98/59 (28 Nov 2019 06:07) (98/59 - 121/63)  BP(mean): --  RR: 18 (28 Nov 2019 05:25) (17 - 19)  SpO2: 97% (28 Nov 2019 05:25) (97% - 100%)    PHYSICAL EXAM:  GENERAL: NAD, well-groomed, well-developed        LABS:              CAPILLARY BLOOD GLUCOSE      POCT Blood Glucose.: 148 mg/dL (27 Nov 2019 21:31)  POCT Blood Glucose.: 120 mg/dL (27 Nov 2019 11:55)    Lipid panel:               RADIOLOGY & ADDITIONAL TESTS:

## 2019-11-28 NOTE — PROGRESS NOTE ADULT - SUBJECTIVE AND OBJECTIVE BOX
Patient is a 59y old  Male who presents with a chief complaint of gi bleed (27 Nov 2019 10:13)      INTERVAL HPI/OVERNIGHT EVENTS:  No new complaints  MEDICATIONS  (STANDING):  amoxicillin  500 milliGRAM(s)/clavulanate 1 Tablet(s) Oral every 12 hours  chlorhexidine 4% Liquid 1 Application(s) Topical <User Schedule>  darunavir 800 milliGRAM(s) Oral daily  dextrose 5%. 1000 milliLiter(s) (50 mL/Hr) IV Continuous <Continuous>  dextrose 50% Injectable 12.5 Gram(s) IV Push once  dextrose 50% Injectable 25 Gram(s) IV Push once  dextrose 50% Injectable 25 Gram(s) IV Push once  etravirine 200 milliGRAM(s) Oral two times a day after meals  heparin  Injectable 5000 Unit(s) SubCutaneous every 12 hours  influenza   Vaccine 0.5 milliLiter(s) IntraMuscular once  insulin lispro (HumaLOG) corrective regimen sliding scale   SubCutaneous three times a day before meals  insulin lispro (HumaLOG) corrective regimen sliding scale   SubCutaneous at bedtime  methimazole 10 milliGRAM(s) Oral daily  metoprolol tartrate 25 milliGRAM(s) Oral two times a day  metroNIDAZOLE    Tablet 500 milliGRAM(s) Oral three times a day  ondansetron Injectable 4 milliGRAM(s) IV Push once  pantoprazole    Tablet 40 milliGRAM(s) Oral before breakfast  raltegravir Tablet 400 milliGRAM(s) Oral two times a day  ritonavir Tablet 100 milliGRAM(s) Oral daily    MEDICATIONS  (PRN):  acetaminophen   Tablet .. 650 milliGRAM(s) Oral every 6 hours PRN Moderate Pain (4 - 6)  aluminum hydroxide/magnesium hydroxide/simethicone Suspension 30 milliLiter(s) Oral every 6 hours PRN Dyspepsia  dextrose 40% Gel 15 Gram(s) Oral once PRN Blood Glucose LESS THAN 70 milliGRAM(s)/deciliter  glucagon  Injectable 1 milliGRAM(s) IntraMuscular once PRN Glucose LESS THAN 70 milligrams/deciliter  nitroglycerin     SubLingual 0.4 milliGRAM(s) SubLingual every 5 minutes PRN Chest Pain      Allergies    ciprofloxacin (Rash)  Levaquin (Rash)    Intolerances        REVIEW OF SYSTEMS:  CONSTITUTIONAL: No fever, weight loss, or fatigue  EYES: No eye pain, visual disturbances, or discharge  ENMT:  No difficulty hearing, tinnitus, vertigo; No sinus or throat pain  NECK: No pain or stiffness  BREASTS: No pain, masses, or nipple discharge  RESPIRATORY: No cough, wheezing, chills or hemoptysis; No shortness of breath  CARDIOVASCULAR: No chest pain, palpitations, dizziness, or leg swelling  GASTROINTESTINAL: No abdominal or epigastric pain. No nausea, vomiting, or hematemesis; No diarrhea or constipation. No melena or hematochezia.  GENITOURINARY: No dysuria, frequency, hematuria, or incontinence  NEUROLOGICAL: No headaches, memory loss, loss of strength, numbness, or tremors  SKIN: No itching, burning, rashes, or lesions   LYMPH NODES: No enlarged glands  ENDOCRINE: No heat or cold intolerance; No hair loss  MUSCULOSKELETAL: No joint pain or swelling; No muscle, back, or extremity pain  PSYCHIATRIC: No depression, anxiety, mood swings, or difficulty sleeping  HEME/LYMPH: No easy bruising, or bleeding gums  ALLERGY AND IMMUNOLOGIC: No hives or eczema    Vital Signs Last 24 Hrs  T(C): 36.2 (28 Nov 2019 12:20), Max: 36.9 (27 Nov 2019 23:57)  T(F): 97.2 (28 Nov 2019 12:20), Max: 98.5 (27 Nov 2019 23:57)  HR: 101 (28 Nov 2019 12:20) (89 - 103)  BP: 118/76 (28 Nov 2019 12:20) (98/59 - 121/63)  BP(mean): --  RR: 18 (28 Nov 2019 12:20) (18 - 19)  SpO2: 97% (28 Nov 2019 12:20) (97% - 100%)    PHYSICAL EXAM:  GENERAL: NAD, well-groomed, well-developed  HEAD:  Atraumatic, Normocephalic  EYES: EOMI, PERRLA, conjunctiva and sclera clear  ENMT: No tonsillar erythema, exudates, or enlargement; Moist mucous membranes, Good dentition, No lesions  NECK: Supple, No JVD, Normal thyroid  NERVOUS SYSTEM:  Alert & Oriented X3, Good concentration; Motor Strength 5/5 B/L upper and lower extremities; DTRs 2+ intact and symmetric  CHEST/LUNG: Clear to percussion bilaterally; No rales, rhonchi, wheezing, or rubs  HEART: Regular rate and rhythm; No murmurs, rubs, or gallops  ABDOMEN: Soft, Nontender, Nondistended; Bowel sounds present  EXTREMITIES:  2+ Peripheral Pulses, No clubbing, cyanosis, or edema  LYMPH: No lymphadenopathy noted  SKIN: No rashes or lesions    LABS:              CAPILLARY BLOOD GLUCOSE      POCT Blood Glucose.: 148 mg/dL (27 Nov 2019 21:31)    CULTURES:    HEMOGLOBIN A1C:    CHOLESTEROL:        RADIOLOGY & ADDITIONAL TESTS:
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
## 2019-11-29 ENCOUNTER — TRANSCRIPTION ENCOUNTER (OUTPATIENT)
Age: 59
End: 2019-11-29

## 2019-11-29 VITALS
RESPIRATION RATE: 19 BRPM | TEMPERATURE: 97 F | SYSTOLIC BLOOD PRESSURE: 120 MMHG | OXYGEN SATURATION: 98 % | HEART RATE: 100 BPM | DIASTOLIC BLOOD PRESSURE: 91 MMHG

## 2019-11-29 LAB
ANION GAP SERPL CALC-SCNC: 7 MMOL/L — SIGNIFICANT CHANGE UP (ref 5–17)
BUN SERPL-MCNC: 38 MG/DL — HIGH (ref 7–23)
CALCIUM SERPL-MCNC: 9.8 MG/DL — SIGNIFICANT CHANGE UP (ref 8.5–10.1)
CHLORIDE SERPL-SCNC: 103 MMOL/L — SIGNIFICANT CHANGE UP (ref 96–108)
CO2 SERPL-SCNC: 29 MMOL/L — SIGNIFICANT CHANGE UP (ref 22–31)
CREAT SERPL-MCNC: 6.71 MG/DL — HIGH (ref 0.5–1.3)
GLUCOSE BLDC GLUCOMTR-MCNC: 98 MG/DL — SIGNIFICANT CHANGE UP (ref 70–99)
GLUCOSE SERPL-MCNC: 121 MG/DL — HIGH (ref 70–99)
HCT VFR BLD CALC: 26.5 % — LOW (ref 39–50)
HGB BLD-MCNC: 7.8 G/DL — LOW (ref 13–17)
MCHC RBC-ENTMCNC: 25.9 PG — LOW (ref 27–34)
MCHC RBC-ENTMCNC: 29.4 GM/DL — LOW (ref 32–36)
MCV RBC AUTO: 88 FL — SIGNIFICANT CHANGE UP (ref 80–100)
NRBC # BLD: 0 /100 WBCS — SIGNIFICANT CHANGE UP (ref 0–0)
PLATELET # BLD AUTO: 316 K/UL — SIGNIFICANT CHANGE UP (ref 150–400)
POTASSIUM SERPL-MCNC: 3.6 MMOL/L — SIGNIFICANT CHANGE UP (ref 3.5–5.3)
POTASSIUM SERPL-SCNC: 3.6 MMOL/L — SIGNIFICANT CHANGE UP (ref 3.5–5.3)
RBC # BLD: 3.01 M/UL — LOW (ref 4.2–5.8)
RBC # FLD: 18.7 % — HIGH (ref 10.3–14.5)
SODIUM SERPL-SCNC: 139 MMOL/L — SIGNIFICANT CHANGE UP (ref 135–145)
T3 SERPL-MCNC: 112 NG/DL — SIGNIFICANT CHANGE UP (ref 80–200)
T4 FREE SERPL-MCNC: 1.7 NG/DL — SIGNIFICANT CHANGE UP (ref 0.9–1.8)
TSH SERPL-MCNC: <0.005 UIU/ML — LOW (ref 0.36–3.74)
WBC # BLD: 8.96 K/UL — SIGNIFICANT CHANGE UP (ref 3.8–10.5)
WBC # FLD AUTO: 8.96 K/UL — SIGNIFICANT CHANGE UP (ref 3.8–10.5)

## 2019-11-29 RX ORDER — METRONIDAZOLE 500 MG
1 TABLET ORAL
Qty: 0 | Refills: 0 | DISCHARGE
Start: 2019-11-29

## 2019-11-29 RX ADMIN — Medication 500 MILLIGRAM(S): at 05:56

## 2019-11-29 RX ADMIN — RITONAVIR 100 MILLIGRAM(S): 100 TABLET, FILM COATED ORAL at 12:11

## 2019-11-29 RX ADMIN — DARUNAVIR 800 MILLIGRAM(S): 75 TABLET, FILM COATED ORAL at 12:11

## 2019-11-29 RX ADMIN — PANTOPRAZOLE SODIUM 40 MILLIGRAM(S): 20 TABLET, DELAYED RELEASE ORAL at 05:56

## 2019-11-29 RX ADMIN — ETRAVIRINE 200 MILLIGRAM(S): 200 TABLET ORAL at 12:10

## 2019-11-29 RX ADMIN — Medication 1 TABLET(S): at 05:55

## 2019-11-29 RX ADMIN — RALTEGRAVIR 400 MILLIGRAM(S): 400 TABLET, FILM COATED ORAL at 05:56

## 2019-11-29 RX ADMIN — CHLORHEXIDINE GLUCONATE 1 APPLICATION(S): 213 SOLUTION TOPICAL at 05:56

## 2019-11-29 RX ADMIN — HEPARIN SODIUM 5000 UNIT(S): 5000 INJECTION INTRAVENOUS; SUBCUTANEOUS at 05:55

## 2019-11-29 RX ADMIN — Medication 25 MILLIGRAM(S): at 05:56

## 2019-11-29 NOTE — DISCHARGE NOTE NURSING/CASE MANAGEMENT/SOCIAL WORK - PATIENT PORTAL LINK FT
You can access the FollowMyHealth Patient Portal offered by Health system by registering at the following website: [email or web address]. By joining [employer]’s FollowMyHealth portal, you will also be able to view your health information using other applications (apps) compatible with our system.

## 2019-11-29 NOTE — PROGRESS NOTE ADULT - SUBJECTIVE AND OBJECTIVE BOX
Patient is a 59y old  Male who presents with a chief complaint of gi bleed (27 Nov 2019 10:13)      INTERVAL HPI/OVERNIGHT EVENTS:  No change in condition.  Discharge to rehab  MEDICATIONS  (STANDING):  amoxicillin  500 milliGRAM(s)/clavulanate 1 Tablet(s) Oral every 12 hours  chlorhexidine 4% Liquid 1 Application(s) Topical <User Schedule>  darunavir 800 milliGRAM(s) Oral daily  dextrose 5%. 1000 milliLiter(s) (50 mL/Hr) IV Continuous <Continuous>  dextrose 50% Injectable 12.5 Gram(s) IV Push once  dextrose 50% Injectable 25 Gram(s) IV Push once  dextrose 50% Injectable 25 Gram(s) IV Push once  etravirine 200 milliGRAM(s) Oral two times a day after meals  heparin  Injectable 5000 Unit(s) SubCutaneous every 12 hours  influenza   Vaccine 0.5 milliLiter(s) IntraMuscular once  insulin lispro (HumaLOG) corrective regimen sliding scale   SubCutaneous three times a day before meals  insulin lispro (HumaLOG) corrective regimen sliding scale   SubCutaneous at bedtime  methimazole 10 milliGRAM(s) Oral daily  metoprolol tartrate 25 milliGRAM(s) Oral two times a day  metroNIDAZOLE    Tablet 500 milliGRAM(s) Oral three times a day  ondansetron Injectable 4 milliGRAM(s) IV Push once  pantoprazole    Tablet 40 milliGRAM(s) Oral before breakfast  raltegravir Tablet 400 milliGRAM(s) Oral two times a day  ritonavir Tablet 100 milliGRAM(s) Oral daily    MEDICATIONS  (PRN):  acetaminophen   Tablet .. 650 milliGRAM(s) Oral every 6 hours PRN Moderate Pain (4 - 6)  aluminum hydroxide/magnesium hydroxide/simethicone Suspension 30 milliLiter(s) Oral every 6 hours PRN Dyspepsia  dextrose 40% Gel 15 Gram(s) Oral once PRN Blood Glucose LESS THAN 70 milliGRAM(s)/deciliter  glucagon  Injectable 1 milliGRAM(s) IntraMuscular once PRN Glucose LESS THAN 70 milligrams/deciliter  nitroglycerin     SubLingual 0.4 milliGRAM(s) SubLingual every 5 minutes PRN Chest Pain      Allergies    ciprofloxacin (Rash)  Levaquin (Rash)    Intolerances        REVIEW OF SYSTEMS:  CONSTITUTIONAL: No fever, weight loss, or fatigue  EYES: No eye pain, visual disturbances, or discharge  ENMT:  No difficulty hearing, tinnitus, vertigo; No sinus or throat pain  NECK: No pain or stiffness  BREASTS: No pain, masses, or nipple discharge  RESPIRATORY: No cough, wheezing, chills or hemoptysis; No shortness of breath  CARDIOVASCULAR: No chest pain, palpitations, dizziness, or leg swelling  GASTROINTESTINAL: No abdominal or epigastric pain. No nausea, vomiting, or hematemesis; No diarrhea or constipation. No melena or hematochezia.  GENITOURINARY: No dysuria, frequency, hematuria, or incontinence  NEUROLOGICAL: No headaches, memory loss, loss of strength, numbness, or tremors  SKIN: No itching, burning, rashes, or lesions   LYMPH NODES: No enlarged glands  ENDOCRINE: No heat or cold intolerance; No hair loss  MUSCULOSKELETAL: No joint pain or swelling; No muscle, back, or extremity pain  PSYCHIATRIC: No depression, anxiety, mood swings, or difficulty sleeping  HEME/LYMPH: No easy bruising, or bleeding gums  ALLERGY AND IMMUNOLOGIC: No hives or eczema    Vital Signs Last 24 Hrs  T(C): 36.4 (29 Nov 2019 12:55), Max: 36.6 (28 Nov 2019 16:31)  T(F): 97.5 (29 Nov 2019 12:55), Max: 97.9 (28 Nov 2019 16:31)  HR: 86 (29 Nov 2019 12:55) (86 - 104)  BP: 133/71 (29 Nov 2019 12:55) (98/55 - 133/76)  BP(mean): --  RR: 20 (29 Nov 2019 12:55) (17 - 20)  SpO2: 97% (29 Nov 2019 12:55) (96% - 100%)    PHYSICAL EXAM:  GENERAL: NAD, well-groomed, well-developed  HEAD:  Atraumatic, Normocephalic  EYES: EOMI, PERRLA, conjunctiva and sclera clear  ENMT: No tonsillar erythema, exudates, or enlargement; Moist mucous membranes, Good dentition, No lesions  NECK: Supple, No JVD, Normal thyroid  NERVOUS SYSTEM:  Alert & Oriented X3, Good concentration; Motor Strength 5/5 B/L upper and lower extremities; DTRs 2+ intact and symmetric  CHEST/LUNG: Clear to percussion bilaterally; No rales, rhonchi, wheezing, or rubs  HEART: Regular rate and rhythm; No murmurs, rubs, or gallops  ABDOMEN: Soft, Nontender, Nondistended; Bowel sounds present  EXTREMITIES:  2+ Peripheral Pulses, No clubbing, cyanosis, or edema  LYMPH: No lymphadenopathy noted  SKIN: No rashes or lesions    LABS:                        7.8    8.96  )-----------( 316      ( 29 Nov 2019 14:49 )             26.5     11-29    139  |  103  |  38<H>  ----------------------------<  121<H>  3.6   |  29  |  6.71<H>    Ca    9.8      29 Nov 2019 14:49          CAPILLARY BLOOD GLUCOSE      POCT Blood Glucose.: 98 mg/dL (29 Nov 2019 16:13)    CULTURES:    HEMOGLOBIN A1C:    CHOLESTEROL:        RADIOLOGY & ADDITIONAL TESTS:
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
## 2019-11-29 NOTE — PROGRESS NOTE ADULT - PROBLEM SELECTOR PROBLEM 3
ESRD (end stage renal disease) on dialysis

## 2019-11-29 NOTE — PROGRESS NOTE ADULT - PROBLEM SELECTOR PLAN 1
pain control
Continue with the same regimen while inpatient   Patient can resume  home regimen upon discharge
Continue with the same regimen while inpatient   controlled finger sticks
Continue with the same regimen while inpatient   will not require much insulin/ diabetes medications upon discharge   while inpatient Finger Sticks should be in 140-180 range, preferably <160
Diet per Surgery  TPN reordered Labs reordered for am
Diet per Surgery - Will Taper TPN once diet advanced and patient tolerating  TPN reordered Labs reordered for am.
continue methimazole 10mg daily  recheck tfts
continue with Methimazole   Check thyroid function tests in a few days   The recovery of TSH may lag behind time wise compared to  thyroid hormones like T4 and T3
continue with Methimazole Check thyroid function tests in a few days especially free T4 as The recovery of TSH may lag behind time wise compared to  thyroid hormones like T4 and T3
ft4 and tt3 now normal, tsh suppressed, will take longer to improve  continue methimazole 10mg daily

## 2019-11-29 NOTE — PROGRESS NOTE ADULT - PROBLEM SELECTOR PROBLEM 2
HIV (human immunodeficiency virus infection)
R/O Infected wound
Hyperthyroidism
Hyperthyroidism
Type 2 diabetes mellitus without complication, without long-term current use of insulin
Type 2 diabetes mellitus without complication, without long-term current use of insulin

## 2019-11-29 NOTE — PROGRESS NOTE ADULT - PROBLEM SELECTOR PROBLEM 1
Abdominal pain, unspecified abdominal location
Hyperthyroidism
Protein calorie malnutrition
Protein calorie malnutrition
R/O Infected wound
Type 2 diabetes mellitus without complication, without long-term current use of insulin
none
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
## 2019-11-29 NOTE — PROGRESS NOTE ADULT - PROBLEM SELECTOR PLAN 2
surgery follow up
Continue with the same regimen while inpatient   Patient can resume  home regimen upon discharge
Continue with the same regimen while inpatient while inpatient Finger Sticks should be in 140-180 range, preferably <160
continue with Methimazole  Check thyroid function tests in a few days   The recovery of TSH may lag behind time wise compared to  thyroid hormones like T4 and T3
continue with same dose Methimazole   The recovery of TSH may lag behind time wise compared to  thyroid hormones like T4 and T3   Check thyroid function tests in a few days   Follow up WBC and liver enzymes
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
## 2019-11-29 NOTE — PROGRESS NOTE ADULT - SUBJECTIVE AND OBJECTIVE BOX
Patient is a 59y old  Male who presents with a chief complaint of gi bleed (27 Nov 2019 10:13)      INTERVAL HPI/OVERNIGHT EVENTS:  pt with no complaints    MEDICATIONS  (STANDING):  amoxicillin  500 milliGRAM(s)/clavulanate 1 Tablet(s) Oral every 12 hours  chlorhexidine 4% Liquid 1 Application(s) Topical <User Schedule>  darunavir 800 milliGRAM(s) Oral daily  dextrose 5%. 1000 milliLiter(s) (50 mL/Hr) IV Continuous <Continuous>  dextrose 50% Injectable 12.5 Gram(s) IV Push once  dextrose 50% Injectable 25 Gram(s) IV Push once  dextrose 50% Injectable 25 Gram(s) IV Push once  etravirine 200 milliGRAM(s) Oral two times a day after meals  heparin  Injectable 5000 Unit(s) SubCutaneous every 12 hours  influenza   Vaccine 0.5 milliLiter(s) IntraMuscular once  insulin lispro (HumaLOG) corrective regimen sliding scale   SubCutaneous three times a day before meals  insulin lispro (HumaLOG) corrective regimen sliding scale   SubCutaneous at bedtime  methimazole 10 milliGRAM(s) Oral daily  metoprolol tartrate 25 milliGRAM(s) Oral two times a day  metroNIDAZOLE    Tablet 500 milliGRAM(s) Oral three times a day  ondansetron Injectable 4 milliGRAM(s) IV Push once  pantoprazole    Tablet 40 milliGRAM(s) Oral before breakfast  raltegravir Tablet 400 milliGRAM(s) Oral two times a day  ritonavir Tablet 100 milliGRAM(s) Oral daily    MEDICATIONS  (PRN):  acetaminophen   Tablet .. 650 milliGRAM(s) Oral every 6 hours PRN Moderate Pain (4 - 6)  aluminum hydroxide/magnesium hydroxide/simethicone Suspension 30 milliLiter(s) Oral every 6 hours PRN Dyspepsia  dextrose 40% Gel 15 Gram(s) Oral once PRN Blood Glucose LESS THAN 70 milliGRAM(s)/deciliter  glucagon  Injectable 1 milliGRAM(s) IntraMuscular once PRN Glucose LESS THAN 70 milligrams/deciliter  nitroglycerin     SubLingual 0.4 milliGRAM(s) SubLingual every 5 minutes PRN Chest Pain      REVIEW OF SYSTEMS:  CONSTITUTIONAL: No fever, weight loss, or fatigue  RESPIRATORY: No cough, wheezing, chills or hemoptysis; No shortness of breath  CARDIOVASCULAR: No chest pain, palpitations, dizziness, or leg swelling  GASTROINTESTINAL: No abdominal or epigastric pain. No nausea, vomiting, or hematemesis; No diarrhea or constipation. No melena or hematochezia.  ENDOCRINE: No heat or cold intolerance; No hair loss      Vital Signs Last 24 Hrs  T(C): 36.6 (29 Nov 2019 05:22), Max: 36.6 (28 Nov 2019 16:31)  T(F): 97.8 (29 Nov 2019 05:22), Max: 97.9 (28 Nov 2019 16:31)  HR: 97 (29 Nov 2019 05:22) (97 - 104)  BP: 133/76 (29 Nov 2019 05:22) (98/55 - 133/76)  BP(mean): --  RR: 18 (29 Nov 2019 05:22) (17 - 18)  SpO2: 100% (29 Nov 2019 05:22) (96% - 100%)    PHYSICAL EXAM:  GENERAL: NAD,  well-developed        LABS:              CAPILLARY BLOOD GLUCOSE        Lipid panel:               RADIOLOGY & ADDITIONAL TESTS:

## 2019-12-04 DIAGNOSIS — Z88.1 ALLERGY STATUS TO OTHER ANTIBIOTIC AGENTS STATUS: ICD-10-CM

## 2019-12-04 DIAGNOSIS — I95.9 HYPOTENSION, UNSPECIFIED: ICD-10-CM

## 2019-12-04 DIAGNOSIS — N18.6 END STAGE RENAL DISEASE: ICD-10-CM

## 2019-12-04 DIAGNOSIS — B20 HUMAN IMMUNODEFICIENCY VIRUS [HIV] DISEASE: ICD-10-CM

## 2019-12-04 DIAGNOSIS — Z86.73 PERSONAL HISTORY OF TRANSIENT ISCHEMIC ATTACK (TIA), AND CEREBRAL INFARCTION WITHOUT RESIDUAL DEFICITS: ICD-10-CM

## 2019-12-04 DIAGNOSIS — E87.6 HYPOKALEMIA: ICD-10-CM
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
## 2019-12-04 DIAGNOSIS — E44.0 MODERATE PROTEIN-CALORIE MALNUTRITION: ICD-10-CM

## 2019-12-04 DIAGNOSIS — I45.10 UNSPECIFIED RIGHT BUNDLE-BRANCH BLOCK: ICD-10-CM

## 2019-12-04 DIAGNOSIS — Z87.828 PERSONAL HISTORY OF OTHER (HEALED) PHYSICAL INJURY AND TRAUMA: ICD-10-CM

## 2019-12-04 DIAGNOSIS — I35.0 NONRHEUMATIC AORTIC (VALVE) STENOSIS: ICD-10-CM

## 2019-12-04 DIAGNOSIS — E11.22 TYPE 2 DIABETES MELLITUS WITH DIABETIC CHRONIC KIDNEY DISEASE: ICD-10-CM

## 2019-12-04 DIAGNOSIS — N17.9 ACUTE KIDNEY FAILURE, UNSPECIFIED: ICD-10-CM
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
## 2019-12-04 DIAGNOSIS — J90 PLEURAL EFFUSION, NOT ELSEWHERE CLASSIFIED: ICD-10-CM

## 2019-12-04 DIAGNOSIS — B96.1 KLEBSIELLA PNEUMONIAE [K. PNEUMONIAE] AS THE CAUSE OF DISEASES CLASSIFIED ELSEWHERE: ICD-10-CM

## 2019-12-04 DIAGNOSIS — I47.1 SUPRAVENTRICULAR TACHYCARDIA: ICD-10-CM

## 2019-12-04 DIAGNOSIS — T81.31XA DISRUPTION OF EXTERNAL OPERATION (SURGICAL) WOUND, NOT ELSEWHERE CLASSIFIED, INITIAL ENCOUNTER: ICD-10-CM

## 2019-12-04 DIAGNOSIS — Z93.3 COLOSTOMY STATUS: ICD-10-CM

## 2019-12-04 DIAGNOSIS — I12.0 HYPERTENSIVE CHRONIC KIDNEY DISEASE WITH STAGE 5 CHRONIC KIDNEY DISEASE OR END STAGE RENAL DISEASE: ICD-10-CM

## 2019-12-04 DIAGNOSIS — I27.20 PULMONARY HYPERTENSION, UNSPECIFIED: ICD-10-CM

## 2019-12-04 DIAGNOSIS — N26.1 ATROPHY OF KIDNEY (TERMINAL): ICD-10-CM

## 2019-12-04 DIAGNOSIS — N28.1 CYST OF KIDNEY, ACQUIRED: ICD-10-CM

## 2019-12-04 DIAGNOSIS — D63.1 ANEMIA IN CHRONIC KIDNEY DISEASE: ICD-10-CM

## 2019-12-04 DIAGNOSIS — Z86.19 PERSONAL HISTORY OF OTHER INFECTIOUS AND PARASITIC DISEASES: ICD-10-CM

## 2019-12-04 DIAGNOSIS — R00.0 TACHYCARDIA, UNSPECIFIED: ICD-10-CM

## 2019-12-04 DIAGNOSIS — E05.90 THYROTOXICOSIS, UNSPECIFIED WITHOUT THYROTOXIC CRISIS OR STORM: ICD-10-CM

## 2019-12-04 DIAGNOSIS — B96.20 UNSPECIFIED ESCHERICHIA COLI [E. COLI] AS THE CAUSE OF DISEASES CLASSIFIED ELSEWHERE: ICD-10-CM

## 2019-12-04 DIAGNOSIS — B19.20 UNSPECIFIED VIRAL HEPATITIS C WITHOUT HEPATIC COMA: ICD-10-CM

## 2019-12-04 DIAGNOSIS — Z90.49 ACQUIRED ABSENCE OF OTHER SPECIFIED PARTS OF DIGESTIVE TRACT: ICD-10-CM

## 2019-12-04 DIAGNOSIS — B95.61 METHICILLIN SUSCEPTIBLE STAPHYLOCOCCUS AUREUS INFECTION AS THE CAUSE OF DISEASES CLASSIFIED ELSEWHERE: ICD-10-CM

## 2019-12-04 DIAGNOSIS — Z99.2 DEPENDENCE ON RENAL DIALYSIS: ICD-10-CM

## 2019-12-10 LAB
HIV 1 RNA IN SERPLBLD-SEQ: SIGNIFICANT CHANGE UP
HIV GENOSURE ARCHIVE 1: SIGNIFICANT CHANGE UP
HIV GENOSURE ARCHIVE 2: SIGNIFICANT CHANGE UP
HIV GENOSURE ARCHIVE INTERP: SIGNIFICANT CHANGE UP
HIV GENOSURE ARCHIVE PDF: SIGNIFICANT CHANGE UP

## 2020-01-06 ENCOUNTER — INPATIENT (INPATIENT)
Facility: HOSPITAL | Age: 60
LOS: 18 days | End: 2020-01-25
Attending: INTERNAL MEDICINE | Admitting: SURGERY
Payer: MEDICARE

## 2020-01-06 VITALS
SYSTOLIC BLOOD PRESSURE: 89 MMHG | TEMPERATURE: 98 F | DIASTOLIC BLOOD PRESSURE: 56 MMHG | WEIGHT: 214.95 LBS | HEART RATE: 114 BPM | RESPIRATION RATE: 18 BRPM | OXYGEN SATURATION: 100 % | HEIGHT: 66 IN

## 2020-01-06 DIAGNOSIS — B19.20 UNSPECIFIED VIRAL HEPATITIS C WITHOUT HEPATIC COMA: ICD-10-CM

## 2020-01-06 DIAGNOSIS — N18.6 END STAGE RENAL DISEASE: ICD-10-CM

## 2020-01-06 DIAGNOSIS — T14.8XXA OTHER INJURY OF UNSPECIFIED BODY REGION, INITIAL ENCOUNTER: ICD-10-CM

## 2020-01-06 DIAGNOSIS — E11.9 TYPE 2 DIABETES MELLITUS WITHOUT COMPLICATIONS: ICD-10-CM

## 2020-01-06 DIAGNOSIS — B20 HUMAN IMMUNODEFICIENCY VIRUS [HIV] DISEASE: ICD-10-CM

## 2020-01-06 DIAGNOSIS — I10 ESSENTIAL (PRIMARY) HYPERTENSION: ICD-10-CM

## 2020-01-06 LAB
ALBUMIN SERPL ELPH-MCNC: 3.2 G/DL — LOW (ref 3.3–5)
ALP SERPL-CCNC: 114 U/L — SIGNIFICANT CHANGE UP (ref 40–120)
ALT FLD-CCNC: 10 U/L — LOW (ref 12–78)
ANION GAP SERPL CALC-SCNC: 17 MMOL/L — SIGNIFICANT CHANGE UP (ref 5–17)
APTT BLD: 28.5 SEC — SIGNIFICANT CHANGE UP (ref 27.5–36.3)
AST SERPL-CCNC: 8 U/L — LOW (ref 15–37)
BASOPHILS # BLD AUTO: 0.04 K/UL — SIGNIFICANT CHANGE UP (ref 0–0.2)
BASOPHILS NFR BLD AUTO: 0.2 % — SIGNIFICANT CHANGE UP (ref 0–2)
BILIRUB SERPL-MCNC: 0.5 MG/DL — SIGNIFICANT CHANGE UP (ref 0.2–1.2)
BLD GP AB SCN SERPL QL: SIGNIFICANT CHANGE UP
BUN SERPL-MCNC: 59 MG/DL — HIGH (ref 7–23)
CALCIUM SERPL-MCNC: 9.2 MG/DL — SIGNIFICANT CHANGE UP (ref 8.5–10.1)
CHLORIDE SERPL-SCNC: 98 MMOL/L — SIGNIFICANT CHANGE UP (ref 96–108)
CO2 SERPL-SCNC: 25 MMOL/L — SIGNIFICANT CHANGE UP (ref 22–31)
CREAT SERPL-MCNC: 12.9 MG/DL — HIGH (ref 0.5–1.3)
EOSINOPHIL # BLD AUTO: 0.02 K/UL — SIGNIFICANT CHANGE UP (ref 0–0.5)
EOSINOPHIL NFR BLD AUTO: 0.1 % — SIGNIFICANT CHANGE UP (ref 0–6)
GLUCOSE SERPL-MCNC: 119 MG/DL — HIGH (ref 70–99)
HCT VFR BLD CALC: 40.8 % — SIGNIFICANT CHANGE UP (ref 39–50)
HGB BLD-MCNC: 12.4 G/DL — LOW (ref 13–17)
IMM GRANULOCYTES NFR BLD AUTO: 0.9 % — SIGNIFICANT CHANGE UP (ref 0–1.5)
INR BLD: 1.32 RATIO — HIGH (ref 0.88–1.16)
LACTATE SERPL-SCNC: 1.5 MMOL/L — SIGNIFICANT CHANGE UP (ref 0.7–2)
LYMPHOCYTES # BLD AUTO: 11.1 % — LOW (ref 13–44)
LYMPHOCYTES # BLD AUTO: 2.32 K/UL — SIGNIFICANT CHANGE UP (ref 1–3.3)
MCHC RBC-ENTMCNC: 26.3 PG — LOW (ref 27–34)
MCHC RBC-ENTMCNC: 30.4 GM/DL — LOW (ref 32–36)
MCV RBC AUTO: 86.6 FL — SIGNIFICANT CHANGE UP (ref 80–100)
MONOCYTES # BLD AUTO: 1.63 K/UL — HIGH (ref 0–0.9)
MONOCYTES NFR BLD AUTO: 7.8 % — SIGNIFICANT CHANGE UP (ref 2–14)
NEUTROPHILS # BLD AUTO: 16.67 K/UL — HIGH (ref 1.8–7.4)
NEUTROPHILS NFR BLD AUTO: 79.9 % — HIGH (ref 43–77)
NRBC # BLD: 0 /100 WBCS — SIGNIFICANT CHANGE UP (ref 0–0)
PLATELET # BLD AUTO: 387 K/UL — SIGNIFICANT CHANGE UP (ref 150–400)
POTASSIUM SERPL-MCNC: 3.6 MMOL/L — SIGNIFICANT CHANGE UP (ref 3.5–5.3)
POTASSIUM SERPL-SCNC: 3.6 MMOL/L — SIGNIFICANT CHANGE UP (ref 3.5–5.3)
PROT SERPL-MCNC: 9.6 GM/DL — HIGH (ref 6–8.3)
PROTHROM AB SERPL-ACNC: 14.9 SEC — HIGH (ref 10–12.9)
RBC # BLD: 4.71 M/UL — SIGNIFICANT CHANGE UP (ref 4.2–5.8)
RBC # FLD: 18.1 % — HIGH (ref 10.3–14.5)
SODIUM SERPL-SCNC: 140 MMOL/L — SIGNIFICANT CHANGE UP (ref 135–145)
WBC # BLD: 20.86 K/UL — HIGH (ref 3.8–10.5)
WBC # FLD AUTO: 20.86 K/UL — HIGH (ref 3.8–10.5)

## 2020-01-06 PROCEDURE — 74176 CT ABD & PELVIS W/O CONTRAST: CPT | Mod: 26

## 2020-01-06 PROCEDURE — 93010 ELECTROCARDIOGRAM REPORT: CPT

## 2020-01-06 PROCEDURE — 99285 EMERGENCY DEPT VISIT HI MDM: CPT

## 2020-01-06 PROCEDURE — 99221 1ST HOSP IP/OBS SF/LOW 40: CPT

## 2020-01-06 PROCEDURE — 71045 X-RAY EXAM CHEST 1 VIEW: CPT | Mod: 26

## 2020-01-06 RX ORDER — SODIUM CHLORIDE 9 MG/ML
250 INJECTION INTRAMUSCULAR; INTRAVENOUS; SUBCUTANEOUS ONCE
Refills: 0 | Status: DISCONTINUED | OUTPATIENT
Start: 2020-01-06 | End: 2020-01-06

## 2020-01-06 RX ORDER — PIPERACILLIN AND TAZOBACTAM 4; .5 G/20ML; G/20ML
3.38 INJECTION, POWDER, LYOPHILIZED, FOR SOLUTION INTRAVENOUS ONCE
Refills: 0 | Status: COMPLETED | OUTPATIENT
Start: 2020-01-06 | End: 2020-01-06

## 2020-01-06 RX ORDER — ACETAMINOPHEN 500 MG
650 TABLET ORAL EVERY 6 HOURS
Refills: 0 | Status: DISCONTINUED | OUTPATIENT
Start: 2020-01-06 | End: 2020-01-08

## 2020-01-06 RX ORDER — PANTOPRAZOLE SODIUM 20 MG/1
40 TABLET, DELAYED RELEASE ORAL DAILY
Refills: 0 | Status: DISCONTINUED | OUTPATIENT
Start: 2020-01-06 | End: 2020-01-08

## 2020-01-06 RX ORDER — PIPERACILLIN AND TAZOBACTAM 4; .5 G/20ML; G/20ML
3.38 INJECTION, POWDER, LYOPHILIZED, FOR SOLUTION INTRAVENOUS EVERY 12 HOURS
Refills: 0 | Status: DISCONTINUED | OUTPATIENT
Start: 2020-01-07 | End: 2020-01-09

## 2020-01-06 RX ORDER — SODIUM CHLORIDE 9 MG/ML
1000 INJECTION, SOLUTION INTRAVENOUS
Refills: 0 | Status: DISCONTINUED | OUTPATIENT
Start: 2020-01-06 | End: 2020-01-24

## 2020-01-06 RX ORDER — ASPIRIN/CALCIUM CARB/MAGNESIUM 324 MG
81 TABLET ORAL DAILY
Refills: 0 | Status: DISCONTINUED | OUTPATIENT
Start: 2020-01-06 | End: 2020-01-24

## 2020-01-06 RX ORDER — DEXTROSE 50 % IN WATER 50 %
25 SYRINGE (ML) INTRAVENOUS ONCE
Refills: 0 | Status: DISCONTINUED | OUTPATIENT
Start: 2020-01-06 | End: 2020-01-09

## 2020-01-06 RX ORDER — DEXTROSE 50 % IN WATER 50 %
25 SYRINGE (ML) INTRAVENOUS ONCE
Refills: 0 | Status: DISCONTINUED | OUTPATIENT
Start: 2020-01-06 | End: 2020-01-24

## 2020-01-06 RX ORDER — HEPARIN SODIUM 5000 [USP'U]/ML
5000 INJECTION INTRAVENOUS; SUBCUTANEOUS EVERY 12 HOURS
Refills: 0 | Status: DISCONTINUED | OUTPATIENT
Start: 2020-01-06 | End: 2020-01-24

## 2020-01-06 RX ORDER — IOHEXOL 300 MG/ML
30 INJECTION, SOLUTION INTRAVENOUS ONCE
Refills: 0 | Status: COMPLETED | OUTPATIENT
Start: 2020-01-06 | End: 2020-01-06

## 2020-01-06 RX ORDER — DEXTROSE 50 % IN WATER 50 %
12.5 SYRINGE (ML) INTRAVENOUS ONCE
Refills: 0 | Status: DISCONTINUED | OUTPATIENT
Start: 2020-01-06 | End: 2020-01-24

## 2020-01-06 RX ORDER — DEXTROSE 50 % IN WATER 50 %
15 SYRINGE (ML) INTRAVENOUS ONCE
Refills: 0 | Status: DISCONTINUED | OUTPATIENT
Start: 2020-01-06 | End: 2020-01-24

## 2020-01-06 RX ORDER — ISOSORBIDE DINITRATE 5 MG/1
10 TABLET ORAL THREE TIMES A DAY
Refills: 0 | Status: DISCONTINUED | OUTPATIENT
Start: 2020-01-06 | End: 2020-01-07

## 2020-01-06 RX ORDER — VANCOMYCIN HCL 1 G
1000 VIAL (EA) INTRAVENOUS ONCE
Refills: 0 | Status: COMPLETED | OUTPATIENT
Start: 2020-01-06 | End: 2020-01-06

## 2020-01-06 RX ORDER — GLUCAGON INJECTION, SOLUTION 0.5 MG/.1ML
1 INJECTION, SOLUTION SUBCUTANEOUS ONCE
Refills: 0 | Status: DISCONTINUED | OUTPATIENT
Start: 2020-01-06 | End: 2020-01-24

## 2020-01-06 RX ORDER — SODIUM CHLORIDE 9 MG/ML
1000 INJECTION, SOLUTION INTRAVENOUS
Refills: 0 | Status: DISCONTINUED | OUTPATIENT
Start: 2020-01-06 | End: 2020-01-07

## 2020-01-06 RX ORDER — SODIUM CHLORIDE 9 MG/ML
1000 INJECTION, SOLUTION INTRAVENOUS
Refills: 0 | Status: DISCONTINUED | OUTPATIENT
Start: 2020-01-06 | End: 2020-01-06

## 2020-01-06 RX ADMIN — PIPERACILLIN AND TAZOBACTAM 200 GRAM(S): 4; .5 INJECTION, POWDER, LYOPHILIZED, FOR SOLUTION INTRAVENOUS at 14:45

## 2020-01-06 RX ADMIN — Medication 81 MILLIGRAM(S): at 18:34

## 2020-01-06 RX ADMIN — Medication 250 MILLIGRAM(S): at 15:17

## 2020-01-06 RX ADMIN — IOHEXOL 30 MILLILITER(S): 300 INJECTION, SOLUTION INTRAVENOUS at 14:08

## 2020-01-06 RX ADMIN — SODIUM CHLORIDE 125 MILLILITER(S): 9 INJECTION, SOLUTION INTRAVENOUS at 18:11

## 2020-01-06 NOTE — ED ADULT NURSE NOTE - NSIMPLEMENTINTERV_GEN_ALL_ED
Implemented All Universal Safety Interventions:  [place] to call system. Call bell, personal items and telephone within reach. Instruct patient to call for assistance. Room bathroom lighting operational. Non-slip footwear when patient is off stretcher. Physically safe environment: no spills, clutter or unnecessary equipment. Stretcher in lowest position, wheels locked, appropriate side rails in place.

## 2020-01-06 NOTE — ED PROVIDER NOTE - PHYSICAL EXAMINATION
Gen: Alert, Well appearing. NAD  , + weak  Head: NC, AT, PERRL, normal lids/conjunctiva   ENT: Bilateral TM WNL, patent oropharynx without erythema/exudate, uvula midline  Neck: supple, no tenderness/meningismus  Pulm: Bilateral clear BS, normal resp effort  CV: RRR, no M/R/G, +dist pulses   Abd: soft, + left stoma, medial to stoma 3 open wounds, most distal with yellow discharge ? fistula. +BS, no guarding/rebound tenderness  Mskel: no edema/erythema/cyanosis   Skin: no rash, no bruising  Neuro: AAOx3, no sensory/motor deficits, CN 2-12 intact Gen: Alert, Well appearing. NAD  , + weak  Head: NC, AT, PERRL, normal lids/conjunctiva   ENT: Bilateral TM WNL, patent oropharynx without erythema/exudate, uvula midline  Neck: supple, no tenderness/meningismus  Pulm: Bilateral clear BS, normal resp effort  CV: RRR, no M/R/G, +dist pulses   Abd: soft, + left stoma, medial to stoma 3 open wounds, most distal with yellow discharge/foul smelling ? fistula. +BS, no guarding/rebound tenderness  Mskel: no edema/erythema/cyanosis   Skin: no rash, no bruising  Neuro: AAOx3, no sensory/motor deficits, CN 2-12 intact

## 2020-01-06 NOTE — PATIENT PROFILE ADULT - BRADEN NUTRITION
[facility] [address] [place] [address]               Lili James   3/9/2017 10:17 PM   ED    Description:  Female : 1993   Department:  [facility]           Your Care was Coordinated By:     Provider Role From To    Jesus Valverde MD Attending Provider [phone] --    Loyda Childers PA-C Physician Assistant [phone] --      Reason for Visit     Rash           Diagnoses this Visit        Comments    Allergic reaction, initial encounter    -  Primary     Rash           ED Disposition     None           To Do List           Follow-up Information     Follow up with Bernice Barreto MD.    Specialty:  Internal Medicine    Why:  for re-evaluation in 2-3 days as needed     Contact information:     [facility] [address] [phone]          Follow up with [facility].    Specialty:  Emergency Medicine    Why:  As needed, If symptoms worsen    Contact information:    [address] [place] [id number] [phone]      [facility]     [facility] Nurse Care Line - 24/7 Assistance  Registered nurses in the [facility] provide clinical advisement, health education, appointment booking, and other advisory services.  Call for this free service at [phone].             Medications           Message regarding Medications     Verify the changes and/or additions to your medication regime listed below are the same as discussed with your clinician today.  If any of these changes or additions are incorrect, please notify your healthcare provider.        These medications were administered today        Dose Freq    dexamethasone injection 8 mg 8 mg ED 1 Time    Sig: Inject 2 mLs (8 mg total) into the muscle ED 1 Time.    Class: Normal    Route: Intramuscular      STOP taking these medications     famotidine (PEPCID) 20 MG tablet Take 1 tablet (20 mg total) by  "mouth 2 (two) times daily.    tramadol (ULTRAM) 50 mg tablet Take 50 mg by mouth every 6 (six) hours as needed for Pain.           Verify that the below list of medications is an accurate representation of the medications you are currently taking.  If none reported, the list may be blank. If incorrect, please contact your healthcare provider. Carry this list with you in case of emergency.           Current Medications     diphenhydrAMINE (SOMINEX) 25 mg tablet Take 25 mg by mouth nightly as needed for Allergies or Insomnia.    dexamethasone injection 8 mg Inject 2 mLs (8 mg total) into the muscle ED 1 Time.           Clinical Reference Information           Your Vitals Were     BP Pulse Temp Resp Height Weight    122/77 83 98 °F (36.7 °C) (Oral) 16 5' 5" (1.651 m) 86.2 kg (190 lb)    SpO2 BMI             100% 31.62 kg/m2         Allergies as of 3/9/2017        Reactions    Chocolate Flavor     Latex, Natural Rubber       Immunizations Administered on Date of Encounter - 3/9/2017     None      ED Micro, Lab, POCT     None      ED Imaging Orders     None      Discharge References/Attachments     ALLERGIC REACTION, OTHER (LOCAL) (ENGLISH)      [facility] Sign-Up     Activating your [facility] account is as easy as 1-2-3!     1) Visit [email or web address], select Sign Up Now, enter this activation code and your date of birth, then select Next.  [email or web address]  Expires: 4/23/2017 11:08 PM      2) Create a username and password to use when you visit [facility] in the future and select a security question in case you lose your password and select Next.    3) Enter your e-mail address and click Sign Up!    Additional Information  If you have questions, please e-mail [email or web address] or call [phone] to talk to our [facility] staff. Remember, [facility] is NOT to be used for urgent needs. For medical emergencies, dial 911.          [facility] complies with applicable Federal civil rights laws and does " not discriminate on the basis of race, color, national origin, age, disability, or sex.        Language Assistance Services     ATTENTION: Language assistance services are available, free of charge. Please call [phone].      ATENCIÓN: Si christopher low, tiene a sorto disposición servicios gratuitos de asistencia lingüística. Llame al [phone].     CHÚ Ý: N?u b?n nói Ti?ng Vi?t, có các d?ch v? h? tr? ngôn ng? mi?n phí dành cho b?n. G?i s? [phone].         (2) probably inadequate
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
## 2020-01-06 NOTE — H&P ADULT - PROBLEM SELECTOR PLAN 1
Admit  ID consult called for IV antibiotic recommendations; Dr. Waters  Monitor WBC  Local wound care  Antipyretics PRN  Pain management PRN  Discussed with Dr. Oliveira Admit  ID consult called for IV antibiotic recommendations; Dr. Waters  Monitor WBC  F/u wound culture  Local wound care  Antipyretics PRN  Pain management PRN  UA/urine cx/blood cx  Discussed with Dr. Oliveira

## 2020-01-06 NOTE — H&P ADULT - GASTROINTESTINAL COMMENTS
right colostomy functioning, formed stool in bag. midline incision with 3 skin openings, most inferiorly, opening with minimal slough, wound bed clean. Nonodorous. No gross drainage/blood appreciated.
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
## 2020-01-06 NOTE — H&P ADULT - HISTORY OF PRESENT ILLNESS
Patient is a 59 year old male with a PMH HTN, ESRD (M/W/F), HIV, hep C, s/p hartmanns procedure 2/2 perforated sigmoid diverticulitis 9/2019 with subsequent admission to [facility] for UGIB likely due to gastric ulcers found on EGD (healed), and admission to our facility 10/2019 with infected abdominal wound, currently presented with drainage of abdominal wound again. Patient states his home health aide was dressing his wound, but he noticed increased drainage over the last 2 days with associated abdominal pain.   Admits to normal bowel function. Tolerating regular diet.   Denies fever, chills, chest pain, sob, palpitations. Patient is a 59 year old male with a PMH HTN, ESRD (M/W/F), HIV, hep C, s/p hartmanns procedure 2/2 perforated sigmoid diverticulitis 9/2019 with subsequent admission to [facility] for UGIB likely due to gastric ulcers found on EGD (healed), and admission to our facility 10/2019 with infected abdominal wound, currently presented with drainage of abdominal wound again. Patient states his home health aide was dressing his wound, but he noticed increased drainage over the last 2 days with associated abdominal pain.   Admits to normal bowel function. Tolerating regular diet.   Denies fever, chills, chest pain, sob, palpitations, urinary symptoms.
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
## 2020-01-06 NOTE — ED ADULT TRIAGE NOTE - CHIEF COMPLAINT QUOTE
pt A&Ox3 c/o pain at colostomy site. pt due for dialysis today. L AV fistula. BP low in triage. on scene as per /92
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
## 2020-01-07 LAB
ALBUMIN SERPL ELPH-MCNC: 2.5 G/DL — LOW (ref 3.3–5)
ALP SERPL-CCNC: 98 U/L — SIGNIFICANT CHANGE UP (ref 40–120)
ALT FLD-CCNC: 9 U/L — LOW (ref 12–78)
ANION GAP SERPL CALC-SCNC: 16 MMOL/L — SIGNIFICANT CHANGE UP (ref 5–17)
AST SERPL-CCNC: 6 U/L — LOW (ref 15–37)
BASOPHILS # BLD AUTO: 0.03 K/UL — SIGNIFICANT CHANGE UP (ref 0–0.2)
BASOPHILS NFR BLD AUTO: 0.2 % — SIGNIFICANT CHANGE UP (ref 0–2)
BILIRUB SERPL-MCNC: 0.6 MG/DL — SIGNIFICANT CHANGE UP (ref 0.2–1.2)
BUN SERPL-MCNC: 63 MG/DL — HIGH (ref 7–23)
CALCIUM SERPL-MCNC: 8.7 MG/DL — SIGNIFICANT CHANGE UP (ref 8.5–10.1)
CHLORIDE SERPL-SCNC: 95 MMOL/L — LOW (ref 96–108)
CO2 SERPL-SCNC: 25 MMOL/L — SIGNIFICANT CHANGE UP (ref 22–31)
CREAT SERPL-MCNC: 13.3 MG/DL — HIGH (ref 0.5–1.3)
EOSINOPHIL # BLD AUTO: 0.06 K/UL — SIGNIFICANT CHANGE UP (ref 0–0.5)
EOSINOPHIL NFR BLD AUTO: 0.4 % — SIGNIFICANT CHANGE UP (ref 0–6)
GLUCOSE BLDC GLUCOMTR-MCNC: 76 MG/DL — SIGNIFICANT CHANGE UP (ref 70–99)
GLUCOSE BLDC GLUCOMTR-MCNC: 85 MG/DL — SIGNIFICANT CHANGE UP (ref 70–99)
GLUCOSE BLDC GLUCOMTR-MCNC: 85 MG/DL — SIGNIFICANT CHANGE UP (ref 70–99)
GLUCOSE SERPL-MCNC: 77 MG/DL — SIGNIFICANT CHANGE UP (ref 70–99)
HBA1C BLD-MCNC: 5 % — SIGNIFICANT CHANGE UP (ref 4–5.6)
HCT VFR BLD CALC: 36.5 % — LOW (ref 39–50)
HGB BLD-MCNC: 11 G/DL — LOW (ref 13–17)
IMM GRANULOCYTES NFR BLD AUTO: 0.7 % — SIGNIFICANT CHANGE UP (ref 0–1.5)
LACTATE SERPL-SCNC: 1.2 MMOL/L — SIGNIFICANT CHANGE UP (ref 0.7–2)
LYMPHOCYTES # BLD AUTO: 14.3 % — SIGNIFICANT CHANGE UP (ref 13–44)
LYMPHOCYTES # BLD AUTO: 2.07 K/UL — SIGNIFICANT CHANGE UP (ref 1–3.3)
MAGNESIUM SERPL-MCNC: 2.2 MG/DL — SIGNIFICANT CHANGE UP (ref 1.6–2.6)
MCHC RBC-ENTMCNC: 26.3 PG — LOW (ref 27–34)
MCHC RBC-ENTMCNC: 30.1 GM/DL — LOW (ref 32–36)
MCV RBC AUTO: 87.3 FL — SIGNIFICANT CHANGE UP (ref 80–100)
MONOCYTES # BLD AUTO: 1.23 K/UL — HIGH (ref 0–0.9)
MONOCYTES NFR BLD AUTO: 8.5 % — SIGNIFICANT CHANGE UP (ref 2–14)
NEUTROPHILS # BLD AUTO: 11 K/UL — HIGH (ref 1.8–7.4)
NEUTROPHILS NFR BLD AUTO: 75.9 % — SIGNIFICANT CHANGE UP (ref 43–77)
NRBC # BLD: 0 /100 WBCS — SIGNIFICANT CHANGE UP (ref 0–0)
PHOSPHATE SERPL-MCNC: 6.7 MG/DL — HIGH (ref 2.5–4.5)
PLATELET # BLD AUTO: 317 K/UL — SIGNIFICANT CHANGE UP (ref 150–400)
POTASSIUM SERPL-MCNC: 3.6 MMOL/L — SIGNIFICANT CHANGE UP (ref 3.5–5.3)
POTASSIUM SERPL-SCNC: 3.6 MMOL/L — SIGNIFICANT CHANGE UP (ref 3.5–5.3)
PROT SERPL-MCNC: 7.9 GM/DL — SIGNIFICANT CHANGE UP (ref 6–8.3)
RBC # BLD: 4.18 M/UL — LOW (ref 4.2–5.8)
RBC # FLD: 18 % — HIGH (ref 10.3–14.5)
SODIUM SERPL-SCNC: 136 MMOL/L — SIGNIFICANT CHANGE UP (ref 135–145)
TROPONIN I SERPL-MCNC: <.015 NG/ML — SIGNIFICANT CHANGE UP (ref 0.01–0.04)
TROPONIN I SERPL-MCNC: <.015 NG/ML — SIGNIFICANT CHANGE UP (ref 0.01–0.04)
WBC # BLD: 14.49 K/UL — HIGH (ref 3.8–10.5)
WBC # FLD AUTO: 14.49 K/UL — HIGH (ref 3.8–10.5)

## 2020-01-07 PROCEDURE — 99233 SBSQ HOSP IP/OBS HIGH 50: CPT

## 2020-01-07 PROCEDURE — 93010 ELECTROCARDIOGRAM REPORT: CPT

## 2020-01-07 PROCEDURE — 71045 X-RAY EXAM CHEST 1 VIEW: CPT | Mod: 26

## 2020-01-07 PROCEDURE — 99291 CRITICAL CARE FIRST HOUR: CPT

## 2020-01-07 RX ORDER — RALTEGRAVIR 400 MG/1
400 TABLET, FILM COATED ORAL
Refills: 0 | Status: DISCONTINUED | OUTPATIENT
Start: 2020-01-07 | End: 2020-01-24

## 2020-01-07 RX ORDER — CHLORHEXIDINE GLUCONATE 213 G/1000ML
1 SOLUTION TOPICAL
Refills: 0 | Status: DISCONTINUED | OUTPATIENT
Start: 2020-01-07 | End: 2020-01-24

## 2020-01-07 RX ORDER — VANCOMYCIN HCL 1 G
1000 VIAL (EA) INTRAVENOUS ONCE
Refills: 0 | Status: COMPLETED | OUTPATIENT
Start: 2020-01-07 | End: 2020-01-07

## 2020-01-07 RX ORDER — SODIUM CHLORIDE 9 MG/ML
1000 INJECTION, SOLUTION INTRAVENOUS ONCE
Refills: 0 | Status: COMPLETED | OUTPATIENT
Start: 2020-01-07 | End: 2020-01-07

## 2020-01-07 RX ORDER — MIDODRINE HYDROCHLORIDE 2.5 MG/1
10 TABLET ORAL EVERY 8 HOURS
Refills: 0 | Status: DISCONTINUED | OUTPATIENT
Start: 2020-01-07 | End: 2020-01-07

## 2020-01-07 RX ORDER — RITONAVIR 100 MG/1
100 TABLET, FILM COATED ORAL EVERY 24 HOURS
Refills: 0 | Status: DISCONTINUED | OUTPATIENT
Start: 2020-01-07 | End: 2020-01-24

## 2020-01-07 RX ORDER — SODIUM CHLORIDE 9 MG/ML
500 INJECTION INTRAMUSCULAR; INTRAVENOUS; SUBCUTANEOUS ONCE
Refills: 0 | Status: COMPLETED | OUTPATIENT
Start: 2020-01-07 | End: 2020-01-07

## 2020-01-07 RX ORDER — SODIUM CHLORIDE 9 MG/ML
1000 INJECTION, SOLUTION INTRAVENOUS
Refills: 0 | Status: DISCONTINUED | OUTPATIENT
Start: 2020-01-07 | End: 2020-01-07

## 2020-01-07 RX ORDER — MIDODRINE HYDROCHLORIDE 2.5 MG/1
5 TABLET ORAL THREE TIMES A DAY
Refills: 0 | Status: DISCONTINUED | OUTPATIENT
Start: 2020-01-07 | End: 2020-01-07

## 2020-01-07 RX ORDER — ACETAMINOPHEN 500 MG
1000 TABLET ORAL ONCE
Refills: 0 | Status: COMPLETED | OUTPATIENT
Start: 2020-01-07 | End: 2020-01-07

## 2020-01-07 RX ORDER — MIDODRINE HYDROCHLORIDE 2.5 MG/1
10 TABLET ORAL EVERY 8 HOURS
Refills: 0 | Status: DISCONTINUED | OUTPATIENT
Start: 2020-01-07 | End: 2020-01-22

## 2020-01-07 RX ADMIN — Medication 650 MILLIGRAM(S): at 00:30

## 2020-01-07 RX ADMIN — Medication 81 MILLIGRAM(S): at 12:41

## 2020-01-07 RX ADMIN — HEPARIN SODIUM 5000 UNIT(S): 5000 INJECTION INTRAVENOUS; SUBCUTANEOUS at 18:42

## 2020-01-07 RX ADMIN — SODIUM CHLORIDE 500 MILLILITER(S): 9 INJECTION, SOLUTION INTRAVENOUS at 12:41

## 2020-01-07 RX ADMIN — PANTOPRAZOLE SODIUM 40 MILLIGRAM(S): 20 TABLET, DELAYED RELEASE ORAL at 12:41

## 2020-01-07 RX ADMIN — SODIUM CHLORIDE 125 MILLILITER(S): 9 INJECTION, SOLUTION INTRAVENOUS at 00:27

## 2020-01-07 RX ADMIN — MIDODRINE HYDROCHLORIDE 5 MILLIGRAM(S): 2.5 TABLET ORAL at 08:20

## 2020-01-07 RX ADMIN — MIDODRINE HYDROCHLORIDE 10 MILLIGRAM(S): 2.5 TABLET ORAL at 15:05

## 2020-01-07 RX ADMIN — PIPERACILLIN AND TAZOBACTAM 25 GRAM(S): 4; .5 INJECTION, POWDER, LYOPHILIZED, FOR SOLUTION INTRAVENOUS at 00:28

## 2020-01-07 RX ADMIN — SODIUM CHLORIDE 1000 MILLILITER(S): 9 INJECTION INTRAMUSCULAR; INTRAVENOUS; SUBCUTANEOUS at 06:33

## 2020-01-07 RX ADMIN — Medication 400 MILLIGRAM(S): at 14:15

## 2020-01-07 RX ADMIN — Medication 250 MILLIGRAM(S): at 19:15

## 2020-01-07 RX ADMIN — RITONAVIR 100 MILLIGRAM(S): 100 TABLET, FILM COATED ORAL at 18:42

## 2020-01-07 RX ADMIN — PIPERACILLIN AND TAZOBACTAM 25 GRAM(S): 4; .5 INJECTION, POWDER, LYOPHILIZED, FOR SOLUTION INTRAVENOUS at 16:35

## 2020-01-07 RX ADMIN — HEPARIN SODIUM 5000 UNIT(S): 5000 INJECTION INTRAVENOUS; SUBCUTANEOUS at 06:04

## 2020-01-07 RX ADMIN — ISOSORBIDE DINITRATE 10 MILLIGRAM(S): 5 TABLET ORAL at 00:28

## 2020-01-07 RX ADMIN — SODIUM CHLORIDE 1000 MILLILITER(S): 9 INJECTION INTRAMUSCULAR; INTRAVENOUS; SUBCUTANEOUS at 08:18

## 2020-01-07 RX ADMIN — MIDODRINE HYDROCHLORIDE 10 MILLIGRAM(S): 2.5 TABLET ORAL at 22:29

## 2020-01-07 RX ADMIN — RALTEGRAVIR 400 MILLIGRAM(S): 400 TABLET, FILM COATED ORAL at 18:42

## 2020-01-07 NOTE — DIETITIAN INITIAL EVALUATION ADULT. - DIET TYPE
renal replacement pts:no protein restr,no conc K & phos, low sodium/Nepro x 2/day (provides 850 kcal, 38 g protein)/supplement (specify)
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
## 2020-01-07 NOTE — CONSULT NOTE ADULT - ASSESSMENT
Patient is a 59 year old male with a PMH HTN, ESRD (M/W/F), HIV, hep C, s/p hartmanns procedure 2/2 perforated sigmoid diverticulitis 9/2019 with subsequent admission to [facility] for UGIB likely due to gastric ulcers found on EGD, admitted to floor for wound infection, now in hypotension, ?sepsis vs dehydration.   As per patient had not been eating and drinking for the past 4 days, last HD was last Friday.  -transfer to critical care  -f/u lactate, cultures  -continue zosyn and add vanco by level  -midodrine 10mg Q8H, if MAP <65, start levophed drip  -renal diet.  -dvt prophylaxis  -GI prophylaxis  -serial cardiac enzymes, ekg,

## 2020-01-07 NOTE — DIETITIAN INITIAL EVALUATION ADULT. - OTHER INFO
Pt has HHA assistance at home. Denies any N/V at this time but present PTA; no chew/swallowing difficulty. Wt loss as noted based on previous nutrition assessment. Pt denies any PMHx of diabetes; no DM meds taken at home; A1c WDL. Pt reports months of lack of appetite; multiple abdominal surgeries & now infection has left him not hungry. Pt agreeable to supplement until appetite improves.
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
## 2020-01-07 NOTE — CONSULT NOTE ADULT - ATTENDING COMMENTS
Marvin: I have seen and examined the patient face to face, have reviewed and addended the HPI, PE and a/p as necessary.    58 yo M HTN ESRD (MWF) HIV, Hep C s/p hartmanns procedure 2/2 perforated sigmoid diverticulitis 9/2019 with subsequent admission to [facility] for UGIB likely due to gastric ulcers found on EGD (healed), and admission to our facility 10/2019 with infected abdominal wound, currently presented with drainage of abdominal wound again.  Pt noted to have nausea and vomiting and decreased PO intake x 3-4 days with diarrhea.  On 1/7/2020; RRT x2 for hypotension BP 73/44 given 500ml of saline, then again dropped again given another 500ml saline bolus. ICU consulted for low BP.  Patient noted to receive 1L LR, with minimal improvement in BP.  Pt given midodrine 5mg with no improvement.  Pt was transferred to ICU for further management.      GEN - NAD; well appearing; A+O x3; non-toxic appearing  CARD -s1s2, RRR, no M,G,R;   PULM - CTA b/l, symmetric breath sounds;   ABD -  +BS, ND, NT, soft, no guarding, no rebound, no masses;   BACK - no CVA tenderness, Normal  spine;   EXT - symmetric pulses, 2+ dp, capillary refill < 2 seconds, no clubbing, no cyanosis, no edema; L AVF with + thrill  NEURO - no focal neuro deficits, no slurred speech    58 yo M HTN ESRD (MWF) HIV, Hep C s/p hartmanns procedure 2/2 perforated sigmoid diverticulitis 9/2019 with persistent drainage now arrives with increasing nausea and vomiting likely volume depletion as cause for hypotension.  S/p 2L and midodrine 5mg and 10mg for hypotension with minimal improvement.  Will admit to ICU.    - Add vancomycin x 1   - discussed with nephrology, HD now on hold, no emergent need to dialyze today   - continue with zosyn  - Start levophed drip  - Reassess.

## 2020-01-07 NOTE — PROGRESS NOTE ADULT - SUBJECTIVE AND OBJECTIVE BOX
Patient is a 59y old  Male who presents with a chief complaint of Wound Infection (07 Jan 2020 08:22)      INTERVAL HPI/OVERNIGHT EVENTS: no events     MEDICATIONS  (STANDING):  aspirin enteric coated 81 milliGRAM(s) Oral daily  dextrose 5%. 1000 milliLiter(s) (50 mL/Hr) IV Continuous <Continuous>  dextrose 5%. 1000 milliLiter(s) (75 mL/Hr) IV Continuous <Continuous>  dextrose 50% Injectable 12.5 Gram(s) IV Push once  dextrose 50% Injectable 25 Gram(s) IV Push once  dextrose 50% Injectable 25 Gram(s) IV Push once  heparin  Injectable 5000 Unit(s) SubCutaneous every 12 hours  lactated ringers Bolus 1000 milliLiter(s) IV Bolus once  methimazole 10 milliGRAM(s) Oral daily  midodrine. 5 milliGRAM(s) Oral three times a day  pantoprazole  Injectable 40 milliGRAM(s) IV Push daily  piperacillin/tazobactam IVPB.. 3.375 Gram(s) IV Intermittent every 12 hours  raltegravir Tablet 400 milliGRAM(s) Oral two times a day  ritonavir Tablet 100 milliGRAM(s) Oral every 24 hours    MEDICATIONS  (PRN):  acetaminophen   Tablet .. 650 milliGRAM(s) Oral every 6 hours PRN Temp greater or equal to 38C (100.4F), Mild Pain (1 - 3)  dextrose 40% Gel 15 Gram(s) Oral once PRN Blood Glucose LESS THAN 70 milliGRAM(s)/deciliter  glucagon  Injectable 1 milliGRAM(s) IntraMuscular once PRN Glucose LESS THAN 70 milligrams/deciliter      Allergies    No Known Allergies    Intolerances         Vital Signs Last 24 Hrs  T(C): 36.6 (07 Jan 2020 08:28), Max: 36.7 (06 Jan 2020 18:26)  T(F): 97.9 (07 Jan 2020 08:28), Max: 98 (06 Jan 2020 18:26)  HR: 93 (07 Jan 2020 10:30) (86 - 106)  BP: 91/54 (07 Jan 2020 10:30) (1/- - 105/70)  BP(mean): --  RR: 18 (07 Jan 2020 10:30) (17 - 19)  SpO2: 100% (07 Jan 2020 10:30) (95% - 100%)    PHYSICAL EXAM:  GENERAL: NAD, well-developed  HEAD:  Atraumatic, Normocephalic  EYES: EOMI, PERRLA, conjunctiva and sclera clear  ENMT: No tonsillar erythema, exudates, or enlargement; Moist mucous membranes   NECK: Supple, No JVD   NERVOUS SYSTEM:  Alert & Oriented X 3, Motor Strength 5/5 B/L upper and lower extremities;    CHEST/LUNG: Good air entry bilaterally; No rales,    HEART: Regular rate and rhythm; No murmurs, rubs, or gallops  ABDOMEN: Soft, Nontender, Nondistended; Bowel sounds present, abdominal wounds with Purulent drainage   colostomy functioning, formed stool in bag.   EXTREMITIES:  2+ Peripheral Pulses, No clubbing, cyanosis, or edema  LYMPH: No lymphadenopathy noted  SKIN: No rashes or lesions    LABS:                        11.0   14.49 )-----------( 317      ( 07 Jan 2020 06:24 )             36.5     01-07    136  |  95<L>  |  63<H>  ----------------------------<  77  3.6   |  25  |  13.30<H>    Ca    8.7      07 Jan 2020 06:25  Phos  6.7     01-07  Mg     2.2     01-07    TPro  7.9  /  Alb  2.5<L>  /  TBili  0.6  /  DBili  x   /  AST  6<L>  /  ALT  9<L>  /  AlkPhos  98  01-07    PT/INR - ( 06 Jan 2020 13:47 )   PT: 14.9 sec;   INR: 1.32 ratio         PTT - ( 06 Jan 2020 13:47 )  PTT:28.5 sec    CAPILLARY BLOOD GLUCOSE      POCT Blood Glucose.: 85 mg/dL (07 Jan 2020 10:57)  POCT Blood Glucose.: 76 mg/dL (07 Jan 2020 07:48)  POCT Blood Glucose.: 85 mg/dL (07 Jan 2020 06:02)      RADIOLOGY & ADDITIONAL TESTS:    Imaging Personally Reviewed:  [ X] YES  [ ] NO    Consultant(s) Notes Reviewed:  [ X] YES  [ ] NO    Care Discussed with Consultants/Other Providers [X ] YES  [ ] NO

## 2020-01-07 NOTE — CHART NOTE - NSCHARTNOTEFT_GEN_A_CORE
Upon Nutritional Assessment by the Registered Dietitian your patient was determined to meet criteria / has evidence of the following diagnosis/diagnoses:          [ ]  Mild Protein Calorie Malnutrition        [ ]  Moderate Protein Calorie Malnutrition        [X ] Severe Protein Calorie Malnutrition        [ ] Unspecified Protein Calorie Malnutrition        [ ] Underweight / BMI <19        [ ] Morbid Obesity / BMI > 40      Findings as based on:  •  Comprehensive nutrition assessment and consultation  •  Calorie counts (nutrient intake analysis)  •  Food acceptance and intake status from observations by staff  •  Follow up  •  Patient education  •  Intervention secondary to interdisciplinary rounds  •   concerns      Treatment:    The following diet has been recommended:  Renal diet + Nepro x 2/day (provides 850 kcal, 38 g protein) for additional nutrition support      PROVIDER Section:     By signing this assessment you are acknowledging and agree with the diagnosis/diagnoses assigned by the Registered Dietitian    Comments:
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
## 2020-01-07 NOTE — DIETITIAN INITIAL EVALUATION ADULT. - PERTINENT LABORATORY DATA
01-07 Na136 mmol/L Glu 77 mg/dL K+ 3.6 mmol/L Cr  13.30 mg/dL<H> BUN 63 mg/dL<H> 01-07 Phos 6.7 mg/dL<H> 01-07 Alb 2.5 g/dL<L> 01-07 [id number] 5.0 %

## 2020-01-07 NOTE — DIETITIAN INITIAL EVALUATION ADULT. - PHYSICAL APPEARANCE
BMI = 34.5; 1+ edema noted b/l arms & legs/obese/other (specify) Nutrition focused physical exam conducted:  Subcutaneous fat loss: [mild ] Orbital fat pads region, [WDL ]Buccal fat region, [moderate ]Triceps region,  [WDL ]Ribs region.    Muscle wasting: [ mild]Temples region, [mild ]Clavicle region, [WDL ]Shoulder region, [unable ]Scapula region, [moderate ]Interosseous region,  [WDL ]thigh region, [WDL ]Calf region

## 2020-01-07 NOTE — DIETITIAN INITIAL EVALUATION ADULT. - ENERGY NEEDS
Height (cm): 167.6 (01-06)  Weight (kg): 97 (01-06)  BMI (kg/m2): 34.5 (01-06)  IBW:  64.5 kg +/- 10%   % IBW:   150%    UBW:  107.8 kg     %UBW: 90%

## 2020-01-07 NOTE — PROGRESS NOTE ADULT - SUBJECTIVE AND OBJECTIVE BOX
[facility] NEPHROLOGY SERVICES, [facility]  NEPHROLOGY AND HYPERTENSION  [address]  [address]  [place] [address]  [phone]    MD PARVEEN NOLAND MD ANDREY GONCHARUK, MD MADHU KORRAPATI, MD YELENA ROSENBERG, MD BINNY KOSHY, MD CHRISTOPHER CAPUTO, MD DONAVAN CORTES MD          Patient events noted;   Chest pains; evaluated by CCU  Hypotension  No SOB      MEDICATIONS  (STANDING):  aspirin enteric coated 81 milliGRAM(s) Oral daily  chlorhexidine 4% Liquid 1 Application(s) Topical <User Schedule>  dextrose 5%. 1000 milliLiter(s) (50 mL/Hr) IV Continuous <Continuous>  dextrose 5%. 1000 milliLiter(s) (75 mL/Hr) IV Continuous <Continuous>  dextrose 50% Injectable 12.5 Gram(s) IV Push once  dextrose 50% Injectable 25 Gram(s) IV Push once  dextrose 50% Injectable 25 Gram(s) IV Push once  heparin  Injectable 5000 Unit(s) SubCutaneous every 12 hours  methimazole 10 milliGRAM(s) Oral daily  midodrine. 10 milliGRAM(s) Oral every 8 hours  pantoprazole  Injectable 40 milliGRAM(s) IV Push daily  piperacillin/tazobactam IVPB.. 3.375 Gram(s) IV Intermittent every 12 hours  raltegravir Tablet 400 milliGRAM(s) Oral two times a day  ritonavir Tablet 100 milliGRAM(s) Oral every 24 hours    MEDICATIONS  (PRN):  acetaminophen   Tablet .. 650 milliGRAM(s) Oral every 6 hours PRN Temp greater or equal to 38C (100.4F), Mild Pain (1 - 3)  dextrose 40% Gel 15 Gram(s) Oral once PRN Blood Glucose LESS THAN 70 milliGRAM(s)/deciliter  glucagon  Injectable 1 milliGRAM(s) IntraMuscular once PRN Glucose LESS THAN 70 milligrams/deciliter      PHYSICAL EXAM:      T(C): 36.1 (01-07-20 @ 12:45), Max: 36.7 (01-06-20 @ 18:26)  HR: 88 (01-07-20 @ 12:45) (86 - 106)  BP: 85/55 (01-07-20 @ 14:23) (73/44 - 98/59)  RR: 18 (01-07-20 @ 14:23) (17 - 19)  SpO2: 100% (01-07-20 @ 14:23) (95% - 100%)  Wt(kg): --  Respiratory: clear anteriorly, decreased BS at bases  Cardiovascular: S1 S2  Gastrointestinal: soft NT ND +BS  Extremities:   tr edema    LUE access + bruit and thrill                                  11.0   14.49 )-----------( 317      ( 07 Jan 2020 06:24 )             36.5     01-07    136  |  95<L>  |  63<H>  ----------------------------<  77  3.6   |  25  |  13.30<H>    Ca    8.7      07 Jan 2020 06:25  Phos  6.7     01-07  Mg     2.2     01-07    TPro  7.9  /  Alb  2.5<L>  /  TBili  0.6  /  DBili  x   /  AST  6<L>  /  ALT  9<L>  /  AlkPhos  98  01-07      LIVER FUNCTIONS - ( 07 Jan 2020 06:25 )  Alb: 2.5 g/dL / Pro: 7.9 gm/dL / ALK PHOS: 98 U/L / ALT: 9 U/L / AST: 6 U/L / GGT: x           Creatinine Trend: 13.30<--, 12.90<--      Assessment   ESRD; severe sepsis; chest pain, high risk for ACS    Plan:  CCU evaluation and transfer    Will hold HD today, patient hemodynamically unstable,  no immediate indication;   Discussed with CCU team    Jacob Hobson MD

## 2020-01-07 NOTE — DIETITIAN INITIAL EVALUATION ADULT. - PROBLEM SELECTOR PLAN 1
Admit  ID consult called for IV antibiotic recommendations; Dr. Waters  Monitor WBC  F/u wound culture  Local wound care  Antipyretics PRN  Pain management PRN  UA/urine cx/blood cx  Discussed with Dr. Oliveira
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
## 2020-01-07 NOTE — CHART NOTE - NSCHARTNOTEFT_GEN_A_CORE
RRT called on the patient for hypotension . HPI:  Patient is a 59 year old male with a PMH HTN, ESRD (M/W/F), HIV, hep C, s/p hartmanns procedure 2/2 perforated sigmoid diverticulitis 9/2019 with subsequent admission to [facility] for UGIB likely due to gastric ulcers found on EGD (healed), and admission to our facility 10/2019 with infected abdominal wound, currently presented with drainage of abdominal wound again. Patient states his home health aide was dressing his wound, but he noticed increased drainage over the last 2 days with associated abdominal pain. Admits to normal bowel function. Pt reports dizziness .Tolerating regular diet. Denies fever, chills, chest pain, sob, palpitations, urinary symptoms.   Upon arrival pt was hypotensive to 78/42 . Last HD on Friday . RN had given the patient antihypertensives a few hours before .     T(F): 97   HR: 93   BP: 83/49 , 78/42.   RR: 18  SpO2: 95% (01-07-20 @ 05:59) (95% - 100%)    PHYSICAL EXAM:  GENERAL: NAD, lying in bed on Trendelenburg   HEAD:  Atraumatic, Normocephalic  EYES: EOMI, PERRLA, conjunctiva and sclera clear  ENT: Moist mucous membranes  NECK: Supple, No JVD  CHEST/LUNG: Clear to auscultation bilaterally;   HEART: Regular rate and rhythm;   ABDOMEN: Bowel sounds present; Soft, + tender,  ostomy draining dsg C/D/I   EXTREMITIES:  2+ Peripheral Pulses, brisk capillary refill. No clubbing, cyanosis, or edema  NERVOUS SYSTEM:  Alert & Oriented X3, speech clear. No deficits   MSK: FROM all 4 extremities, full and equal strength    Assessment &  Plan    symptomatic Hypotension , Dizzyness     Bolus 500 cc NS  Lactic acid   DC isordil for now   Pt already on abx for possible sepsis  If BP doesn't improve consider putting pt on midodrine.   Dr Sarmiento present during the RRT . Staff to notify MD/PA for any deterioration .
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
## 2020-01-07 NOTE — DIETITIAN INITIAL EVALUATION ADULT. - ETIOLOGY
Inadequate energy/protein intake + increased energy/protein needs related to multiple abdominal issues c infections & ESRD on HD tx

## 2020-01-08 LAB
-  AMIKACIN: SIGNIFICANT CHANGE UP
-  AMOXICILLIN/CLAVULANIC ACID: SIGNIFICANT CHANGE UP
-  AMPICILLIN/SULBACTAM: SIGNIFICANT CHANGE UP
-  AMPICILLIN: SIGNIFICANT CHANGE UP
-  AZTREONAM: SIGNIFICANT CHANGE UP
-  CEFAZOLIN: SIGNIFICANT CHANGE UP
-  CEFEPIME: SIGNIFICANT CHANGE UP
-  CEFOXITIN: SIGNIFICANT CHANGE UP
-  CEFTRIAXONE: SIGNIFICANT CHANGE UP
-  CIPROFLOXACIN: SIGNIFICANT CHANGE UP
-  ERTAPENEM: SIGNIFICANT CHANGE UP
-  GENTAMICIN: SIGNIFICANT CHANGE UP
-  IMIPENEM: SIGNIFICANT CHANGE UP
-  LEVOFLOXACIN: SIGNIFICANT CHANGE UP
-  MEROPENEM: SIGNIFICANT CHANGE UP
-  PIPERACILLIN/TAZOBACTAM: SIGNIFICANT CHANGE UP
-  TOBRAMYCIN: SIGNIFICANT CHANGE UP
-  TRIMETHOPRIM/SULFAMETHOXAZOLE: SIGNIFICANT CHANGE UP
4/8 RATIO: 0.51 RATIO — LOW (ref 0.9–3.6)
ABS CD8: 870 /UL — HIGH (ref 142–740)
ALBUMIN SERPL ELPH-MCNC: 2.1 G/DL — LOW (ref 3.3–5)
ALP SERPL-CCNC: 80 U/L — SIGNIFICANT CHANGE UP (ref 40–120)
ALT FLD-CCNC: 8 U/L — LOW (ref 12–78)
ANION GAP SERPL CALC-SCNC: 17 MMOL/L — SIGNIFICANT CHANGE UP (ref 5–17)
AST SERPL-CCNC: 6 U/L — LOW (ref 15–37)
BASOPHILS # BLD AUTO: 0.05 K/UL — SIGNIFICANT CHANGE UP (ref 0–0.2)
BASOPHILS NFR BLD AUTO: 0.5 % — SIGNIFICANT CHANGE UP (ref 0–2)
BILIRUB SERPL-MCNC: 0.4 MG/DL — SIGNIFICANT CHANGE UP (ref 0.2–1.2)
BUN SERPL-MCNC: 64 MG/DL — HIGH (ref 7–23)
CALCIUM SERPL-MCNC: 7.4 MG/DL — LOW (ref 8.5–10.1)
CD16+CD56+ CELLS NFR BLD: 16 % — SIGNIFICANT CHANGE UP (ref 5–23)
CD16+CD56+ CELLS NFR SPEC: 298 /UL — SIGNIFICANT CHANGE UP (ref 71–410)
CD19 BLASTS SPEC-ACNC: 139 /UL — SIGNIFICANT CHANGE UP (ref 84–469)
CD19 BLASTS SPEC-ACNC: 7 % — SIGNIFICANT CHANGE UP (ref 6–24)
CD3 BLASTS SPEC-ACNC: 1385 /UL — SIGNIFICANT CHANGE UP (ref 672–1870)
CD3 BLASTS SPEC-ACNC: 75 % — SIGNIFICANT CHANGE UP (ref 59–83)
CD4 %: 25 % — LOW (ref 30–62)
CD8 %: 48 % — HIGH (ref 12–36)
CHLORIDE SERPL-SCNC: 98 MMOL/L — SIGNIFICANT CHANGE UP (ref 96–108)
CO2 SERPL-SCNC: 23 MMOL/L — SIGNIFICANT CHANGE UP (ref 22–31)
CREAT SERPL-MCNC: 14.1 MG/DL — HIGH (ref 0.5–1.3)
EOSINOPHIL # BLD AUTO: 0.15 K/UL — SIGNIFICANT CHANGE UP (ref 0–0.5)
EOSINOPHIL NFR BLD AUTO: 1.4 % — SIGNIFICANT CHANGE UP (ref 0–6)
GLUCOSE SERPL-MCNC: 92 MG/DL — SIGNIFICANT CHANGE UP (ref 70–99)
HCT VFR BLD CALC: 32.5 % — LOW (ref 39–50)
HGB BLD-MCNC: 9.9 G/DL — LOW (ref 13–17)
IMM GRANULOCYTES NFR BLD AUTO: 1.2 % — SIGNIFICANT CHANGE UP (ref 0–1.5)
LYMPHOCYTES # BLD AUTO: 1.92 K/UL — SIGNIFICANT CHANGE UP (ref 1–3.3)
LYMPHOCYTES # BLD AUTO: 18.2 % — SIGNIFICANT CHANGE UP (ref 13–44)
MAGNESIUM SERPL-MCNC: 2.1 MG/DL — SIGNIFICANT CHANGE UP (ref 1.6–2.6)
MCHC RBC-ENTMCNC: 26.5 PG — LOW (ref 27–34)
MCHC RBC-ENTMCNC: 30.5 GM/DL — LOW (ref 32–36)
MCV RBC AUTO: 87.1 FL — SIGNIFICANT CHANGE UP (ref 80–100)
METHOD TYPE: SIGNIFICANT CHANGE UP
MONOCYTES # BLD AUTO: 0.98 K/UL — HIGH (ref 0–0.9)
MONOCYTES NFR BLD AUTO: 9.3 % — SIGNIFICANT CHANGE UP (ref 2–14)
NEUTROPHILS # BLD AUTO: 7.31 K/UL — SIGNIFICANT CHANGE UP (ref 1.8–7.4)
NEUTROPHILS NFR BLD AUTO: 69.4 % — SIGNIFICANT CHANGE UP (ref 43–77)
NRBC # BLD: 0 /100 WBCS — SIGNIFICANT CHANGE UP (ref 0–0)
PHOSPHATE SERPL-MCNC: 6.7 MG/DL — HIGH (ref 2.5–4.5)
PLATELET # BLD AUTO: 314 K/UL — SIGNIFICANT CHANGE UP (ref 150–400)
POTASSIUM SERPL-MCNC: 3.7 MMOL/L — SIGNIFICANT CHANGE UP (ref 3.5–5.3)
POTASSIUM SERPL-SCNC: 3.7 MMOL/L — SIGNIFICANT CHANGE UP (ref 3.5–5.3)
PROT SERPL-MCNC: 7.2 GM/DL — SIGNIFICANT CHANGE UP (ref 6–8.3)
RBC # BLD: 3.73 M/UL — LOW (ref 4.2–5.8)
RBC # FLD: 17.6 % — HIGH (ref 10.3–14.5)
SODIUM SERPL-SCNC: 138 MMOL/L — SIGNIFICANT CHANGE UP (ref 135–145)
T-CELL CD4 SUBSET PNL BLD: 446 /UL — LOW (ref 489–1457)
VANCOMYCIN FLD-MCNC: 20.5 UG/ML — SIGNIFICANT CHANGE UP
WBC # BLD: 10.54 K/UL — HIGH (ref 3.8–10.5)
WBC # FLD AUTO: 10.54 K/UL — HIGH (ref 3.8–10.5)

## 2020-01-08 PROCEDURE — 99291 CRITICAL CARE FIRST HOUR: CPT

## 2020-01-08 RX ORDER — FENTANYL CITRATE 50 UG/ML
25 INJECTION INTRAVENOUS EVERY 6 HOURS
Refills: 0 | Status: DISCONTINUED | OUTPATIENT
Start: 2020-01-08 | End: 2020-01-09

## 2020-01-08 RX ORDER — PANTOPRAZOLE SODIUM 20 MG/1
40 TABLET, DELAYED RELEASE ORAL
Refills: 0 | Status: DISCONTINUED | OUTPATIENT
Start: 2020-01-08 | End: 2020-01-24

## 2020-01-08 RX ORDER — FENTANYL CITRATE 50 UG/ML
25 INJECTION INTRAVENOUS ONCE
Refills: 0 | Status: DISCONTINUED | OUTPATIENT
Start: 2020-01-08 | End: 2020-01-08

## 2020-01-08 RX ORDER — CALCIUM ACETATE 667 MG
1334 TABLET ORAL
Refills: 0 | Status: DISCONTINUED | OUTPATIENT
Start: 2020-01-08 | End: 2020-01-14

## 2020-01-08 RX ORDER — ACETAMINOPHEN 500 MG
1000 TABLET ORAL ONCE
Refills: 0 | Status: COMPLETED | OUTPATIENT
Start: 2020-01-08 | End: 2020-01-08

## 2020-01-08 RX ADMIN — RITONAVIR 100 MILLIGRAM(S): 100 TABLET, FILM COATED ORAL at 17:47

## 2020-01-08 RX ADMIN — MIDODRINE HYDROCHLORIDE 10 MILLIGRAM(S): 2.5 TABLET ORAL at 14:51

## 2020-01-08 RX ADMIN — RALTEGRAVIR 400 MILLIGRAM(S): 400 TABLET, FILM COATED ORAL at 05:20

## 2020-01-08 RX ADMIN — FENTANYL CITRATE 25 MICROGRAM(S): 50 INJECTION INTRAVENOUS at 10:33

## 2020-01-08 RX ADMIN — MIDODRINE HYDROCHLORIDE 10 MILLIGRAM(S): 2.5 TABLET ORAL at 05:19

## 2020-01-08 RX ADMIN — FENTANYL CITRATE 25 MICROGRAM(S): 50 INJECTION INTRAVENOUS at 10:21

## 2020-01-08 RX ADMIN — CHLORHEXIDINE GLUCONATE 1 APPLICATION(S): 213 SOLUTION TOPICAL at 07:01

## 2020-01-08 RX ADMIN — Medication 400 MILLIGRAM(S): at 08:34

## 2020-01-08 RX ADMIN — FENTANYL CITRATE 25 MICROGRAM(S): 50 INJECTION INTRAVENOUS at 21:17

## 2020-01-08 RX ADMIN — PIPERACILLIN AND TAZOBACTAM 25 GRAM(S): 4; .5 INJECTION, POWDER, LYOPHILIZED, FOR SOLUTION INTRAVENOUS at 05:19

## 2020-01-08 RX ADMIN — HEPARIN SODIUM 5000 UNIT(S): 5000 INJECTION INTRAVENOUS; SUBCUTANEOUS at 17:00

## 2020-01-08 RX ADMIN — Medication 1000 MILLIGRAM(S): at 08:45

## 2020-01-08 RX ADMIN — PIPERACILLIN AND TAZOBACTAM 25 GRAM(S): 4; .5 INJECTION, POWDER, LYOPHILIZED, FOR SOLUTION INTRAVENOUS at 17:00

## 2020-01-08 RX ADMIN — Medication 81 MILLIGRAM(S): at 12:08

## 2020-01-08 RX ADMIN — HEPARIN SODIUM 5000 UNIT(S): 5000 INJECTION INTRAVENOUS; SUBCUTANEOUS at 05:19

## 2020-01-08 RX ADMIN — FENTANYL CITRATE 25 MICROGRAM(S): 50 INJECTION INTRAVENOUS at 20:15

## 2020-01-08 RX ADMIN — RALTEGRAVIR 400 MILLIGRAM(S): 400 TABLET, FILM COATED ORAL at 17:01
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
## 2020-01-08 NOTE — PROGRESS NOTE ADULT - ASSESSMENT
59M PMH HTN, ESRD (M/W/F), HIV, hep C, s/p hartmanns procedure 2/2 perforated sigmoid diverticulitis 9/2019, UGIB likely due to gastric ulcers found on EGD, infected abdominal wound (cx with e-coli, klebsiella, staph) presents with increased drainage from abdominal wound with abdominal pain. Admitted with sepsis, abdominal wound infection. Course complicated by hypotension. Transferred to ICU 1/7 for hemodynamic monitoring    1. NEURO  - awake, alert, responsive    2. CV  - hypotension resolved with midodrine and s/p 2L IVF yesterday  - cont with midodrine 10mg q8 hours    3. RENAL  - HD tonight for ESRD    4. ID  - cont antiretrovirals for HIV  - cont with zosyn for underlying abdominal wound infection, dose vanco by level  - follow up sensitivities    5. GEN  - OOB to chair  - if pt tolerates HD without hypotension and remains stable, can transfer back to medical floor     Critical Care Time: 32 min

## 2020-01-08 NOTE — PROGRESS NOTE ADULT - SUBJECTIVE AND OBJECTIVE BOX
[facility] NEPHROLOGY [facility], [facility]  NEPHROLOGY AND HYPERTENSION  [address]  [address]  [place] [address]  [phone]    MD PARVEEN NOLAND, MD SHEYLA MILLS MD YELENA ROSENBERG, MD STACIE OWENS, MD DONAVAN CORTES MD          Patient feels better; MAP improved;   alert     MEDICATIONS  (STANDING):  aspirin enteric coated 81 milliGRAM(s) Oral daily  chlorhexidine 4% Liquid 1 Application(s) Topical <User Schedule>  dextrose 5%. 1000 milliLiter(s) (50 mL/Hr) IV Continuous <Continuous>  dextrose 50% Injectable 12.5 Gram(s) IV Push once  dextrose 50% Injectable 25 Gram(s) IV Push once  dextrose 50% Injectable 25 Gram(s) IV Push once  heparin  Injectable 5000 Unit(s) SubCutaneous every 12 hours  methimazole 10 milliGRAM(s) Oral daily  midodrine. 10 milliGRAM(s) Oral every 8 hours  pantoprazole    Tablet 40 milliGRAM(s) Oral before breakfast  piperacillin/tazobactam IVPB.. 3.375 Gram(s) IV Intermittent every 12 hours  raltegravir Tablet 400 milliGRAM(s) Oral two times a day  ritonavir Tablet 100 milliGRAM(s) Oral every 24 hours    MEDICATIONS  (PRN):  dextrose 40% Gel 15 Gram(s) Oral once PRN Blood Glucose LESS THAN 70 milliGRAM(s)/deciliter  glucagon  Injectable 1 milliGRAM(s) IntraMuscular once PRN Glucose LESS THAN 70 milligrams/deciliter      01-07-20 @ 07:01 - 01-08-20 @ 07:00  --------------------------------------------------------  IN: 100 mL / OUT: 0 mL / NET: 100 mL    01-08-20 @ 07:01 - 01-08-20 @ 19:12  --------------------------------------------------------  IN: 620 mL / OUT: 250 mL / NET: 370 mL      PHYSICAL EXAM:      T(C): 35.9 (01-08-20 @ 16:44), Max: 36.6 (01-08-20 @ 08:00)  HR: 68 (01-08-20 @ 19:00) (59 - 82)  BP: 145/56 (01-08-20 @ 19:00) (109/55 - 183/160)  RR: 18 (01-08-20 @ 19:00) (14 - 21)  SpO2: 94% (01-08-20 @ 19:00) (94% - 99%)  Wt(kg): --  Respiratory: clear anteriorly, decreased BS at bases  Cardiovascular: S1 S2  Gastrointestinal: soft NT ND +BS  + colostomy  Extremities:   1 edema                                    9.9    10.54 )-----------( 314      ( 08 Jan 2020 04:16 )             32.5     01-08    138  |  98  |  64<H>  ----------------------------<  92  3.7   |  23  |  14.10<H>    Ca    7.4<L>      08 Jan 2020 04:16  Phos  6.7     01-08  Mg     2.1     01-08    TPro  7.2  /  Alb  2.1<L>  /  TBili  0.4  /  DBili  x   /  AST  6<L>  /  ALT  8<L>  /  AlkPhos  80  01-08      LIVER FUNCTIONS - ( 08 Jan 2020 04:16 )  Alb: 2.1 g/dL / Pro: 7.2 gm/dL / ALK PHOS: 80 U/L / ALT: 8 U/L / AST: 6 U/L / GGT: x           Creatinine Trend: 14.10<--, 13.30<--, 12.90<--    Assessment   ESRD; severe sepsis; chest pain, high risk for ACS    Plan:  HD today, patient hemodynamically stable,    Phos binder rx  Will follow.      Jacob Hobson MD

## 2020-01-08 NOTE — PROGRESS NOTE ADULT - ASSESSMENT
s/p Hartmanns procedure 2/2 perforated sigmoid diverticulitis 9/2019 with subsequent admission to [facility] for UGIB likely due to gastric ulcers found on EGD (healed), and admission to our facility 10/2019 with infected abdominal wound, currently presented with drainage of abdominal wound again  known to us for Sigmoid Colon rsxn with creation of colostomy, peritoneal lavage s/p I&D of intra-abdominal abscess on 9/18/19 treated for abdominal incisional wounds, admitted with possible ?enterocutaneous fistula on 11/2019 all wound culture with fairly sensitive organism mssa, ecoli and klebsiella sensitive to po and dc with po abx   on zosyn   surgical evaluation for wound CT abd with   Postsurgical changes anterior abdominal wall with small foci of air. No discrete subcutaneous or intra-abdominal fluid collection noted.  No direct visualization of enterocutaneous fistula.  resume HAART   recent CD4 335 on 11/2019   no need prophylaxis this time   fu repeat CD4    wound culture ecoli fairly sensitive   Transferred to ICU yesterday for persistent hypotension, doing ok on exam ,no fever , leukocytosis better   we will fu

## 2020-01-08 NOTE — PROGRESS NOTE ADULT - SUBJECTIVE AND OBJECTIVE BOX
HPI:  Patient is a 59 year old male with a PMH HTN, ESRD (M/W/F), HIV, hep C, s/p hartmanns procedure 2/2 perforated sigmoid diverticulitis 9/2019 with subsequent admission to [facility] for UGIB likely due to gastric ulcers found on EGD (healed), and admission to our facility 10/2019 with infected abdominal wound (cx with e-coli, klebsiella, staph), currently presents with drainage of abdominal wound again. Patient states his home health aide was dressing his wound, but he noticed increased drainage over the last 2 days with associated abdominal pain. Course complicated by hypotension. Transferred to ICU 1/7 for hemodynamic monitoring    24 hr events:  did not require IV pressor  remains on midodrine - BP stable on midodrine  complaining of abdominal pain which pt states is chronic  no fevers      ## ROS:  no fever, no chills  no fatigue  "feeling better"  no visual changes  no HA  no SOB  no CP  moderate abdominal pain (chronic)      ## Labs:  CBC:                        9.9    10.54 )-----------( 314      ( 08 Jan 2020 04:16 )             32.5     Chem:  01-08    138  |  98  |  64<H>  ----------------------------<  92  3.7   |  23  |  14.10<H>    Ca    7.4<L>      08 Jan 2020 04:16  Phos  6.7     01-08  Mg     2.1     01-08    TPro  7.2  /  Alb  2.1<L>  /  TBili  0.4  /  DBili  x   /  AST  6<L>  /  ALT  8<L>  /  AlkPhos  80  01-08    Culture - Blood (01.07.20 @ 10:04)    Specimen Source: .Blood Blood    Culture Results: No growth to date.    Culture - Blood (01.06.20 @ 17:24)    Specimen Source: .Blood Blood-Peripheral    Culture Results: No growth to date.      Culture - Other (01.06.20 @ 17:17)    Specimen Source: Skin wound    Culture Results:     Few Escherichia coli    Few Staphylococcus aureus           ## Imaging:  CXR < from: Xray Chest 1 View- PORTABLE-Urgent (01.07.20 @ 08:19) >  Left subclavian axillary stent again noted.    Slight linear atelectasis off left lower hilum again noted.    Slightly increased markings off the right hilum are again seen although they are somewhat increased from January 6.      ## Medications:  piperacillin/tazobactam IVPB.. 3.375 Gram(s) IV Intermittent every 12 hours  raltegravir Tablet 400 milliGRAM(s) Oral two times a day  ritonavir Tablet 100 milliGRAM(s) Oral every 24 hours    midodrine. 10 milliGRAM(s) Oral every 8 hours      methimazole 10 milliGRAM(s) Oral daily    aspirin enteric coated 81 milliGRAM(s) Oral daily  heparin  Injectable 5000 Unit(s) SubCutaneous every 12 hours    pantoprazole    Tablet 40 milliGRAM(s) Oral before breakfast    fentaNYL    Injectable 25 MICROGram(s) IV Push every 6 hours PRN      ## Vitals:  T(C): 36.2 (01-08-20 @ 23:40), Max: 36.6 (01-08-20 @ 08:00)  HR: 93 (01-08-20 @ 23:00) (59 - 93)  BP: 139/55 (01-08-20 @ 23:00) (109/55 - 183/160)  BP(mean): 80 (01-08-20 @ 23:00) (70 - 169)  RR: 18 (01-08-20 @ 23:00) (13 - 21)  SpO2: 94% (01-08-20 @ 23:00) (94% - 99%)      01-07 @ 07:01  -  01-08 @ 07:00  --------------------------------------------------------  IN: 100 mL / OUT: 0 mL / NET: 100 mL    01-08 @ 07:01  -  01-08 @ 23:54  --------------------------------------------------------  IN: 620 mL / OUT: 1250 mL / NET: -630 mL          ## P/E:  Gen: lying comfortably in bed in no apparent distress  HEENT: PERRL, EOMI  Resp: CTA B/L   CVS: RRR  Abd: soft ND +BS + colostomy + abdominal wound - no drainage noted  Ext: no c/c/e, left arm AVF +thrill and bruit  Neuro: A&Ox3, moving all extremities    CENTRAL LINE: [ ] YES [x] NO  LOCATION:   DATE INSERTED:  REMOVE: [ ] YES [ ] NO      LOOMIS: [ ] YES [x] NO    DATE INSERTED:  REMOVE:  [ ] YES [ ] NO      A-LINE:  [ ] YES [x] NO  LOCATION:   DATE INSERTED:  REMOVE:  [ ] YES [ ] NO  EXPLAIN:    GLOBAL ISSUE/BEST PRACTICE:  Analgesia: fentanyl prn  Sedation: n/a  HOB elevation: yes  Stress ulcer prophylaxis: protonix  VTE prophylaxis: heparin sc  Oral Care: n/a  Glycemic control: n/a  Nutrition: renal diet    CODE STATUS: [x] full code  [ ] DNR  [ ] DNI  [ ] MOLST  Goals of care discussion: [ ] yes

## 2020-01-08 NOTE — PROGRESS NOTE ADULT - SUBJECTIVE AND OBJECTIVE BOX
HPI:  Patient is a 59 year old male with a PMH HTN, ESRD (M/W/F), HIV, hep C, s/p hartmanns procedure 2/2 perforated sigmoid diverticulitis 9/2019 with subsequent admission to [facility] for UGIB likely due to gastric ulcers found on EGD (healed), and admission to our facility 10/2019 with infected abdominal wound, currently presented with drainage of abdominal wound again. Patient states his home health aide was dressing his wound, but he noticed increased drainage over the last 2 days with associated abdominal pain.   Admits to normal bowel function. Tolerating regular diet.   Denies fever, chills, chest pain, sob, palpitations, urinary symptoms. (06 Jan 2020 18:05)      Allergies    No Known Allergies    Intolerances        MEDICATIONS  (STANDING):  aspirin enteric coated 81 milliGRAM(s) Oral daily  chlorhexidine 4% Liquid 1 Application(s) Topical <User Schedule>  dextrose 5%. 1000 milliLiter(s) (50 mL/Hr) IV Continuous <Continuous>  dextrose 50% Injectable 12.5 Gram(s) IV Push once  dextrose 50% Injectable 25 Gram(s) IV Push once  dextrose 50% Injectable 25 Gram(s) IV Push once  heparin  Injectable 5000 Unit(s) SubCutaneous every 12 hours  methimazole 10 milliGRAM(s) Oral daily  midodrine. 10 milliGRAM(s) Oral every 8 hours  pantoprazole    Tablet 40 milliGRAM(s) Oral before breakfast  piperacillin/tazobactam IVPB.. 3.375 Gram(s) IV Intermittent every 12 hours  raltegravir Tablet 400 milliGRAM(s) Oral two times a day  ritonavir Tablet 100 milliGRAM(s) Oral every 24 hours    MEDICATIONS  (PRN):  dextrose 40% Gel 15 Gram(s) Oral once PRN Blood Glucose LESS THAN 70 milliGRAM(s)/deciliter  glucagon  Injectable 1 milliGRAM(s) IntraMuscular once PRN Glucose LESS THAN 70 milligrams/deciliter      REVIEW OF SYSTEMS:    CONSTITUTIONAL: No fever, chills, weight loss, or fatigue  HEENT: No sore throat, runny nose, ear ache  RESPIRATORY: No cough, wheezing, No shortness of breath  CARDIOVASCULAR: No chest pain, palpitations, dizziness  GASTROINTESTINAL: No abdominal pain. No nausea, vomiting, diarrhea  GENITOURINARY: No dysuria, increase frequency, hematuria, or incontinence  NEUROLOGICAL: No headaches, memory loss, loss of strength, numbness, or tremors, no weakness  EXTREMITY: No pedal edema BLE  SKIN: No itching, burning, rashes, or lesions     VITAL SIGNS:  T(C): 36.2 (01-08-20 @ 11:50), Max: 36.6 (01-08-20 @ 08:00)  T(F): 97.1 (01-08-20 @ 11:50), Max: 97.9 (01-08-20 @ 08:00)  HR: 66 (01-08-20 @ 13:00) (66 - 90)  BP: 142/66 (01-08-20 @ 13:00) (76/46 - 153/76)  RR: 14 (01-08-20 @ 13:00) (14 - 21)  SpO2: 97% (01-08-20 @ 13:00) (94% - 100%)  Wt(kg): --    PHYSICAL EXAM:    GENERAL: not in any distress  HEENT: Neck is supple, normocephalic, atraumatic   CHEST/LUNG: Clear to percussion bilaterally; No rales, rhonchi, wheezing  HEART: Regular rate and rhythm; No murmurs, rubs, or gallops  ABDOMEN: Soft, Nontender, Nondistended; Bowel sounds present, no rebound   EXTREMITIES:  2+ Peripheral Pulses, No clubbing, cyanosis, or edema  GENITOURINARY:   SKIN: No rashes, abdominal wound on dressing , expectoration of purulent dishrage yesterday   BACK: no pressor sore   NERVOUS SYSTEM:  Alert & Oriented X3     LABS:                         9.9    10.54 )-----------( 314      ( 08 Jan 2020 04:16 )             32.5     01-08    138  |  98  |  64<H>  ----------------------------<  92  3.7   |  23  |  14.10<H>    Ca    7.4<L>      08 Jan 2020 04:16  Phos  6.7     01-08  Mg     2.1     01-08    TPro  7.2  /  Alb  2.1<L>  /  TBili  0.4  /  DBili  x   /  AST  6<L>  /  ALT  8<L>  /  AlkPhos  80  01-08    LIVER FUNCTIONS - ( 08 Jan 2020 04:16 )  Alb: 2.1 g/dL / Pro: 7.2 gm/dL / ALK PHOS: 80 U/L / ALT: 8 U/L / AST: 6 U/L / GGT: x           PT/INR - ( 06 Jan 2020 13:47 )   PT: 14.9 sec;   INR: 1.32 ratio         PTT - ( 06 Jan 2020 13:47 )  PTT:28.5 sec      CARDIAC MARKERS ( 07 Jan 2020 15:20 )  <.015 ng/mL / x     / x     / x     / x      CARDIAC MARKERS ( 07 Jan 2020 07:21 )  <.015 ng/mL / x     / x     / x     / x                        Culture Results:   No growth to date. (01-07 @ 10:04)  Culture Results:   No growth to date. (01-07 @ 10:04)  Culture Results:   No growth to date. (01-06 @ 17:24)  Culture Results:   No growth to date. (01-06 @ 17:24)  Culture Results:   Few Escherichia coli  Few Staphylococcus aureus Susceptibility to follow. (01-06 @ 17:17)                Radiology:

## 2020-01-08 NOTE — CHART NOTE - NSCHARTNOTEFT_GEN_A_CORE
Upon Nutritional Assessment by the Registered Dietitian your patient was determined to meet criteria / has evidence of the following diagnosis/diagnoses:          [ ]  Mild Protein Calorie Malnutrition        [ ]  Moderate Protein Calorie Malnutrition        [ X] Severe Protein Calorie Malnutrition        [ ] Unspecified Protein Calorie Malnutrition        [ ] Underweight / BMI <19        [ ] Morbid Obesity / BMI > 40      Findings as based on:  •  Comprehensive nutrition assessment and consultation  •  Calorie counts (nutrient intake analysis)  •  Food acceptance and intake status from observations by staff  •  Follow up  •  Patient education  •  Intervention secondary to interdisciplinary rounds  •   concerns      Treatment:    The following diet has been recommended:  Renal Diet + Nepro x 2/day (provides 850 kcal, 38 g protein) for additional nutritional support      PROVIDER Section:     By signing this assessment you are acknowledging and agree with the diagnosis/diagnoses assigned by the Registered Dietitian    Comments:

## 2020-01-09 LAB
-  AMPICILLIN/SULBACTAM: SIGNIFICANT CHANGE UP
-  CEFAZOLIN: SIGNIFICANT CHANGE UP
-  CLINDAMYCIN: SIGNIFICANT CHANGE UP
-  ERYTHROMYCIN: SIGNIFICANT CHANGE UP
-  GENTAMICIN: SIGNIFICANT CHANGE UP
-  OXACILLIN: SIGNIFICANT CHANGE UP
-  PENICILLIN: SIGNIFICANT CHANGE UP
-  RIFAMPIN: SIGNIFICANT CHANGE UP
-  TETRACYCLINE: SIGNIFICANT CHANGE UP
-  TRIMETHOPRIM/SULFAMETHOXAZOLE: SIGNIFICANT CHANGE UP
-  VANCOMYCIN: SIGNIFICANT CHANGE UP
ALBUMIN SERPL ELPH-MCNC: 2.4 G/DL — LOW (ref 3.3–5)
ALP SERPL-CCNC: 86 U/L — SIGNIFICANT CHANGE UP (ref 40–120)
ALT FLD-CCNC: 9 U/L — LOW (ref 12–78)
ANION GAP SERPL CALC-SCNC: 8 MMOL/L — SIGNIFICANT CHANGE UP (ref 5–17)
AST SERPL-CCNC: 6 U/L — LOW (ref 15–37)
BILIRUB SERPL-MCNC: 0.5 MG/DL — SIGNIFICANT CHANGE UP (ref 0.2–1.2)
BUN SERPL-MCNC: 31 MG/DL — HIGH (ref 7–23)
CALCIUM SERPL-MCNC: 8.2 MG/DL — LOW (ref 8.5–10.1)
CHLORIDE SERPL-SCNC: 98 MMOL/L — SIGNIFICANT CHANGE UP (ref 96–108)
CO2 SERPL-SCNC: 32 MMOL/L — HIGH (ref 22–31)
CORTIS AM PEAK SERPL-MCNC: 12.6 UG/DL — SIGNIFICANT CHANGE UP (ref 6–18.4)
CREAT SERPL-MCNC: 8.1 MG/DL — HIGH (ref 0.5–1.3)
CULTURE RESULTS: SIGNIFICANT CHANGE UP
GLUCOSE SERPL-MCNC: 138 MG/DL — HIGH (ref 70–99)
HAV IGM SER-ACNC: SIGNIFICANT CHANGE UP
HBV CORE IGM SER-ACNC: SIGNIFICANT CHANGE UP
HBV SURFACE AB SER-ACNC: SIGNIFICANT CHANGE UP
HBV SURFACE AG SER-ACNC: SIGNIFICANT CHANGE UP
HCT VFR BLD CALC: 37.1 % — LOW (ref 39–50)
HCV AB S/CO SERPL IA: 0.2 S/CO — SIGNIFICANT CHANGE UP (ref 0–0.99)
HCV AB SERPL-IMP: SIGNIFICANT CHANGE UP
HGB BLD-MCNC: 11.1 G/DL — LOW (ref 13–17)
MAGNESIUM SERPL-MCNC: 2 MG/DL — SIGNIFICANT CHANGE UP (ref 1.6–2.6)
MCHC RBC-ENTMCNC: 26.2 PG — LOW (ref 27–34)
MCHC RBC-ENTMCNC: 29.9 GM/DL — LOW (ref 32–36)
MCV RBC AUTO: 87.5 FL — SIGNIFICANT CHANGE UP (ref 80–100)
METHOD TYPE: SIGNIFICANT CHANGE UP
NRBC # BLD: 0 /100 WBCS — SIGNIFICANT CHANGE UP (ref 0–0)
ORGANISM # SPEC MICROSCOPIC CNT: SIGNIFICANT CHANGE UP
PHOSPHATE SERPL-MCNC: 3.8 MG/DL — SIGNIFICANT CHANGE UP (ref 2.5–4.5)
PLATELET # BLD AUTO: 290 K/UL — SIGNIFICANT CHANGE UP (ref 150–400)
POTASSIUM SERPL-MCNC: 3.4 MMOL/L — LOW (ref 3.5–5.3)
POTASSIUM SERPL-SCNC: 3.4 MMOL/L — LOW (ref 3.5–5.3)
PROT SERPL-MCNC: 7.8 GM/DL — SIGNIFICANT CHANGE UP (ref 6–8.3)
RBC # BLD: 4.24 M/UL — SIGNIFICANT CHANGE UP (ref 4.2–5.8)
RBC # FLD: 17.7 % — HIGH (ref 10.3–14.5)
SODIUM SERPL-SCNC: 138 MMOL/L — SIGNIFICANT CHANGE UP (ref 135–145)
SPECIMEN SOURCE: SIGNIFICANT CHANGE UP
T3 SERPL-MCNC: 92 NG/DL — SIGNIFICANT CHANGE UP (ref 80–200)
T4 FREE SERPL-MCNC: 1.5 NG/DL — SIGNIFICANT CHANGE UP (ref 0.9–1.8)
TSH SERPL-MCNC: <0.005 UIU/ML — LOW (ref 0.36–3.74)
VANCOMYCIN FLD-MCNC: 19.2 UG/ML — SIGNIFICANT CHANGE UP
WBC # BLD: 10.02 K/UL — SIGNIFICANT CHANGE UP (ref 3.8–10.5)
WBC # FLD AUTO: 10.02 K/UL — SIGNIFICANT CHANGE UP (ref 3.8–10.5)

## 2020-01-09 PROCEDURE — 99232 SBSQ HOSP IP/OBS MODERATE 35: CPT

## 2020-01-09 PROCEDURE — 76937 US GUIDE VASCULAR ACCESS: CPT | Mod: 26,59

## 2020-01-09 PROCEDURE — 36000 PLACE NEEDLE IN VEIN: CPT

## 2020-01-09 RX ORDER — CEFTRIAXONE 500 MG/1
1000 INJECTION, POWDER, FOR SOLUTION INTRAMUSCULAR; INTRAVENOUS EVERY 24 HOURS
Refills: 0 | Status: DISCONTINUED | OUTPATIENT
Start: 2020-01-09 | End: 2020-01-13

## 2020-01-09 RX ORDER — DARUNAVIR 75 MG/1
800 TABLET, FILM COATED ORAL DAILY
Refills: 0 | Status: DISCONTINUED | OUTPATIENT
Start: 2020-01-09 | End: 2020-01-24

## 2020-01-09 RX ORDER — ETRAVIRINE 200 MG/1
200 TABLET ORAL
Refills: 0 | Status: DISCONTINUED | OUTPATIENT
Start: 2020-01-09 | End: 2020-01-24

## 2020-01-09 RX ORDER — FENTANYL CITRATE 50 UG/ML
25 INJECTION INTRAVENOUS EVERY 6 HOURS
Refills: 0 | Status: DISCONTINUED | OUTPATIENT
Start: 2020-01-09 | End: 2020-01-11

## 2020-01-09 RX ORDER — CEFTRIAXONE 500 MG/1
1000 INJECTION, POWDER, FOR SOLUTION INTRAMUSCULAR; INTRAVENOUS EVERY 24 HOURS
Refills: 0 | Status: DISCONTINUED | OUTPATIENT
Start: 2020-01-09 | End: 2020-01-09

## 2020-01-09 RX ADMIN — Medication 1334 MILLIGRAM(S): at 12:57

## 2020-01-09 RX ADMIN — RITONAVIR 100 MILLIGRAM(S): 100 TABLET, FILM COATED ORAL at 18:19

## 2020-01-09 RX ADMIN — RALTEGRAVIR 400 MILLIGRAM(S): 400 TABLET, FILM COATED ORAL at 17:37

## 2020-01-09 RX ADMIN — PIPERACILLIN AND TAZOBACTAM 25 GRAM(S): 4; .5 INJECTION, POWDER, LYOPHILIZED, FOR SOLUTION INTRAVENOUS at 05:48

## 2020-01-09 RX ADMIN — Medication 1334 MILLIGRAM(S): at 07:54

## 2020-01-09 RX ADMIN — FENTANYL CITRATE 25 MICROGRAM(S): 50 INJECTION INTRAVENOUS at 09:29

## 2020-01-09 RX ADMIN — Medication 1334 MILLIGRAM(S): at 17:36

## 2020-01-09 RX ADMIN — CHLORHEXIDINE GLUCONATE 1 APPLICATION(S): 213 SOLUTION TOPICAL at 17:38

## 2020-01-09 RX ADMIN — PANTOPRAZOLE SODIUM 40 MILLIGRAM(S): 20 TABLET, DELAYED RELEASE ORAL at 06:27

## 2020-01-09 RX ADMIN — Medication 81 MILLIGRAM(S): at 12:57

## 2020-01-09 RX ADMIN — FENTANYL CITRATE 25 MICROGRAM(S): 50 INJECTION INTRAVENOUS at 09:45

## 2020-01-09 RX ADMIN — ETRAVIRINE 200 MILLIGRAM(S): 200 TABLET ORAL at 17:37

## 2020-01-09 RX ADMIN — RALTEGRAVIR 400 MILLIGRAM(S): 400 TABLET, FILM COATED ORAL at 05:49

## 2020-01-09 RX ADMIN — HEPARIN SODIUM 5000 UNIT(S): 5000 INJECTION INTRAVENOUS; SUBCUTANEOUS at 17:36

## 2020-01-09 RX ADMIN — CEFTRIAXONE 100 MILLIGRAM(S): 500 INJECTION, POWDER, FOR SOLUTION INTRAMUSCULAR; INTRAVENOUS at 17:38

## 2020-01-09 RX ADMIN — MIDODRINE HYDROCHLORIDE 10 MILLIGRAM(S): 2.5 TABLET ORAL at 05:49

## 2020-01-09 RX ADMIN — HEPARIN SODIUM 5000 UNIT(S): 5000 INJECTION INTRAVENOUS; SUBCUTANEOUS at 05:49

## 2020-01-09 NOTE — PROGRESS NOTE ADULT - ASSESSMENT
s/p Hartmanns procedure 2/2 perforated sigmoid diverticulitis 9/2019 with subsequent admission to [facility] for UGIB likely due to gastric ulcers found on EGD (healed), and admission to our facility 10/2019 with infected abdominal wound, currently presented with drainage of abdominal wound again  known to us for Sigmoid Colon rsxn with creation of colostomy, peritoneal lavage s/p I&D of intra-abdominal abscess on 9/18/19 treated for abdominal incisional wounds, admitted with possible ?enterocutaneous fistula on 11/2019 all wound culture with fairly sensitive organism mssa, ecoli and klebsiella sensitive to po and dc with po abx   on zosyn   surgical evaluation for wound CT abd with   Postsurgical changes anterior abdominal wall with small foci of air. No discrete subcutaneous or intra-abdominal fluid collection noted.  No direct visualization of enterocutaneous fistula.  resume HAART   recent CD4 335 on 11/2019   no need prophylaxis this time   fu repeat CD4    wound culture ecoli fairly sensitive   Transferred to ICU yesterday for persistent hypotension, doing ok on exam ,no fever , leukocytosis better   we will fu s/p Hartmanns procedure 2/2 perforated sigmoid diverticulitis 9/2019 with subsequent admission to [facility] for UGIB likely due to gastric ulcers found on EGD (healed), and admission to our facility 10/2019 with infected abdominal wound, currently presented with drainage of abdominal wound again  known to us for Sigmoid Colon rsxn with creation of colostomy, peritoneal lavage s/p I&D of intra-abdominal abscess on 9/18/19 treated for abdominal incisional wounds, admitted with possible ?enterocutaneous fistula on 11/2019 all wound culture with fairly sensitive organism mssa, ecoli and klebsiella sensitive to po and dc with po abx   on zosyn now   wound culture ecoli fairly sensitive and staph aureus final pending   Transferred to ICU  for persistent hypotension, doing ok on exam ,no fever , leukocytosis better   sx follow up ;; ?? fistula   continue zosyn for now   dc rocephin

## 2020-01-09 NOTE — PROGRESS NOTE ADULT - SUBJECTIVE AND OBJECTIVE BOX
HPI:  Patient is a 59 year old male with a PMH HTN, ESRD (M/W/F), HIV, hep C, s/p hartmanns procedure 2/2 perforated sigmoid diverticulitis 9/2019 with subsequent admission to [facility] for UGIB likely due to gastric ulcers found on EGD (healed), and admission to our facility 10/2019 with infected abdominal wound, currently presented with drainage of abdominal wound again. Patient states his home health aide was dressing his wound, but he noticed increased drainage over the last 2 days with associated abdominal pain.   Admits to normal bowel function. Tolerating regular diet.   Denies fever, chills, chest pain, sob, palpitations, urinary symptoms. (06 Jan 2020 18:05)      Allergies    No Known Allergies    Intolerances        MEDICATIONS  (STANDING):  aspirin enteric coated 81 milliGRAM(s) Oral daily  calcium acetate 1334 milliGRAM(s) Oral three times a day with meals  chlorhexidine 4% Liquid 1 Application(s) Topical <User Schedule>  darunavir 800 milliGRAM(s) Oral daily  dextrose 5%. 1000 milliLiter(s) (50 mL/Hr) IV Continuous <Continuous>  dextrose 50% Injectable 12.5 Gram(s) IV Push once  dextrose 50% Injectable 25 Gram(s) IV Push once  etravirine 200 milliGRAM(s) Oral two times a day after meals  heparin  Injectable 5000 Unit(s) SubCutaneous every 12 hours  methimazole 10 milliGRAM(s) Oral daily  midodrine. 10 milliGRAM(s) Oral every 8 hours  pantoprazole    Tablet 40 milliGRAM(s) Oral before breakfast  piperacillin/tazobactam IVPB.. 3.375 Gram(s) IV Intermittent every 12 hours  raltegravir Tablet 400 milliGRAM(s) Oral two times a day  ritonavir Tablet 100 milliGRAM(s) Oral every 24 hours    MEDICATIONS  (PRN):  dextrose 40% Gel 15 Gram(s) Oral once PRN Blood Glucose LESS THAN 70 milliGRAM(s)/deciliter  fentaNYL    Injectable 25 MICROGram(s) IV Push every 6 hours PRN Moderate Pain (4 - 6)  glucagon  Injectable 1 milliGRAM(s) IntraMuscular once PRN Glucose LESS THAN 70 milligrams/deciliter      REVIEW OF SYSTEMS:    CONSTITUTIONAL: No fever, chills, weight loss, or fatigue  HEENT: No sore throat, runny nose, ear ache  RESPIRATORY: No cough, wheezing, No shortness of breath  CARDIOVASCULAR: No chest pain, palpitations, dizziness  GASTROINTESTINAL: No abdominal pain. No nausea, vomiting, diarrhea  GENITOURINARY: No dysuria, increase frequency, hematuria, or incontinence  NEUROLOGICAL: No headaches, memory loss, loss of strength, numbness, or tremors, no weakness  EXTREMITY: No pedal edema BLE  SKIN: No itching, burning, rashes, or lesions     VITAL SIGNS:  T(C): 36.2 (01-09-20 @ 15:50), Max: 36.5 (01-09-20 @ 07:50)  T(F): 97.1 (01-09-20 @ 15:50), Max: 97.7 (01-09-20 @ 07:50)  HR: 83 (01-09-20 @ 12:00) (59 - 99)  BP: 136/61 (01-09-20 @ 12:00) (110/62 - 183/160)  RR: 21 (01-09-20 @ 12:00) (13 - 25)  SpO2: 96% (01-09-20 @ 12:00) (92% - 98%)  Wt(kg): --    PHYSICAL EXAM:    GENERAL: not in any distress  HEENT: Neck is supple, normocephalic, atraumatic   CHEST/LUNG: Clear to auscultation bilaterally; No rales, rhonchi, wheezing  HEART: Regular rate and rhythm; No murmurs, rubs, or gallops  ABDOMEN: Soft, Nontender, Nondistended; Bowel sounds present, no rebound   EXTREMITIES:  2+ Peripheral Pulses, No clubbing, cyanosis, or edema  GENITOURINARY:   SKIN: No rashes or lesions  BACK: no pressor sore   NERVOUS SYSTEM:  Alert & Oriented X3, Good concentration  PSYCH: normal affect     LABS:                         11.1   10.02 )-----------( 290      ( 09 Jan 2020 04:29 )             37.1     01-09    138  |  98  |  31<H>  ----------------------------<  138<H>  3.4<L>   |  32<H>  |  8.10<H>    Ca    8.2<L>      09 Jan 2020 04:29  Phos  3.8     01-09  Mg     2.0     01-09    TPro  7.8  /  Alb  2.4<L>  /  TBili  0.5  /  DBili  x   /  AST  6<L>  /  ALT  9<L>  /  AlkPhos  86  01-09    LIVER FUNCTIONS - ( 09 Jan 2020 04:29 )  Alb: 2.4 g/dL / Pro: 7.8 gm/dL / ALK PHOS: 86 U/L / ALT: 9 U/L / AST: 6 U/L / GGT: x                     Thyroid Stimulating Hormone, Serum: <0.005 uIU/mL (01-09 @ 04:29)                Culture Results:   No growth to date. (01-07 @ 10:04)  Culture Results:   No growth to date. (01-07 @ 10:04)  Culture Results:   No growth to date. (01-06 @ 17:24)  Culture Results:   No growth to date. (01-06 @ 17:24)  Culture Results:   Few Escherichia coli  Few Staphylococcus aureus (01-06 @ 17:17)                Radiology: HPI:  Patient is a 59 year old male with a PMH HTN, ESRD (M/W/F), HIV, hep C, s/p hartmanns procedure 2/2 perforated sigmoid diverticulitis 9/2019 with subsequent admission to [facility] for UGIB likely due to gastric ulcers found on EGD (healed), and admission to our facility 10/2019 with infected abdominal wound, currently presented with drainage of abdominal wound again. Patient states his home health aide was dressing his wound, but he noticed increased drainage over the last 2 days with associated abdominal pain.   Admits to normal bowel function. Tolerating regular diet.   Denies fever, chills, chest pain, sob, palpitations, urinary symptoms. (06 Jan 2020 18:05)  known to me   discharged in nov on po augmentin flagyl to [facility]; no sx fu discharged home where he lives with his nephew on 12/28/2019   nurse never came for follow up at home per patient   and admitted   did get all hiv meds in rehab per patient     Allergies    No Known Allergies    Intolerances        MEDICATIONS  (STANDING):  aspirin enteric coated 81 milliGRAM(s) Oral daily  calcium acetate 1334 milliGRAM(s) Oral three times a day with meals  chlorhexidine 4% Liquid 1 Application(s) Topical <User Schedule>  darunavir 800 milliGRAM(s) Oral daily  dextrose 5%. 1000 milliLiter(s) (50 mL/Hr) IV Continuous <Continuous>  dextrose 50% Injectable 12.5 Gram(s) IV Push once  dextrose 50% Injectable 25 Gram(s) IV Push once  etravirine 200 milliGRAM(s) Oral two times a day after meals  heparin  Injectable 5000 Unit(s) SubCutaneous every 12 hours  methimazole 10 milliGRAM(s) Oral daily  midodrine. 10 milliGRAM(s) Oral every 8 hours  pantoprazole    Tablet 40 milliGRAM(s) Oral before breakfast  piperacillin/tazobactam IVPB.. 3.375 Gram(s) IV Intermittent every 12 hours  raltegravir Tablet 400 milliGRAM(s) Oral two times a day  ritonavir Tablet 100 milliGRAM(s) Oral every 24 hours    MEDICATIONS  (PRN):  dextrose 40% Gel 15 Gram(s) Oral once PRN Blood Glucose LESS THAN 70 milliGRAM(s)/deciliter  fentaNYL    Injectable 25 MICROGram(s) IV Push every 6 hours PRN Moderate Pain (4 - 6)  glucagon  Injectable 1 milliGRAM(s) IntraMuscular once PRN Glucose LESS THAN 70 milligrams/deciliter      REVIEW OF SYSTEMS:  pain lower abdominal   otherwise ok   CONSTITUTIONAL: No fever, chills, weight loss, or fatigue  HEENT: No sore throat, runny nose, ear ache  RESPIRATORY: No cough, wheezing, No shortness of breath  CARDIOVASCULAR: No chest pain, palpitations, dizziness  GASTROINTESTINAL: plus abdominal pain. No nausea, vomiting, diarrhea  open wound   GENITOURINARY: No dysuria, increase frequency, hematuria, or incontinence  NEUROLOGICAL: No headaches, memory loss, loss of strength, numbness, or tremors, no weakness  EXTREMITY: No pedal edema BLE  SKIN: No itching, burning, rashes, or lesions     VITAL SIGNS:  T(C): 36.2 (01-09-20 @ 15:50), Max: 36.5 (01-09-20 @ 07:50)  T(F): 97.1 (01-09-20 @ 15:50), Max: 97.7 (01-09-20 @ 07:50)  HR: 83 (01-09-20 @ 12:00) (59 - 99)  BP: 136/61 (01-09-20 @ 12:00) (110/62 - 183/160)  RR: 21 (01-09-20 @ 12:00) (13 - 25)  SpO2: 96% (01-09-20 @ 12:00) (92% - 98%)  Wt(kg): --    PHYSICAL EXAM:    GENERAL: not in any distress  HEENT: Neck is supple, normocephalic, atraumatic   CHEST/LUNG: Clear to auscultation bilaterally; No rales, rhonchi, wheezing  HEART: Regular rate and rhythm; No murmurs, rubs, or gallops  ABDOMEN: Soft, Nontender, Nondistended; Bowel sounds present, no rebound   colostomy ; clean para colostomy wound   EXTREMITIES:  2+ Peripheral Pulses, No clubbing, cyanosis, or edema  SKIN: No rashes or lesions  BACK: no pressor sore   NERVOUS SYSTEM:  Alert & Oriented X3, Good concentration  PSYCH: normal affect     LABS:                         11.1   10.02 )-----------( 290      ( 09 Jan 2020 04:29 )             37.1     01-09    138  |  98  |  31<H>  ----------------------------<  138<H>  3.4<L>   |  32<H>  |  8.10<H>    Ca    8.2<L>      09 Jan 2020 04:29  Phos  3.8     01-09  Mg     2.0     01-09    TPro  7.8  /  Alb  2.4<L>  /  TBili  0.5  /  DBili  x   /  AST  6<L>  /  ALT  9<L>  /  AlkPhos  86  01-09    LIVER FUNCTIONS - ( 09 Jan 2020 04:29 )  Alb: 2.4 g/dL / Pro: 7.8 gm/dL / ALK PHOS: 86 U/L / ALT: 9 U/L / AST: 6 U/L / GGT: x                     Thyroid Stimulating Hormone, Serum: <0.005 uIU/mL (01-09 @ 04:29)                Culture Results:   No growth to date. (01-07 @ 10:04)  Culture Results:   No growth to date. (01-07 @ 10:04)  Culture Results:   No growth to date. (01-06 @ 17:24)  Culture Results:   No growth to date. (01-06 @ 17:24)  Culture Results:   Few Escherichia coli  Few Staphylococcus aureus (01-06 @ 17:17)                Radiology:    < from: CT Abdomen and Pelvis w/ Oral Cont (01.06.20 @ 15:57) >  IMPRESSION:     Postsurgical changes anterior abdominal wall with small foci of air. No discrete subcutaneous or intra-abdominal fluid collection noted.  No direct visualization of enterocutaneous fistula.    Other findings as discussed.                    KISHAN ELIZABETH M.D., ATTENDING RADIOLOGIST  This document has been electronically signed. Jan 6 2020  4:24PM        < end of copied text >

## 2020-01-09 NOTE — PROGRESS NOTE ADULT - SUBJECTIVE AND OBJECTIVE BOX
Subjective: no active complaints. Well tolerated HD yest.       MEDICATIONS  (STANDING):  aspirin enteric coated 81 milliGRAM(s) Oral daily  calcium acetate 1334 milliGRAM(s) Oral three times a day with meals  chlorhexidine 4% Liquid 1 Application(s) Topical <User Schedule>  dextrose 5%. 1000 milliLiter(s) (50 mL/Hr) IV Continuous <Continuous>  dextrose 50% Injectable 12.5 Gram(s) IV Push once  dextrose 50% Injectable 25 Gram(s) IV Push once  dextrose 50% Injectable 25 Gram(s) IV Push once  heparin  Injectable 5000 Unit(s) SubCutaneous every 12 hours  methimazole 10 milliGRAM(s) Oral daily  midodrine. 10 milliGRAM(s) Oral every 8 hours  pantoprazole    Tablet 40 milliGRAM(s) Oral before breakfast  piperacillin/tazobactam IVPB.. 3.375 Gram(s) IV Intermittent every 12 hours  raltegravir Tablet 400 milliGRAM(s) Oral two times a day  ritonavir Tablet 100 milliGRAM(s) Oral every 24 hours    MEDICATIONS  (PRN):  dextrose 40% Gel 15 Gram(s) Oral once PRN Blood Glucose LESS THAN 70 milliGRAM(s)/deciliter  fentaNYL    Injectable 25 MICROGram(s) IV Push every 6 hours PRN Moderate Pain (4 - 6)  glucagon  Injectable 1 milliGRAM(s) IntraMuscular once PRN Glucose LESS THAN 70 milligrams/deciliter          T(C): 36.4 (01-09-20 @ 04:00), Max: 36.6 (01-08-20 @ 08:00)  HR: 76 (01-09-20 @ 07:30) (59 - 99)  BP: 126/59 (01-09-20 @ 07:00) (110/62 - 183/160)  RR: 20 (01-09-20 @ 07:30) (13 - 21)  SpO2: 95% (01-09-20 @ 07:30) (92% - 99%)  Wt(kg): --        I&O's Detail    08 Jan 2020 07:01  -  09 Jan 2020 07:00  --------------------------------------------------------  IN:    IV PiggyBack: 100 mL    Oral Fluid: 520 mL  Total IN: 620 mL    OUT:    Colostomy: 450 mL    Other: 1000 mL  Total OUT: 1450 mL    Total NET: -830 mL               PHYSICAL EXAM:    GENERAL: NAD  EYES: EOMI, PERRLA, conjunctiva and sclera clear  NECK: Supple, no inc in JVP  CHEST/LUNG: Clear  HEART: S1S2  ABDOMEN: midline dressing, colostomy  EXTREMITIES:  min edema.   NEURO: no asterixis  L AVF pos thrill, bruit      LABS:  CBC Full  -  ( 09 Jan 2020 04:29 )  WBC Count : 10.02 K/uL  RBC Count : 4.24 M/uL  Hemoglobin : 11.1 g/dL  Hematocrit : 37.1 %  Platelet Count - Automated : 290 K/uL  Mean Cell Volume : 87.5 fl  Mean Cell Hemoglobin : 26.2 pg  Mean Cell Hemoglobin Concentration : 29.9 gm/dL  Auto Neutrophil # : x  Auto Lymphocyte # : x  Auto Monocyte # : x  Auto Eosinophil # : x  Auto Basophil # : x  Auto Neutrophil % : x  Auto Lymphocyte % : x  Auto Monocyte % : x  Auto Eosinophil % : x  Auto Basophil % : x    01-09    138  |  98  |  31<H>  ----------------------------<  138<H>  3.4<L>   |  32<H>  |  8.10<H>    Ca    8.2<L>      09 Jan 2020 04:29  Phos  3.8     01-09  Mg     2.0     01-09    TPro  7.8  /  Alb  2.4<L>  /  TBili  0.5  /  DBili  x   /  AST  6<L>  /  ALT  9<L>  /  AlkPhos  86  01-09        Culture Results:   No growth to date. (01-07 @ 10:04)  Culture Results:   No growth to date. (01-07 @ 10:04)  Culture Results:   No growth to date. (01-06 @ 17:24)  Culture Results:   No growth to date. (01-06 @ 17:24)  Culture Results:   Few Escherichia coli  Few Staphylococcus aureus Susceptibility to follow. 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 Polymicrobial abd wound infx  * Recent exp lap, sigm colon resection, perit lavage, I&D of abscess for perfed viscus    Recommendations:   * For HD today again as Rxed.   * 3K/4K bath with HD. UF goal 1-1.5 kg

## 2020-01-09 NOTE — CHART NOTE - NSCHARTNOTEFT_GEN_A_CORE
Mr. Davidson is a 60 yo male with a PMH HTN, ESRD on HD (M/W/F), HIV, HCV, s/p harmann's procedure as tx for perforated sigmoid diverticulitis which has had a complicated healing history including infection who was brought to ICU s/p RRT for hypotension which was IVF responsive on med/surg (1/7). During his stay here in ICU he remained HDS and without respiratory distress, and did not require vasopressor support at any time. Pt has rec his HD tx yesterday without issue. He remains on Midodrine PO. His abdominal wound culture was positive for e. coli, and he remains on abx coverage with zosyn (all blood cultures have remained negative up until this point). Midline catheter was placed today. Patient has been seen/examined by ICU attending and is deemed stable for transfer to med/surg. Mr. Davidson is a 60 yo male with a PMH HTN, ESRD on HD (M/W/F), HIV, HCV, hyperthyroidism on tapazole, s/p harmann's procedure as tx for perforated sigmoid diverticulitis which has had a complicated healing history including infection who was brought to ICU s/p RRT for hypotension which was IVF responsive on med/surg (1/7). During his stay here in ICU he remained HDS and without respiratory distress, and did not require vasopressor support at any time. Pt has rec his HD tx yesterday without issue. He remains on Midodrine PO. His abdominal wound culture was positive for e. coli, and he remains on abx coverage with zosyn (all blood cultures have remained negative up until this point). Midline catheter was placed today. Patient has been seen/examined by ICU attending and is deemed stable for transfer to med/surg. Mr. Spence is a 60 yo male with a PMH HTN, ESRD on HD (M/W/F), HIV, HCV, hyperthyroidism on tapazole, gastric ulcers s/p UGIB, s/p harmann's procedure (9/2019) as tx for perforated sigmoid diverticulitis which has had a complicated healing history including infection who was brought to ICU s/p RRT for hypotension which was IVF responsive on med/surg (1/7). During his stay here in ICU he remained HDS and without respiratory distress, and did not require vasopressor support at any time. Pt has rec his HD tx yesterday without issue. He remains on Midodrine PO. His abdominal wound culture was positive for e. coli, and he remains on abx coverage with zosyn (all blood cultures have remained negative up until this point). Midline catheter was placed today (rt). Patient has been seen/examined by ICU attending and is deemed stable for transfer to med/surg. Case has been discussed with surgery team and our attending and pt is accepted to their service.

## 2020-01-09 NOTE — PROGRESS NOTE ADULT - SUBJECTIVE AND OBJECTIVE BOX
HPI:  59 year old male with a PMH HTN, ESRD (M/W/F), HIV, hep C, s/p hartmanns procedure 2/2 perforated sigmoid diverticulitis 9/2019 with subsequent admission to [facility] for UGIB likely due to gastric ulcers found on EGD (healed), and admission to our facility 10/2019 with infected abdominal wound (cx with e-coli, klebsiella, staph), currently presents with drainage of abdominal wound again. Patient states his home health aide was dressing his wound, but he noticed increased drainage over the last 2 days with associated abdominal pain. Course complicated by hypotension/shock. Transferred to ICU 1/7 for hemodynamic monitoring        24 hr events:  no IV pressors required  BP stable on midodrine 10 mg  tolerated HD yesterday without episodes of hypotension  still with some abdominal pain mostly around colostomy site per pt  afebrile, no nausea, no emesis    ## ROS:  no fever, no chills, no fatigue  no visual changes  no HA, no dizziness  no sore throat  no SOB, no cough  no CP, no palpitations  + abdominal pain mostly left sided  no nausea  + colostomy    ## Labs:  CBC:                        11.1   10.02 )-----------( 290      ( 09 Jan 2020 04:29 )             37.1     Chem:  01-09    138         |  98        |  31<H>  ----------------------------------------<  138<H>  3.4<L>   |  32<H>  |  8.10<H>    Ca    8.2<L>      09 Jan 2020 04:29  Phos  3.8     01-09  Mg     2.0     01-09    TPro  7.8  /  Alb  2.4<L>  /  TBili  0.5  /  AST  6<L>  /  ALT  9<L>  /  AlkPhos  86  01-09    Culture - Blood (01.07.20 @ 10:04)    Specimen Source: .Blood Blood    Culture Results: No growth to date.    Culture - Blood (01.07.20 @ 10:04)    Specimen Source: .Blood Blood    Culture Results: No growth to date.    Culture - Other (01.06.20 @ 17:17)    Specimen Source: Skin wound    Culture Results: Few Escherichia coli                           Few Staphylococcus aureus Susceptibility to follow.           ## Imaging:  CT abdomen < from: CT Abdomen and Pelvis w/ Oral Cont (01.06.20 @ 15:57) >  Postsurgical changes anterior abdominal wall with small foci of air. No discrete subcutaneous or intra-abdominal fluid collection noted.  No direct visualization of enterocutaneous fistula.      ## Medications:  piperacillin/tazobactam IVPB.. 3.375 Gram(s) IV Intermittent every 12 hours  raltegravir Tablet 400 milliGRAM(s) Oral two times a day  ritonavir Tablet 100 milliGRAM(s) Oral every 24 hours    midodrine. 10 milliGRAM(s) Oral every 8 hours      methimazole 10 milliGRAM(s) Oral daily    aspirin enteric coated 81 milliGRAM(s) Oral daily  heparin  Injectable 5000 Unit(s) SubCutaneous every 12 hours    pantoprazole    Tablet 40 milliGRAM(s) Oral before breakfast    fentaNYL    Injectable 25 MICROGram(s) IV Push every 6 hours PRN      ## Vitals:  T(C): 36.3 (01-09-20 @ 11:42), Max: 36.5 (01-09-20 @ 07:50)  HR: 82 (01-09-20 @ 10:00) (59 - 99)  BP: 120/60 (01-09-20 @ 10:00) (110/62 - 183/160)  BP(mean): 76 (01-09-20 @ 10:00) (67 - 169)  RR: 22 (01-09-20 @ 10:00) (13 - 25)  SpO2: 95% (01-09-20 @ 10:00) (92% - 98%)         01-08 @ 07:01  -  01-09 @ 07:00  --------------------------------------------------------  IN: 620 mL / OUT: 1450 mL / NET: -830 mL          ## P/E:  Gen: lying comfortably in bed in no apparent distress  HEENT: PERRL, EOMI  Resp: CTA B/L   CVS: RRR  Abd: soft ND +BS + colostomy with brown stool, abdominal wound without significant drainage - dressing applied and in place, mild tenderness left of colostomy with deep palpation, no rebound  Ext: LUE AVF with thrills and bruit  Neuro: A&Ox3    CENTRAL LINE: [ ] YES [ ] NO  LOCATION:   DATE INSERTED:  REMOVE: [ ] YES [ ] NO      LOOMIS: [ ] YES [ ] NO    DATE INSERTED:  REMOVE:  [ ] YES [ ] NO      A-LINE:  [ ] YES [ ] NO  LOCATION:   DATE INSERTED:  REMOVE:  [ ] YES [ ] NO  EXPLAIN:    GLOBAL ISSUE/BEST PRACTICE:  Analgesia:  Sedation:  HOB elevation: yes  Stress ulcer prophylaxis:  VTE prophylaxis:  Oral Care:  Glycemic control:  Nutrition:    CODE STATUS: [ ] full code  [ ] DNR  [ ] DNI  [ ] MOLST  Goals of care discussion: [ ] yes HPI:  59 year old male with a PMH HTN, ESRD (M/W/F), HIV, hep C, s/p hartmanns procedure 2/2 perforated sigmoid diverticulitis 9/2019 with subsequent admission to [facility] for UGIB likely due to gastric ulcers found on EGD (healed), and admission to our facility 10/2019 with infected abdominal wound (cx with e-coli, klebsiella, staph), currently presents with drainage of abdominal wound again. Patient states his home health aide was dressing his wound, but he noticed increased drainage over the last 2 days with associated abdominal pain. Course complicated by hypotension/shock. Transferred to ICU 1/7 for hemodynamic monitoring        24 hr events:  no IV pressors required  BP stable on midodrine 10 mg  tolerated HD yesterday without episodes of hypotension  still with some abdominal pain mostly around colostomy site per pt  afebrile, no nausea, no emesis  poor vascular access: right arm midline placed this morning     ## ROS:  no fever, no chills, no fatigue  no visual changes  no HA, no dizziness  no sore throat  no SOB, no cough  no CP, no palpitations  + abdominal pain mostly left sided  no nausea  + colostomy    ## Labs:  CBC:                        11.1   10.02 )-----------( 290      ( 09 Jan 2020 04:29 )             37.1     Chem:  01-09    138         |  98        |  31<H>  ----------------------------------------<  138<H>  3.4<L>   |  32<H>  |  8.10<H>    Ca    8.2<L>      09 Jan 2020 04:29  Phos  3.8     01-09  Mg     2.0     01-09    TPro  7.8  /  Alb  2.4<L>  /  TBili  0.5  /  AST  6<L>  /  ALT  9<L>  /  AlkPhos  86  01-09    Culture - Blood (01.07.20 @ 10:04)    Specimen Source: .Blood Blood    Culture Results: No growth to date.    Culture - Blood (01.07.20 @ 10:04)    Specimen Source: .Blood Blood    Culture Results: No growth to date.    Culture - Other (01.06.20 @ 17:17)    Specimen Source: Skin wound    Culture Results: Few Escherichia coli                           Few Staphylococcus aureus Susceptibility to follow.           ## Imaging:  CT abdomen < from: CT Abdomen and Pelvis w/ Oral Cont (01.06.20 @ 15:57) >  Postsurgical changes anterior abdominal wall with small foci of air. No discrete subcutaneous or intra-abdominal fluid collection noted.  No direct visualization of enterocutaneous fistula.      ## Medications:  piperacillin/tazobactam IVPB.. 3.375 Gram(s) IV Intermittent every 12 hours  raltegravir Tablet 400 milliGRAM(s) Oral two times a day  ritonavir Tablet 100 milliGRAM(s) Oral every 24 hours    midodrine. 10 milliGRAM(s) Oral every 8 hours      methimazole 10 milliGRAM(s) Oral daily    aspirin enteric coated 81 milliGRAM(s) Oral daily  heparin  Injectable 5000 Unit(s) SubCutaneous every 12 hours    pantoprazole    Tablet 40 milliGRAM(s) Oral before breakfast    fentaNYL    Injectable 25 MICROGram(s) IV Push every 6 hours PRN      ## Vitals:  T(C): 36.3 (01-09-20 @ 11:42), Max: 36.5 (01-09-20 @ 07:50)  HR: 82 (01-09-20 @ 10:00) (59 - 99)  BP: 120/60 (01-09-20 @ 10:00) (110/62 - 183/160)  BP(mean): 76 (01-09-20 @ 10:00) (67 - 169)  RR: 22 (01-09-20 @ 10:00) (13 - 25)  SpO2: 95% (01-09-20 @ 10:00) (92% - 98%)         01-08 @ 07:01  -  01-09 @ 07:00  --------------------------------------------------------  IN: 620 mL / OUT: 1450 mL / NET: -830 mL          ## P/E:  Gen: lying comfortably in bed in no apparent distress  HEENT: PERRL, EOMI  Resp: CTA B/L   CVS: RRR  Abd: soft ND +BS + colostomy with brown stool, abdominal wound without significant drainage - dressing applied and in place, mild tenderness left of colostomy with deep palpation, no rebound  Ext: LUE AVF with thrills and bruit, R arm midline  Neuro: A&Ox3, no focal deficits    CENTRAL LINE: [ ] YES [x] NO  LOCATION:   DATE INSERTED:  REMOVE: [ ] YES [ ] NO      LOOMIS: [ ] YES [x] NO    DATE INSERTED:  REMOVE:  [ ] YES [ ] NO      A-LINE:  [ ] YES [x] NO  LOCATION:   DATE INSERTED:  REMOVE:  [ ] YES [ ] NO  EXPLAIN:    GLOBAL ISSUE/BEST PRACTICE:  Analgesia: fentanyl prn   Sedation: n/a  HOB elevation: yes  Stress ulcer prophylaxis: protonix  VTE prophylaxis: heparin sc  Oral Care: n/a  Glycemic control: n/a  Nutrition: renal diet    CODE STATUS: [x] full code  [ ] DNR  [ ] DNI  [ ] MOLST  Goals of care discussion: [x] yes
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
## 2020-01-09 NOTE — PROGRESS NOTE ADULT - ASSESSMENT
59M PMH HTN, ESRD (M/W/F), HIV, hep C, s/p hartmanns procedure 2/2 perforated sigmoid diverticulitis 9/2019, UGIB likely due to gastric ulcers found on EGD, infected abdominal wound (cx with e-coli, klebsiella, staph) presents with increased drainage from abdominal wound with abdominal pain. Admitted with sepsis, abdominal wound infection. Course complicated by hypotension. Transferred to ICU 1/7 for hemodynamic monitoring    1. NEURO  - awake, alert, responsive    2. CV  - hypotension resolved with midodrine and s/p 2L IVF yesterday  - cont with midodrine 10mg q8 hours    3. RENAL  - HD tonight for ESRD    4. ID  - cont antiretrovirals for HIV  - cont with zosyn for underlying abdominal wound infection, dose vanco by level  - follow up sensitivities    5. GEN  - OOB to chair  - if pt tolerates HD without hypotension and remains stable, can transfer back to medical floor     Critical Care Time: 32 min 59M PMH HTN, ESRD (M/W/F), HIV, hep C, s/p hartmanns procedure 2/2 perforated sigmoid diverticulitis 9/2019, UGIB likely due to gastric ulcers found on EGD, infected abdominal wound (cx with e-coli, klebsiella, staph) presents with increased drainage from abdominal wound with abdominal pain. Admitted with sepsis, abdominal wound infection. Course complicated by hypotension, septic shock. Transferred to ICU 1/7 for hemodynamic monitoring    1. NEURO  - awake, alert, responsive    2. CV  - hypotension resolved with midodrine and s/p 2L IVF   - cont with midodrine 10mg q8 hours, BP stable on medication     3. RENAL  - HD per nephrology     4. ID  - cont antiretrovirals for HIV  - has been on zosyn for underlying abdominal wound infection and vanco by level, wound cx with e-coli and MSSA  - will de-escalate to ceftriaxone   - spoke with surgery, pt has abdominal mesh likely chronically infected  - pt with excoriation around colostomy site. surgery placed Flexi-Seal through ostomy for drainage of stool  - cont surgical follow up      5. GEN  - OOB to chair  - await transfer back to medical floor

## 2020-01-10 LAB
ANION GAP SERPL CALC-SCNC: 10 MMOL/L — SIGNIFICANT CHANGE UP (ref 5–17)
BUN SERPL-MCNC: 27 MG/DL — HIGH (ref 7–23)
CALCIUM SERPL-MCNC: 9 MG/DL — SIGNIFICANT CHANGE UP (ref 8.5–10.1)
CHLORIDE SERPL-SCNC: 103 MMOL/L — SIGNIFICANT CHANGE UP (ref 96–108)
CO2 SERPL-SCNC: 29 MMOL/L — SIGNIFICANT CHANGE UP (ref 22–31)
CREAT SERPL-MCNC: 6.07 MG/DL — HIGH (ref 0.5–1.3)
GLUCOSE SERPL-MCNC: 131 MG/DL — HIGH (ref 70–99)
HCT VFR BLD CALC: 38.9 % — LOW (ref 39–50)
HGB BLD-MCNC: 11.4 G/DL — LOW (ref 13–17)
MAGNESIUM SERPL-MCNC: 1.9 MG/DL — SIGNIFICANT CHANGE UP (ref 1.6–2.6)
MCHC RBC-ENTMCNC: 26.3 PG — LOW (ref 27–34)
MCHC RBC-ENTMCNC: 29.3 GM/DL — LOW (ref 32–36)
MCV RBC AUTO: 89.6 FL — SIGNIFICANT CHANGE UP (ref 80–100)
NRBC # BLD: 0 /100 WBCS — SIGNIFICANT CHANGE UP (ref 0–0)
PHOSPHATE SERPL-MCNC: 2.6 MG/DL — SIGNIFICANT CHANGE UP (ref 2.5–4.5)
PLATELET # BLD AUTO: 265 K/UL — SIGNIFICANT CHANGE UP (ref 150–400)
POTASSIUM SERPL-MCNC: 3.7 MMOL/L — SIGNIFICANT CHANGE UP (ref 3.5–5.3)
POTASSIUM SERPL-SCNC: 3.7 MMOL/L — SIGNIFICANT CHANGE UP (ref 3.5–5.3)
RBC # BLD: 4.34 M/UL — SIGNIFICANT CHANGE UP (ref 4.2–5.8)
RBC # FLD: 17.7 % — HIGH (ref 10.3–14.5)
SODIUM SERPL-SCNC: 142 MMOL/L — SIGNIFICANT CHANGE UP (ref 135–145)
WBC # BLD: 10.73 K/UL — HIGH (ref 3.8–10.5)
WBC # FLD AUTO: 10.73 K/UL — HIGH (ref 3.8–10.5)

## 2020-01-10 PROCEDURE — 99233 SBSQ HOSP IP/OBS HIGH 50: CPT

## 2020-01-10 RX ORDER — POTASSIUM CHLORIDE 20 MEQ
20 PACKET (EA) ORAL
Refills: 0 | Status: COMPLETED | OUTPATIENT
Start: 2020-01-10 | End: 2020-01-10

## 2020-01-10 RX ADMIN — FENTANYL CITRATE 25 MICROGRAM(S): 50 INJECTION INTRAVENOUS at 22:16

## 2020-01-10 RX ADMIN — MIDODRINE HYDROCHLORIDE 10 MILLIGRAM(S): 2.5 TABLET ORAL at 07:11

## 2020-01-10 RX ADMIN — FENTANYL CITRATE 25 MICROGRAM(S): 50 INJECTION INTRAVENOUS at 21:46

## 2020-01-10 RX ADMIN — RITONAVIR 100 MILLIGRAM(S): 100 TABLET, FILM COATED ORAL at 17:06

## 2020-01-10 RX ADMIN — Medication 1334 MILLIGRAM(S): at 09:23

## 2020-01-10 RX ADMIN — Medication 1334 MILLIGRAM(S): at 17:04

## 2020-01-10 RX ADMIN — ETRAVIRINE 200 MILLIGRAM(S): 200 TABLET ORAL at 17:05

## 2020-01-10 RX ADMIN — HEPARIN SODIUM 5000 UNIT(S): 5000 INJECTION INTRAVENOUS; SUBCUTANEOUS at 07:11

## 2020-01-10 RX ADMIN — Medication 20 MILLIEQUIVALENT(S): at 09:23

## 2020-01-10 RX ADMIN — ETRAVIRINE 200 MILLIGRAM(S): 200 TABLET ORAL at 09:23

## 2020-01-10 RX ADMIN — HEPARIN SODIUM 5000 UNIT(S): 5000 INJECTION INTRAVENOUS; SUBCUTANEOUS at 17:05

## 2020-01-10 RX ADMIN — Medication 81 MILLIGRAM(S): at 12:44

## 2020-01-10 RX ADMIN — CEFTRIAXONE 100 MILLIGRAM(S): 500 INJECTION, POWDER, FOR SOLUTION INTRAMUSCULAR; INTRAVENOUS at 17:03

## 2020-01-10 RX ADMIN — DARUNAVIR 800 MILLIGRAM(S): 75 TABLET, FILM COATED ORAL at 14:51

## 2020-01-10 RX ADMIN — PANTOPRAZOLE SODIUM 40 MILLIGRAM(S): 20 TABLET, DELAYED RELEASE ORAL at 09:23

## 2020-01-10 RX ADMIN — CHLORHEXIDINE GLUCONATE 1 APPLICATION(S): 213 SOLUTION TOPICAL at 09:23

## 2020-01-10 RX ADMIN — RALTEGRAVIR 400 MILLIGRAM(S): 400 TABLET, FILM COATED ORAL at 17:06

## 2020-01-10 RX ADMIN — MIDODRINE HYDROCHLORIDE 10 MILLIGRAM(S): 2.5 TABLET ORAL at 21:47

## 2020-01-10 RX ADMIN — RALTEGRAVIR 400 MILLIGRAM(S): 400 TABLET, FILM COATED ORAL at 07:11

## 2020-01-10 RX ADMIN — Medication 20 MILLIEQUIVALENT(S): at 13:17

## 2020-01-10 NOTE — PROGRESS NOTE ADULT - SUBJECTIVE AND OBJECTIVE BOX
[facility] NEPHROLOGY SERVICES, [facility]  NEPHROLOGY AND HYPERTENSION  [address]  [address]  [place] [address]  [phone]    MD PARVEEN NOLAND MD ANDREY GONCHARUK, MD MADHU KORRAPATI, MD YELENA ROSENBERG, MD BINNY KOSHY, MD CHRISTOPHER CAPUTO, MD DONAVAN CORTES MD          Patient feels well no complaints today.    MEDICATIONS  (STANDING):  aspirin enteric coated 81 milliGRAM(s) Oral daily  calcium acetate 1334 milliGRAM(s) Oral three times a day with meals  cefTRIAXone   IVPB 1000 milliGRAM(s) IV Intermittent every 24 hours  chlorhexidine 4% Liquid 1 Application(s) Topical <User Schedule>  darunavir 800 milliGRAM(s) Oral daily  dextrose 5%. 1000 milliLiter(s) (50 mL/Hr) IV Continuous <Continuous>  dextrose 50% Injectable 12.5 Gram(s) IV Push once  dextrose 50% Injectable 25 Gram(s) IV Push once  etravirine 200 milliGRAM(s) Oral two times a day after meals  heparin  Injectable 5000 Unit(s) SubCutaneous every 12 hours  methimazole 10 milliGRAM(s) Oral daily  midodrine. 10 milliGRAM(s) Oral every 8 hours  pantoprazole    Tablet 40 milliGRAM(s) Oral before breakfast  raltegravir Tablet 400 milliGRAM(s) Oral two times a day  ritonavir Tablet 100 milliGRAM(s) Oral every 24 hours    MEDICATIONS  (PRN):  dextrose 40% Gel 15 Gram(s) Oral once PRN Blood Glucose LESS THAN 70 milliGRAM(s)/deciliter  fentaNYL    Injectable 25 MICROGram(s) IV Push every 6 hours PRN Moderate Pain (4 - 6)  glucagon  Injectable 1 milliGRAM(s) IntraMuscular once PRN Glucose LESS THAN 70 milligrams/deciliter      01-09-20 @ 07:01  -  01-10-20 @ 07:00  --------------------------------------------------------  IN: 350 mL / OUT: 1200 mL / NET: -850 mL      PHYSICAL EXAM:      T(C): 36.8 (01-10-20 @ 11:37), Max: 36.8 (01-09-20 @ 19:10)  HR: 93 (01-10-20 @ 11:37) (80 - 114)  BP: 136/75 (01-10-20 @ 11:37) (114/56 - 138/64)  RR: 18 (01-10-20 @ 11:37) (16 - 20)  SpO2: 97% (01-10-20 @ 11:37) (97% - 100%)  Wt(kg): --  Respiratory: clear anteriorly, decreased BS at bases  Cardiovascular: S1 S2  Gastrointestinal: soft NT ND +BS + colostomy  Extremities: tr  edema                                    11.4   10.73 )-----------( 265      ( 10 Natalio 2020 11:34 )             38.9     01-10    142  |  103  |  27<H>  ----------------------------<  131<H>  3.7   |  29  |  6.07<H>    Ca    9.0      10 Natalio 2020 11:34  Phos  2.6     01-10  Mg     1.9     01-10    TPro  7.8  /  Alb  2.4<L>  /  TBili  0.5  /  DBili  x   /  AST  6<L>  /  ALT  9<L>  /  AlkPhos  86  01-09      LIVER FUNCTIONS - ( 09 Jan 2020 04:29 )  Alb: 2.4 g/dL / Pro: 7.8 gm/dL / ALK PHOS: 86 U/L / ALT: 9 U/L / AST: 6 U/L / GGT: x           Creatinine Trend: 6.07<--, 8.10<--, 14.10<--, 13.30<--, 12.90<--      Assessment   ESRD; septic shock; improving;     Plan:  HD for tomorrow;     Jacob Hobson MD
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
## 2020-01-10 NOTE — CONSULT NOTE ADULT - ASSESSMENT
59M PMH HTN, ESRD (M/W/F), HIV, hep C, s/p hartmanns procedure 2/2 perforated sigmoid diverticulitis 9/2019, UGIB likely due to gastric ulcers found on EGD, infected abdominal wound (cx with e-coli, klebsiella, staph) presents with increased drainage from abdominal wound with abdominal pain. Admitted with sepsis, abdominal wound infection. Course complicated by hypotension, septic shock. Transferred to ICU 1/7 for hemodynamic monitoring    1. NEURO  - awake, alert, responsive    2. CV  - hypotension resolved on  with midodrine    3. RENAL  - HD per nephrology     4. ID  - cont antiretrovirals for HIV  - was on zosyn for underlying abdominal wound infection and vanco by level, wound cx with e-coli and MSSA. now on rocephin per CCT team but conflicts with ID reccs would recommend  surgica primary l team d/w  ID plan of antibx .    , pt has abdominal mesh may be  likely chronically infected  - pt with excoriation around colostomy site. surgery placed Flexi-Seal through ostomy for drainage of stool  - cont surgical follow up

## 2020-01-11 LAB
ANION GAP SERPL CALC-SCNC: 12 MMOL/L — SIGNIFICANT CHANGE UP (ref 5–17)
BUN SERPL-MCNC: 40 MG/DL — HIGH (ref 7–23)
CALCIUM SERPL-MCNC: 9.1 MG/DL — SIGNIFICANT CHANGE UP (ref 8.5–10.1)
CHLORIDE SERPL-SCNC: 102 MMOL/L — SIGNIFICANT CHANGE UP (ref 96–108)
CO2 SERPL-SCNC: 27 MMOL/L — SIGNIFICANT CHANGE UP (ref 22–31)
CREAT SERPL-MCNC: 7.78 MG/DL — HIGH (ref 0.5–1.3)
CULTURE RESULTS: SIGNIFICANT CHANGE UP
CULTURE RESULTS: SIGNIFICANT CHANGE UP
GLUCOSE BLDC GLUCOMTR-MCNC: 130 MG/DL — HIGH (ref 70–99)
GLUCOSE SERPL-MCNC: 122 MG/DL — HIGH (ref 70–99)
HCT VFR BLD CALC: 35 % — LOW (ref 39–50)
HGB BLD-MCNC: 10.5 G/DL — LOW (ref 13–17)
MAGNESIUM SERPL-MCNC: 2 MG/DL — SIGNIFICANT CHANGE UP (ref 1.6–2.6)
MCHC RBC-ENTMCNC: 26.7 PG — LOW (ref 27–34)
MCHC RBC-ENTMCNC: 30 GM/DL — LOW (ref 32–36)
MCV RBC AUTO: 89.1 FL — SIGNIFICANT CHANGE UP (ref 80–100)
NRBC # BLD: 0 /100 WBCS — SIGNIFICANT CHANGE UP (ref 0–0)
PHOSPHATE SERPL-MCNC: 2.8 MG/DL — SIGNIFICANT CHANGE UP (ref 2.5–4.5)
PLATELET # BLD AUTO: 257 K/UL — SIGNIFICANT CHANGE UP (ref 150–400)
POTASSIUM SERPL-MCNC: 3.9 MMOL/L — SIGNIFICANT CHANGE UP (ref 3.5–5.3)
POTASSIUM SERPL-SCNC: 3.9 MMOL/L — SIGNIFICANT CHANGE UP (ref 3.5–5.3)
RBC # BLD: 3.93 M/UL — LOW (ref 4.2–5.8)
RBC # FLD: 17.5 % — HIGH (ref 10.3–14.5)
SODIUM SERPL-SCNC: 141 MMOL/L — SIGNIFICANT CHANGE UP (ref 135–145)
SPECIMEN SOURCE: SIGNIFICANT CHANGE UP
SPECIMEN SOURCE: SIGNIFICANT CHANGE UP
WBC # BLD: 11.96 K/UL — HIGH (ref 3.8–10.5)
WBC # FLD AUTO: 11.96 K/UL — HIGH (ref 3.8–10.5)

## 2020-01-11 PROCEDURE — 99233 SBSQ HOSP IP/OBS HIGH 50: CPT

## 2020-01-11 RX ORDER — ACETAMINOPHEN 500 MG
1000 TABLET ORAL ONCE
Refills: 0 | Status: COMPLETED | OUTPATIENT
Start: 2020-01-11 | End: 2020-01-11

## 2020-01-11 RX ORDER — HYDROMORPHONE HYDROCHLORIDE 2 MG/ML
1 INJECTION INTRAMUSCULAR; INTRAVENOUS; SUBCUTANEOUS ONCE
Refills: 0 | Status: DISCONTINUED | OUTPATIENT
Start: 2020-01-11 | End: 2020-01-11

## 2020-01-11 RX ORDER — HYDROMORPHONE HYDROCHLORIDE 2 MG/ML
1 INJECTION INTRAMUSCULAR; INTRAVENOUS; SUBCUTANEOUS EVERY 6 HOURS
Refills: 0 | Status: DISCONTINUED | OUTPATIENT
Start: 2020-01-11 | End: 2020-01-11

## 2020-01-11 RX ADMIN — CHLORHEXIDINE GLUCONATE 1 APPLICATION(S): 213 SOLUTION TOPICAL at 07:46

## 2020-01-11 RX ADMIN — RALTEGRAVIR 400 MILLIGRAM(S): 400 TABLET, FILM COATED ORAL at 06:05

## 2020-01-11 RX ADMIN — PANTOPRAZOLE SODIUM 40 MILLIGRAM(S): 20 TABLET, DELAYED RELEASE ORAL at 07:46

## 2020-01-11 RX ADMIN — HYDROMORPHONE HYDROCHLORIDE 1 MILLIGRAM(S): 2 INJECTION INTRAMUSCULAR; INTRAVENOUS; SUBCUTANEOUS at 16:02

## 2020-01-11 RX ADMIN — RITONAVIR 100 MILLIGRAM(S): 100 TABLET, FILM COATED ORAL at 18:03

## 2020-01-11 RX ADMIN — HEPARIN SODIUM 5000 UNIT(S): 5000 INJECTION INTRAVENOUS; SUBCUTANEOUS at 06:05

## 2020-01-11 RX ADMIN — HYDROMORPHONE HYDROCHLORIDE 1 MILLIGRAM(S): 2 INJECTION INTRAMUSCULAR; INTRAVENOUS; SUBCUTANEOUS at 16:40

## 2020-01-11 RX ADMIN — DARUNAVIR 800 MILLIGRAM(S): 75 TABLET, FILM COATED ORAL at 16:11

## 2020-01-11 RX ADMIN — MIDODRINE HYDROCHLORIDE 10 MILLIGRAM(S): 2.5 TABLET ORAL at 16:08

## 2020-01-11 RX ADMIN — HEPARIN SODIUM 5000 UNIT(S): 5000 INJECTION INTRAVENOUS; SUBCUTANEOUS at 18:03

## 2020-01-11 RX ADMIN — RALTEGRAVIR 400 MILLIGRAM(S): 400 TABLET, FILM COATED ORAL at 18:03

## 2020-01-11 RX ADMIN — Medication 1334 MILLIGRAM(S): at 07:46

## 2020-01-11 RX ADMIN — HYDROMORPHONE HYDROCHLORIDE 1 MILLIGRAM(S): 2 INJECTION INTRAMUSCULAR; INTRAVENOUS; SUBCUTANEOUS at 13:49

## 2020-01-11 RX ADMIN — Medication 1000 MILLIGRAM(S): at 22:51

## 2020-01-11 RX ADMIN — ETRAVIRINE 200 MILLIGRAM(S): 200 TABLET ORAL at 18:03

## 2020-01-11 RX ADMIN — Medication 400 MILLIGRAM(S): at 21:36

## 2020-01-11 RX ADMIN — CEFTRIAXONE 100 MILLIGRAM(S): 500 INJECTION, POWDER, FOR SOLUTION INTRAMUSCULAR; INTRAVENOUS at 18:04

## 2020-01-11 RX ADMIN — Medication 1334 MILLIGRAM(S): at 18:03

## 2020-01-11 RX ADMIN — HYDROMORPHONE HYDROCHLORIDE 1 MILLIGRAM(S): 2 INJECTION INTRAMUSCULAR; INTRAVENOUS; SUBCUTANEOUS at 14:30

## 2020-01-11 RX ADMIN — MIDODRINE HYDROCHLORIDE 10 MILLIGRAM(S): 2.5 TABLET ORAL at 21:37

## 2020-01-11 NOTE — PROGRESS NOTE ADULT - ASSESSMENT
59M PMH HTN, ESRD (M/W/F), HIV, hep C, s/p hartmanns procedure 2/2 perforated sigmoid diverticulitis 9/2019, UGIB likely due to gastric ulcers found on EGD, infected abdominal wound (cx with e-coli, klebsiella, staph) presents with increased drainage from abdominal wound with abdominal pain. Admitted with sepsis, abdominal wound infection. Course complicated by hypotension, septic shock. Transferred to ICU 1/7 for hemodynamic monitoring    1. NEURO  - awake, alert, responsive    2. CV  - hypotension resolved on  with midodrine    3. RENAL  - HD per nephrology     4. ID  - cont antiretrovirals for HIV  - was on zosyn for underlying abdominal wound infection and vanco by level, wound cx with e-coli and MSSA. now on rocephin per CCT team but conflicts with ID reccs would recommend  surgical primary  team d/w  ID plan of antibx .   1/11/2020 I brought the antibx issue to dr. engle who will see pt on today and address   , pt has abdominal mesh may be  likely chronically infected  - pt with excoriation around colostomy site. surgery placed Flexi-Seal through ostomy for drainage of stool  - cont surgical follow up
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
## 2020-01-11 NOTE — PROGRESS NOTE ADULT - ASSESSMENT
s/p Hartmanns procedure 2/2 perforated sigmoid diverticulitis 9/2019 with subsequent admission to [facility] for UGIB likely due to gastric ulcers found on EGD (healed), and admission to our facility 10/2019 with infected abdominal wound, currently presented with drainage of abdominal wound again  known to us for Sigmoid Colon rsxn with creation of colostomy, peritoneal lavage s/p I&D of intra-abdominal abscess on 9/18/19 treated for abdominal incisional wounds, admitted with possible ?enterocutaneous fistula on 11/2019 all wound culture with fairly sensitive organism mssa, ecoli and klebsiella sensitive to po and dc with po abx   on zosyn now   wound culture ecoli fairly sensitive and staph aureus final pending   Transferred to ICU  for persistent hypotension, doing ok on exam ,no fever , leukocytosis better   sx follow up ;; ?? fistula   patient on rocephin noted  will need po for life if mesh not removed   can discharge on po augmentin once sx stable  hiv follow up as out patient
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
## 2020-01-11 NOTE — CHART NOTE - NSCHARTNOTEFT_GEN_A_CORE
RN called for BP.  BP last hour was at 90s.  Patient s/p HD at 1pm pulled out 1400cc of fluid.  received couple of doses of dilaudid, missed a dose of midodrine.  discuss case with RN - DC narcotic for now will give Acetaminophen IV for pain.  cont to monitor BP and called if cont to trend down.  Ask rn to give dose of midodrine.    Patient currently complaint of mild abdominal pain, AOx3, mild tenderness on the wound.  Flexiseal with 400 cc output since am.  Discuss case with incoming surgical team to continue to monitor

## 2020-01-11 NOTE — PROGRESS NOTE ADULT - SUBJECTIVE AND OBJECTIVE BOX
[facility] NEPHROLOGY SERVICES, [facility]  NEPHROLOGY AND HYPERTENSION  [address]  [address]  [place] [address]  [phone]    MD PARVEEN NOLAND MD ANDREY GONCHARUK, MD MADHU KORRAPATI, MD YELENA ROSENBERG, MD STACIE OWENS, MD DONAVAN CORTES MD          Patient on HD requested early termination due to mild abd pain'   Feels better    MEDICATIONS  (STANDING):  aspirin enteric coated 81 milliGRAM(s) Oral daily  calcium acetate 1334 milliGRAM(s) Oral three times a day with meals  cefTRIAXone   IVPB 1000 milliGRAM(s) IV Intermittent every 24 hours  chlorhexidine 4% Liquid 1 Application(s) Topical <User Schedule>  darunavir 800 milliGRAM(s) Oral daily  dextrose 5%. 1000 milliLiter(s) (50 mL/Hr) IV Continuous <Continuous>  dextrose 50% Injectable 12.5 Gram(s) IV Push once  dextrose 50% Injectable 25 Gram(s) IV Push once  etravirine 200 milliGRAM(s) Oral two times a day after meals  heparin  Injectable 5000 Unit(s) SubCutaneous every 12 hours  methimazole 10 milliGRAM(s) Oral daily  midodrine. 10 milliGRAM(s) Oral every 8 hours  pantoprazole    Tablet 40 milliGRAM(s) Oral before breakfast  raltegravir Tablet 400 milliGRAM(s) Oral two times a day  ritonavir Tablet 100 milliGRAM(s) Oral every 24 hours    MEDICATIONS  (PRN):  dextrose 40% Gel 15 Gram(s) Oral once PRN Blood Glucose LESS THAN 70 milliGRAM(s)/deciliter  glucagon  Injectable 1 milliGRAM(s) IntraMuscular once PRN Glucose LESS THAN 70 milligrams/deciliter  HYDROmorphone  Injectable 1 milliGRAM(s) IV Push every 6 hours PRN Severe Pain (7 - 10)      01-10-20 @ 07:01  -  01-11-20 @ 07:00  --------------------------------------------------------  IN: 0 mL / OUT: 100 mL / NET: -100 mL    01-11-20 @ 07:01  -  01-11-20 @ 16:46  --------------------------------------------------------  IN: 0 mL / OUT: 1400 mL / NET: -1400 mL      PHYSICAL EXAM:      T(C): 36.7 (01-11-20 @ 14:25), Max: 37.1 (01-10-20 @ 18:00)  HR: 113 (01-11-20 @ 14:25) (65 - 113)  BP: 96/55 (01-11-20 @ 14:25) (96/55 - 135/84)  RR: 16 (01-11-20 @ 14:25) (16 - 18)  SpO2: 96% (01-11-20 @ 14:25) (96% - 100%)  Wt(kg): --  Respiratory: clear anteriorly, decreased BS at bases  Cardiovascular: S1 S2  Gastrointestinal: soft NT ND +BS  _ colsotomy  Extremities:   1 edema                                    10.5   11.96 )-----------( 257      ( 11 Jan 2020 11:31 )             35.0     01-11    141  |  102  |  40<H>  ----------------------------<  122<H>  3.9   |  27  |  7.78<H>    Ca    9.1      11 Jan 2020 11:31  Phos  2.8     01-11  Mg     2.0     01-11          Creatinine Trend: 7.78<--, 6.07<--, 8.10<--, 14.10<--, 13.30<--, 12.90<--      Assessment   ESRD; septic shock; improving;     Plan:  HD for today  UF a tolerated   Will follow.      Jacob Hobson MD

## 2020-01-11 NOTE — PROGRESS NOTE ADULT - SUBJECTIVE AND OBJECTIVE BOX
HPI:  Patient is a 59 year old male with a PMH HTN, ESRD (M/W/F), HIV, hep C, s/p hartmanns procedure 2/2 perforated sigmoid diverticulitis 9/2019 with subsequent admission to [facility] for UGIB likely due to gastric ulcers found on EGD (healed), and admission to our facility 10/2019 with infected abdominal wound, currently presented with drainage of abdominal wound again. Patient states his home health aide was dressing his wound, but he noticed increased drainage over the last 2 days with associated abdominal pain.   Admits to normal bowel function. Tolerating regular diet.   Denies fever, chills, chest pain, sob, palpitations, urinary symptoms. (06 Jan 2020 18:05)  work up consistent with recurrent e coli and methicillin sensitive staph aureus from abdominal wound   has an infected mesh     Allergies    No Known Allergies    Intolerances        MEDICATIONS  (STANDING):  aspirin enteric coated 81 milliGRAM(s) Oral daily  calcium acetate 1334 milliGRAM(s) Oral three times a day with meals  cefTRIAXone   IVPB 1000 milliGRAM(s) IV Intermittent every 24 hours  chlorhexidine 4% Liquid 1 Application(s) Topical <User Schedule>  darunavir 800 milliGRAM(s) Oral daily  dextrose 5%. 1000 milliLiter(s) (50 mL/Hr) IV Continuous <Continuous>  dextrose 50% Injectable 12.5 Gram(s) IV Push once  dextrose 50% Injectable 25 Gram(s) IV Push once  etravirine 200 milliGRAM(s) Oral two times a day after meals  heparin  Injectable 5000 Unit(s) SubCutaneous every 12 hours  methimazole 10 milliGRAM(s) Oral daily  midodrine. 10 milliGRAM(s) Oral every 8 hours  pantoprazole    Tablet 40 milliGRAM(s) Oral before breakfast  raltegravir Tablet 400 milliGRAM(s) Oral two times a day  ritonavir Tablet 100 milliGRAM(s) Oral every 24 hours    MEDICATIONS  (PRN):  dextrose 40% Gel 15 Gram(s) Oral once PRN Blood Glucose LESS THAN 70 milliGRAM(s)/deciliter  glucagon  Injectable 1 milliGRAM(s) IntraMuscular once PRN Glucose LESS THAN 70 milligrams/deciliter      REVIEW OF SYSTEMS:    severe pain in abdominal   no nausea vomiting diarrhea   no chest pain  VITAL SIGNS:  T(C): 36.3 (01-11-20 @ 18:10), Max: 36.8 (01-11-20 @ 05:07)  T(F): 97.3 (01-11-20 @ 18:10), Max: 98.3 (01-11-20 @ 05:07)  HR: 90 (01-11-20 @ 22:05) (65 - 113)  BP: 99/63 (01-11-20 @ 22:05) (90/51 - 135/84)  RR: 18 (01-11-20 @ 22:05) (16 - 18)  SpO2: 96% (01-11-20 @ 22:05) (95% - 99%)  Wt(kg): --    PHYSICAL EXAM:    GENERAL: not in any distress  HEENT: Neck is supple, normocephalic, atraumatic   CHEST/LUNG: Clear to auscultation bilaterally; No rales, rhonchi, wheezing  HEART: Regular rate and rhythm; No murmurs, rubs, or gallops  ABDOMEN: firm ;; tender, distended; Bowel sounds present, limited exam   EXTREMITIES:  2+ Peripheral Pulses, No clubbing, cyanosis, or edema  SKIN: No rashes or lesions  BACK: no pressor sore   NERVOUS SYSTEM:  Alert & Oriented X3, Good concentration  PSYCH: normal affect     LABS:                         10.5   11.96 )-----------( 257      ( 11 Jan 2020 11:31 )             35.0     01-11    141  |  102  |  40<H>  ----------------------------<  122<H>  3.9   |  27  |  7.78<H>    Ca    9.1      11 Jan 2020 11:31  Phos  2.8     01-11  Mg     2.0     01-11                  Thyroid Stimulating Hormone, Serum: <0.005 uIU/mL (01-09 @ 04:29)                Culture Results:   No growth to date. (01-07 @ 10:04)  Culture Results:   No growth to date. (01-07 @ 10:04)  Culture Results:   No growth at 5 days. (01-06 @ 17:24)  Culture Results:   No growth at 5 days. (01-06 @ 17:24)  Culture Results:   Few Escherichia coli  Few Staphylococcus aureus (01-06 @ 17:17)                Radiology:

## 2020-01-11 NOTE — CHART NOTE - NSCHARTNOTEFT_GEN_A_CORE
Assessment:     Factors impacting intake: [ ] none [ ] nausea  [ ] vomiting [ ] diarrhea [ ] constipation  [ ]chewing problems [ ] swallowing issues  [ ] other:     Diet Prescription: Diet, Renal Restrictions:   For patients receiving Renal Replacement - No Protein Restr, No Conc K, No Conc Phos, Low Sodium  Supplement Feeding Modality:  Oral  Nepro Cans or Servings Per Day:  1       Frequency:  Two Times a day (01-07-20 @ 12:33)    Intake:     Current Weight: Weight (kg): 97 (01-06 @ 21:45)  % Weight Change    Physical appearance:     Pertinent Medications: MEDICATIONS  (STANDING):  aspirin enteric coated 81 milliGRAM(s) Oral daily  calcium acetate 1334 milliGRAM(s) Oral three times a day with meals  cefTRIAXone   IVPB 1000 milliGRAM(s) IV Intermittent every 24 hours  chlorhexidine 4% Liquid 1 Application(s) Topical <User Schedule>  darunavir 800 milliGRAM(s) Oral daily  dextrose 5%. 1000 milliLiter(s) (50 mL/Hr) IV Continuous <Continuous>  dextrose 50% Injectable 12.5 Gram(s) IV Push once  dextrose 50% Injectable 25 Gram(s) IV Push once  etravirine 200 milliGRAM(s) Oral two times a day after meals  heparin  Injectable 5000 Unit(s) SubCutaneous every 12 hours  methimazole 10 milliGRAM(s) Oral daily  midodrine. 10 milliGRAM(s) Oral every 8 hours  pantoprazole    Tablet 40 milliGRAM(s) Oral before breakfast  raltegravir Tablet 400 milliGRAM(s) Oral two times a day  ritonavir Tablet 100 milliGRAM(s) Oral every 24 hours    MEDICATIONS  (PRN):  dextrose 40% Gel 15 Gram(s) Oral once PRN Blood Glucose LESS THAN 70 milliGRAM(s)/deciliter  fentaNYL    Injectable 25 MICROGram(s) IV Push every 6 hours PRN Moderate Pain (4 - 6)  glucagon  Injectable 1 milliGRAM(s) IntraMuscular once PRN Glucose LESS THAN 70 milligrams/deciliter    Pertinent Labs: 01-10 Na 142 mmol/L Glu 131 mg/dL<H> K+ 3.7 mmol/L Cr 6.07 mg/dL<H> BUN 27 mg/dL<H> Phos 2.6 mg/dL Alb 2.4(1/9)   PAB n/a   Hgb 11.4 g/dL<L> Hct 38.9 %<L> HgA1C n/a    Glucose, Serum: 131 mg/dL <H>, WBC=10.7(1/10), GFR=9   24Hr FS:  Skin: No pressure ulcer, Midline abscess    Estimated Needs:   [ ] no change since previous assessment ( )  [ ] recalculated:     Previous Nutrition Diagnosis: Malnutrition Severe malnutrition in context of chronic illness.   Etiology Inadequate energy/protein intake + increased energy/protein needs related to multiple abdominal issues c infections & ESRD on HD tx.   Signs/Symptoms Wt loss of 9.7% x 6 weeks; < 75% nutrition needs > 1 month.     Goal/Expected Outcome Pt to consume > 50% meals/supplements during hospitalization.  Nutrition Diagnosis is [x ] ongoing  [ ] resolved  [ ] improved  [ ] not applicable       New Nutrition Diagnosis: [x ] not applicable       Interventions:   Recommend  [ ] Continue:  [ ] Change Diet To:  [ ] Nutrition Supplement:  [ ] Nutrition Support:  [ ] Other:     Monitoring and Evaluation:   [x ] PO intake [ x ] Tolerance to diet prescription [ x ] weights [ x ] labs[ x ] follow up per protocol  [ ] other: Assessment:  Pt seen for follow-up & continues chronic severe malnutrition. Pt with s/p Hoffmann's procedure due to perforated sigmoid diverticulitis. Pt with midline abscess & abdominal mesh infection.     Factors impacting intake: [ ] none [ ] nausea  [ ] vomiting [ ] diarrhea [ ] constipation  [ ]chewing problems [ ] swallowing issues  [x ] other: abdominal pain, anorexia    Diet Prescription: Diet, Renal Restrictions:   For patients receiving Renal Replacement - No Protein Restr, No Conc K, No Conc Phos, Low Sodium  Supplement Feeding Modality:  Oral  Nepro Cans or Servings Per Day:  1       Frequency:  Two Times a day (01-07-20 @ 12:33)    Intake: Pt with persistent abdominal pain, anorexia & consuming 25% meals. Pt reports consuming 100% Nepro with carb steady. Pt recommends increase Nepro 3 x day(1375kcal & 57gm protein) daily.    Current Weight: Weight (kg): Wt=95kg(1/11), Wt=97 (01-06 @ 21:45)  % Weight Change 2kg(2%) x 4 days.     Physical appearance: + 1 gen edema, +2 edema Asim arm    Pertinent Medications: MEDICATIONS  (STANDING):  aspirin enteric coated 81 milliGRAM(s) Oral daily  calcium acetate 1334 milliGRAM(s) Oral three times a day with meals  cefTRIAXone   IVPB 1000 milliGRAM(s) IV Intermittent every 24 hours  chlorhexidine 4% Liquid 1 Application(s) Topical <User Schedule>  darunavir 800 milliGRAM(s) Oral daily  dextrose 5%. 1000 milliLiter(s) (50 mL/Hr) IV Continuous <Continuous>  dextrose 50% Injectable 12.5 Gram(s) IV Push once  dextrose 50% Injectable 25 Gram(s) IV Push once  etravirine 200 milliGRAM(s) Oral two times a day after meals  heparin  Injectable 5000 Unit(s) SubCutaneous every 12 hours  methimazole 10 milliGRAM(s) Oral daily  midodrine. 10 milliGRAM(s) Oral every 8 hours  pantoprazole    Tablet 40 milliGRAM(s) Oral before breakfast  raltegravir Tablet 400 milliGRAM(s) Oral two times a day  ritonavir Tablet 100 milliGRAM(s) Oral every 24 hours    MEDICATIONS  (PRN):  dextrose 40% Gel 15 Gram(s) Oral once PRN Blood Glucose LESS THAN 70 milliGRAM(s)/deciliter  fentaNYL    Injectable 25 MICROGram(s) IV Push every 6 hours PRN Moderate Pain (4 - 6)  glucagon  Injectable 1 milliGRAM(s) IntraMuscular once PRN Glucose LESS THAN 70 milligrams/deciliter    Pertinent Labs: 01-10 Na 142 mmol/L Glu 131 mg/dL<H> K+ 3.7 mmol/L Cr 6.07 mg/dL<H> BUN 27 mg/dL<H> Phos 2.6 mg/dL Alb 2.4(1/9)   PAB n/a   Hgb 11.4 g/dL<L> Hct 38.9 %<L> HgA1C n/a    Glucose, Serum: 131 mg/dL <H>, WBC=10.7(1/10), GFR=9   24Hr FS:  Skin: No pressure ulcer, Midline abscess    Estimated Needs:   [x ] no change since previous assessment (1/7/2020)  [ ] recalculated:     Previous Nutrition Diagnosis: Malnutrition Severe malnutrition in context of chronic illness.   Etiology Inadequate energy/protein intake + increased energy/protein needs related to multiple abdominal issues c infections & ESRD on HD tx.   Signs/Symptoms Wt loss of 9.7% x 6 weeks; < 75% nutrition needs > 1 month.     Goal/Expected Outcome Pt to consume > 50% meals; met  Pt to consume >75% supplements during hospitalization; met  Nutrition Diagnosis is [x ] ongoing  [ ] resolved  [ ] improved  [ ] not applicable       New Nutrition Diagnosis: [x ] not applicable       Interventions:   Recommend  [ ] Continue:   [x ] Change Diet To: Renal/Nepro with carb steady 3 x day(1275kcal & 57gm protein)  [ ] Nutrition Supplement:  [ ] Nutrition Support:  [ ] Other:     Monitoring and Evaluation:   [x ] PO intake [ x ] Tolerance to diet prescription [ x ] weights [ x ] labs[ x ] follow up per protocol  [ ] other: Assessment:  Pt seen for follow-up & continues chronic severe malnutrition. Pt with s/p Hoffmann's procedure due to perforated sigmoid diverticulitis. Pt with midline abscess & abdominal mesh infection. Pt with ESRD last dialysis 1/8.     Factors impacting intake: [ ] none [ ] nausea  [ ] vomiting [ ] diarrhea [ ] constipation  [ ]chewing problems [ ] swallowing issues  [x ] other: abdominal pain, anorexia    Diet Prescription: Diet, Renal Restrictions:   For patients receiving Renal Replacement - No Protein Restr, No Conc K, No Conc Phos, Low Sodium  Supplement Feeding Modality:  Oral  Nepro Cans or Servings Per Day:  1       Frequency:  Two Times a day (01-07-20 @ 12:33)    Intake: Pt with persistent abdominal pain, anorexia & consuming 25% meals. Pt reports consuming 100% Nepro with carb steady. Pt recommends increase Nepro 3 x day(1375kcal & 57gm protein) daily.    Current Weight: Weight (kg): Wt=95kg(1/11), Wt=97 (01-06 @ 21:45)  % Weight Change 2kg(2%) wt loss x 4 days.     Physical appearance: + 1 gen edema, +2 edema Asim arm    Pertinent Medications: MEDICATIONS  (STANDING):  aspirin enteric coated 81 milliGRAM(s) Oral daily  calcium acetate 1334 milliGRAM(s) Oral three times a day with meals  cefTRIAXone   IVPB 1000 milliGRAM(s) IV Intermittent every 24 hours  chlorhexidine 4% Liquid 1 Application(s) Topical <User Schedule>  darunavir 800 milliGRAM(s) Oral daily  dextrose 5%. 1000 milliLiter(s) (50 mL/Hr) IV Continuous <Continuous>  dextrose 50% Injectable 12.5 Gram(s) IV Push once  dextrose 50% Injectable 25 Gram(s) IV Push once  etravirine 200 milliGRAM(s) Oral two times a day after meals  heparin  Injectable 5000 Unit(s) SubCutaneous every 12 hours  methimazole 10 milliGRAM(s) Oral daily  midodrine. 10 milliGRAM(s) Oral every 8 hours  pantoprazole    Tablet 40 milliGRAM(s) Oral before breakfast  raltegravir Tablet 400 milliGRAM(s) Oral two times a day  ritonavir Tablet 100 milliGRAM(s) Oral every 24 hours    MEDICATIONS  (PRN):  dextrose 40% Gel 15 Gram(s) Oral once PRN Blood Glucose LESS THAN 70 milliGRAM(s)/deciliter  fentaNYL    Injectable 25 MICROGram(s) IV Push every 6 hours PRN Moderate Pain (4 - 6)  glucagon  Injectable 1 milliGRAM(s) IntraMuscular once PRN Glucose LESS THAN 70 milligrams/deciliter    Pertinent Labs: 01-10 Na 142 mmol/L Glu 131 mg/dL<H> K+ 3.7 mmol/L Cr 6.07 mg/dL<H> BUN 27 mg/dL<H> Phos 2.6 mg/dL Alb 2.4(1/9)   PAB n/a   Hgb 11.4 g/dL<L> Hct 38.9 %<L> HgA1C n/a    Glucose, Serum: 131 mg/dL <H>, WBC=10.7(1/10), GFR=9   24Hr FS:  Skin: No pressure ulcer, Midline abscess    Estimated Needs:   [x ] no change since previous assessment (1/7/2020)  [ ] recalculated:     Previous Nutrition Diagnosis: Malnutrition Severe malnutrition in context of chronic illness.   Etiology Inadequate energy/protein intake + increased energy/protein needs related to multiple abdominal issues c infections & ESRD on HD tx.   Signs/Symptoms Wt loss of 9.7% x 6 weeks; < 75% nutrition needs > 1 month.     Goal/Expected Outcome Pt to consume > 50% meals; met  Pt to consume >75% supplements during hospitalization; met  Nutrition Diagnosis is [x ] ongoing  [ ] resolved  [ ] improved  [ ] not applicable       New Nutrition Diagnosis: [x ] not applicable       Interventions:   Recommend  [ ] Continue:   [x ] Change Diet To: Renal/Nepro with carb steady 3 x day(1275kcal & 57gm protein)  [ ] Nutrition Supplement:  [ ] Nutrition Support:  [ ] Other:     Monitoring and Evaluation:   [x ] PO intake [ x ] Tolerance to diet prescription [ x ] weights [ x ] labs[ x ] follow up per protocol  [ ] other: Assessment:  Pt seen for follow-up & continues chronic severe malnutrition. Pt with s/p Hoffmann's procedure due to perforated sigmoid diverticulitis. Pt with midline abscess & abdominal mesh infection. Pt with ESRD last dialysis 1/8.     Factors impacting intake: [ ] none [ ] nausea  [ ] vomiting [ ] diarrhea [ ] constipation  [ ]chewing problems [ ] swallowing issues  [x ] other: abdominal pain, anorexia    Diet Prescription: Diet, Renal Restrictions:   For patients receiving Renal Replacement - No Protein Restr, No Conc K, No Conc Phos, Low Sodium  Supplement Feeding Modality:  Oral  Nepro Cans or Servings Per Day:  1       Frequency:  Two Times a day (01-07-20 @ 12:33)    Intake: Pt with persistent abdominal pain, anorexia & consuming 25% meals. Pt reports consuming 100% Nepro with carb steady. Pt recommends increase Nepro 3 x day(1375kcal & 57gm protein) daily.    Current Weight: Weight (kg): Wt=95kg(1/11), Wt=97 (01-06 @ 21:45)  % Weight Change 2kg(2%) wt loss x 4 days.     Physical appearance: + 1 gen edema, +2 edema Asim arm    Pertinent Medications: MEDICATIONS  (STANDING):  aspirin enteric coated 81 milliGRAM(s) Oral daily  calcium acetate 1334 milliGRAM(s) Oral three times a day with meals  cefTRIAXone   IVPB 1000 milliGRAM(s) IV Intermittent every 24 hours  chlorhexidine 4% Liquid 1 Application(s) Topical <User Schedule>  darunavir 800 milliGRAM(s) Oral daily  dextrose 5%. 1000 milliLiter(s) (50 mL/Hr) IV Continuous <Continuous>  dextrose 50% Injectable 12.5 Gram(s) IV Push once  dextrose 50% Injectable 25 Gram(s) IV Push once  etravirine 200 milliGRAM(s) Oral two times a day after meals  heparin  Injectable 5000 Unit(s) SubCutaneous every 12 hours  methimazole 10 milliGRAM(s) Oral daily  midodrine. 10 milliGRAM(s) Oral every 8 hours  pantoprazole    Tablet 40 milliGRAM(s) Oral before breakfast  raltegravir Tablet 400 milliGRAM(s) Oral two times a day  ritonavir Tablet 100 milliGRAM(s) Oral every 24 hours    MEDICATIONS  (PRN):  dextrose 40% Gel 15 Gram(s) Oral once PRN Blood Glucose LESS THAN 70 milliGRAM(s)/deciliter  fentaNYL    Injectable 25 MICROGram(s) IV Push every 6 hours PRN Moderate Pain (4 - 6)  glucagon  Injectable 1 milliGRAM(s) IntraMuscular once PRN Glucose LESS THAN 70 milligrams/deciliter    Pertinent Labs: 01-10 Na 142 mmol/L Glu 131 mg/dL<H> K+ 3.7 mmol/L Cr 6.07 mg/dL<H> BUN 27 mg/dL<H> Phos 2.6 mg/dL Alb 2.4(1/9)   PAB n/a   Hgb 11.4 g/dL<L> Hct 38.9 %<L> HgA1C n/a    Glucose, Serum: 131 mg/dL <H>, WBC=10.7(1/10), GFR=9   24Hr FS:  Skin: No pressure ulcer, Midline abscess    Estimated Needs:   [x ] no change since previous assessment (1/7/2020)  [ ] recalculated:     Previous Nutrition Diagnosis: Malnutrition Severe malnutrition in context of chronic illness.   Etiology Inadequate energy/protein intake + increased energy/protein needs related to multiple abdominal issues c infections & ESRD on HD tx.   Signs/Symptoms Wt loss of 9.7% x 6 weeks; < 75% nutrition needs > 1 month.     Goal/Expected Outcome Pt to consume > 50% meals; met  Pt to consume >75% supplements during hospitalization; met  Nutrition Diagnosis is [x ] ongoing  [ ] resolved  [ ] improved  [ ] not applicable       New Nutrition Diagnosis: [x ] not applicable       Interventions:   Recommend  [ ] Continue:   [x ] Change Diet To: Renal/Nepro with carb steady 3 x day(1275kcal & 57gm protein)/Nikhil active 2 x day  [ ] Nutrition Supplement:  [ ] Nutrition Support:  [ ] Other:     Monitoring and Evaluation:   [x ] PO intake [ x ] Tolerance to diet prescription [ x ] weights [ x ] labs[ x ] follow up per protocol  [ ] other: Assessment:  Pt seen for follow-up & continues chronic severe malnutrition. Pt with s/p Hoffmann's procedure due to perforated sigmoid diverticulitis. [id number] (1/11). Pt with midline abscess & abdominal mesh infection. Pt with ESRD last dialysis 1/8.     Factors impacting intake: [ ] none [ ] nausea  [ ] vomiting [ ] diarrhea [ ] constipation  [ ]chewing problems [ ] swallowing issues  [x ] other: abdominal pain, anorexia    Diet Prescription: Diet, Renal Restrictions:   For patients receiving Renal Replacement - No Protein Restr, No Conc K, No Conc Phos, Low Sodium  Supplement Feeding Modality:  Oral  Nepro Cans or Servings Per Day:  1       Frequency:  Two Times a day (01-07-20 @ 12:33)    Intake: Pt with persistent abdominal pain, anorexia & consuming 25% meals. Pt reports consuming 100% Nepro with carb steady. Pt recommends increase Nepro 3 x day(1375kcal & 57gm protein) daily.    Current Weight: Weight (kg): Wt=95kg(1/11), Wt=97 (01-06 @ 21:45)  % Weight Change 2kg(2%) wt loss x 4 days.     Physical appearance: + 1 gen edema, +2 edema Asim arm    Pertinent Medications: MEDICATIONS  (STANDING):  aspirin enteric coated 81 milliGRAM(s) Oral daily  calcium acetate 1334 milliGRAM(s) Oral three times a day with meals  cefTRIAXone   IVPB 1000 milliGRAM(s) IV Intermittent every 24 hours  chlorhexidine 4% Liquid 1 Application(s) Topical <User Schedule>  darunavir 800 milliGRAM(s) Oral daily  dextrose 5%. 1000 milliLiter(s) (50 mL/Hr) IV Continuous <Continuous>  dextrose 50% Injectable 12.5 Gram(s) IV Push once  dextrose 50% Injectable 25 Gram(s) IV Push once  etravirine 200 milliGRAM(s) Oral two times a day after meals  heparin  Injectable 5000 Unit(s) SubCutaneous every 12 hours  methimazole 10 milliGRAM(s) Oral daily  midodrine. 10 milliGRAM(s) Oral every 8 hours  pantoprazole    Tablet 40 milliGRAM(s) Oral before breakfast  raltegravir Tablet 400 milliGRAM(s) Oral two times a day  ritonavir Tablet 100 milliGRAM(s) Oral every 24 hours    MEDICATIONS  (PRN):  dextrose 40% Gel 15 Gram(s) Oral once PRN Blood Glucose LESS THAN 70 milliGRAM(s)/deciliter  fentaNYL    Injectable 25 MICROGram(s) IV Push every 6 hours PRN Moderate Pain (4 - 6)  glucagon  Injectable 1 milliGRAM(s) IntraMuscular once PRN Glucose LESS THAN 70 milligrams/deciliter    Pertinent Labs: 01-10 Na 142 mmol/L Glu 131 mg/dL<H> K+ 3.7 mmol/L Cr 6.07 mg/dL<H> BUN 27 mg/dL<H> Phos 2.6 mg/dL Alb 2.4(1/9)   PAB n/a   Hgb 11.4 g/dL<L> Hct 38.9 %<L> HgA1C n/a    Glucose, Serum: 131 mg/dL <H>, WBC=10.7(1/10), GFR=9   24Hr FS:  Skin: No pressure ulcer, Midline abscess    Estimated Needs:   [x ] no change since previous assessment (1/7/2020)  [ ] recalculated:     Previous Nutrition Diagnosis: Malnutrition Severe malnutrition in context of chronic illness.   Etiology Inadequate energy/protein intake + increased energy/protein needs related to multiple abdominal issues c infections & ESRD on HD tx.   Signs/Symptoms Wt loss of 9.7% x 6 weeks; < 75% nutrition needs > 1 month.     Goal/Expected Outcome Pt to consume > 50% meals; met  Pt to consume >75% supplements during hospitalization; met  Nutrition Diagnosis is [x ] ongoing  [ ] resolved  [ ] improved  [ ] not applicable       New Nutrition Diagnosis: [x ] not applicable       Interventions:   Recommend  [ ] Continue:   [x ] Change Diet To: Renal/Nepro with carb steady 3 x day(1275kcal & 57gm protein)/Nikhil active 2 x day  [ ] Nutrition Supplement:  [ ] Nutrition Support:  [ ] Other:     Monitoring and Evaluation:   [x ] PO intake [ x ] Tolerance to diet prescription [ x ] weights [ x ] labs[ x ] follow up per protocol  [ ] other:

## 2020-01-12 LAB
ANION GAP SERPL CALC-SCNC: 8 MMOL/L — SIGNIFICANT CHANGE UP (ref 5–17)
BUN SERPL-MCNC: 33 MG/DL — HIGH (ref 7–23)
CALCIUM SERPL-MCNC: 10.2 MG/DL — HIGH (ref 8.5–10.1)
CHLORIDE SERPL-SCNC: 99 MMOL/L — SIGNIFICANT CHANGE UP (ref 96–108)
CO2 SERPL-SCNC: 30 MMOL/L — SIGNIFICANT CHANGE UP (ref 22–31)
CREAT SERPL-MCNC: 5.77 MG/DL — HIGH (ref 0.5–1.3)
CULTURE RESULTS: SIGNIFICANT CHANGE UP
CULTURE RESULTS: SIGNIFICANT CHANGE UP
GLUCOSE SERPL-MCNC: 112 MG/DL — HIGH (ref 70–99)
HCT VFR BLD CALC: 40 % — SIGNIFICANT CHANGE UP (ref 39–50)
HGB BLD-MCNC: 11.6 G/DL — LOW (ref 13–17)
MAGNESIUM SERPL-MCNC: 2.3 MG/DL — SIGNIFICANT CHANGE UP (ref 1.6–2.6)
MCHC RBC-ENTMCNC: 26.3 PG — LOW (ref 27–34)
MCHC RBC-ENTMCNC: 29 GM/DL — LOW (ref 32–36)
MCV RBC AUTO: 90.7 FL — SIGNIFICANT CHANGE UP (ref 80–100)
NRBC # BLD: 0 /100 WBCS — SIGNIFICANT CHANGE UP (ref 0–0)
PHOSPHATE SERPL-MCNC: 3.2 MG/DL — SIGNIFICANT CHANGE UP (ref 2.5–4.5)
PLATELET # BLD AUTO: 302 K/UL — SIGNIFICANT CHANGE UP (ref 150–400)
POTASSIUM SERPL-MCNC: 4.9 MMOL/L — SIGNIFICANT CHANGE UP (ref 3.5–5.3)
POTASSIUM SERPL-SCNC: 4.9 MMOL/L — SIGNIFICANT CHANGE UP (ref 3.5–5.3)
RBC # BLD: 4.41 M/UL — SIGNIFICANT CHANGE UP (ref 4.2–5.8)
RBC # FLD: 17.7 % — HIGH (ref 10.3–14.5)
SODIUM SERPL-SCNC: 137 MMOL/L — SIGNIFICANT CHANGE UP (ref 135–145)
SPECIMEN SOURCE: SIGNIFICANT CHANGE UP
SPECIMEN SOURCE: SIGNIFICANT CHANGE UP
WBC # BLD: 15.7 K/UL — HIGH (ref 3.8–10.5)
WBC # FLD AUTO: 15.7 K/UL — HIGH (ref 3.8–10.5)

## 2020-01-12 PROCEDURE — 99232 SBSQ HOSP IP/OBS MODERATE 35: CPT

## 2020-01-12 RX ORDER — OXYCODONE AND ACETAMINOPHEN 5; 325 MG/1; MG/1
2 TABLET ORAL EVERY 6 HOURS
Refills: 0 | Status: DISCONTINUED | OUTPATIENT
Start: 2020-01-12 | End: 2020-01-19

## 2020-01-12 RX ORDER — OXYCODONE AND ACETAMINOPHEN 5; 325 MG/1; MG/1
1 TABLET ORAL EVERY 4 HOURS
Refills: 0 | Status: DISCONTINUED | OUTPATIENT
Start: 2020-01-12 | End: 2020-01-12

## 2020-01-12 RX ADMIN — RITONAVIR 100 MILLIGRAM(S): 100 TABLET, FILM COATED ORAL at 17:48

## 2020-01-12 RX ADMIN — RALTEGRAVIR 400 MILLIGRAM(S): 400 TABLET, FILM COATED ORAL at 05:58

## 2020-01-12 RX ADMIN — OXYCODONE AND ACETAMINOPHEN 2 TABLET(S): 5; 325 TABLET ORAL at 14:53

## 2020-01-12 RX ADMIN — PANTOPRAZOLE SODIUM 40 MILLIGRAM(S): 20 TABLET, DELAYED RELEASE ORAL at 08:07

## 2020-01-12 RX ADMIN — DARUNAVIR 800 MILLIGRAM(S): 75 TABLET, FILM COATED ORAL at 11:49

## 2020-01-12 RX ADMIN — RALTEGRAVIR 400 MILLIGRAM(S): 400 TABLET, FILM COATED ORAL at 17:48

## 2020-01-12 RX ADMIN — MIDODRINE HYDROCHLORIDE 10 MILLIGRAM(S): 2.5 TABLET ORAL at 13:54

## 2020-01-12 RX ADMIN — CHLORHEXIDINE GLUCONATE 1 APPLICATION(S): 213 SOLUTION TOPICAL at 06:04

## 2020-01-12 RX ADMIN — ETRAVIRINE 200 MILLIGRAM(S): 200 TABLET ORAL at 08:07

## 2020-01-12 RX ADMIN — HEPARIN SODIUM 5000 UNIT(S): 5000 INJECTION INTRAVENOUS; SUBCUTANEOUS at 17:48

## 2020-01-12 RX ADMIN — Medication 1334 MILLIGRAM(S): at 17:47

## 2020-01-12 RX ADMIN — MIDODRINE HYDROCHLORIDE 10 MILLIGRAM(S): 2.5 TABLET ORAL at 22:12

## 2020-01-12 RX ADMIN — OXYCODONE AND ACETAMINOPHEN 2 TABLET(S): 5; 325 TABLET ORAL at 15:45

## 2020-01-12 RX ADMIN — Medication 1334 MILLIGRAM(S): at 08:07

## 2020-01-12 RX ADMIN — ETRAVIRINE 200 MILLIGRAM(S): 200 TABLET ORAL at 18:47

## 2020-01-12 RX ADMIN — CEFTRIAXONE 100 MILLIGRAM(S): 500 INJECTION, POWDER, FOR SOLUTION INTRAMUSCULAR; INTRAVENOUS at 17:48

## 2020-01-12 RX ADMIN — Medication 81 MILLIGRAM(S): at 11:49

## 2020-01-12 RX ADMIN — MIDODRINE HYDROCHLORIDE 10 MILLIGRAM(S): 2.5 TABLET ORAL at 05:58

## 2020-01-12 RX ADMIN — Medication 1334 MILLIGRAM(S): at 11:53

## 2020-01-12 RX ADMIN — HEPARIN SODIUM 5000 UNIT(S): 5000 INJECTION INTRAVENOUS; SUBCUTANEOUS at 05:58

## 2020-01-12 NOTE — PROGRESS NOTE ADULT - ASSESSMENT
59M PMH HTN, ESRD (M/W/F), HIV, hep C, s/p hartmanns procedure 2/2 perforated sigmoid diverticulitis 9/2019, UGIB likely due to gastric ulcers found on EGD, infected abdominal wound (cx with e-coli, klebsiella, staph) presents with increased drainage from abdominal wound with abdominal pain. Admitted with sepsis, abdominal wound infection. Course complicated by hypotension, septic shock. Transferred to ICU 1/7 for hemodynamic monitoring    1. NEURO  - awake, alert, responsive    2. CV  - hypotension on  midodrine    3. RENAL  - HD per nephrology     4. ID  - cont antiretrovirals for HIV  - was on zosyn for underlying abdominal wound infection and vanco by level, wound cx with e-coli and MSSA.   1/11/2020 now on Rocephin per CCT team but conflicts with ID reccs would recommend  surgical primary  team d/w  ID plan of antibx .   1/12/2020 ID is ok with Rocephin and states  pt will lifelong oral antibx with Augmentin if mesh is to remain , after  review of surgery note it appears no immediate future plans to remove mesh   recommend primary  surgical team to monitor cbc daily as wbc is increasing .   - pt with excoriation around colostomy site. surgery placed Flexi-Seal through ostomy for drainage of stool  - 59M PMH HTN, ESRD (M/W/F), HIV, hep C, s/p hartmanns procedure 2/2 perforated sigmoid diverticulitis 9/2019, UGIB likely due to gastric ulcers found on EGD, infected abdominal wound (cx with e-coli, klebsiella, staph) presents with increased drainage from abdominal wound with abdominal pain. Admitted with sepsis, abdominal wound infection. Course complicated by hypotension, septic shock. Transferred to ICU 1/7 for hemodynamic monitoring    1. NEURO  - awake, alert, responsive    2. CV  - hypotension on  midodrine    3. RENAL  - HD per nephrology     4. ID  - cont antiretrovirals for HIV  - was on zosyn for underlying abdominal wound infection and vanco by level, wound cx with e-coli and MSSA.   1/11/2020 now on Rocephin per CCT team but conflicts with ID reccs would recommend  surgical primary  team d/w  ID plan of antibx .   1/12/2020 ID is ok with Rocephin and states  pt will lifelong oral antibx with Augmentin if mesh is to remain , after  review of surgery note it appears no immediate future plans to remove mesh   recommend primary  surgical team to monitor cbc daily as wbc is increasing .   - pt with excoriation around colostomy site. surgery placed Flexi-Seal through ostomy for drainage of stool   relayed to surgical PA pt c/o pain   -
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
## 2020-01-13 LAB
GLUCOSE BLDC GLUCOMTR-MCNC: 122 MG/DL — HIGH (ref 70–99)
GLUCOSE BLDC GLUCOMTR-MCNC: 131 MG/DL — HIGH (ref 70–99)
GLUCOSE BLDC GLUCOMTR-MCNC: 172 MG/DL — HIGH (ref 70–99)
GLUCOSE BLDC GLUCOMTR-MCNC: 99 MG/DL — SIGNIFICANT CHANGE UP (ref 70–99)

## 2020-01-13 PROCEDURE — 99232 SBSQ HOSP IP/OBS MODERATE 35: CPT

## 2020-01-13 PROCEDURE — 70450 CT HEAD/BRAIN W/O DYE: CPT | Mod: 26

## 2020-01-13 RX ORDER — CEFAZOLIN SODIUM 1 G
1000 VIAL (EA) INJECTION EVERY 24 HOURS
Refills: 0 | Status: DISCONTINUED | OUTPATIENT
Start: 2020-01-13 | End: 2020-01-24

## 2020-01-13 RX ADMIN — RALTEGRAVIR 400 MILLIGRAM(S): 400 TABLET, FILM COATED ORAL at 17:35

## 2020-01-13 RX ADMIN — MIDODRINE HYDROCHLORIDE 10 MILLIGRAM(S): 2.5 TABLET ORAL at 13:22

## 2020-01-13 RX ADMIN — HEPARIN SODIUM 5000 UNIT(S): 5000 INJECTION INTRAVENOUS; SUBCUTANEOUS at 06:21

## 2020-01-13 RX ADMIN — OXYCODONE AND ACETAMINOPHEN 2 TABLET(S): 5; 325 TABLET ORAL at 11:55

## 2020-01-13 RX ADMIN — MIDODRINE HYDROCHLORIDE 10 MILLIGRAM(S): 2.5 TABLET ORAL at 06:22

## 2020-01-13 RX ADMIN — OXYCODONE AND ACETAMINOPHEN 2 TABLET(S): 5; 325 TABLET ORAL at 19:05

## 2020-01-13 RX ADMIN — OXYCODONE AND ACETAMINOPHEN 2 TABLET(S): 5; 325 TABLET ORAL at 10:38

## 2020-01-13 RX ADMIN — Medication 1334 MILLIGRAM(S): at 17:34

## 2020-01-13 RX ADMIN — CHLORHEXIDINE GLUCONATE 1 APPLICATION(S): 213 SOLUTION TOPICAL at 06:20

## 2020-01-13 RX ADMIN — Medication 1334 MILLIGRAM(S): at 11:53

## 2020-01-13 RX ADMIN — Medication 1334 MILLIGRAM(S): at 08:57

## 2020-01-13 RX ADMIN — PANTOPRAZOLE SODIUM 40 MILLIGRAM(S): 20 TABLET, DELAYED RELEASE ORAL at 06:23

## 2020-01-13 RX ADMIN — DARUNAVIR 800 MILLIGRAM(S): 75 TABLET, FILM COATED ORAL at 11:53

## 2020-01-13 RX ADMIN — RITONAVIR 100 MILLIGRAM(S): 100 TABLET, FILM COATED ORAL at 17:36

## 2020-01-13 RX ADMIN — Medication 100 MILLIGRAM(S): at 21:47

## 2020-01-13 RX ADMIN — RALTEGRAVIR 400 MILLIGRAM(S): 400 TABLET, FILM COATED ORAL at 06:22

## 2020-01-13 RX ADMIN — Medication 81 MILLIGRAM(S): at 11:53

## 2020-01-13 RX ADMIN — OXYCODONE AND ACETAMINOPHEN 2 TABLET(S): 5; 325 TABLET ORAL at 18:04

## 2020-01-13 RX ADMIN — CEFTRIAXONE 100 MILLIGRAM(S): 500 INJECTION, POWDER, FOR SOLUTION INTRAMUSCULAR; INTRAVENOUS at 17:35

## 2020-01-13 RX ADMIN — ETRAVIRINE 200 MILLIGRAM(S): 200 TABLET ORAL at 17:35

## 2020-01-13 RX ADMIN — MIDODRINE HYDROCHLORIDE 10 MILLIGRAM(S): 2.5 TABLET ORAL at 21:46

## 2020-01-13 RX ADMIN — ETRAVIRINE 200 MILLIGRAM(S): 200 TABLET ORAL at 08:57

## 2020-01-13 RX ADMIN — HEPARIN SODIUM 5000 UNIT(S): 5000 INJECTION INTRAVENOUS; SUBCUTANEOUS at 17:35
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
## 2020-01-13 NOTE — PROGRESS NOTE ADULT - ASSESSMENT
59M PMH HTN, ESRD (M/W/F), HIV, hep C, s/p hartmanns procedure 2/2 perforated sigmoid diverticulitis 9/2019, UGIB likely due to gastric ulcers found on EGD, infected abdominal wound (cx with e-coli, klebsiella, staph) presents with increased drainage from abdominal wound with abdominal pain. Admitted with sepsis, abdominal wound infection. Course complicated by hypotension, septic shock. Transferred to ICU 1/7 for hemodynamic monitoring    1. NEURO  - awake, alert, responsive    2. CV  - hypotension on  midodrine    3. RENAL  - HD per nephrology     4. ID  - cont antiretrovirals for HIV  - was on zosyn for underlying abdominal wound infection and vanco by level, wound cx with e-coli and MSSA.   1/11/2020 now on Rocephin per CCT team but conflicts with ID reccs would recommend  surgical primary  team d/w  ID plan of antibx .   1/12/2020 ID is ok with Rocephin and states  pt will lifelong oral antibx with Augmentin if mesh is to remain , after  review of surgery note it appears no immediate future plans to remove mesh   recommend primary  surgical team to monitor cbc daily as wbc is increasing .   - pt with excoriation around colostomy site. surgery placed Flexi-Seal through ostomy for drainage of stool   relayed to surgical PA pt c/o pain  1/13/2020 recommend obtaining labs today to eval wbc elevation   -

## 2020-01-13 NOTE — PROGRESS NOTE ADULT - SUBJECTIVE AND OBJECTIVE BOX
Patient seen and examined at bedside in no distress.  Endorses lack of appetite, but is tolerating when eating.  Denies fever, chills, chest pain, sob, abdominal pain.    Vital Signs Last 24 Hrs  T(F): 97.2 (01-13-20 @ 05:00), Max: 98.6 (01-12-20 @ 16:36)  HR: 80 (01-13-20 @ 05:00)  BP: 102/67 (01-13-20 @ 05:00)  RR: 18 (01-13-20 @ 05:00)  SpO2: 95% (01-13-20 @ 05:00)    GENERAL: Alert, oriented, NAD  CHEST/LUNG: Respirations nonlabored  HEART: S1S2, RRR  ABDOMEN: + Bowel sounds. Midline incision with open wounds, wound beds clean, no drainage/bleeding noted. Colostomy functioning, draining stool via flexiseal. Surrounding skin macerated and raw. Soft, NTND  EXTREMITIES: No pedal edema b/l     LABS:                        11.6   15.70 )-----------( 302      ( 12 Jan 2020 08:08 )             40.0     01-12    137  |  99  |  33<H>  ----------------------------<  112<H>  4.9   |  30  |  5.77<H>    Ca    10.2<H>      12 Jan 2020 08:08  Phos  3.2     01-12  Mg     2.3     01-12      A/P: 59M PMH ESRD on HD (MWF), HIV on HAART, hepatitis C, HLD, and HTN, s/p hartmanns procedure 9/2019 for perforated sigmoid diverticulitis, s/p UGIB due to gastric ulcers, s/p admission for infected abdominal wound, now readmitted with sepsis, possibly due to infected abdominal wound (no clinical evidence) vs chronically infected mesh?   No evidence of fistula on CT scan. Leukocytosis worsened today. Afebrile.   - continue colostomy care, surrounding skin care  - antibiotics per ID, may need adjustment  - diet as tolerated  - DVT ppx  - renal and medical team f/u  - will d/w Dr. Oliveira

## 2020-01-13 NOTE — PROGRESS NOTE ADULT - SUBJECTIVE AND OBJECTIVE BOX
HPI:  Patient is a 59 year old male with a PMH HTN, ESRD (M/W/F), HIV, hep C, s/p hartmanns procedure 2/2 perforated sigmoid diverticulitis 9/2019 with subsequent admission to [facility] for UGIB likely due to gastric ulcers found on EGD (healed), and admission to our facility 10/2019 with infected abdominal wound, currently presented with drainage of abdominal wound again. Patient states his home health aide was dressing his wound, but he noticed increased drainage over the last 2 days with associated abdominal pain.   Admits to normal bowel function. Tolerating regular diet.   Denies fever, chills, chest pain, sob, palpitations, urinary symptoms. (06 Jan 2020 18:05)  work up consistent with infected mesh   did better but now worsening abdominal pain and higher wbc which was normal inbetween    Allergies    No Known Allergies    Intolerances        MEDICATIONS  (STANDING):  aspirin enteric coated 81 milliGRAM(s) Oral daily  calcium acetate 1334 milliGRAM(s) Oral three times a day with meals  ceFAZolin   IVPB 1000 milliGRAM(s) IV Intermittent every 24 hours  chlorhexidine 4% Liquid 1 Application(s) Topical <User Schedule>  darunavir 800 milliGRAM(s) Oral daily  dextrose 5%. 1000 milliLiter(s) (50 mL/Hr) IV Continuous <Continuous>  dextrose 50% Injectable 12.5 Gram(s) IV Push once  dextrose 50% Injectable 25 Gram(s) IV Push once  etravirine 200 milliGRAM(s) Oral two times a day after meals  heparin  Injectable 5000 Unit(s) SubCutaneous every 12 hours  methimazole 10 milliGRAM(s) Oral daily  midodrine. 10 milliGRAM(s) Oral every 8 hours  pantoprazole    Tablet 40 milliGRAM(s) Oral before breakfast  raltegravir Tablet 400 milliGRAM(s) Oral two times a day  ritonavir Tablet 100 milliGRAM(s) Oral every 24 hours    MEDICATIONS  (PRN):  dextrose 40% Gel 15 Gram(s) Oral once PRN Blood Glucose LESS THAN 70 milliGRAM(s)/deciliter  glucagon  Injectable 1 milliGRAM(s) IntraMuscular once PRN Glucose LESS THAN 70 milligrams/deciliter  oxycodone    5 mG/acetaminophen 325 mG 2 Tablet(s) Oral every 6 hours PRN Severe Pain (7 - 10)  oxycodone    5 mG/acetaminophen 325 mG 1 Tablet(s) Oral every 4 hours PRN Moderate Pain (4 - 6)      REVIEW OF SYSTEMS:    severe pain abdomen  poor appetite   no nausea vomiting diarrhea     VITAL SIGNS:  T(C): 36.6 (01-13-20 @ 16:26), Max: 36.7 (01-13-20 @ 12:24)  T(F): 97.9 (01-13-20 @ 16:26), Max: 98.1 (01-13-20 @ 12:24)  HR: 65 (01-13-20 @ 16:26) (65 - 82)  BP: 108/54 (01-13-20 @ 16:26) (102/67 - 137/59)  RR: 17 (01-13-20 @ 16:26) (16 - 18)  SpO2: 92% (01-13-20 @ 16:26) (92% - 98%)  Wt(kg): --    PHYSICAL EXAM:    GENERAL: not in any distress  HEENT: Neck is supple, normocephalic, atraumatic   CHEST/LUNG: Clear to auscultation bilaterally; No rales, rhonchi, wheezing  HEART: Regular rate and rhythm; No murmurs, rubs, or gallops  ABDOMEN: Soft, Nontender, Nondistended; Bowel sounds present, no rebound   colostomy plus   resits exam   be;lly is tender  EXTREMITIES:  2+ Peripheral Pulses, No clubbing, cyanosis, or edema  SKIN: No rashes or lesions  BACK: no pressor sore   NERVOUS SYSTEM:  Alert & Oriented X3, Good concentration  PSYCH: normal affect     LABS:                         11.6   15.70 )-----------( 302      ( 12 Jan 2020 08:08 )             40.0     01-12    137  |  99  |  33<H>  ----------------------------<  112<H>  4.9   |  30  |  5.77<H>    Ca    10.2<H>      12 Jan 2020 08:08  Phos  3.2     01-12  Mg     2.3     01-12                  Thyroid Stimulating Hormone, Serum: <0.005 uIU/mL (01-09 @ 04:29)                Culture Results:   No growth at 5 days. (01-07 @ 10:04)  Culture Results:   No growth at 5 days. (01-07 @ 10:04)                Radiology:

## 2020-01-13 NOTE — PROGRESS NOTE ADULT - ASSESSMENT
s/p Hartmanns procedure 2/2 perforated sigmoid diverticulitis 9/2019 with subsequent admission to [facility] for UGIB likely due to gastric ulcers found on EGD (healed), and admission to our facility 10/2019 with infected abdominal wound, currently presented with drainage of abdominal wound again  known to us for Sigmoid Colon rsxn with creation of colostomy, peritoneal lavage s/p I&D of intra-abdominal abscess on 9/18/19 treated for abdominal incisional wounds, admitted with possible ?enterocutaneous fistula on 11/2019 all wound culture with fairly sensitive organism mssa, ecoli and klebsiella sensitive to po and dc with po abx   on zosyn now   wound culture ecoli fairly sensitive and staph aureus final pending   Transferred to ICU  for persistent hypotension, doing ok on exam ,no fever , leukocytosis better   sx follow up ;; ?? fistula   patient on rocephin noted getting worse wbc '  switch to iv ancef as better methicillin sensitive staph aureus drug  sx follow up   will need po for life if mesh not removed   c

## 2020-01-14 LAB
ALBUMIN SERPL ELPH-MCNC: 2.3 G/DL — LOW (ref 3.3–5)
ALP SERPL-CCNC: 149 U/L — HIGH (ref 40–120)
ALT FLD-CCNC: 16 U/L — SIGNIFICANT CHANGE UP (ref 12–78)
ANION GAP SERPL CALC-SCNC: 13 MMOL/L — SIGNIFICANT CHANGE UP (ref 5–17)
APTT BLD: 30.2 SEC — SIGNIFICANT CHANGE UP (ref 27.5–36.3)
AST SERPL-CCNC: 12 U/L — LOW (ref 15–37)
BASE EXCESS BLDV CALC-SCNC: 6.3 MMOL/L — HIGH (ref -2–2)
BASOPHILS # BLD AUTO: 0.06 K/UL — SIGNIFICANT CHANGE UP (ref 0–0.2)
BASOPHILS NFR BLD AUTO: 0.5 % — SIGNIFICANT CHANGE UP (ref 0–2)
BILIRUB SERPL-MCNC: 0.3 MG/DL — SIGNIFICANT CHANGE UP (ref 0.2–1.2)
BLOOD GAS COMMENTS, VENOUS: SIGNIFICANT CHANGE UP
BUN SERPL-MCNC: 68 MG/DL — HIGH (ref 7–23)
CALCIUM SERPL-MCNC: 10.4 MG/DL — HIGH (ref 8.5–10.1)
CHLORIDE SERPL-SCNC: 95 MMOL/L — LOW (ref 96–108)
CO2 SERPL-SCNC: 30 MMOL/L — SIGNIFICANT CHANGE UP (ref 22–31)
CREAT SERPL-MCNC: 8.93 MG/DL — HIGH (ref 0.5–1.3)
EOSINOPHIL # BLD AUTO: 0.24 K/UL — SIGNIFICANT CHANGE UP (ref 0–0.5)
EOSINOPHIL NFR BLD AUTO: 1.9 % — SIGNIFICANT CHANGE UP (ref 0–6)
GAS PNL BLDV: SIGNIFICANT CHANGE UP
GLUCOSE BLDC GLUCOMTR-MCNC: 108 MG/DL — HIGH (ref 70–99)
GLUCOSE BLDC GLUCOMTR-MCNC: 128 MG/DL — HIGH (ref 70–99)
GLUCOSE SERPL-MCNC: 110 MG/DL — HIGH (ref 70–99)
HCO3 BLDV-SCNC: 32 MMOL/L — HIGH (ref 21–29)
HCT VFR BLD CALC: 34.7 % — LOW (ref 39–50)
HGB BLD-MCNC: 10.3 G/DL — LOW (ref 13–17)
HOROWITZ INDEX BLDV+IHG-RTO: 0.28 — SIGNIFICANT CHANGE UP
IMM GRANULOCYTES NFR BLD AUTO: 0.6 % — SIGNIFICANT CHANGE UP (ref 0–1.5)
INR BLD: 1.16 RATIO — SIGNIFICANT CHANGE UP (ref 0.88–1.16)
LACTATE SERPL-SCNC: 2.1 MMOL/L — HIGH (ref 0.7–2)
LYMPHOCYTES # BLD AUTO: 17.5 % — SIGNIFICANT CHANGE UP (ref 13–44)
LYMPHOCYTES # BLD AUTO: 2.18 K/UL — SIGNIFICANT CHANGE UP (ref 1–3.3)
MAGNESIUM SERPL-MCNC: 2.5 MG/DL — SIGNIFICANT CHANGE UP (ref 1.6–2.6)
MCHC RBC-ENTMCNC: 27 PG — SIGNIFICANT CHANGE UP (ref 27–34)
MCHC RBC-ENTMCNC: 29.7 GM/DL — LOW (ref 32–36)
MCV RBC AUTO: 91.1 FL — SIGNIFICANT CHANGE UP (ref 80–100)
MONOCYTES # BLD AUTO: 0.97 K/UL — HIGH (ref 0–0.9)
MONOCYTES NFR BLD AUTO: 7.8 % — SIGNIFICANT CHANGE UP (ref 2–14)
NEUTROPHILS # BLD AUTO: 8.97 K/UL — HIGH (ref 1.8–7.4)
NEUTROPHILS NFR BLD AUTO: 71.7 % — SIGNIFICANT CHANGE UP (ref 43–77)
NRBC # BLD: 0 /100 WBCS — SIGNIFICANT CHANGE UP (ref 0–0)
PCO2 BLDV: 51 MMHG — HIGH (ref 35–50)
PH BLDV: 7.41 — SIGNIFICANT CHANGE UP (ref 7.35–7.45)
PHOSPHATE SERPL-MCNC: 4.2 MG/DL — SIGNIFICANT CHANGE UP (ref 2.5–4.5)
PLATELET # BLD AUTO: 270 K/UL — SIGNIFICANT CHANGE UP (ref 150–400)
PO2 BLDV: 44 MMHG — SIGNIFICANT CHANGE UP (ref 25–45)
POTASSIUM SERPL-MCNC: 5.3 MMOL/L — SIGNIFICANT CHANGE UP (ref 3.5–5.3)
POTASSIUM SERPL-SCNC: 5.3 MMOL/L — SIGNIFICANT CHANGE UP (ref 3.5–5.3)
PROT SERPL-MCNC: 8.1 GM/DL — SIGNIFICANT CHANGE UP (ref 6–8.3)
PROTHROM AB SERPL-ACNC: 13 SEC — HIGH (ref 10–12.9)
RBC # BLD: 3.81 M/UL — LOW (ref 4.2–5.8)
RBC # FLD: 17.6 % — HIGH (ref 10.3–14.5)
SAO2 % BLDV: 74 % — SIGNIFICANT CHANGE UP (ref 67–88)
SODIUM SERPL-SCNC: 138 MMOL/L — SIGNIFICANT CHANGE UP (ref 135–145)
WBC # BLD: 12.49 K/UL — HIGH (ref 3.8–10.5)
WBC # FLD AUTO: 12.49 K/UL — HIGH (ref 3.8–10.5)

## 2020-01-14 PROCEDURE — 70496 CT ANGIOGRAPHY HEAD: CPT | Mod: 26

## 2020-01-14 PROCEDURE — 70498 CT ANGIOGRAPHY NECK: CPT | Mod: 26

## 2020-01-14 PROCEDURE — 99233 SBSQ HOSP IP/OBS HIGH 50: CPT

## 2020-01-14 PROCEDURE — 74177 CT ABD & PELVIS W/CONTRAST: CPT | Mod: 26

## 2020-01-14 RX ADMIN — Medication 100 MILLIGRAM(S): at 22:29

## 2020-01-14 RX ADMIN — CHLORHEXIDINE GLUCONATE 1 APPLICATION(S): 213 SOLUTION TOPICAL at 06:27

## 2020-01-14 RX ADMIN — Medication 81 MILLIGRAM(S): at 12:05

## 2020-01-14 RX ADMIN — ETRAVIRINE 200 MILLIGRAM(S): 200 TABLET ORAL at 18:08

## 2020-01-14 RX ADMIN — MIDODRINE HYDROCHLORIDE 10 MILLIGRAM(S): 2.5 TABLET ORAL at 22:28

## 2020-01-14 RX ADMIN — RITONAVIR 100 MILLIGRAM(S): 100 TABLET, FILM COATED ORAL at 18:08

## 2020-01-14 RX ADMIN — Medication 1334 MILLIGRAM(S): at 12:04

## 2020-01-14 RX ADMIN — DARUNAVIR 800 MILLIGRAM(S): 75 TABLET, FILM COATED ORAL at 12:06

## 2020-01-14 RX ADMIN — RALTEGRAVIR 400 MILLIGRAM(S): 400 TABLET, FILM COATED ORAL at 18:08

## 2020-01-14 RX ADMIN — ETRAVIRINE 200 MILLIGRAM(S): 200 TABLET ORAL at 08:49

## 2020-01-14 RX ADMIN — Medication 1334 MILLIGRAM(S): at 18:08

## 2020-01-14 NOTE — PROGRESS NOTE ADULT - PROVIDER SPECIALTY LIST ADULT
Hypertensive Medications  Protocol Criteria:  · Appointment scheduled in the past 6 months or in the next 3 months  · BMP or CMP in the past 12 months  · Creatinine result < 2  Recent Outpatient Visits            1 month ago Screening for colorectal cancer Nephrology

## 2020-01-14 NOTE — PROGRESS NOTE ADULT - SUBJECTIVE AND OBJECTIVE BOX
HPI:  Patient is a 59 year old male with a PMH HTN, ESRD (M/W/F), HIV, hep C, s/p hartmanns procedure 2/2 perforated sigmoid diverticulitis 9/2019 with subsequent admission to [facility] for UGIB likely due to gastric ulcers found on EGD (healed), and admission to our facility 10/2019 with infected abdominal wound, currently presented with drainage of abdominal wound again. Patient states his home health aide was dressing his wound, but he noticed increased drainage over the last 2 days with associated abdominal pain.   Admits to normal bowel function. Tolerating regular diet.   Denies fever, chills, chest pain, sob, palpitations, urinary symptoms. (06 Jan 2020 18:05)  work up consistent with infected mesh     Allergies    No Known Allergies    Intolerances        MEDICATIONS  (STANDING):  aspirin enteric coated 81 milliGRAM(s) Oral daily  ceFAZolin   IVPB 1000 milliGRAM(s) IV Intermittent every 24 hours  chlorhexidine 4% Liquid 1 Application(s) Topical <User Schedule>  darunavir 800 milliGRAM(s) Oral daily  dextrose 5%. 1000 milliLiter(s) (50 mL/Hr) IV Continuous <Continuous>  dextrose 50% Injectable 12.5 Gram(s) IV Push once  dextrose 50% Injectable 25 Gram(s) IV Push once  etravirine 200 milliGRAM(s) Oral two times a day after meals  heparin  Injectable 5000 Unit(s) SubCutaneous every 12 hours  methimazole 10 milliGRAM(s) Oral daily  midodrine. 10 milliGRAM(s) Oral every 8 hours  pantoprazole    Tablet 40 milliGRAM(s) Oral before breakfast  raltegravir Tablet 400 milliGRAM(s) Oral two times a day  ritonavir Tablet 100 milliGRAM(s) Oral every 24 hours    MEDICATIONS  (PRN):  dextrose 40% Gel 15 Gram(s) Oral once PRN Blood Glucose LESS THAN 70 milliGRAM(s)/deciliter  glucagon  Injectable 1 milliGRAM(s) IntraMuscular once PRN Glucose LESS THAN 70 milligrams/deciliter  oxycodone    5 mG/acetaminophen 325 mG 2 Tablet(s) Oral every 6 hours PRN Severe Pain (7 - 10)  oxycodone    5 mG/acetaminophen 325 mG 1 Tablet(s) Oral every 4 hours PRN Moderate Pain (4 - 6)      REVIEW OF SYSTEMS:    ongoing abdominal pain     VITAL SIGNS:  T(C): 37 (01-14-20 @ 16:51), Max: 37.1 (01-13-20 @ 23:27)  T(F): 98.6 (01-14-20 @ 16:51), Max: 98.7 (01-13-20 @ 23:27)  HR: 105 (01-14-20 @ 16:51) (69 - 105)  BP: 113/47 (01-14-20 @ 16:51) (89/51 - 147/66)  RR: 17 (01-14-20 @ 16:51) (16 - 18)  SpO2: 96% (01-14-20 @ 16:51) (91% - 99%)  Wt(kg): --    PHYSICAL EXAM:    GENERAL: not in any distress  HEENT: Neck is supple, normocephalic, atraumatic   CHEST/LUNG: Clear to auscultation bilaterally; No rales, rhonchi, wheezing  HEART: Regular rate and rhythm; No murmurs, rubs, or gallops  ABDOMEN: Soft, Nontender, Nondistended; Bowel sounds present,   colostomy   incision paracolostomy clean tender  EXTREMITIES:  2+ Peripheral Pulses, No clubbing, cyanosis, or edema  SKIN: No rashes or lesions  BACK: no pressor sore   NERVOUS SYSTEM:  Alert & Oriented X3, Good concentration  PSYCH: normal affect     LABS:                         10.3   12.49 )-----------( 270      ( 14 Jan 2020 03:06 )             34.7     01-14    138  |  95<L>  |  68<H>  ----------------------------<  110<H>  5.3   |  30  |  8.93<H>    Ca    10.4<H>      14 Jan 2020 03:06  Phos  4.2     01-14  Mg     2.5     01-14    TPro  8.1  /  Alb  2.3<L>  /  TBili  0.3  /  DBili  x   /  AST  12<L>  /  ALT  16  /  AlkPhos  149<H>  01-14    LIVER FUNCTIONS - ( 14 Jan 2020 03:06 )  Alb: 2.3 g/dL / Pro: 8.1 gm/dL / ALK PHOS: 149 U/L / ALT: 16 U/L / AST: 12 U/L / GGT: x           PT/INR - ( 14 Jan 2020 03:06 )   PT: 13.0 sec;   INR: 1.16 ratio         PTT - ( 14 Jan 2020 03:06 )  PTT:30.2 sec                                      Radiology:
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
## 2020-01-14 NOTE — PHYSICAL THERAPY INITIAL EVALUATION ADULT - ADDITIONAL COMMENTS
Pt lives in apartment without the need to negotiate steps. Everything is one floor. Pt owns a wheelchair and rolling walker. Pt lives with a home attendant for assistance.

## 2020-01-14 NOTE — PROGRESS NOTE ADULT - ASSESSMENT
59M PMH HTN, ESRD (M/W/F), HIV, hep C, s/p hartmanns procedure 2/2 perforated sigmoid diverticulitis 9/2019, UGIB likely due to gastric ulcers found on EGD, infected abdominal wound (cx with e-coli, klebsiella, staph) presents with increased drainage from abdominal wound with abdominal pain. Admitted with sepsis, abdominal wound infection. Course complicated by hypotension, septic shock. Transferred to ICU 1/7 for hemodynamic monitoring    1. NEURO overnight acute encephelopathy    primary team from documentation discussed with neuro would recommend an official documented consult from neurology .     - awake, alert, responsive    2. CV  - hypotension on  midodrine    3. RENAL  - HD per nephrology     4. ID  - cont antiretrovirals for HIV  - was on zosyn for underlying abdominal wound infection and vanco by level, wound cx with e-coli and MSSA.   1/11/2020 now on Rocephin per CCT team but conflicts with ID reccs would recommend  surgical primary  team d/w  ID plan of antibx .   1/12/2020 ID is ok with Rocephin and states  pt will lifelong oral antibx with Augmentin if mesh is to remain , after  review of surgery note it appears no immediate future plans to remove mesh   recommend primary  surgical team to monitor cbc daily as wbc is increasing .   - pt with excoriation around colostomy site. surgery placed Flexi-Seal through ostomy for drainage of stool   relayed to surgical PA pt c/o pain  1/13/2020 recommend obtaining labs today to eval wbc elevation   1/14/2020 is decreasing  -

## 2020-01-14 NOTE — PHYSICAL THERAPY INITIAL EVALUATION ADULT - CRITERIA FOR SKILLED THERAPEUTIC INTERVENTIONS
functional limitations in following categories/impairments found/anticipated discharge recommendation Patient appears in baseline functions on assessment of bed mobility, transfers and gait . No further skilled PT indicated and does not require skilled PT services post-acute care discharge./impairments found impairments found/Patient appears in baseline functions on assessment of bed mobility, transfers and gait . No further skilled PT indicated and does not require skilled PT services post-acute care discharge. Now discharged from PT.
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
## 2020-01-14 NOTE — PHYSICAL THERAPY INITIAL EVALUATION ADULT - GENERAL OBSERVATIONS, REHAB EVAL
Pt was encountered supine in bed. +heplock, +colostomy bag intact, + cardiac monitor, AOx4, NAD, and willing to follow commands.

## 2020-01-14 NOTE — CHART NOTE - NSCHARTNOTEFT_GEN_A_CORE
RRT called for AMS, patient obtunded, eyes wide open, staring. Minimally responding to painful stimuli. Patient seen and examined at bedside, minimally responsive after sternal rub, shakes head no to chest pain, but nods to dizziness and headache. Left pupil larger than right pupil. Patient was sent for STAT CTA of head which showed no acute stroke. Blood work pending review. Dr. Kramer, neurologist was consulted, examined patient, does not suspect CVA, possibly metabolic encephalitis, with possible seizure.     Patient slowly has become more responsive now talking, does not recall events earlier in night. States he has been told in the past that his left pupil is larger than right, that this is not a new finding. As per Dr. Kramer r/o seizure, possibly metabolic encephalitis in setting of HIV.    Will continue to monitor.

## 2020-01-14 NOTE — PHYSICAL THERAPY INITIAL EVALUATION ADULT - DISCHARGE DISPOSITION, PT EVAL
home/Without PT home/Patient appears in baseline functions on assessment of bed mobility, transfers and gait . No further skilled PT indicated and does not require skilled PT services post-acute care discharge.

## 2020-01-14 NOTE — PROGRESS NOTE ADULT - ASSESSMENT
s/p Hartmanns procedure 2/2 perforated sigmoid diverticulitis 9/2019 with subsequent admission to [facility] for UGIB likely due to gastric ulcers found on EGD (healed), and admission to our facility 10/2019 with infected abdominal wound, currently presented with drainage of abdominal wound again  known to us for Sigmoid Colon rsxn with creation of colostomy, peritoneal lavage s/p I&D of intra-abdominal abscess on 9/18/19 treated for abdominal incisional wounds, admitted with possible ?enterocutaneous fistula on 11/2019 all wound culture with fairly sensitive organism mssa, ecoli and klebsiella sensitive to po and dc with po abx   Transferred to ICU  for persistent hypotension, doing ok on exam ,no fever , leukocytosis better   patient on rocephin noted getting worse wbc '  switch to iv ancef as better methicillin sensitive staph aureus drug  sx follow up   today wbc is better   will need po for life if mesh not removed

## 2020-01-14 NOTE — PROGRESS NOTE ADULT - SUBJECTIVE AND OBJECTIVE BOX
[facility] NEPHROLOGY [facility], [facility]  NEPHROLOGY AND HYPERTENSION  [address]  [address]  [place] [address]  [phone]    MD PARVEEN NOLAND MD ANDREY GONCHARUK, MD MADHU KORRAPATI, MD YELENA ROSENBERG, MD BINNY KOSHY, MD CHRISTOPHER CAPUTO, MD EDWARD BOVER, MD          Patient feels ok no distress  receiving ostomy care;     MEDICATIONS  (STANDING):  aspirin enteric coated 81 milliGRAM(s) Oral daily  calcium acetate 1334 milliGRAM(s) Oral three times a day with meals  ceFAZolin   IVPB 1000 milliGRAM(s) IV Intermittent every 24 hours  chlorhexidine 4% Liquid 1 Application(s) Topical <User Schedule>  darunavir 800 milliGRAM(s) Oral daily  dextrose 5%. 1000 milliLiter(s) (50 mL/Hr) IV Continuous <Continuous>  dextrose 50% Injectable 12.5 Gram(s) IV Push once  dextrose 50% Injectable 25 Gram(s) IV Push once  etravirine 200 milliGRAM(s) Oral two times a day after meals  heparin  Injectable 5000 Unit(s) SubCutaneous every 12 hours  methimazole 10 milliGRAM(s) Oral daily  midodrine. 10 milliGRAM(s) Oral every 8 hours  pantoprazole    Tablet 40 milliGRAM(s) Oral before breakfast  raltegravir Tablet 400 milliGRAM(s) Oral two times a day  ritonavir Tablet 100 milliGRAM(s) Oral every 24 hours    MEDICATIONS  (PRN):  dextrose 40% Gel 15 Gram(s) Oral once PRN Blood Glucose LESS THAN 70 milliGRAM(s)/deciliter  glucagon  Injectable 1 milliGRAM(s) IntraMuscular once PRN Glucose LESS THAN 70 milligrams/deciliter  oxycodone    5 mG/acetaminophen 325 mG 2 Tablet(s) Oral every 6 hours PRN Severe Pain (7 - 10)  oxycodone    5 mG/acetaminophen 325 mG 1 Tablet(s) Oral every 4 hours PRN Moderate Pain (4 - 6)      01-13-20 @ 07:01  -  01-14-20 @ 07:00  --------------------------------------------------------  IN: 110 mL / OUT: 760 mL / NET: -650 mL    01-14-20 @ 07:01  -  01-14-20 @ 19:07  --------------------------------------------------------  IN: 780 mL / OUT: 1780 mL / NET: -1000 mL      PHYSICAL EXAM:      T(C): 37 (01-14-20 @ 16:51), Max: 37.1 (01-13-20 @ 23:27)  HR: 105 (01-14-20 @ 16:51) (69 - 105)  BP: 113/47 (01-14-20 @ 16:51) (89/51 - 147/66)  RR: 17 (01-14-20 @ 16:51) (16 - 18)  SpO2: 96% (01-14-20 @ 16:51) (91% - 99%)  Wt(kg): --  Respiratory: clear anteriorly, decreased BS at bases  Cardiovascular: S1 S2  Gastrointestinal: soft NT ND +BS  + ostomy  Extremities:  1 edema                                    10.3   12.49 )-----------( 270      ( 14 Jan 2020 03:06 )             34.7     01-14    138  |  95<L>  |  68<H>  ----------------------------<  110<H>  5.3   |  30  |  8.93<H>    Ca    10.4<H>      14 Jan 2020 03:06  Phos  4.2     01-14  Mg     2.5     01-14    TPro  8.1  /  Alb  2.3<L>  /  TBili  0.3  /  DBili  x   /  AST  12<L>  /  ALT  16  /  AlkPhos  149<H>  01-14      LIVER FUNCTIONS - ( 14 Jan 2020 03:06 )  Alb: 2.3 g/dL / Pro: 8.1 gm/dL / ALK PHOS: 149 U/L / ALT: 16 U/L / AST: 12 U/L / GGT: x           Creatinine Trend: 8.93<--, 5.77<--, 7.78<--, 6.07<--, 8.10<--, 14.10<--    Assessment   ESRD; septic shock; improving;   Hypercalcemia, mild, phoslo effect    Plan:  HD for today  D/C phoslo  UF a tolerated   Will follow.            Jacob Hobson MD

## 2020-01-14 NOTE — PROGRESS NOTE ADULT - SUBJECTIVE AND OBJECTIVE BOX
Patient seen and examined at bedside with no complaints. Admits to flatus/BM. Denies pain, nasuea/vomiting. Tolerating diet.  Overnight RRT was called for AMS, patient now at baseline, upgraded to telemetry    Vital Signs Last 24 Hrs  T(F): 97.7 (01-14-20 @ 06:00), Max: 98.7 (01-13-20 @ 23:27)  HR: 74 (01-14-20 @ 06:00)  BP: 120/76 (01-14-20 @ 06:00)  RR: 18 (01-14-20 @ 06:00)  SpO2: 91% (01-14-20 @ 06:00)    CAPILLARY BLOOD GLUCOSE      POCT Blood Glucose.: 122 mg/dL (13 Jan 2020 23:26)      GENERAL: Alert, NAD  CHEST/LUNG: Clear to auscultation bilaterally, respirations nonlabored  HEART: S1S2, Regular rate and rhythm;   ABDOMEN: + Bowel sounds. Midline incision with open wounds, wound beds clean, minimal bleeding noted from colostomy site. Colostomy functioning, draining stool via flexiseal. Surrounding skin macerated and raw. Soft, NTND  EXTREMITIES:  no calf tenderness, No edema          LABS:                        10.3   12.49 )-----------( 270      ( 14 Jan 2020 03:06 )             34.7     01-14    138  |  95<L>  |  68<H>  ----------------------------<  110<H>  5.3   |  30  |  8.93<H>    Ca    10.4<H>      14 Jan 2020 03:06  Phos  3.2     01-12  Mg     2.3     01-12    TPro  8.1  /  Alb  2.3<L>  /  TBili  0.3  /  DBili  x   /  AST  12<L>  /  ALT  16  /  AlkPhos  149<H>  01-14    PT/INR - ( 14 Jan 2020 03:06 )   PT: 13.0 sec;   INR: 1.16 ratio         PTT - ( 14 Jan 2020 03:06 )  PTT:30.2 sec    A/P: 59M PMH ESRD on HD (MWF), HIV on HAART, hepatitis C, HLD, and HTN, s/p hartmanns procedure 9/2019 for perforated sigmoid diverticulitis, s/p UGIB due to gastric ulcers, s/p admission for infected abdominal wound, now readmitted with sepsis, possibly due to infected abdominal wound (no clinical evidence) vs chronically infected mesh?   No evidence of fistula on CT scan. Leukocytosis worsened today. Afebrile.   - continue colostomy care, surrounding skin care  - antibiotics per ID, may need adjustment  - diet as tolerated  - DVT ppx  - renal and medical team f/u  - will d/w Dr. Oliveira Patient seen and examined at bedside with no complaints. Admits to flatus/BM. Denies pain, nasuea/vomiting. Tolerating diet.  Overnight RRT was called for AMS, patient now at baseline, upgraded to telemetry    Vital Signs Last 24 Hrs  T(F): 97.7 (01-14-20 @ 06:00), Max: 98.7 (01-13-20 @ 23:27)  HR: 74 (01-14-20 @ 06:00)  BP: 120/76 (01-14-20 @ 06:00)  RR: 18 (01-14-20 @ 06:00)  SpO2: 91% (01-14-20 @ 06:00)    CAPILLARY BLOOD GLUCOSE      POCT Blood Glucose.: 122 mg/dL (13 Jan 2020 23:26)      GENERAL: Alert, NAD  CHEST/LUNG: Clear to auscultation bilaterally, respirations nonlabored  HEART: S1S2, Regular rate and rhythm;   ABDOMEN: + Bowel sounds. Midline incision with open wounds, wound beds clean, minimal bleeding noted from colostomy site. Colostomy functioning, draining stool via flexiseal. Surrounding skin macerated and raw. Soft, NTND  EXTREMITIES:  no calf tenderness, No edema    LABS:                        10.3   12.49 )-----------( 270      ( 14 Jan 2020 03:06 )             34.7     01-14    138  |  95<L>  |  68<H>  ----------------------------<  110<H>  5.3   |  30  |  8.93<H>    Ca    10.4<H>      14 Jan 2020 03:06  Phos  3.2     01-12  Mg     2.3     01-12    TPro  8.1  /  Alb  2.3<L>  /  TBili  0.3  /  DBili  x   /  AST  12<L>  /  ALT  16  /  AlkPhos  149<H>  01-14    PT/INR - ( 14 Jan 2020 03:06 )   PT: 13.0 sec;   INR: 1.16 ratio         PTT - ( 14 Jan 2020 03:06 )  PTT:30.2 sec    A/P: 59M PMH ESRD on HD (MWF), HIV on HAART, hepatitis C, HLD, and HTN, s/p hartmanns procedure 9/2019 for perforated sigmoid diverticulitis, s/p UGIB due to gastric ulcers, s/p admission for infected abdominal wound, now readmitted with sepsis, possibly due to infected abdominal wound (no clinical evidence) vs chronically infected mesh?   No evidence of fistula on CT scan. Leukocytosis improved today. Afebrile.   - continue colostomy care, surrounding skin care  - antibiotics per ID, may need adjustment  - diet as tolerated  - DVT ppx  - renal and medical team f/u  - will d/w Dr. Oliveira

## 2020-01-15 LAB
ANION GAP SERPL CALC-SCNC: 10 MMOL/L — SIGNIFICANT CHANGE UP (ref 5–17)
BUN SERPL-MCNC: 37 MG/DL — HIGH (ref 7–23)
CALCIUM SERPL-MCNC: 10.8 MG/DL — HIGH (ref 8.5–10.1)
CHLORIDE SERPL-SCNC: 97 MMOL/L — SIGNIFICANT CHANGE UP (ref 96–108)
CO2 SERPL-SCNC: 29 MMOL/L — SIGNIFICANT CHANGE UP (ref 22–31)
CREAT SERPL-MCNC: 6.44 MG/DL — HIGH (ref 0.5–1.3)
GLUCOSE SERPL-MCNC: 89 MG/DL — SIGNIFICANT CHANGE UP (ref 70–99)
HCT VFR BLD CALC: 36.7 % — LOW (ref 39–50)
HGB BLD-MCNC: 10.6 G/DL — LOW (ref 13–17)
MCHC RBC-ENTMCNC: 26.3 PG — LOW (ref 27–34)
MCHC RBC-ENTMCNC: 28.9 GM/DL — LOW (ref 32–36)
MCV RBC AUTO: 91.1 FL — SIGNIFICANT CHANGE UP (ref 80–100)
NRBC # BLD: 0 /100 WBCS — SIGNIFICANT CHANGE UP (ref 0–0)
PLATELET # BLD AUTO: 308 K/UL — SIGNIFICANT CHANGE UP (ref 150–400)
POTASSIUM SERPL-MCNC: 5.2 MMOL/L — SIGNIFICANT CHANGE UP (ref 3.5–5.3)
POTASSIUM SERPL-SCNC: 5.2 MMOL/L — SIGNIFICANT CHANGE UP (ref 3.5–5.3)
RBC # BLD: 4.03 M/UL — LOW (ref 4.2–5.8)
RBC # FLD: 17.6 % — HIGH (ref 10.3–14.5)
SODIUM SERPL-SCNC: 136 MMOL/L — SIGNIFICANT CHANGE UP (ref 135–145)
WBC # BLD: 12.04 K/UL — HIGH (ref 3.8–10.5)
WBC # FLD AUTO: 12.04 K/UL — HIGH (ref 3.8–10.5)

## 2020-01-15 PROCEDURE — 99233 SBSQ HOSP IP/OBS HIGH 50: CPT

## 2020-01-15 RX ADMIN — MIDODRINE HYDROCHLORIDE 10 MILLIGRAM(S): 2.5 TABLET ORAL at 05:43

## 2020-01-15 RX ADMIN — Medication 100 MILLIGRAM(S): at 22:18

## 2020-01-15 RX ADMIN — ETRAVIRINE 200 MILLIGRAM(S): 200 TABLET ORAL at 17:00

## 2020-01-15 RX ADMIN — Medication 81 MILLIGRAM(S): at 11:16

## 2020-01-15 RX ADMIN — MIDODRINE HYDROCHLORIDE 10 MILLIGRAM(S): 2.5 TABLET ORAL at 13:37

## 2020-01-15 RX ADMIN — CHLORHEXIDINE GLUCONATE 1 APPLICATION(S): 213 SOLUTION TOPICAL at 05:43

## 2020-01-15 RX ADMIN — HEPARIN SODIUM 5000 UNIT(S): 5000 INJECTION INTRAVENOUS; SUBCUTANEOUS at 17:01

## 2020-01-15 RX ADMIN — DARUNAVIR 800 MILLIGRAM(S): 75 TABLET, FILM COATED ORAL at 11:16

## 2020-01-15 RX ADMIN — RALTEGRAVIR 400 MILLIGRAM(S): 400 TABLET, FILM COATED ORAL at 17:01

## 2020-01-15 RX ADMIN — PANTOPRAZOLE SODIUM 40 MILLIGRAM(S): 20 TABLET, DELAYED RELEASE ORAL at 05:42

## 2020-01-15 RX ADMIN — HEPARIN SODIUM 5000 UNIT(S): 5000 INJECTION INTRAVENOUS; SUBCUTANEOUS at 05:43

## 2020-01-15 RX ADMIN — ETRAVIRINE 200 MILLIGRAM(S): 200 TABLET ORAL at 10:25

## 2020-01-15 RX ADMIN — RALTEGRAVIR 400 MILLIGRAM(S): 400 TABLET, FILM COATED ORAL at 05:44

## 2020-01-15 RX ADMIN — RITONAVIR 100 MILLIGRAM(S): 100 TABLET, FILM COATED ORAL at 17:00

## 2020-01-15 NOTE — PROGRESS NOTE ADULT - SUBJECTIVE AND OBJECTIVE BOX
Patient is a 59y old  Male who presents with a chief complaint of drainage from abdominal wound (14 Jan 2020 19:55), NAD       OVERNIGHT EVENTS: none    MEDICATIONS  (STANDING):  aspirin enteric coated 81 milliGRAM(s) Oral daily  ceFAZolin   IVPB 1000 milliGRAM(s) IV Intermittent every 24 hours  chlorhexidine 4% Liquid 1 Application(s) Topical <User Schedule>  darunavir 800 milliGRAM(s) Oral daily  dextrose 5%. 1000 milliLiter(s) (50 mL/Hr) IV Continuous <Continuous>  dextrose 50% Injectable 12.5 Gram(s) IV Push once  dextrose 50% Injectable 25 Gram(s) IV Push once  etravirine 200 milliGRAM(s) Oral two times a day after meals  heparin  Injectable 5000 Unit(s) SubCutaneous every 12 hours  methimazole 10 milliGRAM(s) Oral daily  midodrine. 10 milliGRAM(s) Oral every 8 hours  pantoprazole    Tablet 40 milliGRAM(s) Oral before breakfast  raltegravir Tablet 400 milliGRAM(s) Oral two times a day  ritonavir Tablet 100 milliGRAM(s) Oral every 24 hours    MEDICATIONS  (PRN):  dextrose 40% Gel 15 Gram(s) Oral once PRN Blood Glucose LESS THAN 70 milliGRAM(s)/deciliter  glucagon  Injectable 1 milliGRAM(s) IntraMuscular once PRN Glucose LESS THAN 70 milligrams/deciliter  oxycodone    5 mG/acetaminophen 325 mG 2 Tablet(s) Oral every 6 hours PRN Severe Pain (7 - 10)  oxycodone    5 mG/acetaminophen 325 mG 1 Tablet(s) Oral every 4 hours PRN Moderate Pain (4 - 6)        REVIEW OF SYSTEMS:  CONSTITUTIONAL: No fever,    EYES: No eye pain,    ENMT:  No difficulty hearing,    NECK: No pain or stiffness  RESPIRATORY: No cough,  No shortness of breath  CARDIOVASCULAR: No chest pain,    GASTROINTESTINAL: No abdominal pain.    GENITOURINARY: No dysuria,    NEUROLOGICAL: No headaches  SKIN: No itching,      Vital Signs Last 24 Hrs  T(C): 36.1 (15 Natalio 2020 11:16), Max: 37 (14 Jan 2020 16:51)  T(F): 97 (15 Natalio 2020 11:16), Max: 98.6 (14 Jan 2020 16:51)  HR: 61 (15 Natalio 2020 11:16) (61 - 105)  BP: 97/57 (15 Natalio 2020 11:16) (97/57 - 141/50)  BP(mean): --  RR: 18 (15 Natalio 2020 11:16) (16 - 18)  SpO2: 100% (15 Natalio 2020 11:16) (96% - 100%)    PHYSICAL EXAM:  GENERAL: NAD, well-developed  HEAD:  Atraumatic, Normocephalic  EYES: Conjunctiva and sclera clear  ENMT:  Moist mucous membranes, Good dentition, No lesions  NECK: Supple, No JVD, Normal thyroid  NERVOUS SYSTEM:  Alert & Oriented X3, Good concentration; Motor Strength 5/5 B/L upper and lower extremities; DTRs 2+ intact and symmetric  CHEST/LUNG: Clear to percussion bilaterally; No rales, rhonchi, wheezing, or rubs  HEART: Regular rate and rhythm; No murmurs, rubs, or gallops  Abd: soft ND +BS + colostomy with brown stool, abdominal wound without significant drainage - dressing applied and in place, mild tenderness left of colostomy with deep palpation, no rebound  Ext: LUE AVF with thrills and bruit, R arm midline  SKIN: No petechiae    LABS:                        10.6   12.04 )-----------( 308      ( 15 Natalio 2020 08:20 )             36.7     01-15    136  |  97  |  37<H>  ----------------------------<  89  5.2   |  29  |  6.44<H>    Ca    10.8<H>      15 Natalio 2020 08:20  Phos  4.2     01-14  Mg     2.5     01-14    TPro  8.1  /  Alb  2.3<L>  /  TBili  0.3  /  DBili  x   /  AST  12<L>  /  ALT  16  /  AlkPhos  149<H>  01-14    PT/INR - ( 14 Jan 2020 03:06 )   PT: 13.0 sec;   INR: 1.16 ratio         PTT - ( 14 Jan 2020 03:06 )  PTT:30.2 sec   cardiac markers     CAPILLARY BLOOD GLUCOSE        Cultures    RADIOLOGY & ADDITIONAL TESTS:    Imaging Personally Reviewed:  [ ] YES  [ ] NO    Consultant(s) Notes Reviewed:  [ x ] YES  [ ] NO    Care Discussed with Consultants/Other Providers [ ] YES  [ ] NO

## 2020-01-15 NOTE — PROGRESS NOTE ADULT - SUBJECTIVE AND OBJECTIVE BOX
[facility] NEPHROLOG[facility], [facility]  NEPHROLOGY AND HYPERTENSION  [address]  [address]  [place] [address]  [phone]    MD PARVEEN NOLAND MD ANDREY GONCHARUK, MD MADHU KORRAPATI, MD YELENA ROSENBERG, MD BINNY KOSHY, MD CHRISTOPHER CAPUTO, MD EDWARD BOVER, MD          Patient feels well, doesn't like the food.      MEDICATIONS  (STANDING):  aspirin enteric coated 81 milliGRAM(s) Oral daily  ceFAZolin   IVPB 1000 milliGRAM(s) IV Intermittent every 24 hours  chlorhexidine 4% Liquid 1 Application(s) Topical <User Schedule>  darunavir 800 milliGRAM(s) Oral daily  dextrose 5%. 1000 milliLiter(s) (50 mL/Hr) IV Continuous <Continuous>  dextrose 50% Injectable 12.5 Gram(s) IV Push once  dextrose 50% Injectable 25 Gram(s) IV Push once  etravirine 200 milliGRAM(s) Oral two times a day after meals  heparin  Injectable 5000 Unit(s) SubCutaneous every 12 hours  methimazole 10 milliGRAM(s) Oral daily  midodrine. 10 milliGRAM(s) Oral every 8 hours  pantoprazole    Tablet 40 milliGRAM(s) Oral before breakfast  raltegravir Tablet 400 milliGRAM(s) Oral two times a day  ritonavir Tablet 100 milliGRAM(s) Oral every 24 hours    MEDICATIONS  (PRN):  dextrose 40% Gel 15 Gram(s) Oral once PRN Blood Glucose LESS THAN 70 milliGRAM(s)/deciliter  glucagon  Injectable 1 milliGRAM(s) IntraMuscular once PRN Glucose LESS THAN 70 milligrams/deciliter  oxycodone    5 mG/acetaminophen 325 mG 2 Tablet(s) Oral every 6 hours PRN Severe Pain (7 - 10)  oxycodone    5 mG/acetaminophen 325 mG 1 Tablet(s) Oral every 4 hours PRN Moderate Pain (4 - 6)      01-14-20 @ 07:01  -  01-15-20 @ 07:00  --------------------------------------------------------  IN: 780 mL / OUT: 1780 mL / NET: -1000 mL      PHYSICAL EXAM:      T(C): 37.1 (01-15-20 @ 16:15), Max: 37.1 (01-15-20 @ 16:15)  HR: 91 (01-15-20 @ 16:15) (61 - 101)  BP: 104/83 (01-15-20 @ 16:15) (97/57 - 141/50)  RR: 18 (01-15-20 @ 16:15) (17 - 18)  SpO2: 97% (01-15-20 @ 16:15) (97% - 100%)  Wt(kg): --  Respiratory: clear anteriorly, decreased BS at bases  Cardiovascular: S1 S2  Gastrointestinal: soft NT ND +BS + ostomy  Extremities:  1  edema                                    10.6   12.04 )-----------( 308      ( 15 Natalio 2020 08:20 )             36.7     01-15    136  |  97  |  37<H>  ----------------------------<  89  5.2   |  29  |  6.44<H>    Ca    10.8<H>      15 Natalio 2020 08:20  Phos  4.2     01-14  Mg     2.5     01-14    TPro  8.1  /  Alb  2.3<L>  /  TBili  0.3  /  DBili  x   /  AST  12<L>  /  ALT  16  /  AlkPhos  149<H>  01-14      LIVER FUNCTIONS - ( 14 Jan 2020 03:06 )  Alb: 2.3 g/dL / Pro: 8.1 gm/dL / ALK PHOS: 149 U/L / ALT: 16 U/L / AST: 12 U/L / GGT: x           Creatinine Trend: 6.44<--, 8.93<--, 5.77<--, 7.78<--, 6.07<--, 8.10<--      Assessment   ESRD; septic shock; improving;   Hypercalcemia, mild, phoslo effect    Plan:  HD for tomorrow  D/C phoslo  UF a tolerated   Will follow.          Jacob Hobson MD

## 2020-01-15 NOTE — CHART NOTE - NSCHARTNOTEFT_GEN_A_CORE
Assessment:  Pt seen for follow-up & continues chronic severe malnutrition. Pt with HTN, DM, Hep C, HIV and ESRD on dialysis. Pt on phone when going to speak with him and didn't want to be interrupted; complained of food, requested food not allowed in renal diet, ie tomato, pickle & chocolate       Factors impacting intake: [ ] none [ ] nausea  [ ] vomiting [ ] diarrhea [ ] constipation  [ ]chewing problems [ ] swallowing issues  [X ] other: dislikes menu selection     Diet Prescription: Diet, Renal Restrictions:   For patients receiving Renal Replacement - No Protein Restr, No Conc K, No Conc Phos, Low Sodium  Supplement Feeding Modality:  Oral  Nepro Cans or Servings Per Day:  1       Frequency:  Three Times a day  DanActive Cans or Servings Per Day:  1       Frequency:  Two Times a day (01-11-20 @ 08:54)    Intake: 50-85% as per medical record, consuming nutritional supplement Nepro, c/o not receiving Nepro 3 x /day.     Current Weight: 96.6 kg 01/15, 97 kg 01/07, c weight loss of .4kg  .41% Weight Change  +2 edema noted in both arms 01/09    Not available to do NFPE due to pt not wanting to get off phone  Pt c visible mild orbital & temple area depletion.     Pertinent Medications: MEDICATIONS  (STANDING):  aspirin enteric coated 81 milliGRAM(s) Oral daily  ceFAZolin   IVPB 1000 milliGRAM(s) IV Intermittent every 24 hours  chlorhexidine 4% Liquid 1 Application(s) Topical <User Schedule>  darunavir 800 milliGRAM(s) Oral daily  dextrose 5%. 1000 milliLiter(s) (50 mL/Hr) IV Continuous <Continuous>  dextrose 50% Injectable 12.5 Gram(s) IV Push once  dextrose 50% Injectable 25 Gram(s) IV Push once  etravirine 200 milliGRAM(s) Oral two times a day after meals  heparin  Injectable 5000 Unit(s) SubCutaneous every 12 hours  methimazole 10 milliGRAM(s) Oral daily  midodrine. 10 milliGRAM(s) Oral every 8 hours  pantoprazole    Tablet 40 milliGRAM(s) Oral before breakfast  raltegravir Tablet 400 milliGRAM(s) Oral two times a day  ritonavir Tablet 100 milliGRAM(s) Oral every 24 hours    MEDICATIONS  (PRN):  dextrose 40% Gel 15 Gram(s) Oral once PRN Blood Glucose LESS THAN 70 milliGRAM(s)/deciliter  glucagon  Injectable 1 milliGRAM(s) IntraMuscular once PRN Glucose LESS THAN 70 milligrams/deciliter  oxycodone    5 mG/acetaminophen 325 mG 2 Tablet(s) Oral every 6 hours PRN Severe Pain (7 - 10)  oxycodone    5 mG/acetaminophen 325 mG 1 Tablet(s) Oral every 4 hours PRN Moderate Pain (4 - 6)    Pertinent Labs: 01-15 Na136 mmol/L Glu 89 mg/dL K+ 5.2 mmol/L Cr  6.44 mg/dL<H> BUN 37 mg/dL<H> 01-14 Phos 4.2 mg/dL 01-14 Alb 2.3 g/dL<L> 01-07 [id number] 5.0 %     CAPILLARY BLOOD GLUCOSE        Skin: WNL    Estimated Needs:   [x ] no change since previous assessment (1/7/2020)  [ ] recalculated:     Previous Nutrition Diagnosis: Malnutrition Severe malnutrition in context of chronic illness.   Etiology Inadequate energy/protein intake + increased energy/protein needs related to multiple abdominal issues c infections & ESRD on HD tx.   Signs/Symptoms Wt loss of 9.7% x 6 weeks; < 75% nutrition needs > 1 month.     Goal/Expected Outcome Pt to consume > 50% meals; met   Pt to consume >75% supplements during hospitalization- not met     Nutrition Diagnosis is [x ] ongoing  [ ] resolved  [ ] improved  [ ] not applicable       New Nutrition Diagnosis: [x ] not applicable         Interventions:   Recommend  [X] No changes , Continue c current diet regimen, to remain available to provide food choices within dietary restrictions    [ ] Change Diet To:  [ ] Nutrition Supplement  [ ] Nutrition Support  [ x] Other: add Nephro-Sigifredo daily     Monitoring and Evaluation:   [X ] PO intake [ x ] Tolerance to diet prescription [ x ] weights [ x ] labs[ x ] follow up per protocol  [ ] other:

## 2020-01-15 NOTE — PROGRESS NOTE ADULT - ASSESSMENT
59M PMH HTN, ESRD (M/W/F), HIV, hep C, s/p hartmanns procedure 2/2 perforated sigmoid diverticulitis 9/2019, UGIB likely due to gastric ulcers found on EGD, infected abdominal wound (cx with e-coli, klebsiella, staph) presents with increased drainage from abdominal wound with abdominal pain. Admitted with sepsis, abdominal wound infection. Course complicated by hypotension, septic shock. Transferred to ICU 1/7 for hemodynamic monitoring    S/p acute metabolic encephelopathy - resolved.   - awake, alert, responsive  - Neurology is following.    Hypotension  - on  midodrine    ESRD  - HD per nephrology     HIV:  - cont antiretrovirals      S/p sepsis, possibly due to infected abdominal wound (no clinical evidence) vs chronically infected mesh,  - was on zosyn for underlying abdominal wound infection and vanco by level, wound cx with e-coli and MSSA.   - Wound care as per ID and surgical team.  - ID antibiotics as per ID  - may need lifelong oral antibx with Augmentin if mesh is to remain , after  review of surgery note it appears no immediate future plans to remove mesh    - pt with excoriation around colostomy site.  Surgical team has placed Flexi-Seal through ostomy for drainage of stool.    Follow up patient.

## 2020-01-15 NOTE — PROGRESS NOTE ADULT - SUBJECTIVE AND OBJECTIVE BOX
Patient seen and examined at bedside in no distress.  States he feels much better overall.  Tolerating diet.  Denies fever, chills, chest pain, sob, abdominal pain, nausea, vomiting.    Vital Signs Last 24 Hrs  T(F): 98.6 (01-15-20 @ 00:09), Max: 98.6 (01-14-20 @ 16:51)  HR: 94 (01-15-20 @ 05:28)  BP: 113/77 (01-15-20 @ 05:28)  RR: 18 (01-15-20 @ 05:28)  SpO2: 100% (01-15-20 @ 05:28)    GENERAL: Alert, oriented, NAD  CHEST/LUNG: Clear to auscultation bilaterally, respirations nonlabored  HEART: S1S2, RRR  ABDOMEN: Midline incision with 3 skin wounds clean and dry. No drainage/blood noted. Dry sterile dressing applied. Colostomy pink and viable, stool in bag. Soft, NTND    LABS:                        10.3   12.49 )-----------( 270      ( 14 Jan 2020 03:06 )             34.7     01-14    138  |  95<L>  |  68<H>  ----------------------------<  110<H>  5.3   |  30  |  8.93<H>    Ca    10.4<H>      14 Jan 2020 03:06  Phos  4.2     01-14  Mg     2.5     01-14    TPro  8.1  /  Alb  2.3<L>  /  TBili  0.3  /  DBili  x   /  AST  12<L>  /  ALT  16  /  AlkPhos  149<H>  01-14    PT/INR - ( 14 Jan 2020 03:06 )   PT: 13.0 sec;   INR: 1.16 ratio         PTT - ( 14 Jan 2020 03:06 )  PTT:30.2 sec    A/P: 59M PMH ESRD on HD (MWF), HIV on HAART, hepatitis C, HLD, and HTN, s/p hartmanns procedure 9/2019 for perforated sigmoid diverticulitis, s/p UGIB due to gastric ulcers, s/p admission for infected abdominal wound, now readmitted with sepsis, possibly due to infected abdominal wound (no clinical evidence) vs chronically infected mesh, s/p RRT for metabolic encephalopathy, now alert and oriented  - continue colostomy care, surrounding skin care  - antibiotics per ID  - neuro workup, f/u results  - f/u labs  - will d/w Dr. Oliveira

## 2020-01-16 LAB
ANION GAP SERPL CALC-SCNC: 8 MMOL/L — SIGNIFICANT CHANGE UP (ref 5–17)
BUN SERPL-MCNC: 59 MG/DL — HIGH (ref 7–23)
CALCIUM SERPL-MCNC: 9.9 MG/DL — SIGNIFICANT CHANGE UP (ref 8.5–10.1)
CHLORIDE SERPL-SCNC: 98 MMOL/L — SIGNIFICANT CHANGE UP (ref 96–108)
CO2 SERPL-SCNC: 30 MMOL/L — SIGNIFICANT CHANGE UP (ref 22–31)
CREAT SERPL-MCNC: 8.99 MG/DL — HIGH (ref 0.5–1.3)
GLUCOSE SERPL-MCNC: 116 MG/DL — HIGH (ref 70–99)
HCT VFR BLD CALC: 33.3 % — LOW (ref 39–50)
HGB BLD-MCNC: 10 G/DL — LOW (ref 13–17)
MCHC RBC-ENTMCNC: 26.3 PG — LOW (ref 27–34)
MCHC RBC-ENTMCNC: 30 GM/DL — LOW (ref 32–36)
MCV RBC AUTO: 87.6 FL — SIGNIFICANT CHANGE UP (ref 80–100)
NRBC # BLD: 0 /100 WBCS — SIGNIFICANT CHANGE UP (ref 0–0)
PLATELET # BLD AUTO: 296 K/UL — SIGNIFICANT CHANGE UP (ref 150–400)
POTASSIUM SERPL-MCNC: 4.9 MMOL/L — SIGNIFICANT CHANGE UP (ref 3.5–5.3)
POTASSIUM SERPL-SCNC: 4.9 MMOL/L — SIGNIFICANT CHANGE UP (ref 3.5–5.3)
RBC # BLD: 3.8 M/UL — LOW (ref 4.2–5.8)
RBC # FLD: 17.2 % — HIGH (ref 10.3–14.5)
SODIUM SERPL-SCNC: 136 MMOL/L — SIGNIFICANT CHANGE UP (ref 135–145)
WBC # BLD: 10.52 K/UL — HIGH (ref 3.8–10.5)
WBC # FLD AUTO: 10.52 K/UL — HIGH (ref 3.8–10.5)

## 2020-01-16 PROCEDURE — 99232 SBSQ HOSP IP/OBS MODERATE 35: CPT

## 2020-01-16 RX ADMIN — PANTOPRAZOLE SODIUM 40 MILLIGRAM(S): 20 TABLET, DELAYED RELEASE ORAL at 06:01

## 2020-01-16 RX ADMIN — ETRAVIRINE 200 MILLIGRAM(S): 200 TABLET ORAL at 17:58

## 2020-01-16 RX ADMIN — RALTEGRAVIR 400 MILLIGRAM(S): 400 TABLET, FILM COATED ORAL at 17:58

## 2020-01-16 RX ADMIN — CHLORHEXIDINE GLUCONATE 1 APPLICATION(S): 213 SOLUTION TOPICAL at 06:01

## 2020-01-16 RX ADMIN — RITONAVIR 100 MILLIGRAM(S): 100 TABLET, FILM COATED ORAL at 17:58

## 2020-01-16 RX ADMIN — HEPARIN SODIUM 5000 UNIT(S): 5000 INJECTION INTRAVENOUS; SUBCUTANEOUS at 05:56

## 2020-01-16 RX ADMIN — RALTEGRAVIR 400 MILLIGRAM(S): 400 TABLET, FILM COATED ORAL at 05:56

## 2020-01-16 RX ADMIN — Medication 100 MILLIGRAM(S): at 22:21

## 2020-01-16 RX ADMIN — MIDODRINE HYDROCHLORIDE 10 MILLIGRAM(S): 2.5 TABLET ORAL at 22:28

## 2020-01-16 RX ADMIN — HEPARIN SODIUM 5000 UNIT(S): 5000 INJECTION INTRAVENOUS; SUBCUTANEOUS at 17:58

## 2020-01-16 NOTE — PROGRESS NOTE ADULT - SUBJECTIVE AND OBJECTIVE BOX
Subjective: non-specific complaints.       MEDICATIONS  (STANDING):  aspirin enteric coated 81 milliGRAM(s) Oral daily  ceFAZolin   IVPB 1000 milliGRAM(s) IV Intermittent every 24 hours  chlorhexidine 4% Liquid 1 Application(s) Topical <User Schedule>  darunavir 800 milliGRAM(s) Oral daily  dextrose 5%. 1000 milliLiter(s) (50 mL/Hr) IV Continuous <Continuous>  dextrose 50% Injectable 12.5 Gram(s) IV Push once  dextrose 50% Injectable 25 Gram(s) IV Push once  etravirine 200 milliGRAM(s) Oral two times a day after meals  heparin  Injectable 5000 Unit(s) SubCutaneous every 12 hours  methimazole 10 milliGRAM(s) Oral daily  midodrine. 10 milliGRAM(s) Oral every 8 hours  pantoprazole    Tablet 40 milliGRAM(s) Oral before breakfast  raltegravir Tablet 400 milliGRAM(s) Oral two times a day  ritonavir Tablet 100 milliGRAM(s) Oral every 24 hours    MEDICATIONS  (PRN):  dextrose 40% Gel 15 Gram(s) Oral once PRN Blood Glucose LESS THAN 70 milliGRAM(s)/deciliter  glucagon  Injectable 1 milliGRAM(s) IntraMuscular once PRN Glucose LESS THAN 70 milligrams/deciliter  oxycodone    5 mG/acetaminophen 325 mG 2 Tablet(s) Oral every 6 hours PRN Severe Pain (7 - 10)  oxycodone    5 mG/acetaminophen 325 mG 1 Tablet(s) Oral every 4 hours PRN Moderate Pain (4 - 6)          T(C): 36.7 (01-16-20 @ 05:30), Max: 37.1 (01-15-20 @ 16:15)  HR: 85 (01-16-20 @ 05:30) (72 - 91)  BP: 135/69 (01-16-20 @ 05:30) (104/83 - 151/66)  RR: 18 (01-16-20 @ 05:30) (18 - 18)  SpO2: 98% (01-16-20 @ 05:30) (97% - 99%)  Wt(kg): --        I&O's Detail    15 Natalio 2020 07:01  -  16 Jan 2020 07:00  --------------------------------------------------------  IN:  Total IN: 0 mL    OUT:    Colostomy: 150 mL  Total OUT: 150 mL    Total NET: -150 mL               PHYSICAL EXAM:    GENERAL: NAD  EYES: EOMI, PERRLA, conjunctiva and sclera clear  NECK: Supple, no inc in JVP  CHEST/LUNG: Clear  HEART: S1S2  ABDOMEN: midline dressing, colostomy  EXTREMITIES:  min edema.   NEURO: no asterixis  L AVF pos thrill, bruit        LABS:  CBC Full  -  ( 16 Jan 2020 10:38 )  WBC Count : 10.52 K/uL  RBC Count : 3.80 M/uL  Hemoglobin : 10.0 g/dL  Hematocrit : 33.3 %  Platelet Count - Automated : 296 K/uL  Mean Cell Volume : 87.6 fl  Mean Cell Hemoglobin : 26.3 pg  Mean Cell Hemoglobin Concentration : 30.0 gm/dL  Auto Neutrophil # : x  Auto Lymphocyte # : x  Auto Monocyte # : x  Auto Eosinophil # : x  Auto Basophil # : x  Auto Neutrophil % : x  Auto Lymphocyte % : x  Auto Monocyte % : x  Auto Eosinophil % : x  Auto Basophil % : x    01-16    136  |  98  |  59<H>  ----------------------------<  116<H>  4.9   |  30  |  8.99<H>    Ca    9.9      16 Jan 2020 10:38          Culture Results:   No growth to date. (01-14 @ 09:10)  Culture Results:   No growth to date. 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 Polymicrobial abd wound infx  * Recent exp lap, sigm colon resection, perit lavage, I&D of abscess for perfed viscus    Recommendations:   * Stable HD today as Rxed.   * 2K bath with HD. UF goal 1.5-2 kg

## 2020-01-16 NOTE — PROGRESS NOTE ADULT - ASSESSMENT
59M PMH HTN, ESRD (M/W/F), HIV, hep C, s/p hartmanns procedure 2/2 perforated sigmoid diverticulitis 9/2019, UGIB likely due to gastric ulcers found on EGD, infected abdominal wound (cx with e-coli, klebsiella, staph) presents with increased drainage from abdominal wound with abdominal pain. Admitted with sepsis, abdominal wound infection. Course complicated by hypotension, septic shock. Transferred to ICU 1/7 for hemodynamic monitoring    S/p acute metabolic encephelopathy - resolved.   - awake, alert, responsive  - Neurology is following.    Hypotension  - on  midodrine    ESRD  - HD per nephrology     HIV:  - cont antiretrovirals      S/p sepsis, possibly due to infected abdominal wound (no clinical evidence) vs chronically infected mesh,  - was on zosyn for underlying abdominal wound infection and vanco by level, wound cx with e-coli and MSSA.   - Wound care as per ID and surgical team.  - ID antibiotics as per ID  - may need lifelong oral antibx with Augmentin if mesh is to remain , after  review of surgery note it appears no immediate future plans to remove mesh    - pt with excoriation around colostomy site.  Surgical team has placed Flexi-Seal through ostomy for drainage of stool.

## 2020-01-16 NOTE — PROGRESS NOTE ADULT - SUBJECTIVE AND OBJECTIVE BOX
HPI:  Patient is a 59 year old male with a PMH HTN, ESRD (M/W/F), HIV, hep C, s/p hartmanns procedure 2/2 perforated sigmoid diverticulitis 9/2019 with subsequent admission to [facility] for UGIB likely due to gastric ulcers found on EGD (healed), and admission to our facility 10/2019 with infected abdominal wound, currently presented with drainage of abdominal wound again. Patient states his home health aide was dressing his wound, but he noticed increased drainage over the last 2 days with associated abdominal pain.   Admits to normal bowel function. Tolerating regular diet.   Denies fever, chills, chest pain, sob, palpitations, urinary symptoms. (06 Jan 2020 18:05)  here infected mesh same bacteria growing since oct   Allergies    No Known Allergies    Intolerances        MEDICATIONS  (STANDING):  aspirin enteric coated 81 milliGRAM(s) Oral daily  ceFAZolin   IVPB 1000 milliGRAM(s) IV Intermittent every 24 hours  chlorhexidine 4% Liquid 1 Application(s) Topical <User Schedule>  darunavir 800 milliGRAM(s) Oral daily  dextrose 5%. 1000 milliLiter(s) (50 mL/Hr) IV Continuous <Continuous>  dextrose 50% Injectable 12.5 Gram(s) IV Push once  dextrose 50% Injectable 25 Gram(s) IV Push once  etravirine 200 milliGRAM(s) Oral two times a day after meals  heparin  Injectable 5000 Unit(s) SubCutaneous every 12 hours  methimazole 10 milliGRAM(s) Oral daily  midodrine. 10 milliGRAM(s) Oral every 8 hours  pantoprazole    Tablet 40 milliGRAM(s) Oral before breakfast  raltegravir Tablet 400 milliGRAM(s) Oral two times a day  ritonavir Tablet 100 milliGRAM(s) Oral every 24 hours    MEDICATIONS  (PRN):  dextrose 40% Gel 15 Gram(s) Oral once PRN Blood Glucose LESS THAN 70 milliGRAM(s)/deciliter  glucagon  Injectable 1 milliGRAM(s) IntraMuscular once PRN Glucose LESS THAN 70 milligrams/deciliter  oxycodone    5 mG/acetaminophen 325 mG 2 Tablet(s) Oral every 6 hours PRN Severe Pain (7 - 10)  oxycodone    5 mG/acetaminophen 325 mG 1 Tablet(s) Oral every 4 hours PRN Moderate Pain (4 - 6)      REVIEW OF SYSTEMS:    severe abdominal pain   not better   narcotics dont work   cant go home like this     VITAL SIGNS:  T(C): 37.2 (01-16-20 @ 17:00), Max: 37.2 (01-16-20 @ 17:00)  T(F): 98.9 (01-16-20 @ 17:00), Max: 98.9 (01-16-20 @ 17:00)  HR: 109 (01-16-20 @ 17:00) (72 - 109)  BP: 91/45 (01-16-20 @ 17:00) (91/45 - 164/76)  RR: 19 (01-16-20 @ 17:00) (18 - 19)  SpO2: 100% (01-16-20 @ 17:00) (98% - 100%)  Wt(kg): --    PHYSICAL EXAM:    GENERAL: not in any distress  HEENT: Neck is supple, normocephalic, atraumatic   CHEST/LUNG: Clear to auscultation bilaterally; No rales, rhonchi, wheezing  HEART: Regular rate and rhythm; No murmurs, rubs, or gallops  ABDOMEN: Soft, Nontender, Nondistended; Bowel sounds present, no rebound   colostomy plus; small open wound  EXTREMITIES:  2+ Peripheral Pulses, No clubbing, cyanosis, or edema  SKIN: No rashes or lesions  BACK: no pressor sore   NERVOUS SYSTEM:  Alert & Oriented X3, Good concentration  PSYCH: normal affect     LABS:                         10.0   10.52 )-----------( 296      ( 16 Jan 2020 10:38 )             33.3     01-16    136  |  98  |  59<H>  ----------------------------<  116<H>  4.9   |  30  |  8.99<H>    Ca    9.9      16 Jan 2020 10:38                                Culture Results:   No growth to date. (01-14 @ 09:10)  Culture Results:   No growth to date. (01-14 @ 09:10)                Radiology:

## 2020-01-16 NOTE — PROGRESS NOTE ADULT - SUBJECTIVE AND OBJECTIVE BOX
Patient seen and examined at the bedside resting comfortably in NAD   Pt admits to nausea since his dialysis this afternoon, denies vomiting.   Tolerating diet. Admits to flatus and BM   pain well controlled   Denies chest pain, dyspnea, cough.    T(F): 98.4 (01-16-20 @ 09:45), Max: 98.7 (01-15-20 @ 16:15)  HR: 98 (01-16-20 @ 09:45) (72 - 98)  BP: 164/76 (01-16-20 @ 09:45) (104/83 - 164/76)  RR: 18 (01-16-20 @ 05:30) (18 - 18)  SpO2: 98% (01-16-20 @ 05:30) (97% - 99%)    PHYSICAL EXAM:  General: NAD   Neuro:  Alert & oriented x 3  HEENT: NCAT, EOMI, conjunctiva clear  CV: +S1+S2 regular rate and rhythm  Lung: clear to ausculation bilaterally, respirations nonlabored, good inspiratory effort  Abdomen: soft, NT, ND Normoactive BS, old scar inferior portion open without purulent discharge, clean dressing in place, colostomy site: stoma is pink and viable, brown watery nonbloody stool noted, surrounding skin without leak   Extremities: no pedal edema or calf tenderness noted     LABS:                        10.0   10.52 )-----------( 296      ( 16 Jan 2020 10:38 )             33.3     01-16    136  |  98  |  59<H>  ----------------------------<  116<H>  4.9   |  30  |  8.99<H>    Ca    9.9      16 Jan 2020 10:38        I&O's Detail    15 Natalio 2020 07:01  -  16 Jan 2020 07:00  --------------------------------------------------------  IN:  Total IN: 0 mL    OUT:    Colostomy: 150 mL  Total OUT: 150 mL    Total NET: -150 mL      16 Jan 2020 07:01  -  16 Jan 2020 16:11  --------------------------------------------------------  IN:    Oral Fluid: 240 mL  Total IN: 240 mL    OUT:    Colostomy: 200 mL  Total OUT: 200 mL    Total NET: 40 mL    < from: CT Abdomen and Pelvis w/ IV Cont (01.14.20 @ 00:12) >    EXAM:  CT ABDOMEN AND PELVIS IC                            PROCEDURE DATE:  01/14/2020          INTERPRETATION:    PROCEDURE INFORMATION:   Exam: CT Abdomen And Pelvis With Contrast   Exam date and time: 1/14/2020 12:03 AM   Age: 59 years old   Clinical indication: Other: Bleeding in ostomy bag     TECHNIQUE:   Imaging protocol: Computed tomography of the abdomen and pelvis with   intravenous contrast.     COMPARISON:   CT ABDOMEN AND PELVIS WITH ORAL CONTRAST WITH IV CONTRAST 11/21/2019 9:59 PM     FINDINGS:   Lungs: Mild atelectatic changes in the dependent portions of the lung bases.  lobes.   The visualized portion of the heart is unremarkable.     Liver: Normal. No mass.   Gallbladder and bile ducts: Cholecystectomy. No fluid in gallbladder fossa.   Pancreas: Normal. No ductal dilation.   Spleen: 6.0 x 5.6 cm hypodense lesion is seen in the spleen. Suboptimal   visualization of a 5.0 by 4.2 cm mass lesion in inferior pole of the spleen.   Adrenals: Normal. No mass.   Kidneys and ureters: Numerous cysts in bilateral kidneys measuring up to 2.8   cm.Cortical atrophy of bilateral kidneys.A  9.4 mm nonobstructing stone in the lower pole of the left kidney. A 5 mm nonobstructing stone in the posterior cortex of the upper half of theleft kidney.  Stomach and bowel: Partial colectomy with left lower quadrant colostomy.   Diffuse wall thickening is seen in proximal sigmoid colon extending to the   colostomy. Wall thickening appears more significant in the subcutaneous portion   ofthe bowel. No evidence of obstruction. Stranding of pericolonic fat is seen   in the region of wall thickening including in subcutaneous fat. Colonic   diverticulosis.No pericolonic inflammatory changes to suggest acute   diverticulitis. Surgical changes seen in multiple loops of small bowel. No   evidence of obstruction. In the anterior abdominal wall of the left lower abdomen, near the midline, image [id number] is a small ventral hernia, containing  a loop of small bowel, but  there no evidence of incarceration.  Appendix: Normal appendix is seen.   Intraperitoneal space: Unremarkable. No free air. No significant fluid   collection.   Vasculature: Mild atherosclerotic disease in the aorta. No significant   aneurysmal dilatation.   Lymph nodes: Unremarkable. No enlarged lymph nodes.     Bladder: Unremarkable as visualized.   Reproductive: Unremarkable as visualized.   Bones/joints: Degenerative changes in the spine. No evidence of fracture.   Multiple Schmorl;s nodes are seen in the  lumbar spine.   Soft tissues: See Stomach And Bowel Finding.     IMPRESSION:   1. Partial colectomy with left lower quadrant colostomy. Diffuse wall   thickening is seen in the proximal sigmoid colon extending to the colostomy. Wall   thickening appears more significant in the subcutaneous portion of the bowel.   No evidence of obstruction. Stranding of pericolonic fat is seen in the region   of wall thickening including in subcutaneous fat. Findings are consistent with   colitis.   2. dependent atelectasis, versus mild infiltrates in bilateral lower lobes.   3. 6.0 x 5.6 cm hypodense lesion is seen in the spleen. Suboptimal   visualization of a 5.0 by 4.2 cm mass lesion in inferior pole of the spleen.   These lesions appear unchanged in size compared to prior study. Other lesions   seen in the spleen on prior study are suboptimally visualized on the current   study which appears to be secondary to timing of the bolus. 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 Please correlate with previous   workup.   End stage renal disease, and multiple bilateral renal cysts and nonobstructing left renal stones.            A/P: 59M PMH ESRD on HD (MWF), HIV on HAART, hepatitis C, HLD, and HTN, s/p hartmanns procedure 9/2019 for perforated sigmoid diverticulitis, s/p UGIB due to gastric ulcers, s/p admission for infected abdominal wound, now readmitted with sepsis, possibly due to infected abdominal wound (no clinical evidence) vs chronically infected mesh, s/p RRT for metabolic encephalopathy, now alert and oriented new CT 1/14 showing colitis   - continue colostomy care, surrounding skin care  - antibiotics per ID  - neuro workup   - continue HD per nephrology   - f/u labs  - encourage ambulation, OOB   - will d/w Dr. Oliveira  - no surgical intervention suggested at this time   discussed with Dr. Oliveira     - Zaria DEL TOROS

## 2020-01-16 NOTE — PROGRESS NOTE ADULT - ASSESSMENT
s/p Hartmanns procedure 2/2 perforated sigmoid diverticulitis 9/2019 with subsequent admission to [facility] for UGIB likely due to gastric ulcers found on EGD (healed), and admission to our facility 10/2019 with infected abdominal wound, currently presented with drainage of abdominal wound again  known to us for Sigmoid Colon rsxn with creation of colostomy, peritoneal lavage s/p I&D of intra-abdominal abscess on 9/18/19 treated for abdominal incisional wounds, admitted with possible ?enterocutaneous fistula on 11/2019 all wound culture with fairly sensitive organism mssa, ecoli and klebsiella sensitive to po and dc with po abx   Transferred to ICU  for persistent hypotension, doing ok on exam ,no fever , leukocytosis better   on ancef   today wbc is better   will need po for life if mesh not removed   sx follow up   if no intervention discharge on po keflex for life

## 2020-01-17 PROCEDURE — 99232 SBSQ HOSP IP/OBS MODERATE 35: CPT

## 2020-01-17 RX ORDER — ERYTHROPOIETIN 10000 [IU]/ML
10000 INJECTION, SOLUTION INTRAVENOUS; SUBCUTANEOUS
Refills: 0 | Status: DISCONTINUED | OUTPATIENT
Start: 2020-01-17 | End: 2020-01-24

## 2020-01-17 RX ADMIN — ETRAVIRINE 200 MILLIGRAM(S): 200 TABLET ORAL at 17:10

## 2020-01-17 RX ADMIN — HEPARIN SODIUM 5000 UNIT(S): 5000 INJECTION INTRAVENOUS; SUBCUTANEOUS at 17:08

## 2020-01-17 RX ADMIN — RALTEGRAVIR 400 MILLIGRAM(S): 400 TABLET, FILM COATED ORAL at 05:52

## 2020-01-17 RX ADMIN — ETRAVIRINE 200 MILLIGRAM(S): 200 TABLET ORAL at 11:08

## 2020-01-17 RX ADMIN — HEPARIN SODIUM 5000 UNIT(S): 5000 INJECTION INTRAVENOUS; SUBCUTANEOUS at 05:51

## 2020-01-17 RX ADMIN — Medication 81 MILLIGRAM(S): at 11:08

## 2020-01-17 RX ADMIN — MIDODRINE HYDROCHLORIDE 10 MILLIGRAM(S): 2.5 TABLET ORAL at 05:52

## 2020-01-17 RX ADMIN — Medication 100 MILLIGRAM(S): at 22:40

## 2020-01-17 RX ADMIN — RALTEGRAVIR 400 MILLIGRAM(S): 400 TABLET, FILM COATED ORAL at 17:08

## 2020-01-17 RX ADMIN — PANTOPRAZOLE SODIUM 40 MILLIGRAM(S): 20 TABLET, DELAYED RELEASE ORAL at 05:52

## 2020-01-17 RX ADMIN — RITONAVIR 100 MILLIGRAM(S): 100 TABLET, FILM COATED ORAL at 17:11

## 2020-01-17 RX ADMIN — DARUNAVIR 800 MILLIGRAM(S): 75 TABLET, FILM COATED ORAL at 11:10

## 2020-01-17 RX ADMIN — CHLORHEXIDINE GLUCONATE 1 APPLICATION(S): 213 SOLUTION TOPICAL at 05:53

## 2020-01-17 NOTE — PROGRESS NOTE ADULT - ASSESSMENT
59M PMH HTN, ESRD (M/W/F), HIV, hep C, s/p hartmanns procedure 2/2 perforated sigmoid diverticulitis 9/2019, UGIB likely due to gastric ulcers found on EGD, infected abdominal wound (cx with e-coli, klebsiella, staph) presents with increased drainage from abdominal wound with abdominal pain. Admitted with sepsis, abdominal wound infection. Course complicated by hypotension, septic shock. Transferred to ICU 1/7 for hemodynamic monitoring    S/p acute metabolic encephelopathy - resolved.   - alert, oriented times 4.  - Neurology is following.    Hypotension  - on  midodrine    ESRD  - HD per nephrology   - Renal team is following.    HIV:  - cont antiretrovirals      S/p sepsis, possibly due to infected abdominal wound (no clinical evidence) vs chronically infected mesh,  - was on zosyn for underlying abdominal wound infection and vanco by level, wound cx with e-coli and MSSA.   - On Ancef 1 gm daily.  - No surgical intervention at this time as per surgery, patient will need lifelong oral antibx since the mesh will not be removed at this time.  - I will discuss the frequency with ID.    Follow up patient.

## 2020-01-17 NOTE — PROGRESS NOTE ADULT - SUBJECTIVE AND OBJECTIVE BOX
Patient seen and examined at the bedside resting comfortably in NAD   Pt admits to nausea since his dialysis this afternoon, denies vomiting.   Tolerating diet.+flatus and BM   pain well controlled   Denies chest pain, dyspnea, cough.      T(F): 98 (01-17-20 @ 11:36), Max: 98.6 (01-16-20 @ 23:20)  HR: 89 (01-17-20 @ 11:36) (89 - 106)  BP: 127/83 (01-17-20 @ 11:36) (103/56 - 127/83)  RR: 18 (01-17-20 @ 11:36) (18 - 18)  SpO2: 97% (01-17-20 @ 11:36) (95% - 99%)  Wt(kg): --  CAPILLARY BLOOD GLUCOSE    PHYSICAL EXAM:  General: NAD   Neuro:  Alert & oriented x 3  HEENT: NCAT, EOMI, conjunctiva clear  CV: +S1+S2 regular rate and rhythm  Lung: clear to ausculation bilaterally, respirations nonlabored, good inspiratory effort  Abdomen: soft, NT, ND Normoactive BS, old scar inferior portion open without purulent discharge, clean dressing in place, colostomy site: stoma is pink and viable, brown watery nonbloody stool noted, surrounding skin without leak   Extremities: no pedal edema or calf tenderness noted     LABS:                        10.0   10.52 )-----------( 296      ( 16 Jan 2020 10:38 )             33.3     01-16    136  |  98  |  59<H>  ----------------------------<  116<H>  4.9   |  30  |  8.99<H>    Ca    9.9      16 Jan 2020 10:38        I&O's Detail    16 Jan 2020 07:01  -  17 Jan 2020 07:00  --------------------------------------------------------  IN:    Oral Fluid: 640 mL  Total IN: 640 mL    OUT:    Colostomy: 800 mL    Other: 2000 mL  Total OUT: 2800 mL    Total NET: -2160 mL      17 Jan 2020 07:01  -  17 Jan 2020 19:28  --------------------------------------------------------  IN:    Oral Fluid: 340 mL  Total IN: 340 mL    OUT:    Colostomy: 300 mL  Total OUT: 300 mL    Total NET: 40 mL        Culture Results:   No growth to date. (01-14 @ 09:10)  Culture Results:   No growth to date. (01-14 @ 09:10)      A/P: 59M PMH ESRD on HD (MWF), HIV on HAART, hepatitis C, HLD, and HTN, s/p hartmanns procedure 9/2019 for perforated sigmoid diverticulitis, s/p UGIB due to gastric ulcers, s/p admission for infected abdominal wound, now readmitted with sepsis, possibly due to infected abdominal wound (no clinical evidence) vs chronically infected mesh, s/p RRT for metabolic encephalopathy- resolved, now alert and oriented , leukocytosis improved  - continue colostomy care, surrounding skin care  - antibiotics per ID, keflex po on discharge  - continue HD per nephrology   - encourage ambulation, OOB   - will d/w Dr. Oliveira  - no surgical intervention at this time    - d/c planning  -discussed with Dr. Oliveira

## 2020-01-17 NOTE — PROGRESS NOTE ADULT - SUBJECTIVE AND OBJECTIVE BOX
Patient is a 59y old  Male who presents with a chief complaint of drainage from abdominal wound (16 Jan 2020 21:41), has no chest pain, no abdominal pain.       OVERNIGHT EVENTS: none    MEDICATIONS  (STANDING):  aspirin enteric coated 81 milliGRAM(s) Oral daily  ceFAZolin   IVPB 1000 milliGRAM(s) IV Intermittent every 24 hours  chlorhexidine 4% Liquid 1 Application(s) Topical <User Schedule>  darunavir 800 milliGRAM(s) Oral daily  dextrose 5%. 1000 milliLiter(s) (50 mL/Hr) IV Continuous <Continuous>  dextrose 50% Injectable 12.5 Gram(s) IV Push once  dextrose 50% Injectable 25 Gram(s) IV Push once  etravirine 200 milliGRAM(s) Oral two times a day after meals  heparin  Injectable 5000 Unit(s) SubCutaneous every 12 hours  methimazole 10 milliGRAM(s) Oral daily  midodrine. 10 milliGRAM(s) Oral every 8 hours  pantoprazole    Tablet 40 milliGRAM(s) Oral before breakfast  raltegravir Tablet 400 milliGRAM(s) Oral two times a day  ritonavir Tablet 100 milliGRAM(s) Oral every 24 hours    MEDICATIONS  (PRN):  dextrose 40% Gel 15 Gram(s) Oral once PRN Blood Glucose LESS THAN 70 milliGRAM(s)/deciliter  glucagon  Injectable 1 milliGRAM(s) IntraMuscular once PRN Glucose LESS THAN 70 milligrams/deciliter  oxycodone    5 mG/acetaminophen 325 mG 2 Tablet(s) Oral every 6 hours PRN Severe Pain (7 - 10)  oxycodone    5 mG/acetaminophen 325 mG 1 Tablet(s) Oral every 4 hours PRN Moderate Pain (4 - 6)        REVIEW OF SYSTEMS: reviewed and negative.     Vital Signs Last 24 Hrs  T(C): 36.7 (17 Jan 2020 11:36), Max: 37.2 (16 Jan 2020 17:00)  T(F): 98 (17 Jan 2020 11:36), Max: 98.9 (16 Jan 2020 17:00)  HR: 89 (17 Jan 2020 11:36) (89 - 118)  BP: 127/83 (17 Jan 2020 11:36) (91/45 - 127/83)  BP(mean): 101 (17 Jan 2020 11:36) (60 - 101)  RR: 18 (17 Jan 2020 11:36) (18 - 19)  SpO2: 97% (17 Jan 2020 11:36) (95% - 100%)    PHYSICAL EXAM:  GENERAL: NAD, well-developed  HEAD:  Atraumatic, Normocephalic  EYES: EOMI, PERRLA, conjunctiva and sclera clear  ENMT: No tonsillar erythema, exudates, or enlargement; Moist mucous membranes   NECK: Supple, No JVD   NERVOUS SYSTEM:  Alert & Oriented X 3, Motor Strength 5/5 B/L upper and lower extremities;    CHEST/LUNG: Good air entry bilaterally; No rales,    HEART: Regular rate and rhythm; No murmurs, rubs, or gallops  ABDOMEN: Soft, Nontender, Nondistended; Bowel sounds present, colostomy functioning, formed stool in bag.  EXTREMITIES:  2+ Peripheral Pulses, No clubbing, cyanosis, or edema  LYMPH: No lymphadenopathy noted  SKIN: No petechiae.    LABS:                        10.0   10.52 )-----------( 296      ( 16 Jan 2020 10:38 )             33.3     01-16    136  |  98  |  59<H>  ----------------------------<  116<H>  4.9   |  30  |  8.99<H>    Ca    9.9      16 Jan 2020 10:38         cardiac markers     CAPILLARY BLOOD GLUCOSE        Cultures    RADIOLOGY & ADDITIONAL TESTS:    Imaging Personally Reviewed:  [ ] YES  [ ] NO    Consultant(s) Notes Reviewed:  [x ] YES  [ ] NO    Care Discussed with Consultants/Other Providers [ ] YES  [ ] NO

## 2020-01-17 NOTE — PROGRESS NOTE ADULT - ASSESSMENT
s/p Hartmanns procedure 2/2 perforated sigmoid diverticulitis 9/2019 with subsequent admission to [facility] for UGIB likely due to gastric ulcers found on EGD (healed), and admission to our facility 10/2019 with infected abdominal wound, currently presented with drainage of abdominal wound again  known to us for Sigmoid Colon rsxn with creation of colostomy, peritoneal lavage s/p I&D of intra-abdominal abscess on 9/18/19 treated for abdominal incisional wounds, admitted with possible ?enterocutaneous fistula on 11/2019 all wound culture with fairly sensitive organism mssa, ecoli and klebsiella sensitive to po and dc with po abx   Transferred to ICU  for persistent hypotension, doing ok on exam ,no fever , leukocytosis better   on ancef   today wbc is better   will need po for life if mesh not removed   sx follow up   if no intervention discharge on po keflex for life  case discussed with Dr Martin today

## 2020-01-17 NOTE — PROGRESS NOTE ADULT - SUBJECTIVE AND OBJECTIVE BOX
[facility] NEPHROLOGY SERVICES, [facility]  NEPHROLOGY AND HYPERTENSION  [address]  [address]  [place] [address]  [phone]    MD PARVEEN NOLAND MD ANDREY GONCHARUK, MD MADHU KORRAPATI, MD YELENA ROSENBERG, MD BINNY KOSHY, MD CHRISTOPHER CAPUTO, MD DONAVAN CORTES MD          Patient feels well no complaints today.    MEDICATIONS  (STANDING):  aspirin enteric coated 81 milliGRAM(s) Oral daily  ceFAZolin   IVPB 1000 milliGRAM(s) IV Intermittent every 24 hours  chlorhexidine 4% Liquid 1 Application(s) Topical <User Schedule>  darunavir 800 milliGRAM(s) Oral daily  dextrose 5%. 1000 milliLiter(s) (50 mL/Hr) IV Continuous <Continuous>  dextrose 50% Injectable 12.5 Gram(s) IV Push once  dextrose 50% Injectable 25 Gram(s) IV Push once  etravirine 200 milliGRAM(s) Oral two times a day after meals  heparin  Injectable 5000 Unit(s) SubCutaneous every 12 hours  methimazole 10 milliGRAM(s) Oral daily  midodrine. 10 milliGRAM(s) Oral every 8 hours  pantoprazole    Tablet 40 milliGRAM(s) Oral before breakfast  raltegravir Tablet 400 milliGRAM(s) Oral two times a day  ritonavir Tablet 100 milliGRAM(s) Oral every 24 hours    MEDICATIONS  (PRN):  dextrose 40% Gel 15 Gram(s) Oral once PRN Blood Glucose LESS THAN 70 milliGRAM(s)/deciliter  glucagon  Injectable 1 milliGRAM(s) IntraMuscular once PRN Glucose LESS THAN 70 milligrams/deciliter  oxycodone    5 mG/acetaminophen 325 mG 2 Tablet(s) Oral every 6 hours PRN Severe Pain (7 - 10)  oxycodone    5 mG/acetaminophen 325 mG 1 Tablet(s) Oral every 4 hours PRN Moderate Pain (4 - 6)      01-16-20 @ 07:01  -  01-17-20 @ 07:00  --------------------------------------------------------  IN: 640 mL / OUT: 2800 mL / NET: -2160 mL      PHYSICAL EXAM:      T(C): 36.7 (01-17-20 @ 11:36), Max: 37.2 (01-16-20 @ 17:00)  HR: 89 (01-17-20 @ 11:36) (89 - 118)  BP: 127/83 (01-17-20 @ 11:36) (91/45 - 127/83)  RR: 18 (01-17-20 @ 11:36) (18 - 19)  SpO2: 97% (01-17-20 @ 11:36) (95% - 100%)  Wt(kg): --  Respiratory: clear anteriorly, decreased BS at bases  Cardiovascular: S1 S2  Gastrointestinal: soft NT ND +BS + colostomy  Extremities:   1 edema                                    10.0   10.52 )-----------( 296      ( 16 Jan 2020 10:38 )             33.3     01-16    136  |  98  |  59<H>  ----------------------------<  116<H>  4.9   |  30  |  8.99<H>    Ca    9.9      16 Jan 2020 10:38          Creatinine Trend: 8.99<--, 6.44<--, 8.93<--, 5.77<--, 7.78<--, 6.07<--      Assessment   ESRD; septic shock; improving;   Hypercalcemia, mild, phoslo effect; improved    Plan:  HD for tomorrow  UF a tolerated   Will follow.      Jacob Hobson MD

## 2020-01-17 NOTE — PROGRESS NOTE ADULT - SUBJECTIVE AND OBJECTIVE BOX
HPI:  Patient is a 59 year old male with a PMH HTN, ESRD (M/W/F), HIV, hep C, s/p hartmanns procedure 2/2 perforated sigmoid diverticulitis 9/2019 with subsequent admission to [facility] for UGIB likely due to gastric ulcers found on EGD (healed), and admission to our facility 10/2019 with infected abdominal wound, currently presented with drainage of abdominal wound again. Patient states his home health aide was dressing his wound, but he noticed increased drainage over the last 2 days with associated abdominal pain.   Admits to normal bowel function. Tolerating regular diet.   Denies fever, chills, chest pain, sob, palpitations, urinary symptoms. (06 Jan 2020 18:05)      Allergies    No Known Allergies    Intolerances        MEDICATIONS  (STANDING):  aspirin enteric coated 81 milliGRAM(s) Oral daily  ceFAZolin   IVPB 1000 milliGRAM(s) IV Intermittent every 24 hours  chlorhexidine 4% Liquid 1 Application(s) Topical <User Schedule>  darunavir 800 milliGRAM(s) Oral daily  dextrose 5%. 1000 milliLiter(s) (50 mL/Hr) IV Continuous <Continuous>  dextrose 50% Injectable 12.5 Gram(s) IV Push once  dextrose 50% Injectable 25 Gram(s) IV Push once  etravirine 200 milliGRAM(s) Oral two times a day after meals  heparin  Injectable 5000 Unit(s) SubCutaneous every 12 hours  methimazole 10 milliGRAM(s) Oral daily  midodrine. 10 milliGRAM(s) Oral every 8 hours  pantoprazole    Tablet 40 milliGRAM(s) Oral before breakfast  raltegravir Tablet 400 milliGRAM(s) Oral two times a day  ritonavir Tablet 100 milliGRAM(s) Oral every 24 hours    MEDICATIONS  (PRN):  dextrose 40% Gel 15 Gram(s) Oral once PRN Blood Glucose LESS THAN 70 milliGRAM(s)/deciliter  glucagon  Injectable 1 milliGRAM(s) IntraMuscular once PRN Glucose LESS THAN 70 milligrams/deciliter  oxycodone    5 mG/acetaminophen 325 mG 2 Tablet(s) Oral every 6 hours PRN Severe Pain (7 - 10)  oxycodone    5 mG/acetaminophen 325 mG 1 Tablet(s) Oral every 4 hours PRN Moderate Pain (4 - 6)      REVIEW OF SYSTEMS:    CONSTITUTIONAL: No fever, chills, weight loss, or fatigue  HEENT: No sore throat, runny nose, ear ache  RESPIRATORY: No cough, wheezing, No shortness of breath  CARDIOVASCULAR: No chest pain, palpitations, dizziness  GASTROINTESTINAL: No abdominal pain. No nausea, vomiting, diarrhea  GENITOURINARY: No dysuria, increase frequency, hematuria, or incontinence  NEUROLOGICAL: No headaches, memory loss, loss of strength, numbness, or tremors, no weakness  EXTREMITY: No pedal edema BLE  SKIN: No itching, burning, rashes, or lesions     VITAL SIGNS:  T(C): 36.1 (01-17-20 @ 04:46), Max: 37.2 (01-16-20 @ 17:00)  T(F): 97 (01-17-20 @ 04:46), Max: 98.9 (01-16-20 @ 17:00)  HR: 99 (01-17-20 @ 04:46) (99 - 118)  BP: 103/56 (01-17-20 @ 04:46) (91/45 - 118/65)  RR: 18 (01-17-20 @ 04:46) (18 - 19)  SpO2: 99% (01-17-20 @ 04:46) (95% - 100%)  Wt(kg): --    PHYSICAL EXAM:    GENERAL: not in any distress  HEENT: Neck is supple, normocephalic, atraumatic   CHEST/LUNG: Clear to percussion bilaterally; No rales, rhonchi, wheezing  HEART: Regular rate and rhythm; No murmurs, rubs, or gallops  ABDOMEN: Soft, Nontender, Nondistended; Bowel sounds present, no rebound   EXTREMITIES:  2+ Peripheral Pulses, No clubbing, cyanosis, or edema  GENITOURINARY:   SKIN: No rashes or lesions  BACK: no pressor sore   NERVOUS SYSTEM:  Alert & Oriented X3, Good concentration  PSYCH: normal affect     LABS:                         10.0   10.52 )-----------( 296      ( 16 Jan 2020 10:38 )             33.3     01-16    136  |  98  |  59<H>  ----------------------------<  116<H>  4.9   |  30  |  8.99<H>    Ca    9.9      16 Jan 2020 10:38              Culture Results:   No growth to date. (01-14 @ 09:10)  Culture Results:   No growth to date. (01-14 @ 09:10)                Radiology:

## 2020-01-18 LAB
ANION GAP SERPL CALC-SCNC: 13 MMOL/L — SIGNIFICANT CHANGE UP (ref 5–17)
BUN SERPL-MCNC: 58 MG/DL — HIGH (ref 7–23)
CALCIUM SERPL-MCNC: 10 MG/DL — SIGNIFICANT CHANGE UP (ref 8.5–10.1)
CHLORIDE SERPL-SCNC: 101 MMOL/L — SIGNIFICANT CHANGE UP (ref 96–108)
CO2 SERPL-SCNC: 26 MMOL/L — SIGNIFICANT CHANGE UP (ref 22–31)
CREAT SERPL-MCNC: 8.5 MG/DL — HIGH (ref 0.5–1.3)
GLUCOSE BLDC GLUCOMTR-MCNC: 118 MG/DL — HIGH (ref 70–99)
GLUCOSE BLDC GLUCOMTR-MCNC: 141 MG/DL — HIGH (ref 70–99)
GLUCOSE SERPL-MCNC: 154 MG/DL — HIGH (ref 70–99)
HCT VFR BLD CALC: 33.7 % — LOW (ref 39–50)
HGB BLD-MCNC: 10.2 G/DL — LOW (ref 13–17)
MAGNESIUM SERPL-MCNC: 2.2 MG/DL — SIGNIFICANT CHANGE UP (ref 1.6–2.6)
MCHC RBC-ENTMCNC: 26.2 PG — LOW (ref 27–34)
MCHC RBC-ENTMCNC: 30.3 GM/DL — LOW (ref 32–36)
MCV RBC AUTO: 86.6 FL — SIGNIFICANT CHANGE UP (ref 80–100)
NRBC # BLD: 0 /100 WBCS — SIGNIFICANT CHANGE UP (ref 0–0)
PHOSPHATE SERPL-MCNC: 4.5 MG/DL — SIGNIFICANT CHANGE UP (ref 2.5–4.5)
PLATELET # BLD AUTO: 363 K/UL — SIGNIFICANT CHANGE UP (ref 150–400)
POTASSIUM SERPL-MCNC: 4.5 MMOL/L — SIGNIFICANT CHANGE UP (ref 3.5–5.3)
POTASSIUM SERPL-SCNC: 4.5 MMOL/L — SIGNIFICANT CHANGE UP (ref 3.5–5.3)
RBC # BLD: 3.89 M/UL — LOW (ref 4.2–5.8)
RBC # FLD: 17.3 % — HIGH (ref 10.3–14.5)
SODIUM SERPL-SCNC: 140 MMOL/L — SIGNIFICANT CHANGE UP (ref 135–145)
WBC # BLD: 12.34 K/UL — HIGH (ref 3.8–10.5)
WBC # FLD AUTO: 12.34 K/UL — HIGH (ref 3.8–10.5)

## 2020-01-18 PROCEDURE — 99233 SBSQ HOSP IP/OBS HIGH 50: CPT

## 2020-01-18 RX ORDER — ACETAMINOPHEN 500 MG
1000 TABLET ORAL ONCE
Refills: 0 | Status: COMPLETED | OUTPATIENT
Start: 2020-01-18 | End: 2020-01-18

## 2020-01-18 RX ORDER — ONDANSETRON 8 MG/1
4 TABLET, FILM COATED ORAL EVERY 6 HOURS
Refills: 0 | Status: DISCONTINUED | OUTPATIENT
Start: 2020-01-18 | End: 2020-01-20

## 2020-01-18 RX ORDER — MORPHINE SULFATE 50 MG/1
2 CAPSULE, EXTENDED RELEASE ORAL EVERY 4 HOURS
Refills: 0 | Status: DISCONTINUED | OUTPATIENT
Start: 2020-01-18 | End: 2020-01-19

## 2020-01-18 RX ADMIN — OXYCODONE AND ACETAMINOPHEN 2 TABLET(S): 5; 325 TABLET ORAL at 17:00

## 2020-01-18 RX ADMIN — DARUNAVIR 800 MILLIGRAM(S): 75 TABLET, FILM COATED ORAL at 09:32

## 2020-01-18 RX ADMIN — Medication 81 MILLIGRAM(S): at 09:31

## 2020-01-18 RX ADMIN — PANTOPRAZOLE SODIUM 40 MILLIGRAM(S): 20 TABLET, DELAYED RELEASE ORAL at 05:49

## 2020-01-18 RX ADMIN — OXYCODONE AND ACETAMINOPHEN 2 TABLET(S): 5; 325 TABLET ORAL at 09:31

## 2020-01-18 RX ADMIN — RITONAVIR 100 MILLIGRAM(S): 100 TABLET, FILM COATED ORAL at 17:02

## 2020-01-18 RX ADMIN — ETRAVIRINE 200 MILLIGRAM(S): 200 TABLET ORAL at 08:41

## 2020-01-18 RX ADMIN — MIDODRINE HYDROCHLORIDE 10 MILLIGRAM(S): 2.5 TABLET ORAL at 15:24

## 2020-01-18 RX ADMIN — ETRAVIRINE 200 MILLIGRAM(S): 200 TABLET ORAL at 17:01

## 2020-01-18 RX ADMIN — Medication 100 MILLIGRAM(S): at 21:32

## 2020-01-18 RX ADMIN — OXYCODONE AND ACETAMINOPHEN 2 TABLET(S): 5; 325 TABLET ORAL at 18:00

## 2020-01-18 RX ADMIN — CHLORHEXIDINE GLUCONATE 1 APPLICATION(S): 213 SOLUTION TOPICAL at 09:00

## 2020-01-18 RX ADMIN — HEPARIN SODIUM 5000 UNIT(S): 5000 INJECTION INTRAVENOUS; SUBCUTANEOUS at 05:46

## 2020-01-18 RX ADMIN — ERYTHROPOIETIN 10000 UNIT(S): 10000 INJECTION, SOLUTION INTRAVENOUS; SUBCUTANEOUS at 15:20

## 2020-01-18 RX ADMIN — Medication 400 MILLIGRAM(S): at 21:31

## 2020-01-18 RX ADMIN — HEPARIN SODIUM 5000 UNIT(S): 5000 INJECTION INTRAVENOUS; SUBCUTANEOUS at 17:02

## 2020-01-18 RX ADMIN — RALTEGRAVIR 400 MILLIGRAM(S): 400 TABLET, FILM COATED ORAL at 05:46

## 2020-01-18 RX ADMIN — Medication 1000 MILLIGRAM(S): at 21:46

## 2020-01-18 RX ADMIN — ONDANSETRON 4 MILLIGRAM(S): 8 TABLET, FILM COATED ORAL at 17:33

## 2020-01-18 RX ADMIN — OXYCODONE AND ACETAMINOPHEN 2 TABLET(S): 5; 325 TABLET ORAL at 10:23

## 2020-01-18 RX ADMIN — RALTEGRAVIR 400 MILLIGRAM(S): 400 TABLET, FILM COATED ORAL at 17:02

## 2020-01-18 RX ADMIN — MIDODRINE HYDROCHLORIDE 10 MILLIGRAM(S): 2.5 TABLET ORAL at 05:45

## 2020-01-18 NOTE — PROGRESS NOTE ADULT - SUBJECTIVE AND OBJECTIVE BOX
HPI:  Patient is a 59 year old male with a PMH HTN, ESRD (M/W/F), HIV, hep C, s/p hartmanns procedure 2/2 perforated sigmoid diverticulitis 9/2019 with subsequent admission to [facility] for UGIB likely due to gastric ulcers found on EGD (healed), and admission to our facility 10/2019 with infected abdominal wound, currently presented with drainage of abdominal wound again. Patient states his home health aide was dressing his wound, but he noticed increased drainage over the last 2 days with associated abdominal pain.   Admits to normal bowel function. Tolerating regular diet.   Denies fever, chills, chest pain, sob, palpitations, urinary symptoms. (06 Jan 2020 18:05)      Allergies    No Known Allergies    Intolerances        MEDICATIONS  (STANDING):  aspirin enteric coated 81 milliGRAM(s) Oral daily  ceFAZolin   IVPB 1000 milliGRAM(s) IV Intermittent every 24 hours  chlorhexidine 4% Liquid 1 Application(s) Topical <User Schedule>  darunavir 800 milliGRAM(s) Oral daily  dextrose 5%. 1000 milliLiter(s) (50 mL/Hr) IV Continuous <Continuous>  dextrose 50% Injectable 12.5 Gram(s) IV Push once  dextrose 50% Injectable 25 Gram(s) IV Push once  epoetin marsha Injectable 46077 Unit(s) IV Push <User Schedule>  etravirine 200 milliGRAM(s) Oral two times a day after meals  heparin  Injectable 5000 Unit(s) SubCutaneous every 12 hours  methimazole 10 milliGRAM(s) Oral daily  midodrine. 10 milliGRAM(s) Oral every 8 hours  pantoprazole    Tablet 40 milliGRAM(s) Oral before breakfast  raltegravir Tablet 400 milliGRAM(s) Oral two times a day  ritonavir Tablet 100 milliGRAM(s) Oral every 24 hours    MEDICATIONS  (PRN):  dextrose 40% Gel 15 Gram(s) Oral once PRN Blood Glucose LESS THAN 70 milliGRAM(s)/deciliter  glucagon  Injectable 1 milliGRAM(s) IntraMuscular once PRN Glucose LESS THAN 70 milligrams/deciliter  ondansetron Injectable 4 milliGRAM(s) IV Push every 6 hours PRN Nausea and/or Vomiting  oxycodone    5 mG/acetaminophen 325 mG 2 Tablet(s) Oral every 6 hours PRN Severe Pain (7 - 10)  oxycodone    5 mG/acetaminophen 325 mG 1 Tablet(s) Oral every 4 hours PRN Moderate Pain (4 - 6)      REVIEW OF SYSTEMS:    CONSTITUTIONAL: No fever, chills, weight loss, or fatigue  HEENT: No sore throat, runny nose, ear ache  RESPIRATORY: No cough, wheezing, No shortness of breath  CARDIOVASCULAR: No chest pain, palpitations, dizziness  GASTROINTESTINAL: No abdominal pain. No nausea, vomiting, diarrhea  GENITOURINARY: No dysuria, increase frequency, hematuria, or incontinence  NEUROLOGICAL: No headaches, memory loss, loss of strength, numbness, or tremors, no weakness  EXTREMITY: No pedal edema BLE  SKIN: No itching, burning, rashes, or lesions     VITAL SIGNS:  T(C): 36.3 (01-18-20 @ 16:40), Max: 36.9 (01-18-20 @ 12:55)  T(F): 97.4 (01-18-20 @ 16:40), Max: 98.4 (01-18-20 @ 12:55)  HR: 108 (01-18-20 @ 16:40) (90 - 108)  BP: 106/53 (01-18-20 @ 16:40) (100/52 - 132/72)  RR: 18 (01-18-20 @ 16:40) (18 - 20)  SpO2: 98% (01-18-20 @ 16:40) (96% - 100%)  Wt(kg): --    PHYSICAL EXAM:    GENERAL: not in any distress  HEENT: Neck is supple, normocephalic, atraumatic   CHEST/LUNG: Clear to auscultation bilaterally; No rales, rhonchi, wheezing  HEART: Regular rate and rhythm; No murmurs, rubs, or gallops  ABDOMEN: Soft, Nontender, Nondistended; Bowel sounds present, no rebound   EXTREMITIES:  2+ Peripheral Pulses, No clubbing, cyanosis, or edema  GENITOURINARY:   SKIN: No rashes or lesions  BACK: no pressor sore   NERVOUS SYSTEM:  Alert & Oriented X3, Good concentration  PSYCH: normal affect     LABS:                         10.2   12.34 )-----------( 363      ( 18 Jan 2020 13:55 )             33.7     01-18    140  |  101  |  58<H>  ----------------------------<  154<H>  4.5   |  26  |  8.50<H>    Ca    10.0      18 Jan 2020 13:55  Phos  4.5     01-18  Mg     2.2     01-18                                Culture Results:   No growth to date. (01-14 @ 09:10)  Culture Results:   No growth to date. (01-14 @ 09:10)                Radiology: HPI:  Patient is a 59 year old male with a PMH HTN, ESRD (M/W/F), HIV, hep C, s/p hartmanns procedure 2/2 perforated sigmoid diverticulitis 9/2019 with subsequent admission to [facility] for UGIB likely due to gastric ulcers found on EGD (healed), and admission to our facility 10/2019 with infected abdominal wound, currently presented with drainage of abdominal wound again. Patient states his home health aide was dressing his wound, but he noticed increased drainage over the last 2 days with associated abdominal pain.   Admits to normal bowel function. Tolerating regular diet.   Denies fever, chills, chest pain, sob, palpitations, urinary symptoms. (06 Jan 2020 18:05)  vomited today   severe abdominal pain     Allergies    No Known Allergies    Intolerances        MEDICATIONS  (STANDING):  aspirin enteric coated 81 milliGRAM(s) Oral daily  ceFAZolin   IVPB 1000 milliGRAM(s) IV Intermittent every 24 hours  chlorhexidine 4% Liquid 1 Application(s) Topical <User Schedule>  darunavir 800 milliGRAM(s) Oral daily  dextrose 5%. 1000 milliLiter(s) (50 mL/Hr) IV Continuous <Continuous>  dextrose 50% Injectable 12.5 Gram(s) IV Push once  dextrose 50% Injectable 25 Gram(s) IV Push once  epoetin marsha Injectable 51069 Unit(s) IV Push <User Schedule>  etravirine 200 milliGRAM(s) Oral two times a day after meals  heparin  Injectable 5000 Unit(s) SubCutaneous every 12 hours  methimazole 10 milliGRAM(s) Oral daily  midodrine. 10 milliGRAM(s) Oral every 8 hours  pantoprazole    Tablet 40 milliGRAM(s) Oral before breakfast  raltegravir Tablet 400 milliGRAM(s) Oral two times a day  ritonavir Tablet 100 milliGRAM(s) Oral every 24 hours    MEDICATIONS  (PRN):  dextrose 40% Gel 15 Gram(s) Oral once PRN Blood Glucose LESS THAN 70 milliGRAM(s)/deciliter  glucagon  Injectable 1 milliGRAM(s) IntraMuscular once PRN Glucose LESS THAN 70 milligrams/deciliter  ondansetron Injectable 4 milliGRAM(s) IV Push every 6 hours PRN Nausea and/or Vomiting  oxycodone    5 mG/acetaminophen 325 mG 2 Tablet(s) Oral every 6 hours PRN Severe Pain (7 - 10)  oxycodone    5 mG/acetaminophen 325 mG 1 Tablet(s) Oral every 4 hours PRN Moderate Pain (4 - 6)      REVIEW OF SYSTEMS:    CONSTITUTIONAL: No fever, chills, weight loss, or fatigue  HEENT: No sore throat, runny nose, ear ache  RESPIRATORY: No cough, wheezing, No shortness of breath  CARDIOVASCULAR: No chest pain, palpitations, dizziness  GASTROINTESTINAL:   plus  abdominal pain.  vomited today   GENITOURINARY: No dysuria, increase frequency, hematuria, or incontinence  NEUROLOGICAL: No headaches, memory loss, loss of strength, numbness, or tremors, no weakness  EXTREMITY: No pedal edema BLE  SKIN: No itching, burning, rashes, or lesions     VITAL SIGNS:  T(C): 36.3 (01-18-20 @ 16:40), Max: 36.9 (01-18-20 @ 12:55)  T(F): 97.4 (01-18-20 @ 16:40), Max: 98.4 (01-18-20 @ 12:55)  HR: 108 (01-18-20 @ 16:40) (90 - 108)  BP: 106/53 (01-18-20 @ 16:40) (100/52 - 132/72)  RR: 18 (01-18-20 @ 16:40) (18 - 20)  SpO2: 98% (01-18-20 @ 16:40) (96% - 100%)  Wt(kg): --    PHYSICAL EXAM:    GENERAL: not in any distress  HEENT: Neck is supple, normocephalic, atraumatic   CHEST/LUNG: Clear to auscultation bilaterally; No rales, rhonchi, wheezing  HEART: Regular rate and rhythm; No murmurs, rubs, or gallops  ABDOMEN: Soft, Nontender, Nondistended; Bowel sounds present, no rebound   colostomy benign exam  EXTREMITIES:  2+ Peripheral Pulses, No clubbing, cyanosis, or edema  SKIN: No rashes or lesions  BACK: no pressor sore   NERVOUS SYSTEM:  Alert & Oriented X3, Good concentration  PSYCH: normal affect     LABS:                         10.2   12.34 )-----------( 363      ( 18 Jan 2020 13:55 )             33.7     01-18    140  |  101  |  58<H>  ----------------------------<  154<H>  4.5   |  26  |  8.50<H>    Ca    10.0      18 Jan 2020 13:55  Phos  4.5     01-18  Mg     2.2     01-18                                Culture Results:   No growth to date. (01-14 @ 09:10)  Culture Results:   No growth to date. (01-14 @ 09:10)                Radiology:

## 2020-01-18 NOTE — PROGRESS NOTE ADULT - SUBJECTIVE AND OBJECTIVE BOX
Subjective: feels fatigued.       MEDICATIONS  (STANDING):  aspirin enteric coated 81 milliGRAM(s) Oral daily  ceFAZolin   IVPB 1000 milliGRAM(s) IV Intermittent every 24 hours  chlorhexidine 4% Liquid 1 Application(s) Topical <User Schedule>  darunavir 800 milliGRAM(s) Oral daily  dextrose 5%. 1000 milliLiter(s) (50 mL/Hr) IV Continuous <Continuous>  dextrose 50% Injectable 12.5 Gram(s) IV Push once  dextrose 50% Injectable 25 Gram(s) IV Push once  epoetin marsha Injectable 87847 Unit(s) IV Push <User Schedule>  etravirine 200 milliGRAM(s) Oral two times a day after meals  heparin  Injectable 5000 Unit(s) SubCutaneous every 12 hours  methimazole 10 milliGRAM(s) Oral daily  midodrine. 10 milliGRAM(s) Oral every 8 hours  pantoprazole    Tablet 40 milliGRAM(s) Oral before breakfast  raltegravir Tablet 400 milliGRAM(s) Oral two times a day  ritonavir Tablet 100 milliGRAM(s) Oral every 24 hours    MEDICATIONS  (PRN):  dextrose 40% Gel 15 Gram(s) Oral once PRN Blood Glucose LESS THAN 70 milliGRAM(s)/deciliter  glucagon  Injectable 1 milliGRAM(s) IntraMuscular once PRN Glucose LESS THAN 70 milligrams/deciliter  oxycodone    5 mG/acetaminophen 325 mG 2 Tablet(s) Oral every 6 hours PRN Severe Pain (7 - 10)  oxycodone    5 mG/acetaminophen 325 mG 1 Tablet(s) Oral every 4 hours PRN Moderate Pain (4 - 6)          T(C): 36 (01-18-20 @ 04:55), Max: 36.7 (01-17-20 @ 11:36)  HR: 94 (01-18-20 @ 04:55) (89 - 96)  BP: 112/55 (01-18-20 @ 04:55) (100/52 - 127/83)  RR: 18 (01-18-20 @ 04:55) (18 - 18)  SpO2: 96% (01-18-20 @ 04:55) (96% - 97%)  Wt(kg): --        I&O's Detail    17 Jan 2020 07:01  -  18 Jan 2020 07:00  --------------------------------------------------------  IN:    Oral Fluid: 340 mL    Solution: 50 mL  Total IN: 390 mL    OUT:    Colostomy: 500 mL  Total OUT: 500 mL    Total NET: -110 mL               PHYSICAL EXAM:    GENERAL: NAD  EYES: EOMI, PERRLA, conjunctiva and sclera clear  NECK: Supple, no inc in JVP  CHEST/LUNG: Clear  HEART: S1S2  ABDOMEN: midline dressing, colostomy  EXTREMITIES:  min edema.   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 Polymicrobial abd wound infx  * Recent exp lap, sigm colon resection, perit lavage, I&D of abscess for perfed viscus    Recommendations:   * HD today as Rxed.   * 2K bath. UF goal 1.5-2 kg

## 2020-01-18 NOTE — PROGRESS NOTE ADULT - SUBJECTIVE AND OBJECTIVE BOX
Patient is a 59y old  Male who presents with a chief complaint of Infected abdominal wound (17 Jan 2020 13:03), no chest pain, no SOB.       OVERNIGHT EVENTS: none    MEDICATIONS  (STANDING):  aspirin enteric coated 81 milliGRAM(s) Oral daily  ceFAZolin   IVPB 1000 milliGRAM(s) IV Intermittent every 24 hours  chlorhexidine 4% Liquid 1 Application(s) Topical <User Schedule>  darunavir 800 milliGRAM(s) Oral daily  dextrose 5%. 1000 milliLiter(s) (50 mL/Hr) IV Continuous <Continuous>  dextrose 50% Injectable 12.5 Gram(s) IV Push once  dextrose 50% Injectable 25 Gram(s) IV Push once  epoetin marsha Injectable 32507 Unit(s) IV Push <User Schedule>  etravirine 200 milliGRAM(s) Oral two times a day after meals  heparin  Injectable 5000 Unit(s) SubCutaneous every 12 hours  methimazole 10 milliGRAM(s) Oral daily  midodrine. 10 milliGRAM(s) Oral every 8 hours  pantoprazole    Tablet 40 milliGRAM(s) Oral before breakfast  raltegravir Tablet 400 milliGRAM(s) Oral two times a day  ritonavir Tablet 100 milliGRAM(s) Oral every 24 hours    MEDICATIONS  (PRN):  dextrose 40% Gel 15 Gram(s) Oral once PRN Blood Glucose LESS THAN 70 milliGRAM(s)/deciliter  glucagon  Injectable 1 milliGRAM(s) IntraMuscular once PRN Glucose LESS THAN 70 milligrams/deciliter  oxycodone    5 mG/acetaminophen 325 mG 2 Tablet(s) Oral every 6 hours PRN Severe Pain (7 - 10)  oxycodone    5 mG/acetaminophen 325 mG 1 Tablet(s) Oral every 4 hours PRN Moderate Pain (4 - 6)        REVIEW OF SYSTEMS:  CONSTITUTIONAL: No fever,    EYES: No eye pain,    ENMT:  No difficulty hearing,    NECK: No pain or stiffness  RESPIRATORY: No cough, wheezing,    CARDIOVASCULAR: No chest pain,    GASTROINTESTINAL: No abdominal or epigastric pain.    GENITOURINARY: No dysuria, frequency, hematuria, or incontinence  NEUROLOGICAL: No headaches,    SKIN: No itching, burning, rashes, or lesions      Vital Signs Last 24 Hrs  T(C): 36 (18 Jan 2020 04:55), Max: 36.7 (17 Jan 2020 11:36)  T(F): 96.8 (18 Jan 2020 04:55), Max: 98 (17 Jan 2020 11:36)  HR: 94 (18 Jan 2020 04:55) (89 - 96)  BP: 112/55 (18 Jan 2020 04:55) (100/52 - 127/83)  BP(mean): 101 (17 Jan 2020 11:36) (101 - 101)  RR: 18 (18 Jan 2020 04:55) (18 - 18)  SpO2: 96% (18 Jan 2020 04:55) (96% - 97%)    PHYSICAL EXAM:  GENERAL: NAD, well-developed  HEAD:  Atraumatic, Normocephalic  EYES: EOMI, PERRLA, conjunctiva and sclera clear  ENMT: No tonsillar erythema, exudates, or enlargement; Moist mucous membranes   NECK: Supple, No JVD   NERVOUS SYSTEM:  Alert & Oriented X 3, Motor Strength 5/5 B/L upper and lower extremities;    CHEST/LUNG: Good air entry bilaterally; No rales,    HEART: S1,S2  ABDOMEN: Soft, Nontender, Nondistended; Bowel sounds present, colostomy functioning well,  .  EXTREMITIES:  2+ Peripheral Pulses, No clubbing, cyanosis, or edema  LYMPH: No lymphadenopathy noted  SKIN: No petechiae.      LABS:             cardiac markers     CAPILLARY BLOOD GLUCOSE        Cultures    RADIOLOGY & ADDITIONAL TESTS:    Imaging Personally Reviewed:  [ ] YES  [ ] NO    Consultant(s) Notes Reviewed:  [ x ] YES  [ ] NO    Care Discussed with Consultants/Other Providers [ ] YES  [ ] NO

## 2020-01-18 NOTE — PROGRESS NOTE ADULT - ASSESSMENT
s/p Hartmanns procedure 2/2 perforated sigmoid diverticulitis 9/2019 with subsequent admission to [facility] for UGIB likely due to gastric ulcers found on EGD (healed), and admission to our facility 10/2019 with infected abdominal wound, currently presented with drainage of abdominal wound again  known to us for Sigmoid Colon rsxn with creation of colostomy, peritoneal lavage s/p I&D of intra-abdominal abscess on 9/18/19 treated for abdominal incisional wounds, admitted with possible ?enterocutaneous fistula on 11/2019 all wound culture with fairly sensitive organism mssa, ecoli and klebsiella sensitive to po and dc with po abx   Transferred to ICU  for persistent hypotension, doing ok on exam ,no fever , leukocytosis better   on ancef   today wbc is better   will need po for life if mesh not removed   sx follow up   if no intervention discharge on po keflex for life

## 2020-01-18 NOTE — PROGRESS NOTE ADULT - ASSESSMENT
59M PMH HTN, ESRD (M/W/F), HIV, hep C, s/p hartmanns procedure 2/2 perforated sigmoid diverticulitis 9/2019, UGIB likely due to gastric ulcers found on EGD, infected abdominal wound (cx with e-coli, klebsiella, staph) presents with increased drainage from abdominal wound with abdominal pain. Admitted with sepsis, abdominal wound infection. Course complicated by hypotension, septic shock. Transferred to ICU 1/7 for hemodynamic monitoring    S/p acute metabolic encephelopathy - resolved.   - alert, oriented times 4.  - Neurology is following.  - supportive care    Hypotension- improving  - on  midodrine    ESRD  - HD per nephrology   - Renal team is following.  - Chlorexhidine 4% liquid bathing    HIV:  - cont antiretrovirals      S/p sepsis, possibly due to infected abdominal wound (no clinical evidence) vs chronically infected mesh,  - was on zosyn for underlying abdominal wound infection and vanco by level, wound cx with e-coli and MSSA.   - No surgical intervention at this time as per surgery, patient will need lifelong oral antibx since the mesh will not be removed at this time.  - On Ancef 1 gm daily now, to transition to Keflex 500 mg PO daily lifelong as per ID, d/w ID on 01/17/2020.    Follow up patient.

## 2020-01-18 NOTE — PROGRESS NOTE ADULT - SUBJECTIVE AND OBJECTIVE BOX
Patient seen and examined at bedside with no complaints.   Denies abdominal pain at present.    Denies nausea/ vomiting, fever, chills, chest pain, shortness of breath.    Tolerating regular diet.    Vital Signs Last 24 Hrs  T(F): 98 (01-18-20 @ 12:16), Max: 98 (01-18-20 @ 12:16)  HR: 100 (01-18-20 @ 12:16)  BP: 132/72 (01-18-20 @ 12:16)  RR: 18 (01-18-20 @ 12:16)  SpO2: 98% (01-18-20 @ 12:16)      GENERAL: Alert, NAD  CHEST/LUNG: Respirations nonlabored  HEART: S1S2, Regular rate and rhythm;   ABDOMEN: +Bowel sounds, soft, Nontender, Nondistended, inferior opening of midline incision covered with gauze dressing in place, dressing taken down, site clean without active drainage or bleeding.    Colostomy site: stoma is pink and viable, brown stool and air in bag. Skin surrounding bag partially macerated (improving).  Ostomy appliance removed, area cleaned with NS.  Adaptiv powder and stoma paste applied, new ostomy appliance and bag placed.    EXTREMITIES:  no calf tenderness, No edema    I&O's Detail    17 Jan 2020 07:01  -  18 Jan 2020 07:00  --------------------------------------------------------  IN:    Oral Fluid: 340 mL    Solution: 50 mL  Total IN: 390 mL    OUT:    Colostomy: 500 mL  Total OUT: 500 mL    Total NET: -110 mL        Impression:  60 y/o male PMH ESRD on HD (MWF), HIV on HAART, hepatitis C, HLD, and HTN, s/p hartmanns procedure 9/2019 for perforated sigmoid diverticulitis, s/p UGIB due to gastric ulcers, s/p admission for infected abdominal wound, now readmitted with sepsis, possibly due to infected abdominal wound (no clinical evidence) vs chronically infected mesh, s/p RRT for metabolic encephalopathy- resolved, now alert and oriented , leukocytosis improved.      Plan  - Continue colostomy care, surrounding skin care.   - Surgical planning in future for excision of infected mesh.    - Antibiotics per ID.  - Continue HD per nephrology   - Encourage ambulation, OOB   - F/u am labs  - Discussed with Dr. Oliveira

## 2020-01-19 LAB
CULTURE RESULTS: SIGNIFICANT CHANGE UP
CULTURE RESULTS: SIGNIFICANT CHANGE UP
FLU A RESULT: SIGNIFICANT CHANGE UP
FLU A RESULT: SIGNIFICANT CHANGE UP
FLUAV AG NPH QL: SIGNIFICANT CHANGE UP
FLUBV AG NPH QL: SIGNIFICANT CHANGE UP
GLUCOSE BLDC GLUCOMTR-MCNC: 112 MG/DL — HIGH (ref 70–99)
HCT VFR BLD CALC: 41.1 % — SIGNIFICANT CHANGE UP (ref 39–50)
HGB BLD-MCNC: 12 G/DL — LOW (ref 13–17)
MCHC RBC-ENTMCNC: 25.9 PG — LOW (ref 27–34)
MCHC RBC-ENTMCNC: 29.2 GM/DL — LOW (ref 32–36)
MCV RBC AUTO: 88.8 FL — SIGNIFICANT CHANGE UP (ref 80–100)
NRBC # BLD: 0 /100 WBCS — SIGNIFICANT CHANGE UP (ref 0–0)
PLATELET # BLD AUTO: 432 K/UL — HIGH (ref 150–400)
RBC # BLD: 4.63 M/UL — SIGNIFICANT CHANGE UP (ref 4.2–5.8)
RBC # FLD: 17.5 % — HIGH (ref 10.3–14.5)
RSV RESULT: SIGNIFICANT CHANGE UP
RSV RNA RESP QL NAA+PROBE: SIGNIFICANT CHANGE UP
SPECIMEN SOURCE: SIGNIFICANT CHANGE UP
SPECIMEN SOURCE: SIGNIFICANT CHANGE UP
TSH SERPL-MCNC: 0.01 UU/ML — LOW (ref 0.36–3.74)
WBC # BLD: 17.16 K/UL — HIGH (ref 3.8–10.5)
WBC # FLD AUTO: 17.16 K/UL — HIGH (ref 3.8–10.5)

## 2020-01-19 PROCEDURE — 99233 SBSQ HOSP IP/OBS HIGH 50: CPT

## 2020-01-19 RX ADMIN — MORPHINE SULFATE 2 MILLIGRAM(S): 50 CAPSULE, EXTENDED RELEASE ORAL at 00:36

## 2020-01-19 RX ADMIN — Medication 100 MILLIGRAM(S): at 21:59

## 2020-01-19 RX ADMIN — HEPARIN SODIUM 5000 UNIT(S): 5000 INJECTION INTRAVENOUS; SUBCUTANEOUS at 18:08

## 2020-01-19 RX ADMIN — RALTEGRAVIR 400 MILLIGRAM(S): 400 TABLET, FILM COATED ORAL at 04:45

## 2020-01-19 RX ADMIN — Medication 81 MILLIGRAM(S): at 13:50

## 2020-01-19 RX ADMIN — MORPHINE SULFATE 2 MILLIGRAM(S): 50 CAPSULE, EXTENDED RELEASE ORAL at 00:21

## 2020-01-19 RX ADMIN — CHLORHEXIDINE GLUCONATE 1 APPLICATION(S): 213 SOLUTION TOPICAL at 04:47

## 2020-01-19 RX ADMIN — OXYCODONE AND ACETAMINOPHEN 2 TABLET(S): 5; 325 TABLET ORAL at 04:44

## 2020-01-19 RX ADMIN — HEPARIN SODIUM 5000 UNIT(S): 5000 INJECTION INTRAVENOUS; SUBCUTANEOUS at 04:45

## 2020-01-19 RX ADMIN — ONDANSETRON 4 MILLIGRAM(S): 8 TABLET, FILM COATED ORAL at 00:21

## 2020-01-19 RX ADMIN — OXYCODONE AND ACETAMINOPHEN 2 TABLET(S): 5; 325 TABLET ORAL at 05:44

## 2020-01-19 RX ADMIN — PANTOPRAZOLE SODIUM 40 MILLIGRAM(S): 20 TABLET, DELAYED RELEASE ORAL at 04:44

## 2020-01-19 RX ADMIN — MIDODRINE HYDROCHLORIDE 10 MILLIGRAM(S): 2.5 TABLET ORAL at 21:57

## 2020-01-19 RX ADMIN — MIDODRINE HYDROCHLORIDE 10 MILLIGRAM(S): 2.5 TABLET ORAL at 04:44

## 2020-01-19 RX ADMIN — MORPHINE SULFATE 2 MILLIGRAM(S): 50 CAPSULE, EXTENDED RELEASE ORAL at 06:38

## 2020-01-19 RX ADMIN — MORPHINE SULFATE 2 MILLIGRAM(S): 50 CAPSULE, EXTENDED RELEASE ORAL at 06:21

## 2020-01-19 NOTE — PROGRESS NOTE ADULT - SUBJECTIVE AND OBJECTIVE BOX
Patient is a 59y old  Male who presents with a chief complaint of drainage from abdominal wound (18 Jan 2020 18:27), no chest pain, no SOB       OVERNIGHT EVENTS: none    MEDICATIONS  (STANDING):  aspirin enteric coated 81 milliGRAM(s) Oral daily  ceFAZolin   IVPB 1000 milliGRAM(s) IV Intermittent every 24 hours  chlorhexidine 4% Liquid 1 Application(s) Topical <User Schedule>  darunavir 800 milliGRAM(s) Oral daily  dextrose 5%. 1000 milliLiter(s) (50 mL/Hr) IV Continuous <Continuous>  dextrose 50% Injectable 12.5 Gram(s) IV Push once  dextrose 50% Injectable 25 Gram(s) IV Push once  epoetin marsha Injectable 19630 Unit(s) IV Push <User Schedule>  etravirine 200 milliGRAM(s) Oral two times a day after meals  heparin  Injectable 5000 Unit(s) SubCutaneous every 12 hours  methimazole 10 milliGRAM(s) Oral daily  midodrine. 10 milliGRAM(s) Oral every 8 hours  pantoprazole    Tablet 40 milliGRAM(s) Oral before breakfast  raltegravir Tablet 400 milliGRAM(s) Oral two times a day  ritonavir Tablet 100 milliGRAM(s) Oral every 24 hours    MEDICATIONS  (PRN):  dextrose 40% Gel 15 Gram(s) Oral once PRN Blood Glucose LESS THAN 70 milliGRAM(s)/deciliter  glucagon  Injectable 1 milliGRAM(s) IntraMuscular once PRN Glucose LESS THAN 70 milligrams/deciliter  ondansetron Injectable 4 milliGRAM(s) IV Push every 6 hours PRN Nausea and/or Vomiting  oxycodone    5 mG/acetaminophen 325 mG 2 Tablet(s) Oral every 6 hours PRN Severe Pain (7 - 10)  oxycodone    5 mG/acetaminophen 325 mG 1 Tablet(s) Oral every 4 hours PRN Moderate Pain (4 - 6)        REVIEW OF SYSTEMS:  CONSTITUTIONAL: No fever,    EYES: No eye pain,    ENMT:  No difficulty hearing,    NECK: No pain or stiffness  RESPIRATORY: No cough, wheezing,    CARDIOVASCULAR: No chest pain,    GASTROINTESTINAL: No abdominal pain.    GENITOURINARY: No dysuria,   NEUROLOGICAL: No headaches,    SKIN: abdominal wounds     Vital Signs Last 24 Hrs  T(C): 37 (19 Jan 2020 04:50), Max: 37 (19 Jan 2020 04:50)  T(F): 98.6 (19 Jan 2020 04:50), Max: 98.6 (19 Jan 2020 04:50)  HR: 122 (19 Jan 2020 04:50) (100 - 122)  BP: 101/56 (19 Jan 2020 04:50) (101/54 - 132/72)  BP(mean): --  RR: 18 (19 Jan 2020 04:50) (16 - 20)  SpO2: 99% (19 Jan 2020 04:50) (98% - 100%)    PHYSICAL EXAM:  GENERAL: NAD, well-developed  HEAD:  Atraumatic, Normocephalic  EYES:  Conjunctiva and sclera clear  ENMT:  Moist mucous membranes   NECK: Supple, No JVD   NERVOUS SYSTEM:  Alert & Oriented X 3, Motor Strength 5/5 B/L upper and lower extremities;    CHEST/LUNG: Good air entry bilaterally; No rales,    HEART: S1,S2  ABDOMEN: Soft, Nontender, Nondistended; Bowel sounds present, colostomy functioning well.  EXTREMITIES:  2+ Peripheral Pulses, No clubbing, cyanosis, or edema  LYMPH: No lymphadenopathy noted  SKIN: No petechiae.    LABS:                        12.0   17.16 )-----------( 432      ( 19 Jan 2020 06:55 )             41.1     01-18    140  |  101  |  58<H>  ----------------------------<  154<H>  4.5   |  26  |  8.50<H>    Ca    10.0      18 Jan 2020 13:55  Phos  4.5     01-18  Mg     2.2     01-18         cardiac markers     CAPILLARY BLOOD GLUCOSE      POCT Blood Glucose.: 141 mg/dL (18 Jan 2020 22:05)  POCT Blood Glucose.: 118 mg/dL (18 Jan 2020 16:20)    Cultures    RADIOLOGY & ADDITIONAL TESTS:    Imaging Personally Reviewed:  [ ] YES  [ ] NO    Consultant(s) Notes Reviewed:  [ x ] YES  [ ] NO    Care Discussed with Consultants/Other Providers [ ] YES  [ ] NO

## 2020-01-19 NOTE — PROGRESS NOTE ADULT - ASSESSMENT
59M PMH HTN, ESRD (M/W/F), HIV, hep C, s/p hartmanns procedure 2/2 perforated sigmoid diverticulitis 9/2019, UGIB likely due to gastric ulcers found on EGD, infected abdominal wound (cx with e-coli, klebsiella, staph) presents with increased drainage from abdominal wound with abdominal pain. Admitted with sepsis, abdominal wound infection. Course complicated by hypotension, septic shock. Transferred to ICU 1/7 for hemodynamic monitoring    S/p acute metabolic encephelopathy - resolved.   - alert, oriented times 4.  - Neurology is following.    Hypotension  - on  midodrine    ESRD  - HD per nephrology   - Renal team is following.    HIV:  - cont antiretrovirals    - ID is following.    S/p sepsis, possibly due to infected abdominal wound (no clinical evidence) vs chronically infected mesh,  - was on zosyn for underlying abdominal wound infection and vanco by level, wound cx with e-coli and MSSA.   - Surgical planning in future for excision of infected mesh.   - In the meantime, continue Ancef 1 gm daily now.    To transition to Keflex 500 mg PO daily lifelong if mesh is not removed  upon discharge as per ID .    Follow up patient.

## 2020-01-19 NOTE — PROGRESS NOTE ADULT - ASSESSMENT
Subjective Complaints:  Historian:             Vital Signs Last 24 Hrs  T(C): 36.6 (19 Jan 2020 12:15), Max: 37 (19 Jan 2020 04:50)  T(F): 97.9 (19 Jan 2020 12:15), Max: 98.6 (19 Jan 2020 04:50)  HR: 110 (19 Jan 2020 12:15) (110 - 122)  BP: 131/69 (19 Jan 2020 12:15) (101/54 - 131/69)  BP(mean): --  RR: 18 (19 Jan 2020 04:50) (16 - 18)  SpO2: 99% (19 Jan 2020 12:15) (98% - 99%)    GENERAL PHYSICAL EXAM:  General:  Appears stated age, well-groomed, well-nourished, no distress  HEENT:  NC/AT, patent nares w/ pink mucosa, OP clear w/o lesions, PERRL, EOMI, conjunctivae clear, no thyromegaly, nodules, adenopathy, no JVD  Chest:  Full & symmetric excursion, no increased effort, breath sounds clear  Cardiovascular:  Regular rhythm, S1, S2, no murmur/rub/S3/S4, no carotid/femoral/abdominal bruit, radial/pedal pulses 2+, no edema  Abdomen:  Soft, non-tender, non-distended, normoactive bowel sounds, no HSM  Extremities:  Gait & station:   Digits:   Nails:   Joints, Bones, Muscles:   ROM:   Stability:  Skin:  No rash/erythema/ecchymoses/petechiae/wounds/abscess/warm/dry  Musculoskeletal:  Full ROM in all joints w/o swelling/tenderness/effusion        LABS:                        12.0   17.16 )-----------( 432      ( 19 Jan 2020 06:55 )             41.1     01-18    140  |  101  |  58<H>  ----------------------------<  154<H>  4.5   |  26  |  8.50<H>    Ca    10.0      18 Jan 2020 13:55  Phos  4.5     01-18  Mg     2.2     01-18            RADIOLOGY & ADDITIONAL STUDIES:        Neurology Progress Note:      Mental Status:  awake alert speech fluent folows commands       Cranial Nerves:2 /12intact      Motor:   arm leg 4/5         Sensory:intact      Cerebellar: defrd      Gait:  unsteady       Assesment/Plan: s/p abd surg had change of mental status ct head no acute path  metabolic encephaalthy no seziure

## 2020-01-19 NOTE — PROGRESS NOTE ADULT - ASSESSMENT
s/p Hartmanns procedure 2/2 perforated sigmoid diverticulitis 9/2019 with subsequent admission to [facility] for UGIB likely due to gastric ulcers found on EGD (healed), and admission to our facility 10/2019 with infected abdominal wound, currently presented with drainage of abdominal wound again  known to us for Sigmoid Colon rsxn with creation of colostomy, peritoneal lavage s/p I&D of intra-abdominal abscess on 9/18/19 treated for abdominal incisional wounds, admitted with possible ?enterocutaneous fistula on 11/2019 all wound culture with fairly sensitive organism mssa, ecoli and klebsiella sensitive to po and dc with po abx   work up consistent with infected mesh covering current culture with ancef   now worsening wbc count   on ancef   today wbc is better   will need po for life if mesh not removed   sx follow up   if no intervention discharge on po keflex for life

## 2020-01-19 NOTE — PROGRESS NOTE ADULT - SUBJECTIVE AND OBJECTIVE BOX
HPI:  Patient is a 59 year old male with a PMH HTN, ESRD (M/W/F), HIV, hep C, s/p hartmanns procedure 2/2 perforated sigmoid diverticulitis 9/2019 with subsequent admission to [facility] for UGIB likely due to gastric ulcers found on EGD (healed), and admission to our facility 10/2019 with infected abdominal wound, currently presented with drainage of abdominal wound again. Patient states his home health aide was dressing his wound, but he noticed increased drainage over the last 2 days with associated abdominal pain.   Admits to normal bowel function. Tolerating regular diet.   Denies fever, chills, chest pain, sob, palpitations, urinary symptoms. (06 Jan 2020 18:05)  worsening abdominal pain and worsening wbc count     Allergies    No Known Allergies    Intolerances        MEDICATIONS  (STANDING):  aspirin enteric coated 81 milliGRAM(s) Oral daily  ceFAZolin   IVPB 1000 milliGRAM(s) IV Intermittent every 24 hours  chlorhexidine 4% Liquid 1 Application(s) Topical <User Schedule>  darunavir 800 milliGRAM(s) Oral daily  dextrose 5%. 1000 milliLiter(s) (50 mL/Hr) IV Continuous <Continuous>  dextrose 50% Injectable 12.5 Gram(s) IV Push once  dextrose 50% Injectable 25 Gram(s) IV Push once  epoetin marsha Injectable 64839 Unit(s) IV Push <User Schedule>  etravirine 200 milliGRAM(s) Oral two times a day after meals  heparin  Injectable 5000 Unit(s) SubCutaneous every 12 hours  methimazole 10 milliGRAM(s) Oral daily  midodrine. 10 milliGRAM(s) Oral every 8 hours  pantoprazole    Tablet 40 milliGRAM(s) Oral before breakfast  raltegravir Tablet 400 milliGRAM(s) Oral two times a day  ritonavir Tablet 100 milliGRAM(s) Oral every 24 hours    MEDICATIONS  (PRN):  dextrose 40% Gel 15 Gram(s) Oral once PRN Blood Glucose LESS THAN 70 milliGRAM(s)/deciliter  glucagon  Injectable 1 milliGRAM(s) IntraMuscular once PRN Glucose LESS THAN 70 milligrams/deciliter  ondansetron Injectable 4 milliGRAM(s) IV Push every 6 hours PRN Nausea and/or Vomiting  oxycodone    5 mG/acetaminophen 325 mG 2 Tablet(s) Oral every 6 hours PRN Severe Pain (7 - 10)  oxycodone    5 mG/acetaminophen 325 mG 1 Tablet(s) Oral every 4 hours PRN Moderate Pain (4 - 6)      REVIEW OF SYSTEMS:    abdominal pain is non stop  issues with colostomy noted  VITAL SIGNS:  T(C): 36.6 (01-19-20 @ 12:15), Max: 37 (01-19-20 @ 04:50)  T(F): 97.9 (01-19-20 @ 12:15), Max: 98.6 (01-19-20 @ 04:50)  HR: 110 (01-19-20 @ 12:15) (110 - 122)  BP: 131/69 (01-19-20 @ 12:15) (101/54 - 131/69)  RR: 18 (01-19-20 @ 04:50) (16 - 18)  SpO2: 99% (01-19-20 @ 12:15) (98% - 99%)  Wt(kg): --    PHYSICAL EXAM:    GENERAL: not in any distress  HEENT: Neck is supple, normocephalic, atraumatic   CHEST/LUNG: Clear to auscultation bilaterally; No rales, rhonchi, wheezing  HEART: Regular rate and rhythm; No murmurs, rubs, or gallops  ABDOMEN: firm tender, Nondistended; Bowel sounds present, no rebound   colostomy   on exam no major issues   EXTREMITIES:  2+ Peripheral Pulses, No clubbing, cyanosis, or edema  SKIN: No rashes or lesions  BACK: no pressor sore   NERVOUS SYSTEM:  Alert & Oriented X3, Good concentration  PSYCH: normal affect     LABS:                         12.0   17.16 )-----------( 432      ( 19 Jan 2020 06:55 )             41.1     01-18    140  |  101  |  58<H>  ----------------------------<  154<H>  4.5   |  26  |  8.50<H>    Ca    10.0      18 Jan 2020 13:55  Phos  4.5     01-18  Mg     2.2     01-18                  Thyroid Stimulating Hormone, Serum: 0.011 uU/mL (01-19 @ 06:55)                Culture Results:   No growth at 5 days. (01-14 @ 09:10)  Culture Results:   No growth at 5 days. (01-14 @ 09:10)                Radiology:

## 2020-01-19 NOTE — PROGRESS NOTE ADULT - SUBJECTIVE AND OBJECTIVE BOX
Patient seen and examined at bedside with Dr. Oliveira.  C/o chills and overall weakness.  Reports nausea, denies vomiting.  Denies fever, chest pain, sob, abdominal pain.    Vital Signs Last 24 Hrs  T(F): 97.9 (01-19-20 @ 12:15), Max: 98.6 (01-19-20 @ 04:50)  HR: 110 (01-19-20 @ 12:15)  BP: 131/69 (01-19-20 @ 12:15)  RR: 18 (01-19-20 @ 04:50)  SpO2: 99% (01-19-20 @ 12:15)    GENERAL: Alert, oriented, NAD  CHEST/LUNG: Clear to auscultation bilaterally, respirations nonlabored  HEART: S1S2, RRR  ABDOMEN: Colostomy dressing saturated, leaking out onto midline wound. Removed. Ostomy viable, draining stool. Ostomy site cleaned thoroughly, adaptiv powder applied, stoma paste applied surrounding ostomy, new/clean appliance placed. Pt tolerated well. Midline incision cleansed, new/dry sterile dressing applied. No drainage/no gross blood appreciated. No surrounding erythema/edema/tenderness. Soft, NTND    LABS:                        12.0   17.16 )-----------( 432      ( 19 Jan 2020 06:55 )             41.1         A/P: 59M PMH ESRD on HD (MWF), HIV on HAART, hepatitis C, HLD, and HTN, s/p hartmanns procedure 9/2019 for perforated sigmoid diverticulitis, s/p UGIB due to gastric ulcers, s/p admission for infected abdominal wound, now readmitted with sepsis, likely due to chronically infected mesh, s/p RRT for metabolic encephalopathy, now with worsening leukocytosis  - strict colostomy care and surrounding wound care  - antibiotics per ID  - flu swab  - monitor WBC  - increase activity with PT  - continue medical comanagement  - discussed with Dr. Oliveira

## 2020-01-20 LAB
ANION GAP SERPL CALC-SCNC: 13 MMOL/L — SIGNIFICANT CHANGE UP (ref 5–17)
BASOPHILS # BLD AUTO: 0.08 K/UL — SIGNIFICANT CHANGE UP (ref 0–0.2)
BASOPHILS NFR BLD AUTO: 0.4 % — SIGNIFICANT CHANGE UP (ref 0–2)
BUN SERPL-MCNC: 51 MG/DL — HIGH (ref 7–23)
CALCIUM SERPL-MCNC: 10.9 MG/DL — HIGH (ref 8.5–10.1)
CHLORIDE SERPL-SCNC: 100 MMOL/L — SIGNIFICANT CHANGE UP (ref 96–108)
CO2 SERPL-SCNC: 26 MMOL/L — SIGNIFICANT CHANGE UP (ref 22–31)
CREAT SERPL-MCNC: 7.43 MG/DL — HIGH (ref 0.5–1.3)
EOSINOPHIL # BLD AUTO: 0.09 K/UL — SIGNIFICANT CHANGE UP (ref 0–0.5)
EOSINOPHIL NFR BLD AUTO: 0.5 % — SIGNIFICANT CHANGE UP (ref 0–6)
GLUCOSE SERPL-MCNC: 120 MG/DL — HIGH (ref 70–99)
HCT VFR BLD CALC: 39.7 % — SIGNIFICANT CHANGE UP (ref 39–50)
HGB BLD-MCNC: 11.6 G/DL — LOW (ref 13–17)
IMM GRANULOCYTES NFR BLD AUTO: 0.7 % — SIGNIFICANT CHANGE UP (ref 0–1.5)
LYMPHOCYTES # BLD AUTO: 13.6 % — SIGNIFICANT CHANGE UP (ref 13–44)
LYMPHOCYTES # BLD AUTO: 2.52 K/UL — SIGNIFICANT CHANGE UP (ref 1–3.3)
MCHC RBC-ENTMCNC: 26.1 PG — LOW (ref 27–34)
MCHC RBC-ENTMCNC: 29.2 GM/DL — LOW (ref 32–36)
MCV RBC AUTO: 89.4 FL — SIGNIFICANT CHANGE UP (ref 80–100)
MONOCYTES # BLD AUTO: 1.13 K/UL — HIGH (ref 0–0.9)
MONOCYTES NFR BLD AUTO: 6.1 % — SIGNIFICANT CHANGE UP (ref 2–14)
NEUTROPHILS # BLD AUTO: 14.59 K/UL — HIGH (ref 1.8–7.4)
NEUTROPHILS NFR BLD AUTO: 78.7 % — HIGH (ref 43–77)
NRBC # BLD: 0 /100 WBCS — SIGNIFICANT CHANGE UP (ref 0–0)
PLATELET # BLD AUTO: 439 K/UL — HIGH (ref 150–400)
POTASSIUM SERPL-MCNC: 5.2 MMOL/L — SIGNIFICANT CHANGE UP (ref 3.5–5.3)
POTASSIUM SERPL-SCNC: 5.2 MMOL/L — SIGNIFICANT CHANGE UP (ref 3.5–5.3)
RBC # BLD: 4.44 M/UL — SIGNIFICANT CHANGE UP (ref 4.2–5.8)
RBC # FLD: 17.6 % — HIGH (ref 10.3–14.5)
SODIUM SERPL-SCNC: 139 MMOL/L — SIGNIFICANT CHANGE UP (ref 135–145)
WBC # BLD: 18.54 K/UL — HIGH (ref 3.8–10.5)
WBC # FLD AUTO: 18.54 K/UL — HIGH (ref 3.8–10.5)

## 2020-01-20 PROCEDURE — 99232 SBSQ HOSP IP/OBS MODERATE 35: CPT

## 2020-01-20 RX ORDER — METRONIDAZOLE 500 MG
500 TABLET ORAL THREE TIMES A DAY
Refills: 0 | Status: DISCONTINUED | OUTPATIENT
Start: 2020-01-20 | End: 2020-01-24

## 2020-01-20 RX ADMIN — RITONAVIR 100 MILLIGRAM(S): 100 TABLET, FILM COATED ORAL at 17:57

## 2020-01-20 RX ADMIN — ETRAVIRINE 200 MILLIGRAM(S): 200 TABLET ORAL at 09:17

## 2020-01-20 RX ADMIN — CHLORHEXIDINE GLUCONATE 1 APPLICATION(S): 213 SOLUTION TOPICAL at 06:11

## 2020-01-20 RX ADMIN — DARUNAVIR 800 MILLIGRAM(S): 75 TABLET, FILM COATED ORAL at 11:26

## 2020-01-20 RX ADMIN — Medication 81 MILLIGRAM(S): at 11:25

## 2020-01-20 RX ADMIN — RALTEGRAVIR 400 MILLIGRAM(S): 400 TABLET, FILM COATED ORAL at 17:56

## 2020-01-20 RX ADMIN — MIDODRINE HYDROCHLORIDE 10 MILLIGRAM(S): 2.5 TABLET ORAL at 23:20

## 2020-01-20 RX ADMIN — ETRAVIRINE 200 MILLIGRAM(S): 200 TABLET ORAL at 17:56

## 2020-01-20 RX ADMIN — Medication 100 MILLIGRAM(S): at 23:22

## 2020-01-20 NOTE — PROGRESS NOTE ADULT - SUBJECTIVE AND OBJECTIVE BOX
INTERVAL HPI/OVERNIGHT EVENTS:  Pt seen and examined at bedside.     Allergies/Intolerance: No Known Allergies      MEDICATIONS  (STANDING):  aspirin enteric coated 81 milliGRAM(s) Oral daily  ceFAZolin   IVPB 1000 milliGRAM(s) IV Intermittent every 24 hours  chlorhexidine 4% Liquid 1 Application(s) Topical <User Schedule>  darunavir 800 milliGRAM(s) Oral daily  dextrose 5%. 1000 milliLiter(s) (50 mL/Hr) IV Continuous <Continuous>  dextrose 50% Injectable 12.5 Gram(s) IV Push once  dextrose 50% Injectable 25 Gram(s) IV Push once  epoetin marsha Injectable 14210 Unit(s) IV Push <User Schedule>  etravirine 200 milliGRAM(s) Oral two times a day after meals  heparin  Injectable 5000 Unit(s) SubCutaneous every 12 hours  methimazole 10 milliGRAM(s) Oral daily  midodrine. 10 milliGRAM(s) Oral every 8 hours  pantoprazole    Tablet 40 milliGRAM(s) Oral before breakfast  raltegravir Tablet 400 milliGRAM(s) Oral two times a day  ritonavir Tablet 100 milliGRAM(s) Oral every 24 hours    MEDICATIONS  (PRN):  dextrose 40% Gel 15 Gram(s) Oral once PRN Blood Glucose LESS THAN 70 milliGRAM(s)/deciliter  glucagon  Injectable 1 milliGRAM(s) IntraMuscular once PRN Glucose LESS THAN 70 milligrams/deciliter  ondansetron Injectable 4 milliGRAM(s) IV Push every 6 hours PRN Nausea and/or Vomiting        ROS: all systems reviewed and wnl      PHYSICAL EXAMINATION:  Vital Signs Last 24 Hrs  T(C): 36.4 (20 Jan 2020 05:25), Max: 36.6 (19 Jan 2020 12:15)  T(F): 97.6 (20 Jan 2020 05:25), Max: 97.9 (19 Jan 2020 12:15)  HR: 94 (20 Jan 2020 05:25) (94 - 110)  BP: 103/67 (20 Jan 2020 05:25) (101/53 - 131/69)  BP(mean): --  RR: 18 (20 Jan 2020 05:25) (16 - 18)  SpO2: 97% (20 Jan 2020 05:25) (95% - 99%)  CAPILLARY BLOOD GLUCOSE      POCT Blood Glucose.: 112 mg/dL (19 Jan 2020 16:32)        GENERAL:   NECK: supple, No JVD  CHEST/LUNG: clear to auscultation bilaterally; no rales, rhonchi, or wheezing b/l  HEART: normal S1, S2  ABDOMEN: BS+, soft, ND, NT   EXTREMITIES:  pulses palpable; no clubbing, cyanosis, or edema b/l LEs  SKIN: no rashes or lesions      LABS:                        12.0   17.16 )-----------( 432      ( 19 Jan 2020 06:55 )             41.1     01-18    140  |  101  |  58<H>  ----------------------------<  154<H>  4.5   |  26  |  8.50<H>    Ca    10.0      18 Jan 2020 13:55  Phos  4.5     01-18  Mg     2.2     01-18 INTERVAL HPI/OVERNIGHT EVENTS:  Pt seen and examined at bedside.     Allergies/Intolerance: No Known Allergies      MEDICATIONS  (STANDING):  aspirin enteric coated 81 milliGRAM(s) Oral daily  ceFAZolin   IVPB 1000 milliGRAM(s) IV Intermittent every 24 hours  chlorhexidine 4% Liquid 1 Application(s) Topical <User Schedule>  darunavir 800 milliGRAM(s) Oral daily  dextrose 5%. 1000 milliLiter(s) (50 mL/Hr) IV Continuous <Continuous>  dextrose 50% Injectable 12.5 Gram(s) IV Push once  dextrose 50% Injectable 25 Gram(s) IV Push once  epoetin marsha Injectable 66283 Unit(s) IV Push <User Schedule>  etravirine 200 milliGRAM(s) Oral two times a day after meals  heparin  Injectable 5000 Unit(s) SubCutaneous every 12 hours  methimazole 10 milliGRAM(s) Oral daily  midodrine. 10 milliGRAM(s) Oral every 8 hours  pantoprazole    Tablet 40 milliGRAM(s) Oral before breakfast  raltegravir Tablet 400 milliGRAM(s) Oral two times a day  ritonavir Tablet 100 milliGRAM(s) Oral every 24 hours    MEDICATIONS  (PRN):  dextrose 40% Gel 15 Gram(s) Oral once PRN Blood Glucose LESS THAN 70 milliGRAM(s)/deciliter  glucagon  Injectable 1 milliGRAM(s) IntraMuscular once PRN Glucose LESS THAN 70 milligrams/deciliter  ondansetron Injectable 4 milliGRAM(s) IV Push every 6 hours PRN Nausea and/or Vomiting        ROS: all systems reviewed and wnl      PHYSICAL EXAMINATION:  Vital Signs Last 24 Hrs  T(C): 36.4 (20 Jan 2020 05:25), Max: 36.6 (19 Jan 2020 12:15)  T(F): 97.6 (20 Jan 2020 05:25), Max: 97.9 (19 Jan 2020 12:15)  HR: 94 (20 Jan 2020 05:25) (94 - 110)  BP: 103/67 (20 Jan 2020 05:25) (101/53 - 131/69)  BP(mean): --  RR: 18 (20 Jan 2020 05:25) (16 - 18)  SpO2: 97% (20 Jan 2020 05:25) (95% - 99%)  CAPILLARY BLOOD GLUCOSE      POCT Blood Glucose.: 112 mg/dL (19 Jan 2020 16:32)        GENERAL: in bed, NAD, comfortable, no fevers or cough  NECK: supple, No JVD  CHEST/LUNG: clear to auscultation bilaterally; no rales, rhonchi, or wheezing b/l  HEART: normal S1, S2  ABDOMEN: BS+, soft, ND, NT, colostomy in place   EXTREMITIES:  pulses palpable; no clubbing, cyanosis, or edema b/l LEs  SKIN: no rashes or lesions      LABS:                        12.0   17.16 )-----------( 432      ( 19 Jan 2020 06:55 )             41.1     01-18    140  |  101  |  58<H>  ----------------------------<  154<H>  4.5   |  26  |  8.50<H>    Ca    10.0      18 Jan 2020 13:55  Phos  4.5     01-18  Mg     2.2     01-18

## 2020-01-20 NOTE — PROGRESS NOTE ADULT - ASSESSMENT
s/p Hartmanns procedure 2/2 perforated sigmoid diverticulitis 9/2019 with subsequent admission to [facility] for UGIB likely due to gastric ulcers found on EGD (healed), and admission to our facility 10/2019 with infected abdominal wound, currently presented with drainage of abdominal wound again  known to us for Sigmoid Colon rsxn with creation of colostomy, peritoneal lavage s/p I&D of intra-abdominal abscess on 9/18/19 treated for abdominal incisional wounds, admitted with possible ?enterocutaneous fistula on 11/2019 all wound culture with fairly sensitive organism mssa, ecoli and klebsiella sensitive to po and dc with po abx   work up consistent with infected mesh covering current culture with ancef   now worsening wbc count   on ancef   sx follow up   will add flagyl and assess response

## 2020-01-20 NOTE — PROGRESS NOTE ADULT - ASSESSMENT
59M PMH HTN, ESRD (M/W/F), HIV, hep C, s/p hartmanns procedure 2/2 perforated sigmoid diverticulitis 9/2019, UGIB likely due to gastric ulcers found on EGD, infected abdominal wound (cx with e-coli, klebsiella, staph) presents with increased drainage from abdominal wound with abdominal pain. Admitted with sepsis, abdominal wound infection. Course complicated by hypotension, septic shock. Transferred to ICU 1/7 for hemodynamic monitoring    S/p acute metabolic encephelopathy - resolved.   - alert, oriented times 4.  - Neurology is following.    Hypotension  - on  midodrine    ESRD  - HD per nephrology   - Renal team is following.    HIV:  - cont antiretrovirals    - ID is following.    S/p sepsis, possibly due to infected abdominal wound (no clinical evidence) vs chronically infected mesh,  - was on zosyn for underlying abdominal wound infection and vanco by level, wound cx with e-coli and MSSA.   - Surgical planning in future for excision of infected mesh.   - In the meantime, continue Ancef 1 gm daily now.    To transition to Keflex 500 mg PO daily lifelong if mesh is not removed  upon discharge as per ID .    Follow up patient. 59M PMH HTN, ESRD (M/W/F), HIV, hep C, s/p hartmanns procedure 2/2 perforated sigmoid diverticulitis 9/2019, UGIB likely due to gastric ulcers found on EGD, infected abdominal wound (cx with e-coli, klebsiella, staph) presents with increased drainage from abdominal wound with abdominal pain. Admitted with sepsis, abdominal wound infection. Course complicated by hypotension, septic shock. Transferred to ICU 1/7 for hemodynamic monitoring    S/p acute metabolic encephelopathy - resolved.   - alert, oriented times 4.  - Neurology is following.    Hypotension  - on  midodrine 10 mg tid    ESRD  - HD per nephrology   - Renal team is following.    HIV:  - cont antiretrovirals    - ID is following.    S/p sepsis, possibly due to infected abdominal wound (no clinical evidence) vs chronically infected mesh,  - was on zosyn for underlying abdominal wound infection and vanco by level, wound cx with e-coli and MSSA.   - Surgical planning in future for excision of infected mesh.   - In the meantime, continue Ancef 1 gm daily now.    To transition to Keflex 500 mg PO daily lifelong if mesh is not removed  upon discharge as per ID .    Follow up patient. 59M PMH HTN, ESRD (M/W/F), HIV, hep C, s/p hartmanns procedure 2/2 perforated sigmoid diverticulitis 9/2019, UGIB likely due to gastric ulcers found on EGD, infected abdominal wound (cx with e-coli, klebsiella, staph) presents with increased drainage from abdominal wound with abdominal pain. Admitted with sepsis, abdominal wound infection. Course complicated by hypotension, septic shock. Transferred to ICU 1/7 for hemodynamic monitoring    S/p acute metabolic encephelopathy - resolved.  Alert, oriented times 3.     Hypotension  - on  midodrine 10 mg tid. Stopped telemetry today.     ESRD  - HD per nephrology. Renal team is following.    HIV:  - cont antiretrovirals as ordered. ID is following.    S/p sepsis, possibly due to infected abdominal wound (no clinical evidence) vs chronically infected mesh,  - was on zosyn for underlying abdominal wound infection and vanco by level, wound cx with e-coli and MSSA.   - Surgical planning in future for excision of infected mesh, per notes today.   - In the meantime, continue Ancef 1 gm daily now.  To transition to Keflex 500 mg PO daily lifelong if mesh is not removed  upon discharge as per ID .    Continue all above meds. Await Surgery input regarding removing mesh. 59M PMH HTN, ESRD (M/W/F), HIV, hep C, s/p hartmanns procedure 2/2 perforated sigmoid diverticulitis 9/2019, UGIB likely due to gastric ulcers found on EGD, infected abdominal wound (cx with e-coli, klebsiella, staph) presents with increased drainage from abdominal wound with abdominal pain. Admitted with sepsis, abdominal wound infection. Course complicated by hypotension, septic shock. Transferred to ICU 1/7 for hemodynamic monitoring    S/p acute metabolic encephelopathy - resolved.  Alert, oriented times 3.     Hypotension  - on  midodrine 10 mg tid. Stopped telemetry today.     ESRD  - HD per nephrology. Renal team is following.    HIV:  - cont antiretrovirals as ordered. ID is following.    S/p sepsis, possibly due to infected abdominal wound (no clinical evidence) vs chronically infected mesh,  - was on zosyn for underlying abdominal wound infection and vanco by level, wound cx with e-coli and MSSA.   - Surgical planning in future for excision of infected mesh, per notes today.  In the meantime, continue Ancef 1 gm daily now.  To transition to Keflex 500 mg PO daily lifelong if mesh is not removed  upon discharge as per ID.     Elevated WBC count noted, 18,000, no bands. Continue Ancef as above, follow daily trend.     Continue all above meds. Await Surgery input regarding removing mesh.

## 2020-01-20 NOTE — CHART NOTE - NSCHARTNOTEFT_GEN_A_CORE
AM lab review:                          11.6   18.54 )-----------( 439      ( 20 Jan 2020 10:48 )             39.7       01-20    139  |  100  |  51<H>  ----------------------------<  120<H>  5.2   |  26  |  7.43<H>    Ca    10.9<H>      20 Jan 2020 10:48          Impression:  increasing WBC - remains afebrile    Plan:  f/u WBC in AM  continue IVAB  will discus with Dr. Oliveira

## 2020-01-20 NOTE — PROGRESS NOTE ADULT - SUBJECTIVE AND OBJECTIVE BOX
HPI:  Patient is a 59 year old male with a PMH HTN, ESRD (M/W/F), HIV, hep C, s/p hartmanns procedure 2/2 perforated sigmoid diverticulitis 9/2019 with subsequent admission to [facility] for UGIB likely due to gastric ulcers found on EGD (healed), and admission to our facility 10/2019 with infected abdominal wound, currently presented with drainage of abdominal wound again. Patient states his home health aide was dressing his wound, but he noticed increased drainage over the last 2 days with associated abdominal pain.   Admits to normal bowel function. Tolerating regular diet.   Denies fever, chills, chest pain, sob, palpitations, urinary symptoms. (06 Jan 2020 18:05)  work up consistent with infected mesh    grew same org as prev admission   no change in status   abdominal pain   high wbc   no appetite  discussed with elissa EDDY yesterday     Allergies    No Known Allergies    Intolerances        MEDICATIONS  (STANDING):  aspirin enteric coated 81 milliGRAM(s) Oral daily  ceFAZolin   IVPB 1000 milliGRAM(s) IV Intermittent every 24 hours  chlorhexidine 4% Liquid 1 Application(s) Topical <User Schedule>  darunavir 800 milliGRAM(s) Oral daily  dextrose 5%. 1000 milliLiter(s) (50 mL/Hr) IV Continuous <Continuous>  dextrose 50% Injectable 12.5 Gram(s) IV Push once  dextrose 50% Injectable 25 Gram(s) IV Push once  epoetin marsha Injectable 53203 Unit(s) IV Push <User Schedule>  etravirine 200 milliGRAM(s) Oral two times a day after meals  heparin  Injectable 5000 Unit(s) SubCutaneous every 12 hours  methimazole 10 milliGRAM(s) Oral daily  midodrine. 10 milliGRAM(s) Oral every 8 hours  pantoprazole    Tablet 40 milliGRAM(s) Oral before breakfast  raltegravir Tablet 400 milliGRAM(s) Oral two times a day  ritonavir Tablet 100 milliGRAM(s) Oral every 24 hours    MEDICATIONS  (PRN):  dextrose 40% Gel 15 Gram(s) Oral once PRN Blood Glucose LESS THAN 70 milliGRAM(s)/deciliter  glucagon  Injectable 1 milliGRAM(s) IntraMuscular once PRN Glucose LESS THAN 70 milligrams/deciliter      REVIEW OF SYSTEMS:    abdominal pain   no appetite  feels lousy     VITAL SIGNS:  T(C): 36.1 (01-20-20 @ 17:12), Max: 36.4 (01-20-20 @ 05:25)  T(F): 97 (01-20-20 @ 17:12), Max: 97.6 (01-20-20 @ 05:25)  HR: 94 (01-20-20 @ 17:12) (89 - 99)  BP: 143/80 (01-20-20 @ 17:12) (95/61 - 143/80)  RR: 18 (01-20-20 @ 17:12) (17 - 18)  SpO2: 98% (01-20-20 @ 17:12) (95% - 98%)  Wt(kg): --    PHYSICAL EXAM:    GENERAL: not in any distress  HEENT: Neck is supple, normocephalic, atraumatic   CHEST/LUNG: Clear to auscultation bilaterally; No rales, rhonchi, wheezing  HEART: Regular rate and rhythm; No murmurs, rubs, or gallops  ABDOMEN: Soft, Nontender, Nondistended; Bowel sounds present, no rebound   colostomy   EXTREMITIES:  2+ Peripheral Pulses, No clubbing, cyanosis, or edema  SKIN: No rashes or lesions  BACK: no pressor sore   NERVOUS SYSTEM:  Alert & Oriented X3, Good concentration  PSYCH: normal affect     LABS:                         11.6   18.54 )-----------( 439      ( 20 Jan 2020 10:48 )             39.7     01-20    139  |  100  |  51<H>  ----------------------------<  120<H>  5.2   |  26  |  7.43<H>    Ca    10.9<H>      20 Jan 2020 10:48                  Thyroid Stimulating Hormone, Serum: 0.011 uU/mL (01-19 @ 06:55)                Culture Results:   No growth at 5 days. (01-14 @ 09:10)  Culture Results:   No growth at 5 days. (01-14 @ 09:10)                Radiology:

## 2020-01-20 NOTE — CHART NOTE - NSCHARTNOTEFT_GEN_A_CORE
Assessment: Pt with PMHx HTN, ESRD on HD, HIV, hepatitis C, s/p Dee's procedure (09/2019) for perforated sigmoid diverticulitis (colon resection and creation of colostomy), UGIB, admission for infected abdominal wound (cx with e-coli, klebsiella, staph); now readmitted with sepsis, abdominal wound infection. Pt s/p sepsis, possibly due to infected abdominal wounds vs chronically infected mesh. Metabolic encephalopathy (resolved), leukocytosis.    Factors impacting intake: [ ] none [ ] nausea  [ ] vomiting [ ] diarrhea [ ] constipation  [ ]chewing problems [ ] swallowing issues  [x] other: persistent lack of appetite; abdominal pain; nausea; dislike of certain menu items    Diet Prescription: Diet, Renal Restrictions:   For patients receiving Renal Replacement - No Protein Restr, No Conc K, No Conc Phos, Low Sodium  Supplement Feeding Modality:  Oral  Nepro Cans or Servings Per Day:  1       Frequency:  Three Times a day  DanActive Cans or Servings Per Day:  1       Frequency:  Two Times a day (01-11-20 @ 08:54)    Intake: Po intake ~50% or <. Pt reported enjoys and drinks Nepro supplements.    Current Weight: 94.8 kg (1/20), 98.3 kg (1/13)  % Weight Change: 3.6% (3.5 kg) wt loss x 1 week during hospitalization; Likely at least partially related to fluid/HD; Pt with no current edema    Nutrition focused physical exam conducted; Subcutaneous fat loss: [mild] Orbital fat pads region, [mild]Buccal fat region, [moderate]Triceps region,  [WDL]Ribs region.  Muscle wasting: [mild]Temples region, [mild]Clavicle region, [mild]Shoulder region, [unable]Scapula region, [moderate]Interosseous region, [mild]thigh region, [mild]Calf region    Pertinent Medications: MEDICATIONS  (STANDING):  aspirin enteric coated 81 milliGRAM(s) Oral daily  ceFAZolin   IVPB 1000 milliGRAM(s) IV Intermittent every 24 hours  chlorhexidine 4% Liquid 1 Application(s) Topical <User Schedule>  darunavir 800 milliGRAM(s) Oral daily  dextrose 5%. 1000 milliLiter(s) (50 mL/Hr) IV Continuous <Continuous>  dextrose 50% Injectable 12.5 Gram(s) IV Push once  dextrose 50% Injectable 25 Gram(s) IV Push once  epoetin marsha Injectable 96802 Unit(s) IV Push <User Schedule>  etravirine 200 milliGRAM(s) Oral two times a day after meals  heparin  Injectable 5000 Unit(s) SubCutaneous every 12 hours  methimazole 10 milliGRAM(s) Oral daily  midodrine. 10 milliGRAM(s) Oral every 8 hours  pantoprazole    Tablet 40 milliGRAM(s) Oral before breakfast  raltegravir Tablet 400 milliGRAM(s) Oral two times a day  ritonavir Tablet 100 milliGRAM(s) Oral every 24 hours    MEDICATIONS  (PRN):  dextrose 40% Gel 15 Gram(s) Oral once PRN Blood Glucose LESS THAN 70 milliGRAM(s)/deciliter  glucagon  Injectable 1 milliGRAM(s) IntraMuscular once PRN Glucose LESS THAN 70 milligrams/deciliter  ondansetron Injectable 4 milliGRAM(s) IV Push every 6 hours PRN Nausea and/or Vomiting    Pertinent Labs: 01-18 Na140 mmol/L Glu 154 mg/dL<H> K+ 4.5 mmol/L Cr  8.50 mg/dL<H> BUN 58 mg/dL<H> 01-18 Phos 4.5 mg/dL 01-14 Alb 2.3 g/dL<L> 01-07 [id number] 5.0 %    CAPILLARY BLOOD GLUCOSE    POCT Blood Glucose.: 112 mg/dL (19 Jan 2020 16:32)    Skin: Wound on mid lower abdomen; no pressure ulcers    Estimated Needs:   [x] no change since previous assessment on 1/7/20  [ ] recalculated:     Previous Nutrition Diagnosis:   Malnutrition Severe malnutrition in context of chronic illness.    Etiology Inadequate energy/protein intake + increased energy/protein needs related to multiple abdominal issues c infections & ESRD on HD tx.    Signs/Symptoms Wt loss of 9.7% x 6 weeks; < 75% nutrition needs > 1 month.    Nutrition Diagnosis is [x] ongoing  [ ] resolved [ ] not applicable    Goal/Expected Outcome Pt to consume > 50% meals (goal not met - pt consuming 50% of less of meals)  Pt to consume >75% supplements during hospitalization (goal met)    New Nutrition Diagnosis: [x] not applicable       Interventions:   Recommend  [x] Continue current diet rx  [ ] Change Diet To:  [ ] Nutrition Supplement  [ ] Nutrition Support  [x] Other: Provide food choices/preferences within dietary restrictions; Add Nephro-Sigifredo daily    Monitoring and Evaluation:   [ x ] PO intake [ x ] Tolerance to diet prescription [ x ] weights [ x ] labs[ x ] follow up per protocol  [ ] other: Assessment: Pt with PMHx HTN, ESRD on HD, HIV, hepatitis C, s/p Dee's procedure (09/2019) for perforated sigmoid diverticulitis (colon resection and creation of colostomy), UGIB, admission for infected abdominal wound (cx with e-coli, klebsiella, staph); now readmitted with sepsis, abdominal wound infection. Pt s/p sepsis, possibly due to infected abdominal wounds vs chronically infected mesh. Metabolic encephalopathy (resolved), leukocytosis.    Factors impacting intake: [ ] none [ ] nausea  [ ] vomiting [ ] diarrhea [ ] constipation  [ ]chewing problems [ ] swallowing issues  [x] other: persistent lack of appetite; abdominal pain; nausea; dislike of certain menu items    Diet Prescription: Diet, Renal Restrictions:   For patients receiving Renal Replacement - No Protein Restr, No Conc K, No Conc Phos, Low Sodium  Supplement Feeding Modality:  Oral  Nepro Cans or Servings Per Day:  1       Frequency:  Three Times a day  DanActive Cans or Servings Per Day:  1       Frequency:  Two Times a day (01-11-20 @ 08:54)    Intake: Po intake ~50% or <. Pt reported enjoys and drinks Nepro supplements.    Current Weight: 94.8 kg (1/20), 98.3 kg (1/13)  % Weight Change: 3.6% (3.5 kg) wt loss x 1 week during hospitalization; Likely at least partially related to fluid/HD; Pt with no current edema    Nutrition focused physical exam conducted; Subcutaneous fat loss: [mild] Orbital fat pads region, [mild]Buccal fat region, [moderate]Triceps region,  [WDL]Ribs region.  Muscle wasting: [mild]Temples region, [mild]Clavicle region, [mild]Shoulder region, [unable]Scapula region, [moderate]Interosseous region, [mild]thigh region, [mild]Calf region    Pertinent Medications: MEDICATIONS  (STANDING):  aspirin enteric coated 81 milliGRAM(s) Oral daily  ceFAZolin   IVPB 1000 milliGRAM(s) IV Intermittent every 24 hours  chlorhexidine 4% Liquid 1 Application(s) Topical <User Schedule>  darunavir 800 milliGRAM(s) Oral daily  dextrose 5%. 1000 milliLiter(s) (50 mL/Hr) IV Continuous <Continuous>  dextrose 50% Injectable 12.5 Gram(s) IV Push once  dextrose 50% Injectable 25 Gram(s) IV Push once  epoetin marsha Injectable 72318 Unit(s) IV Push <User Schedule>  etravirine 200 milliGRAM(s) Oral two times a day after meals  heparin  Injectable 5000 Unit(s) SubCutaneous every 12 hours  methimazole 10 milliGRAM(s) Oral daily  midodrine. 10 milliGRAM(s) Oral every 8 hours  pantoprazole    Tablet 40 milliGRAM(s) Oral before breakfast  raltegravir Tablet 400 milliGRAM(s) Oral two times a day  ritonavir Tablet 100 milliGRAM(s) Oral every 24 hours    MEDICATIONS  (PRN):  dextrose 40% Gel 15 Gram(s) Oral once PRN Blood Glucose LESS THAN 70 milliGRAM(s)/deciliter  glucagon  Injectable 1 milliGRAM(s) IntraMuscular once PRN Glucose LESS THAN 70 milligrams/deciliter  ondansetron Injectable 4 milliGRAM(s) IV Push every 6 hours PRN Nausea and/or Vomiting    Pertinent Labs: 01-18 Na140 mmol/L Glu 154 mg/dL<H> K+ 4.5 mmol/L Cr  8.50 mg/dL<H> BUN 58 mg/dL<H> 01-18 Phos 4.5 mg/dL 01-14 Alb 2.3 g/dL<L> 01-07 [id number] 5.0 %    CAPILLARY BLOOD GLUCOSE    POCT Blood Glucose.: 112 mg/dL (19 Jan 2020 16:32)    Skin: Wound on mid lower abdomen; no pressure ulcers    Estimated Needs:   [x] no change since previous assessment on 1/7/20  [ ] recalculated:     Previous Nutrition Diagnosis:   Malnutrition Severe malnutrition in context of chronic illness.    Etiology Inadequate energy/protein intake + increased energy/protein needs related to multiple abdominal issues c infections & ESRD on HD tx.    Signs/Symptoms Wt loss of 9.7% x 6 weeks; < 75% nutrition needs > 1 month.    Nutrition Diagnosis is [x] ongoing  [ ] resolved [ ] not applicable    Goal/Expected Outcome Pt to consume > 50% meals (goal not met - pt consuming 50% or less of meals)  Pt to consume >75% supplements during hospitalization (goal met)    New Nutrition Diagnosis: [x] not applicable       Interventions:   Recommend  [x] Continue current diet rx  [ ] Change Diet To:  [ ] Nutrition Supplement  [ ] Nutrition Support  [x] Other: Provide food choices/preferences within dietary restrictions; Add Nephro-Sigifredo daily    Monitoring and Evaluation:   [ x ] PO intake [ x ] Tolerance to diet prescription [ x ] weights [ x ] labs[ x ] follow up per protocol  [ ] other: Assessment: Pt with PMHx HTN, ESRD on HD, HIV, hepatitis C, s/p Dee's procedure (09/2019) for perforated sigmoid diverticulitis (colon resection and creation of colostomy), UGIB, admission for infected abdominal wound (cx with e-coli, klebsiella, staph); now readmitted with sepsis, abdominal wound infection. Pt s/p sepsis, possibly due to infected abdominal wounds vs chronically infected mesh. Metabolic encephalopathy (resolved), leukocytosis.    Factors impacting intake: [ ] none [ ] nausea  [ ] vomiting [ ] diarrhea [ ] constipation  [ ]chewing problems [ ] swallowing issues  [x] other: persistent lack of appetite; abdominal pain; nausea; dislike of certain menu items    Diet Prescription: Diet, Renal Restrictions:   For patients receiving Renal Replacement - No Protein Restr, No Conc K, No Conc Phos, Low Sodium  Supplement Feeding Modality:  Oral  Nepro Cans or Servings Per Day:  1       Frequency:  Three Times a day  DanActive Cans or Servings Per Day:  1       Frequency:  Two Times a day (01-11-20 @ 08:54)    Intake: Po intake ~50% or <; Pt reported enjoys and drinks Nepro supplements; Observed pt ate applesauce, oatmeal (<50%), and DanActive for breakfast this AM; put Nepro aside for snack between breakfast and lunch.    Current Weight: 94.8 kg (1/20), 98.3 kg (1/13)  % Weight Change: 3.6% (3.5 kg) wt loss x 1 week during hospitalization; Likely at least partially related to fluid/HD; Pt with no current edema    Nutrition focused physical exam conducted; Subcutaneous fat loss: [mild] Orbital fat pads region, [mild]Buccal fat region, [moderate]Triceps region,  [WDL]Ribs region.  Muscle wasting: [mild]Temples region, [mild]Clavicle region, [mild]Shoulder region, [unable]Scapula region, [moderate]Interosseous region, [mild]thigh region, [mild]Calf region    Pertinent Medications: MEDICATIONS  (STANDING):  aspirin enteric coated 81 milliGRAM(s) Oral daily  ceFAZolin   IVPB 1000 milliGRAM(s) IV Intermittent every 24 hours  chlorhexidine 4% Liquid 1 Application(s) Topical <User Schedule>  darunavir 800 milliGRAM(s) Oral daily  dextrose 5%. 1000 milliLiter(s) (50 mL/Hr) IV Continuous <Continuous>  dextrose 50% Injectable 12.5 Gram(s) IV Push once  dextrose 50% Injectable 25 Gram(s) IV Push once  epoetin marsha Injectable 29827 Unit(s) IV Push <User Schedule>  etravirine 200 milliGRAM(s) Oral two times a day after meals  heparin  Injectable 5000 Unit(s) SubCutaneous every 12 hours  methimazole 10 milliGRAM(s) Oral daily  midodrine. 10 milliGRAM(s) Oral every 8 hours  pantoprazole    Tablet 40 milliGRAM(s) Oral before breakfast  raltegravir Tablet 400 milliGRAM(s) Oral two times a day  ritonavir Tablet 100 milliGRAM(s) Oral every 24 hours    MEDICATIONS  (PRN):  dextrose 40% Gel 15 Gram(s) Oral once PRN Blood Glucose LESS THAN 70 milliGRAM(s)/deciliter  glucagon  Injectable 1 milliGRAM(s) IntraMuscular once PRN Glucose LESS THAN 70 milligrams/deciliter  ondansetron Injectable 4 milliGRAM(s) IV Push every 6 hours PRN Nausea and/or Vomiting    Pertinent Labs: 01-18 Na140 mmol/L Glu 154 mg/dL<H> K+ 4.5 mmol/L Cr  8.50 mg/dL<H> BUN 58 mg/dL<H> 01-18 Phos 4.5 mg/dL 01-14 Alb 2.3 g/dL<L> 01-07 [id number] 5.0 %    CAPILLARY BLOOD GLUCOSE    POCT Blood Glucose.: 112 mg/dL (19 Jan 2020 16:32)    Skin: Wound on mid lower abdomen; no pressure ulcers    Estimated Needs:   [x] no change since previous assessment on 1/7/20  [ ] recalculated:     Previous Nutrition Diagnosis:   Malnutrition Severe malnutrition in context of chronic illness.    Etiology Inadequate energy/protein intake + increased energy/protein needs related to multiple abdominal issues c infections & ESRD on HD tx.    Signs/Symptoms Wt loss of 9.7% x 6 weeks; < 75% nutrition needs > 1 month.    Nutrition Diagnosis is [x] ongoing  [ ] resolved [ ] not applicable    Goal/Expected Outcome Pt to consume > 50% meals (goal not met - pt consuming 50% or less of meals)  Pt to consume >75% supplements during hospitalization (goal met)    New Nutrition Diagnosis: [x] not applicable       Interventions:   Recommend  [x] Continue current diet rx  [ ] Change Diet To:  [ ] Nutrition Supplement  [ ] Nutrition Support  [x] Other: Provide food choices/preferences within dietary restrictions; Add Nephro-Sigifredo daily    Monitoring and Evaluation:   [ x ] PO intake [ x ] Tolerance to diet prescription [ x ] weights [ x ] labs[ x ] follow up per protocol  [ ] other:

## 2020-01-20 NOTE — PROGRESS NOTE ADULT - SUBJECTIVE AND OBJECTIVE BOX
Patient seen and examined at bedside in no acute distress.    Pt says abdominal pain is "same as it's been."  Denies nausea/ vomiting. Reports he is not eating well.    Ostomy functioning.    Denies chest pain, cough, fevers.     Vital Signs Last 24 Hrs  T(F): 97.6 (01-20-20 @ 05:25), Max: 97.9 (01-19-20 @ 12:15)  HR: 94 (01-20-20 @ 05:25)  BP: 103/67 (01-20-20 @ 05:25)  RR: 18 (01-20-20 @ 05:25)  SpO2: 97% (01-20-20 @ 05:25)      POCT Blood Glucose.: 112 mg/dL (19 Jan 2020 16:32)      GENERAL: Alert, NAD  CHEST/LUNG: Nasal cannula.  Respirations nonlabored  HEART: S1S2, Regular rate and rhythm  ABDOMEN: Abdominal wound dressing changed by recently changed by RN.  Dressing c/d/i.  Ostomy with air and minimal formed stool in bag.  + Bowel sounds, soft, Nontender, Nondistended  EXTREMITIES:  no calf tenderness, No edema    I&O's Detail      LABS:                        12.0   17.16 )-----------( 432      ( 19 Jan 2020 06:55 )             41.1     01-18    140  |  101  |  58<H>  ----------------------------<  154<H>  4.5   |  26  |  8.50<H>    Ca    10.0      18 Jan 2020 13:55  Phos  4.5     01-18  Mg     2.2     01-18          Impression:  60 y/o male PMH ESRD on HD (MWF), HIV on HAART, hepatitis C, HLD, and HTN, s/p hartmanns procedure 9/2019 for perforated sigmoid diverticulitis, s/p UGIB due to gastric ulcers, s/p admission for infected abdominal wound, now readmitted with sepsis, possibly due to infected abdominal wound (no clinical evidence) vs chronically infected mesh, s/p RRT for metabolic encephalopathy- resolved, now alert and oriented , leukocytosis worsening (12.34>17.16).  Afebrile.      Plan  - Continue colostomy care, surrounding skin care with adaptiv powder and stoma paste.    - Surgical planning in future for excision of infected mesh.    - ID recs appreciated, continue ancef, PO Keflex for life if mesh not removed.    - Continue HD per nephrology   - Encourage ambulation, OOB   - F/u am labs. Continue to trend WBCs.   - Discussed with Dr. Oliveira

## 2020-01-21 LAB
ANION GAP SERPL CALC-SCNC: 15 MMOL/L — SIGNIFICANT CHANGE UP (ref 5–17)
BUN SERPL-MCNC: 64 MG/DL — HIGH (ref 7–23)
CALCIUM SERPL-MCNC: 9.7 MG/DL — SIGNIFICANT CHANGE UP (ref 8.5–10.1)
CHLORIDE SERPL-SCNC: 97 MMOL/L — SIGNIFICANT CHANGE UP (ref 96–108)
CO2 SERPL-SCNC: 25 MMOL/L — SIGNIFICANT CHANGE UP (ref 22–31)
CREAT SERPL-MCNC: 9.32 MG/DL — HIGH (ref 0.5–1.3)
GLUCOSE BLDC GLUCOMTR-MCNC: 94 MG/DL — SIGNIFICANT CHANGE UP (ref 70–99)
GLUCOSE SERPL-MCNC: 121 MG/DL — HIGH (ref 70–99)
HCT VFR BLD CALC: 34.2 % — LOW (ref 39–50)
HGB BLD-MCNC: 10.4 G/DL — LOW (ref 13–17)
MCHC RBC-ENTMCNC: 26.7 PG — LOW (ref 27–34)
MCHC RBC-ENTMCNC: 30.4 GM/DL — LOW (ref 32–36)
MCV RBC AUTO: 87.9 FL — SIGNIFICANT CHANGE UP (ref 80–100)
NRBC # BLD: 0 /100 WBCS — SIGNIFICANT CHANGE UP (ref 0–0)
PLATELET # BLD AUTO: 411 K/UL — HIGH (ref 150–400)
POTASSIUM SERPL-MCNC: 4.6 MMOL/L — SIGNIFICANT CHANGE UP (ref 3.5–5.3)
POTASSIUM SERPL-SCNC: 4.6 MMOL/L — SIGNIFICANT CHANGE UP (ref 3.5–5.3)
RBC # BLD: 3.89 M/UL — LOW (ref 4.2–5.8)
RBC # FLD: 17.3 % — HIGH (ref 10.3–14.5)
SODIUM SERPL-SCNC: 137 MMOL/L — SIGNIFICANT CHANGE UP (ref 135–145)
WBC # BLD: 13.6 K/UL — HIGH (ref 3.8–10.5)
WBC # FLD AUTO: 13.6 K/UL — HIGH (ref 3.8–10.5)

## 2020-01-21 PROCEDURE — 99233 SBSQ HOSP IP/OBS HIGH 50: CPT

## 2020-01-21 RX ORDER — OXYCODONE HYDROCHLORIDE 5 MG/1
5 TABLET ORAL ONCE
Refills: 0 | Status: DISCONTINUED | OUTPATIENT
Start: 2020-01-21 | End: 2020-01-21

## 2020-01-21 RX ORDER — ONDANSETRON 8 MG/1
4 TABLET, FILM COATED ORAL EVERY 6 HOURS
Refills: 0 | Status: DISCONTINUED | OUTPATIENT
Start: 2020-01-21 | End: 2020-01-24

## 2020-01-21 RX ORDER — ACETAMINOPHEN 500 MG
650 TABLET ORAL ONCE
Refills: 0 | Status: COMPLETED | OUTPATIENT
Start: 2020-01-21 | End: 2020-01-21

## 2020-01-21 RX ADMIN — PANTOPRAZOLE SODIUM 40 MILLIGRAM(S): 20 TABLET, DELAYED RELEASE ORAL at 05:44

## 2020-01-21 RX ADMIN — Medication 81 MILLIGRAM(S): at 15:47

## 2020-01-21 RX ADMIN — Medication 650 MILLIGRAM(S): at 13:40

## 2020-01-21 RX ADMIN — MIDODRINE HYDROCHLORIDE 10 MILLIGRAM(S): 2.5 TABLET ORAL at 05:43

## 2020-01-21 RX ADMIN — Medication 650 MILLIGRAM(S): at 14:40

## 2020-01-21 RX ADMIN — ETRAVIRINE 200 MILLIGRAM(S): 200 TABLET ORAL at 08:13

## 2020-01-21 RX ADMIN — ONDANSETRON 4 MILLIGRAM(S): 8 TABLET, FILM COATED ORAL at 18:30

## 2020-01-21 RX ADMIN — Medication 500 MILLIGRAM(S): at 05:44

## 2020-01-21 RX ADMIN — RALTEGRAVIR 400 MILLIGRAM(S): 400 TABLET, FILM COATED ORAL at 05:55

## 2020-01-21 RX ADMIN — OXYCODONE HYDROCHLORIDE 5 MILLIGRAM(S): 5 TABLET ORAL at 15:47

## 2020-01-21 RX ADMIN — HEPARIN SODIUM 5000 UNIT(S): 5000 INJECTION INTRAVENOUS; SUBCUTANEOUS at 05:43

## 2020-01-21 RX ADMIN — Medication 500 MILLIGRAM(S): at 15:47

## 2020-01-21 RX ADMIN — DARUNAVIR 800 MILLIGRAM(S): 75 TABLET, FILM COATED ORAL at 15:49

## 2020-01-21 RX ADMIN — CHLORHEXIDINE GLUCONATE 1 APPLICATION(S): 213 SOLUTION TOPICAL at 05:42

## 2020-01-21 RX ADMIN — OXYCODONE HYDROCHLORIDE 5 MILLIGRAM(S): 5 TABLET ORAL at 16:47

## 2020-01-21 RX ADMIN — MIDODRINE HYDROCHLORIDE 10 MILLIGRAM(S): 2.5 TABLET ORAL at 15:47

## 2020-01-21 RX ADMIN — Medication 100 MILLIGRAM(S): at 23:01

## 2020-01-21 NOTE — PROGRESS NOTE ADULT - ASSESSMENT
59M PMH HTN, ESRD (M/W/F), HIV, hep C, s/p hartmanns procedure 2/2 perforated sigmoid diverticulitis 9/2019, UGIB likely due to gastric ulcers found on EGD, infected abdominal wound (cx with e-coli, klebsiella, staph) presents with increased drainage from abdominal wound with abdominal pain. Admitted with sepsis, abdominal wound infection. Course complicated by hypotension, septic shock. Transferred to ICU 1/7 for hemodynamic monitoring    S/p acute metabolic encephelopathy - resolved.  Alert, oriented times 3.     Hypotension  - on  midodrine 10 mg tid. Stopped telemetry today.     ESRD  - HD per nephrology. Renal team is following.    HIV:  - cont antiretrovirals as ordered. ID is following.    S/p sepsis, possibly due to infected abdominal wound (no clinical evidence) vs chronically infected mesh,  - was on zosyn for underlying abdominal wound infection and vanco by level, wound cx with e-coli and MSSA.   - Surgical planning in future for excision of infected mesh, per notes today.  In the meantime, continue Ancef 1 gm daily now.  To transition to Keflex 500 mg PO daily lifelong if mesh is not removed  upon discharge as per ID.     Elevated WBC count noted, 18,000, no bands. Continue Ancef as above, follow daily trend.     Continue all above meds. Await Surgery input regarding removing mesh.

## 2020-01-21 NOTE — PROGRESS NOTE ADULT - SUBJECTIVE AND OBJECTIVE BOX
Patient seen and examined at bedside in no acute distress.  Afebrile.   Reports abdominal pain around the midline wound.  Denies nausea, vomiting.  +Chills.  Denies fevers.  Denies chest pain, shortness of breath.   On regular diet, however, reports consuming mostly clears.        Vital Signs Last 24 Hrs  T(F): 96.8 (01-21-20 @ 05:01), Max: 97.2 (01-20-20 @ 12:02)  HR: 87 (01-21-20 @ 05:01)  BP: 104/67 (01-21-20 @ 05:01)  RR: 18 (01-21-20 @ 05:01)  SpO2: 98% (01-21-20 @ 05:01)      POCT Blood Glucose.: 112 mg/dL (19 Jan 2020 16:32)      GENERAL: Alert, NAD  CHEST/LUNG: Respirations nonlabored  HEART: S1S2, Regular rate and rhythm  ABDOMEN: Midline wound dressing c/d/i.  Ostomy with air and formed stool in bag.  + Bowel sounds, soft, Nontender, Nondistended.  EXTREMITIES:  no calf tenderness, No edema    I&O's Detail      LABS:                        11.6   18.54 )-----------( 439      ( 20 Jan 2020 10:48 )             39.7     01-20    139  |  100  |  51<H>  ----------------------------<  120<H>  5.2   |  26  |  7.43<H>    Ca    10.9<H>      20 Jan 2020 10:48      Impression:  60 y/o male PMH ESRD on HD (MWF), HIV on HAART, hepatitis C, HLD, and HTN, s/p hartmanns procedure 9/2019 for perforated sigmoid diverticulitis, s/p UGIB due to gastric ulcers, s/p admission for infected abdominal wound, now readmitted with sepsis, possibly due to infected abdominal wound (no clinical evidence) vs chronically infected mesh, s/p RRT for metabolic encephalopathy- resolved, now alert and oriented , leukocytosis worsening.  Afebrile.      Plan  - Continue colostomy care, surrounding skin care with adaptiv powder and stoma paste.    - Surgical planning in future for excision of infected mesh.    - ID recs appreciated, continue Ancef, PO Keflex for life if mesh not removed.  Flagyl added yesterday.    - Continue HD per nephrology   - Encourage ambulation, OOB   - F/u am labs. Continue to trend WBCs.   - Discussed with Dr. Oliveira

## 2020-01-21 NOTE — PROGRESS NOTE ADULT - SUBJECTIVE AND OBJECTIVE BOX
[facility] NEPHROLOGY SERVICES, [facility]  NEPHROLOGY AND HYPERTENSION  [address]  [address]  [place] [address]  [phone]    MD PARVEEN NOLAND, MD SHEYLA MILLS MD YELENA ROSENBERG, MD STACIE OWENS, MD DONAVAN CORTES MD          Patient feels ok no distress;   Post HD;     MEDICATIONS  (STANDING):  aspirin enteric coated 81 milliGRAM(s) Oral daily  ceFAZolin   IVPB 1000 milliGRAM(s) IV Intermittent every 24 hours  chlorhexidine 4% Liquid 1 Application(s) Topical <User Schedule>  darunavir 800 milliGRAM(s) Oral daily  dextrose 5%. 1000 milliLiter(s) (50 mL/Hr) IV Continuous <Continuous>  dextrose 50% Injectable 12.5 Gram(s) IV Push once  dextrose 50% Injectable 25 Gram(s) IV Push once  epoetin marsha Injectable 10172 Unit(s) IV Push <User Schedule>  etravirine 200 milliGRAM(s) Oral two times a day after meals  heparin  Injectable 5000 Unit(s) SubCutaneous every 12 hours  methimazole 10 milliGRAM(s) Oral daily  metroNIDAZOLE    Tablet 500 milliGRAM(s) Oral three times a day  midodrine. 10 milliGRAM(s) Oral every 8 hours  pantoprazole    Tablet 40 milliGRAM(s) Oral before breakfast  raltegravir Tablet 400 milliGRAM(s) Oral two times a day  ritonavir Tablet 100 milliGRAM(s) Oral every 24 hours    MEDICATIONS  (PRN):  dextrose 40% Gel 15 Gram(s) Oral once PRN Blood Glucose LESS THAN 70 milliGRAM(s)/deciliter  glucagon  Injectable 1 milliGRAM(s) IntraMuscular once PRN Glucose LESS THAN 70 milligrams/deciliter      01-21-20 @ 07:01  -  01-21-20 @ 17:33  --------------------------------------------------------  IN: 0 mL / OUT: 1800 mL / NET: -1800 mL      PHYSICAL EXAM:      T(C): 36.5 (01-21-20 @ 16:49), Max: 36.7 (01-21-20 @ 14:10)  HR: 119 (01-21-20 @ 16:49) (85 - 119)  BP: 112/64 (01-21-20 @ 16:49) (101/56 - 112/64)  RR: 18 (01-21-20 @ 16:49) (16 - 18)  SpO2: 95% (01-21-20 @ 16:49) (95% - 98%)  Wt(kg): --  Respiratory: clear anteriorly, decreased BS at bases  Cardiovascular: S1 S2  Gastrointestinal: soft NT ND +BS colostomy  Extremities:  tr  edema                                    10.4   13.60 )-----------( 411      ( 21 Jan 2020 11:20 )             34.2     01-21    137  |  97  |  64<H>  ----------------------------<  121<H>  4.6   |  25  |  9.32<H>    Ca    9.7      21 Jan 2020 11:20          Creatinine Trend: 9.32<--, 7.43<--, 8.50<--, 8.99<--, 6.44<--, 8.93<--      Assessment   ESRD; septic shock; improving;   Hypercalcemia, mild, phoslo effect; improved    Plan:  HD for tomorrow  UF a tolerated   Will follow.    Jacob Hobson MD

## 2020-01-21 NOTE — PROGRESS NOTE ADULT - SUBJECTIVE AND OBJECTIVE BOX
HPI:  Patient is a 59 year old male with a PMH HTN, ESRD (M/W/F), HIV, hep C, s/p hartmanns procedure 2/2 perforated sigmoid diverticulitis 9/2019 with subsequent admission to [facility] for UGIB likely due to gastric ulcers found on EGD (healed), and admission to our facility 10/2019 with infected abdominal wound, currently presented with drainage of abdominal wound again. Patient states his home health aide was dressing his wound, but he noticed increased drainage over the last 2 days with associated abdominal pain.   Admits to normal bowel function. Tolerating regular diet.   Denies fever, chills, chest pain, sob, palpitations, urinary symptoms. (06 Jan 2020 18:05)      Allergies    No Known Allergies    Intolerances        MEDICATIONS  (STANDING):  aspirin enteric coated 81 milliGRAM(s) Oral daily  ceFAZolin   IVPB 1000 milliGRAM(s) IV Intermittent every 24 hours  chlorhexidine 4% Liquid 1 Application(s) Topical <User Schedule>  darunavir 800 milliGRAM(s) Oral daily  dextrose 5%. 1000 milliLiter(s) (50 mL/Hr) IV Continuous <Continuous>  dextrose 50% Injectable 12.5 Gram(s) IV Push once  dextrose 50% Injectable 25 Gram(s) IV Push once  epoetin marsha Injectable 12001 Unit(s) IV Push <User Schedule>  etravirine 200 milliGRAM(s) Oral two times a day after meals  heparin  Injectable 5000 Unit(s) SubCutaneous every 12 hours  methimazole 10 milliGRAM(s) Oral daily  metroNIDAZOLE    Tablet 500 milliGRAM(s) Oral three times a day  midodrine. 10 milliGRAM(s) Oral every 8 hours  pantoprazole    Tablet 40 milliGRAM(s) Oral before breakfast  raltegravir Tablet 400 milliGRAM(s) Oral two times a day  ritonavir Tablet 100 milliGRAM(s) Oral every 24 hours    MEDICATIONS  (PRN):  dextrose 40% Gel 15 Gram(s) Oral once PRN Blood Glucose LESS THAN 70 milliGRAM(s)/deciliter  glucagon  Injectable 1 milliGRAM(s) IntraMuscular once PRN Glucose LESS THAN 70 milligrams/deciliter      REVIEW OF SYSTEMS:    CONSTITUTIONAL: No fever, chills, weight loss, or fatigue  HEENT: No sore throat, runny nose, ear ache  RESPIRATORY: No cough, wheezing, No shortness of breath  CARDIOVASCULAR: No chest pain, palpitations, dizziness  GASTROINTESTINAL: No abdominal pain. No nausea, vomiting, diarrhea  GENITOURINARY: No dysuria, increase frequency, hematuria, or incontinence  NEUROLOGICAL: No headaches, memory loss, loss of strength, numbness, or tremors, no weakness  EXTREMITY: No pedal edema BLE  SKIN: No itching, burning, rashes, or lesions     VITAL SIGNS:  T(C): 36.6 (01-21-20 @ 09:21), Max: 36.6 (01-21-20 @ 09:21)  T(F): 97.9 (01-21-20 @ 09:21), Max: 97.9 (01-21-20 @ 09:21)  HR: 90 (01-21-20 @ 09:21) (87 - 95)  BP: 111/58 (01-21-20 @ 09:21) (103/54 - 143/80)  RR: 18 (01-21-20 @ 05:01) (18 - 18)  SpO2: 98% (01-21-20 @ 05:01) (98% - 98%)  Wt(kg): --    PHYSICAL EXAM:    GENERAL: not in any distress  HEENT: Neck is supple, normocephalic, atraumatic   CHEST/LUNG: Clear to percussion bilaterally; No rales, rhonchi, wheezing  HEART: Regular rate and rhythm; No murmurs, rubs, or gallops  ABDOMEN: Soft, Nontender, Nondistended; Bowel sounds present, no rebound   EXTREMITIES:  2+ Peripheral Pulses, No clubbing, cyanosis, or edema  GENITOURINARY:   SKIN: No rashes or lesions  BACK: no pressor sore   NERVOUS SYSTEM:  Alert     LABS:                         10.4   13.60 )-----------( 411      ( 21 Jan 2020 11:20 )             34.2     01-21    137  |  97  |  64<H>  ----------------------------<  121<H>  4.6   |  25  |  9.32<H>    Ca    9.7      21 Jan 2020 11:20                Thyroid Stimulating Hormone, Serum: 0.011 uU/mL (01-19 @ 06:55)                              Radiology:

## 2020-01-21 NOTE — PROGRESS NOTE ADULT - SUBJECTIVE AND OBJECTIVE BOX
Patient is a 59y old  Male who presents with a chief complaint of sepsis (21 Jan 2020 12:03)      INTERVAL HPI/OVERNIGHT EVENTS:  Pt was seen and examined, no acute events.    MEDICATIONS  (STANDING):  aspirin enteric coated 81 milliGRAM(s) Oral daily  ceFAZolin   IVPB 1000 milliGRAM(s) IV Intermittent every 24 hours  chlorhexidine 4% Liquid 1 Application(s) Topical <User Schedule>  darunavir 800 milliGRAM(s) Oral daily  dextrose 5%. 1000 milliLiter(s) (50 mL/Hr) IV Continuous <Continuous>  dextrose 50% Injectable 12.5 Gram(s) IV Push once  dextrose 50% Injectable 25 Gram(s) IV Push once  epoetin marsha Injectable 49005 Unit(s) IV Push <User Schedule>  etravirine 200 milliGRAM(s) Oral two times a day after meals  heparin  Injectable 5000 Unit(s) SubCutaneous every 12 hours  methimazole 10 milliGRAM(s) Oral daily  metroNIDAZOLE    Tablet 500 milliGRAM(s) Oral three times a day  midodrine. 10 milliGRAM(s) Oral every 8 hours  pantoprazole    Tablet 40 milliGRAM(s) Oral before breakfast  raltegravir Tablet 400 milliGRAM(s) Oral two times a day  ritonavir Tablet 100 milliGRAM(s) Oral every 24 hours    MEDICATIONS  (PRN):  dextrose 40% Gel 15 Gram(s) Oral once PRN Blood Glucose LESS THAN 70 milliGRAM(s)/deciliter  glucagon  Injectable 1 milliGRAM(s) IntraMuscular once PRN Glucose LESS THAN 70 milligrams/deciliter      Allergies  No Known Allergies      Vital Signs Last 24 Hrs  T(C): 36.1 (21 Jan 2020 10:30), Max: 36.6 (21 Jan 2020 09:21)  T(F): 97 (21 Jan 2020 10:30), Max: 97.9 (21 Jan 2020 09:21)  HR: 85 (21 Jan 2020 10:30) (85 - 94)  BP: 101/56 (21 Jan 2020 10:30) (101/56 - 143/80)  BP(mean): --  RR: 18 (21 Jan 2020 10:30) (18 - 18)  SpO2: 98% (21 Jan 2020 10:30) (98% - 98%)      PHYSICAL EXAM:  GENERAL: NAD  HEAD:  Atraumatic  EYES: PERRLA  NERVOUS SYSTEM:  Awake, alert  CHEST/LUNG: clear  HEART: RRR  ABDOMEN: soft, dressing in place      LABS:                        10.4   13.60 )-----------( 411      ( 21 Jan 2020 11:20 )             34.2     01-21    137  |  97  |  64<H>  ----------------------------<  121<H>  4.6   |  25  |  9.32<H>    Ca    9.7      21 Jan 2020 11:20          CAPILLARY BLOOD GLUCOSE      POCT Blood Glucose.: 94 mg/dL (21 Jan 2020 13:06)      RADIOLOGY & ADDITIONAL TESTS:    Imaging Personally Reviewed:  [ ] YES  [ ] NO    Consultant(s) Notes Reviewed:  [ ] YES  [ ] NO    Care Discussed with Consultants/Other Providers [ ] YES  [ ] NO

## 2020-01-21 NOTE — PROGRESS NOTE ADULT - ASSESSMENT
s/p Hartmanns procedure 2/2 perforated sigmoid diverticulitis 9/2019 with subsequent admission to [facility] for UGIB likely due to gastric ulcers found on EGD (healed), and admission to our facility 10/2019 with infected abdominal wound, currently presented with drainage of abdominal wound again  known to us for Sigmoid Colon rsxn with creation of colostomy, peritoneal lavage s/p I&D of intra-abdominal abscess on 9/18/19 treated for abdominal incisional wounds, admitted with possible ?enterocutaneous fistula on 11/2019 all wound culture with fairly sensitive organism mssa, ecoli and klebsiella sensitive to po and dc with po abx   work up consistent with infected mesh covering current culture with ancef   now worsening wbc count   on ancef   sx follow up   added flagyl yesterday and leukocytosis better today   fu cbc tomorrow

## 2020-01-22 LAB
ALBUMIN SERPL ELPH-MCNC: 2.7 G/DL — LOW (ref 3.3–5)
ALP SERPL-CCNC: 179 U/L — HIGH (ref 40–120)
ALT FLD-CCNC: <6 U/L — LOW (ref 12–78)
ANION GAP SERPL CALC-SCNC: 12 MMOL/L — SIGNIFICANT CHANGE UP (ref 5–17)
AST SERPL-CCNC: 16 U/L — SIGNIFICANT CHANGE UP (ref 15–37)
BILIRUB SERPL-MCNC: 0.3 MG/DL — SIGNIFICANT CHANGE UP (ref 0.2–1.2)
BUN SERPL-MCNC: 38 MG/DL — HIGH (ref 7–23)
CALCIUM SERPL-MCNC: 10.2 MG/DL — HIGH (ref 8.5–10.1)
CHLORIDE SERPL-SCNC: 102 MMOL/L — SIGNIFICANT CHANGE UP (ref 96–108)
CO2 SERPL-SCNC: 26 MMOL/L — SIGNIFICANT CHANGE UP (ref 22–31)
CREAT SERPL-MCNC: 6.21 MG/DL — HIGH (ref 0.5–1.3)
GLUCOSE SERPL-MCNC: 111 MG/DL — HIGH (ref 70–99)
HCT VFR BLD CALC: 39 % — SIGNIFICANT CHANGE UP (ref 39–50)
HGB BLD-MCNC: 11.3 G/DL — LOW (ref 13–17)
MAGNESIUM SERPL-MCNC: 2.2 MG/DL — SIGNIFICANT CHANGE UP (ref 1.6–2.6)
MCHC RBC-ENTMCNC: 26.2 PG — LOW (ref 27–34)
MCHC RBC-ENTMCNC: 29 GM/DL — LOW (ref 32–36)
MCV RBC AUTO: 90.3 FL — SIGNIFICANT CHANGE UP (ref 80–100)
NRBC # BLD: 0 /100 WBCS — SIGNIFICANT CHANGE UP (ref 0–0)
PHOSPHATE SERPL-MCNC: 4.8 MG/DL — HIGH (ref 2.5–4.5)
PLATELET # BLD AUTO: 424 K/UL — HIGH (ref 150–400)
POTASSIUM SERPL-MCNC: 4.8 MMOL/L — SIGNIFICANT CHANGE UP (ref 3.5–5.3)
POTASSIUM SERPL-SCNC: 4.8 MMOL/L — SIGNIFICANT CHANGE UP (ref 3.5–5.3)
PROT SERPL-MCNC: 9.2 GM/DL — HIGH (ref 6–8.3)
RBC # BLD: 4.32 M/UL — SIGNIFICANT CHANGE UP (ref 4.2–5.8)
RBC # FLD: 17.5 % — HIGH (ref 10.3–14.5)
SODIUM SERPL-SCNC: 140 MMOL/L — SIGNIFICANT CHANGE UP (ref 135–145)
WBC # BLD: 11.39 K/UL — HIGH (ref 3.8–10.5)
WBC # FLD AUTO: 11.39 K/UL — HIGH (ref 3.8–10.5)

## 2020-01-22 PROCEDURE — 99233 SBSQ HOSP IP/OBS HIGH 50: CPT

## 2020-01-22 RX ORDER — MIDODRINE HYDROCHLORIDE 2.5 MG/1
15 TABLET ORAL EVERY 8 HOURS
Refills: 0 | Status: DISCONTINUED | OUTPATIENT
Start: 2020-01-22 | End: 2020-01-24

## 2020-01-22 RX ADMIN — ETRAVIRINE 200 MILLIGRAM(S): 200 TABLET ORAL at 08:51

## 2020-01-22 RX ADMIN — Medication 500 MILLIGRAM(S): at 06:56

## 2020-01-22 RX ADMIN — Medication 500 MILLIGRAM(S): at 13:50

## 2020-01-22 RX ADMIN — MIDODRINE HYDROCHLORIDE 10 MILLIGRAM(S): 2.5 TABLET ORAL at 06:55

## 2020-01-22 RX ADMIN — Medication 81 MILLIGRAM(S): at 13:49

## 2020-01-22 RX ADMIN — RALTEGRAVIR 400 MILLIGRAM(S): 400 TABLET, FILM COATED ORAL at 17:19

## 2020-01-22 RX ADMIN — MIDODRINE HYDROCHLORIDE 10 MILLIGRAM(S): 2.5 TABLET ORAL at 13:50

## 2020-01-22 RX ADMIN — ETRAVIRINE 200 MILLIGRAM(S): 200 TABLET ORAL at 17:21

## 2020-01-22 RX ADMIN — CHLORHEXIDINE GLUCONATE 1 APPLICATION(S): 213 SOLUTION TOPICAL at 06:54

## 2020-01-22 RX ADMIN — RALTEGRAVIR 400 MILLIGRAM(S): 400 TABLET, FILM COATED ORAL at 06:56

## 2020-01-22 RX ADMIN — MIDODRINE HYDROCHLORIDE 15 MILLIGRAM(S): 2.5 TABLET ORAL at 21:22

## 2020-01-22 RX ADMIN — Medication 100 MILLIGRAM(S): at 21:22

## 2020-01-22 RX ADMIN — PANTOPRAZOLE SODIUM 40 MILLIGRAM(S): 20 TABLET, DELAYED RELEASE ORAL at 06:56

## 2020-01-22 RX ADMIN — DARUNAVIR 800 MILLIGRAM(S): 75 TABLET, FILM COATED ORAL at 13:49

## 2020-01-22 RX ADMIN — Medication 500 MILLIGRAM(S): at 21:22

## 2020-01-22 NOTE — PROGRESS NOTE ADULT - ASSESSMENT
s/p Hartmanns procedure 2/2 perforated sigmoid diverticulitis 9/2019 with subsequent admission to [facility] for UGIB likely due to gastric ulcers found on EGD (healed), and admission to our facility 10/2019 with infected abdominal wound, currently presented with drainage of abdominal wound again  known to us for Sigmoid Colon rsxn with creation of colostomy, peritoneal lavage s/p I&D of intra-abdominal abscess on 9/18/19 treated for abdominal incisional wounds, admitted with possible ?enterocutaneous fistula on 11/2019 all wound culture with fairly sensitive organism mssa, ecoli and klebsiella sensitive to po and dc with po abx   work up consistent with infected mesh covering current culture with ancef   now worsening wbc count   on ancef   sx follow up   added flagyl and leukocytosis improved   Surgical planning in future for excision of infected mesh   continue abx   case discussed with pt today

## 2020-01-22 NOTE — PROGRESS NOTE ADULT - SUBJECTIVE AND OBJECTIVE BOX
Patient is a 59y old  Male who presents with a chief complaint of sepsis (21 Jan 2020 12:03)      INTERVAL HPI/OVERNIGHT EVENTS:  Pt was seen and examined, no acute events.    MEDICATIONS  (STANDING):  aspirin enteric coated 81 milliGRAM(s) Oral daily  ceFAZolin   IVPB 1000 milliGRAM(s) IV Intermittent every 24 hours  chlorhexidine 4% Liquid 1 Application(s) Topical <User Schedule>  darunavir 800 milliGRAM(s) Oral daily  dextrose 5%. 1000 milliLiter(s) (50 mL/Hr) IV Continuous <Continuous>  dextrose 50% Injectable 12.5 Gram(s) IV Push once  dextrose 50% Injectable 25 Gram(s) IV Push once  epoetin marsha Injectable 22321 Unit(s) IV Push <User Schedule>  etravirine 200 milliGRAM(s) Oral two times a day after meals  heparin  Injectable 5000 Unit(s) SubCutaneous every 12 hours  methimazole 10 milliGRAM(s) Oral daily  metroNIDAZOLE    Tablet 500 milliGRAM(s) Oral three times a day  midodrine. 10 milliGRAM(s) Oral every 8 hours  pantoprazole    Tablet 40 milliGRAM(s) Oral before breakfast  raltegravir Tablet 400 milliGRAM(s) Oral two times a day  ritonavir Tablet 100 milliGRAM(s) Oral every 24 hours    MEDICATIONS  (PRN):  dextrose 40% Gel 15 Gram(s) Oral once PRN Blood Glucose LESS THAN 70 milliGRAM(s)/deciliter  glucagon  Injectable 1 milliGRAM(s) IntraMuscular once PRN Glucose LESS THAN 70 milligrams/deciliter  ondansetron Injectable 4 milliGRAM(s) IV Push every 6 hours PRN Nausea and/or Vomiting      Allergies  No Known Allergies        Vital Signs Last 24 Hrs  T(C): 36.7 (22 Jan 2020 10:43), Max: 36.7 (22 Jan 2020 10:43)  T(F): 98 (22 Jan 2020 10:43), Max: 98 (22 Jan 2020 10:43)  HR: 100 (22 Jan 2020 10:43) (95 - 119)  BP: 92/46 (22 Jan 2020 10:43) (92/46 - 134/84)  BP(mean): --  RR: 18 (22 Jan 2020 10:43) (17 - 18)  SpO2: 97% (22 Jan 2020 10:43) (95% - 97%)    PHYSICAL EXAM:  GENERAL: NAD  HEAD:  Atraumatic  EYES: PERRLA  NERVOUS SYSTEM:  Awake, alert  CHEST/LUNG: Clear  HEART: RRR  ABDOMEN: Soft, non tender, colostomy in place  EXTREMITIES:  no edmea      LABS:                        11.3   11.39 )-----------( 424      ( 22 Jan 2020 07:17 )             39.0     01-22    140  |  102  |  38<H>  ----------------------------<  111<H>  4.8   |  26  |  6.21<H>    Ca    10.2<H>      22 Jan 2020 07:17  Phos  4.8     01-22  Mg     2.2     01-22    TPro  9.2<H>  /  Alb  2.7<L>  /  TBili  0.3  /  DBili  x   /  AST  16  /  ALT  <6<L>  /  AlkPhos  179<H>  01-22        CAPILLARY BLOOD GLUCOSE          RADIOLOGY & ADDITIONAL TESTS:    Imaging Personally Reviewed:  [ ] YES  [ ] NO    Consultant(s) Notes Reviewed:  [ ] YES  [ ] NO    Care Discussed with Consultants/Other Providers [ ] YES  [ ] NO

## 2020-01-22 NOTE — PROGRESS NOTE ADULT - SUBJECTIVE AND OBJECTIVE BOX
HPI:  Patient is a 59 year old male with a PMH HTN, ESRD (M/W/F), HIV, hep C, s/p hartmanns procedure 2/2 perforated sigmoid diverticulitis 9/2019 with subsequent admission to [facility] for UGIB likely due to gastric ulcers found on EGD (healed), and admission to our facility 10/2019 with infected abdominal wound, currently presented with drainage of abdominal wound again. Patient states his home health aide was dressing his wound, but he noticed increased drainage over the last 2 days with associated abdominal pain.   Admits to normal bowel function. Tolerating regular diet.   Denies fever, chills, chest pain, sob, palpitations, urinary symptoms. (06 Jan 2020 18:05)      Allergies    No Known Allergies    Intolerances        MEDICATIONS  (STANDING):  aspirin enteric coated 81 milliGRAM(s) Oral daily  ceFAZolin   IVPB 1000 milliGRAM(s) IV Intermittent every 24 hours  chlorhexidine 4% Liquid 1 Application(s) Topical <User Schedule>  darunavir 800 milliGRAM(s) Oral daily  dextrose 5%. 1000 milliLiter(s) (50 mL/Hr) IV Continuous <Continuous>  dextrose 50% Injectable 12.5 Gram(s) IV Push once  dextrose 50% Injectable 25 Gram(s) IV Push once  epoetin marsha Injectable 30724 Unit(s) IV Push <User Schedule>  etravirine 200 milliGRAM(s) Oral two times a day after meals  heparin  Injectable 5000 Unit(s) SubCutaneous every 12 hours  methimazole 10 milliGRAM(s) Oral daily  metroNIDAZOLE    Tablet 500 milliGRAM(s) Oral three times a day  midodrine. 10 milliGRAM(s) Oral every 8 hours  pantoprazole    Tablet 40 milliGRAM(s) Oral before breakfast  raltegravir Tablet 400 milliGRAM(s) Oral two times a day  ritonavir Tablet 100 milliGRAM(s) Oral every 24 hours      REVIEW OF SYSTEMS:    CONSTITUTIONAL: No fever, chills, weight loss, or fatigue  HEENT: No sore throat, runny nose, ear ache  RESPIRATORY: No cough, wheezing, No shortness of breath  CARDIOVASCULAR: No chest pain, palpitations, dizziness  GASTROINTESTINAL: No abdominal pain. No nausea, vomiting, diarrhea  GENITOURINARY: No dysuria, increase frequency, hematuria, or incontinence  NEUROLOGICAL: No headaches, memory loss, loss of strength, numbness, or tremors, no weakness  EXTREMITY: No pedal edema BLE  SKIN: No itching, burning, rashes, or lesions     VITAL SIGNS:  T(C): 36.1 (01-22-20 @ 05:19), Max: 36.7 (01-21-20 @ 14:10)  T(F): 97 (01-22-20 @ 05:19), Max: 98 (01-21-20 @ 14:10)  HR: 95 (01-22-20 @ 05:19) (85 - 119)  BP: 98/40 (01-22-20 @ 05:19) (96/57 - 134/84)  RR: 17 (01-22-20 @ 05:19) (16 - 18)  SpO2: 96% (01-22-20 @ 05:19) (95% - 98%)  Wt(kg): --    PHYSICAL EXAM:    GENERAL: not in any distress  HEENT: Neck is supple, normocephalic, atraumatic   CHEST/LUNG: Clear to percussion bilaterally; No rales, rhonchi, wheezing  HEART: Regular rate and rhythm; No murmurs, rubs, or gallops  ABDOMEN: Soft, Nontender, Nondistended; Bowel sounds present, no rebound , stomy wound ok   EXTREMITIES:  2+ Peripheral Pulses, No clubbing, cyanosis, or edema  GENITOURINARY:   SKIN: No rashes or lesions  BACK: no pressor sore   NERVOUS SYSTEM:  Alert & Oriented     LABS:                         11.3   11.39 )-----------( 424      ( 22 Jan 2020 07:17 )             39.0     01-22    140  |  102  |  38<H>  ----------------------------<  111<H>  4.8   |  26  |  6.21<H>    Ca    10.2<H>      22 Jan 2020 07:17  Phos  4.8     01-22  Mg     2.2     01-22    TPro  9.2<H>  /  Alb  2.7<L>  /  TBili  0.3  /  DBili  x   /  AST  16  /  ALT  <6<L>  /  AlkPhos  179<H>  01-22    LIVER FUNCTIONS - ( 22 Jan 2020 07:17 )  Alb: 2.7 g/dL / Pro: 9.2 gm/dL / ALK PHOS: 179 U/L / ALT: <6 U/L / AST: 16 U/L / GGT: x                     Thyroid Stimulating Hormone, Serum: 0.011 uU/mL (01-19 @ 06:55)                              Radiology:

## 2020-01-22 NOTE — PROGRESS NOTE ADULT - ASSESSMENT
59M PMH HTN, ESRD (M/W/F), HIV, hep C, s/p hartmanns procedure 2/2 perforated sigmoid diverticulitis 9/2019, UGIB likely due to gastric ulcers found on EGD, infected abdominal wound (cx with e-coli, klebsiella, staph) presents with increased drainage from abdominal wound with abdominal pain. Admitted with sepsis, abdominal wound infection. Course complicated by hypotension, septic shock. Transferred to ICU 1/7 for hemodynamic monitoring    S/p acute metabolic encephelopathy   - resolved.  Alert, oriented times 3.     Hypotension  - on  midodrine 10 mg tid. Stopped telemetry today.     ESRD  - HD per nephrology. Renal team is following.    HIV:  - cont antiretrovirals as ordered. ID is following.    S/p sepsis, possibly due to infected abdominal wound (no clinical evidence) vs chronically infected mesh,  - Continue Abx per ID  - Surgical planning in future for excision of infected mesh

## 2020-01-22 NOTE — PROGRESS NOTE ADULT - SUBJECTIVE AND OBJECTIVE BOX
Patient seen and examined at bedside.  Afebrile overnight.  Refused meds last PM.     Patient in good spirits this AM reporting his "abdominal pain is better than yesterday."  Denies nausea/ vomiting overnight.    Colostomy functioning.    Not eating much.      Vital Signs Last 24 Hrs  T(F): 97 (01-22-20 @ 05:19), Max: 98 (01-21-20 @ 14:10)  HR: 95 (01-22-20 @ 05:19)  BP: 98/40 (01-22-20 @ 05:19)  RR: 17 (01-22-20 @ 05:19)  SpO2: 96% (01-22-20 @ 05:19)      POCT Blood Glucose.: 94 mg/dL (21 Jan 2020 13:06)      GENERAL: Alert, NAD  CHEST/LUNG: Clear to auscultation bilaterally, respirations nonlabored  HEART: S1S2, Regular rate and rhythm  ABDOMEN: Midline dressing soaked with ostomy drainage.  Ostomy viable with formed stool and air in bag.  Dressing and ostomy appliance removed.  Area cleaned with NS.  Adaptiv powder and stoma paste applied to skin surrounding ostomy.  New gauze dressing in midline wound.  +Bowel sounds, soft, Nontender, Nondistended  EXTREMITIES:  no calf tenderness, No edema    I&O's Detail    21 Jan 2020 07:01  -  22 Jan 2020 06:32  --------------------------------------------------------  IN:  Total IN: 0 mL    OUT:    Other: 1800 mL  Total OUT: 1800 mL    Total NET: -1800 mL          LABS:                        10.4   13.60 )-----------( 411      ( 21 Jan 2020 11:20 )             34.2     01-21    137  |  97  |  64<H>  ----------------------------<  121<H>  4.6   |  25  |  9.32<H>    Ca    9.7      21 Jan 2020 11:20         Impression:  60 y/o male PMH ESRD on HD (MWF), HIV on HAART, hepatitis C, HLD, and HTN, s/p hartmanns procedure 9/2019 for perforated sigmoid diverticulitis, s/p UGIB due to gastric ulcers, s/p admission for infected abdominal wound, now readmitted with sepsis, possibly due to infected abdominal wound (no clinical evidence) vs chronically infected mesh, s/p RRT for metabolic encephalopathy- resolved, now alert and oriented, leukocytosis improving (18.54>13.60). Afebrile.      Plan  - Continue colostomy care, surrounding skin care with adaptiv powder and stoma paste.    - Surgical planning in future for excision of infected mesh.    - Continue Ancef/Flagyl per ID recs  - Continue HD per nephrology   - Encourage ambulation, OOB   - F/u am labs. Continue to trend WBCs.   - Will discuss with Dr. Oliveira.

## 2020-01-23 ENCOUNTER — TRANSCRIPTION ENCOUNTER (OUTPATIENT)
Age: 60
End: 2020-01-23

## 2020-01-23 LAB
ANION GAP SERPL CALC-SCNC: 15 MMOL/L — SIGNIFICANT CHANGE UP (ref 5–17)
BUN SERPL-MCNC: 70 MG/DL — HIGH (ref 7–23)
CALCIUM SERPL-MCNC: 10.2 MG/DL — HIGH (ref 8.5–10.1)
CHLORIDE SERPL-SCNC: 100 MMOL/L — SIGNIFICANT CHANGE UP (ref 96–108)
CO2 SERPL-SCNC: 25 MMOL/L — SIGNIFICANT CHANGE UP (ref 22–31)
CREAT SERPL-MCNC: 8.39 MG/DL — HIGH (ref 0.5–1.3)
GLUCOSE BLDC GLUCOMTR-MCNC: 160 MG/DL — HIGH (ref 70–99)
GLUCOSE SERPL-MCNC: 97 MG/DL — SIGNIFICANT CHANGE UP (ref 70–99)
HCT VFR BLD CALC: 34.6 % — LOW (ref 39–50)
HGB BLD-MCNC: 10.4 G/DL — LOW (ref 13–17)
MCHC RBC-ENTMCNC: 26.5 PG — LOW (ref 27–34)
MCHC RBC-ENTMCNC: 30.1 GM/DL — LOW (ref 32–36)
MCV RBC AUTO: 88.3 FL — SIGNIFICANT CHANGE UP (ref 80–100)
NRBC # BLD: 0 /100 WBCS — SIGNIFICANT CHANGE UP (ref 0–0)
PHOSPHATE SERPL-MCNC: 4.2 MG/DL — SIGNIFICANT CHANGE UP (ref 2.5–4.5)
PLATELET # BLD AUTO: 426 K/UL — HIGH (ref 150–400)
POTASSIUM SERPL-MCNC: 4.8 MMOL/L — SIGNIFICANT CHANGE UP (ref 3.5–5.3)
POTASSIUM SERPL-SCNC: 4.8 MMOL/L — SIGNIFICANT CHANGE UP (ref 3.5–5.3)
RBC # BLD: 3.92 M/UL — LOW (ref 4.2–5.8)
RBC # FLD: 17.5 % — HIGH (ref 10.3–14.5)
SODIUM SERPL-SCNC: 140 MMOL/L — SIGNIFICANT CHANGE UP (ref 135–145)
WBC # BLD: 12.33 K/UL — HIGH (ref 3.8–10.5)
WBC # FLD AUTO: 12.33 K/UL — HIGH (ref 3.8–10.5)

## 2020-01-23 RX ORDER — OXYCODONE HYDROCHLORIDE 5 MG/1
5 TABLET ORAL ONCE
Refills: 0 | Status: DISCONTINUED | OUTPATIENT
Start: 2020-01-23 | End: 2020-01-23

## 2020-01-23 RX ORDER — MORPHINE SULFATE 50 MG/1
2 CAPSULE, EXTENDED RELEASE ORAL EVERY 6 HOURS
Refills: 0 | Status: DISCONTINUED | OUTPATIENT
Start: 2020-01-23 | End: 2020-01-24

## 2020-01-23 RX ORDER — CYCLOBENZAPRINE HYDROCHLORIDE 10 MG/1
10 TABLET, FILM COATED ORAL ONCE
Refills: 0 | Status: COMPLETED | OUTPATIENT
Start: 2020-01-23 | End: 2020-01-24

## 2020-01-23 RX ORDER — ACETAMINOPHEN 500 MG
1000 TABLET ORAL ONCE
Refills: 0 | Status: COMPLETED | OUTPATIENT
Start: 2020-01-23 | End: 2020-01-23

## 2020-01-23 RX ADMIN — ETRAVIRINE 200 MILLIGRAM(S): 200 TABLET ORAL at 11:20

## 2020-01-23 RX ADMIN — PANTOPRAZOLE SODIUM 40 MILLIGRAM(S): 20 TABLET, DELAYED RELEASE ORAL at 05:25

## 2020-01-23 RX ADMIN — Medication 1000 MILLIGRAM(S): at 20:00

## 2020-01-23 RX ADMIN — MORPHINE SULFATE 2 MILLIGRAM(S): 50 CAPSULE, EXTENDED RELEASE ORAL at 20:45

## 2020-01-23 RX ADMIN — OXYCODONE HYDROCHLORIDE 5 MILLIGRAM(S): 5 TABLET ORAL at 23:05

## 2020-01-23 RX ADMIN — MORPHINE SULFATE 2 MILLIGRAM(S): 50 CAPSULE, EXTENDED RELEASE ORAL at 20:15

## 2020-01-23 RX ADMIN — Medication 500 MILLIGRAM(S): at 05:25

## 2020-01-23 RX ADMIN — ONDANSETRON 4 MILLIGRAM(S): 8 TABLET, FILM COATED ORAL at 19:10

## 2020-01-23 RX ADMIN — OXYCODONE HYDROCHLORIDE 5 MILLIGRAM(S): 5 TABLET ORAL at 22:03

## 2020-01-23 RX ADMIN — Medication 400 MILLIGRAM(S): at 19:09

## 2020-01-23 RX ADMIN — Medication 100 MILLIGRAM(S): at 23:50

## 2020-01-23 RX ADMIN — RALTEGRAVIR 400 MILLIGRAM(S): 400 TABLET, FILM COATED ORAL at 05:25

## 2020-01-23 RX ADMIN — MIDODRINE HYDROCHLORIDE 15 MILLIGRAM(S): 2.5 TABLET ORAL at 05:41

## 2020-01-23 RX ADMIN — CHLORHEXIDINE GLUCONATE 1 APPLICATION(S): 213 SOLUTION TOPICAL at 07:20

## 2020-01-23 NOTE — DISCHARGE NOTE PROVIDER - HOSPITAL COURSE
60 y/o male PMH ESRD on HD (MWF), HIV on HAART, HepC, HLD, and HTN, s/p Hartmanns procedure 9/2019 for perforated sigmoid diverticulitis, s/p UGIB due to gastric ulcers, s/p admission for infected abdominal wound, now readmitted with sepsis, possibly due to infected abdominal wound (no clinical evidence) vs chronically infected mesh, s/p RRT for metabolic encephalopathy which resolved. Medicine, renal, ID, and neurology were consulted. Patient received antibiotics. Wound care was provided. Patient was seen by PT. Patient will have surgery as an outpatient for elective excision of infected mesh.    At the time of discharge, the patient was hemodynamically stable, was tolerating PO diet, was voiding urine, was ambulating, and was comfortable with adequate pain control. Patient instructed to follow up and to call the office to make an appointment.

## 2020-01-23 NOTE — DISCHARGE NOTE PROVIDER - NSDCMRMEDTOKEN_GEN_ALL_CORE_FT
amoxicillin-clavulanate 500 mg-125 mg oral tablet: 1 tab(s) orally every 12 hours for 7 days  Aspir 81 oral delayed release tablet: 1 tab(s) orally once a day  darunavir 800 mg oral tablet: 1 tab(s) orally once a day  etravirine 200 mg oral tablet: 1 tab(s) orally 2 times a day  Isentress 400 mg oral tablet: 1 tab(s) orally 2 times a day  Isordil 10 mg sublingual tablet: 1 tab(s) sublingual 3 times a day  metroNIDAZOLE 500 mg oral tablet: 1 tab(s) orally 3 times a day x 7days  Norvir 100 mg oral tablet: 1 tab(s) orally once a day  pantoprazole 40 mg oral delayed release tablet: 1 tab(s) orally every 12 hours  Pravachol 20 mg oral tablet: 1 tab(s) orally once a day  Velphoro 2500 mg (500 mg elemental iron) oral tablet, chewable: 1 tab(s) orally 3 times a day (with meals)

## 2020-01-23 NOTE — PROGRESS NOTE ADULT - SUBJECTIVE AND OBJECTIVE BOX
HPI:  Patient is a 59 year old male with a PMH HTN, ESRD (M/W/F), HIV, hep C, s/p hartmanns procedure 2/2 perforated sigmoid diverticulitis 9/2019 with subsequent admission to [facility] for UGIB likely due to gastric ulcers found on EGD (healed), and admission to our facility 10/2019 with infected abdominal wound, currently presented with drainage of abdominal wound again. Patient states his home health aide was dressing his wound, but he noticed increased drainage over the last 2 days with associated abdominal pain.   Admits to normal bowel function. Tolerating regular diet.   Denies fever, chills, chest pain, sob, palpitations, urinary symptoms. (06 Jan 2020 18:05)  worsening  wbc and empiric po flagyl started 3 days ago   wbc better but patient with severe abdominal pain today     Allergies    No Known Allergies    Intolerances        MEDICATIONS  (STANDING):  aspirin enteric coated 81 milliGRAM(s) Oral daily  ceFAZolin   IVPB 1000 milliGRAM(s) IV Intermittent every 24 hours  chlorhexidine 4% Liquid 1 Application(s) Topical <User Schedule>  darunavir 800 milliGRAM(s) Oral daily  dextrose 5%. 1000 milliLiter(s) (50 mL/Hr) IV Continuous <Continuous>  dextrose 50% Injectable 12.5 Gram(s) IV Push once  dextrose 50% Injectable 25 Gram(s) IV Push once  epoetin marsha Injectable 31171 Unit(s) IV Push <User Schedule>  etravirine 200 milliGRAM(s) Oral two times a day after meals  heparin  Injectable 5000 Unit(s) SubCutaneous every 12 hours  methimazole 10 milliGRAM(s) Oral daily  metroNIDAZOLE    Tablet 500 milliGRAM(s) Oral three times a day  midodrine. 15 milliGRAM(s) Oral every 8 hours  pantoprazole    Tablet 40 milliGRAM(s) Oral before breakfast  raltegravir Tablet 400 milliGRAM(s) Oral two times a day  ritonavir Tablet 100 milliGRAM(s) Oral every 24 hours    MEDICATIONS  (PRN):  dextrose 40% Gel 15 Gram(s) Oral once PRN Blood Glucose LESS THAN 70 milliGRAM(s)/deciliter  glucagon  Injectable 1 milliGRAM(s) IntraMuscular once PRN Glucose LESS THAN 70 milligrams/deciliter  morphine  - Injectable 2 milliGRAM(s) IV Push every 6 hours PRN Severe Pain (7 - 10)  ondansetron Injectable 4 milliGRAM(s) IV Push every 6 hours PRN Nausea and/or Vomiting      REVIEW OF SYSTEMS:    severe abdominal apin     VITAL SIGNS:  T(C): 36.4 (01-23-20 @ 21:14), Max: 36.6 (01-23-20 @ 12:05)  T(F): 97.5 (01-23-20 @ 21:14), Max: 97.9 (01-23-20 @ 12:05)  HR: 90 (01-23-20 @ 21:14) (68 - 111)  BP: 139/59 (01-23-20 @ 21:14) (94/46 - 139/59)  RR: 19 (01-23-20 @ 21:14) (16 - 19)  SpO2: 97% (01-23-20 @ 18:00) (97% - 100%)  Wt(kg): --    PHYSICAL EXAM:    GENERAL: not in any distress  HEENT: Neck is supple, normocephalic, atraumatic   CHEST/LUNG: Clear to auscultation bilaterally; No rales, rhonchi, wheezing  HEART: Regular rate and rhythm; No murmurs, rubs, or gallops  ABDOMEN: Soft, Nontender, Nondistended; Bowel sounds present, no rebound   colostomy good output  EXTREMITIES:  2+ Peripheral Pulses, No clubbing, cyanosis, or edema  SKIN: No rashes or lesions  BACK: no pressor sore   NERVOUS SYSTEM:  Alert & Oriented X3, Good concentration  PSYCH: depresssed affect     LABS:                         10.4   12.33 )-----------( [id number]      ( 23 Jan 2020 13:59 )             34.6     01-23    140  |  100  |  70<H>  ----------------------------<  97  4.8   |  25  |  8.39<H>    Ca    10.2<H>      23 Jan 2020 13:59  Phos  4.2     01-23  Mg     2.2     01-22    TPro  9.2<H>  /  Alb  2.7<L>  /  TBili  0.3  /  DBili  x   /  AST  16  /  ALT  <6<L>  /  AlkPhos  179<H>  01-22    LIVER FUNCTIONS - ( 22 Jan 2020 07:17 )  Alb: 2.7 g/dL / Pro: 9.2 gm/dL / ALK PHOS: 179 U/L / ALT: <6 U/L / AST: 16 U/L / GGT: x                     Thyroid Stimulating Hormone, Serum: 0.011 uU/mL (01-19 @ 06:55)                              Radiology:      < from: CT Abdomen and Pelvis w/ IV Cont (01.14.20 @ 00:12) >  IMPRESSION:   1. Partial colectomy with left lower quadrant colostomy. Diffuse wall   thickening is seen in the proximal sigmoid colon extending to the colostomy. Wall   thickening appears more significant in the subcutaneous portion of the bowel.   No evidence of obstruction. Stranding of pericolonic fat is seen in the region   of wall thickening including in subcutaneous fat. Findings are consistent with   colitis.   2. dependent atelectasis, versus mild infiltrates in bilateral lower lobes.   3. 6.0 x 5.6 cm hypodense lesion is seen in the spleen. Suboptimal   visualization of a 5.0 by 4.2 cm mass lesion in inferior pole of the spleen.   These lesions appear unchanged in size compared to prior study. Other lesions   seen in the spleen on prior study are suboptimally visualized on the current   study which appears to be secondary to timing of the bolus. 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 Please correlate with previous   workup.   End stage renal disease, and multiple bilateral renal cysts and nonobstructing left renal stones.      < end of copied text >

## 2020-01-23 NOTE — PROGRESS NOTE ADULT - SUBJECTIVE AND OBJECTIVE BOX
Patient seen and examined bedside resting comfortably.  No complaints offered.   Denies abdominal pain, nausea and vomiting. Tolerating diet.  Eager to ambulate with PT and asks when he will be able to go home.      T(F): 97 (01-23-20 @ 11:15), Max: 97.7 (01-22-20 @ 17:00)  HR: 69 (01-23-20 @ 11:15) (69 - 93)  BP: 108/57 (01-23-20 @ 11:15) (89/52 - 108/57)  RR: 18 (01-23-20 @ 11:15) (16 - 18)  SpO2: 97% (01-23-20 @ 11:15) (97% - 100%)      PHYSICAL EXAM:  General: NAD, WDWN  Neuro:  Alert & oriented x 3  HEENT: NCAT, EOMI, conjunctiva clear  CV: +S1+S2 regular rate and rhythm  Lung: clear to auscultation bilaterally, respirations nonlabored, good inspiratory effort  Abdomen: soft, NTND. Normoactive BS. Midline wound with no gross purulence or drainage, dry dressing in place. Colostomy with stool and air.  Extremities: no pedal edema or calf tenderness noted     LABS:                        11.3   11.39 )-----------( 424      ( 22 Jan 2020 07:17 )             39.0     01-22    140  |  102  |  38<H>  ----------------------------<  111<H>  4.8   |  26  |  6.21<H>    Ca    10.2<H>      22 Jan 2020 07:17  Phos  4.8     01-22  Mg     2.2     01-22    TPro  9.2<H>  /  Alb  2.7<L>  /  TBili  0.3  /  DBili  x   /  AST  16  /  ALT  <6<L>  /  AlkPhos  179<H>  01-22        A/P: 60 y/o male PMH ESRD on HD (MWF), HIV on HAART, HepC, HLD, and HTN, s/p Hartmanns procedure 9/2019 for perforated sigmoid diverticulitis, s/p UGIB due to gastric ulcers, s/p admission for infected abdominal wound, now readmitted with sepsis, possibly due to infected abdominal wound (no clinical evidence) vs chronically infected mesh, s/p RRT for metabolic encephalopathy - resolved  Afebrile.    -continue local wound care with dry dressing  -discharge planning to rehab after PT reeval  -C/w colostomy care, surrounding skin care with adaptive powder and stoma paste.  -Surgical planning as OP for elective excision of infected mesh, patient understands  -Continue Ancef/Flagyl per ID recommendations, Follow up appreciated.  -Continue HD per nephrology, Follow up lab results, await repeat WBC   -will discuss with Dr Oliveira Patient seen and examined bedside resting comfortably.  No complaints offered.   Denies abdominal pain, nausea and vomiting. Tolerating diet.  Eager to ambulate with PT and asks when he will be able to go home.      T(F): 97 (01-23-20 @ 11:15), Max: 97.7 (01-22-20 @ 17:00)  HR: 69 (01-23-20 @ 11:15) (69 - 93)  BP: 108/57 (01-23-20 @ 11:15) (89/52 - 108/57)  RR: 18 (01-23-20 @ 11:15) (16 - 18)  SpO2: 97% (01-23-20 @ 11:15) (97% - 100%)      PHYSICAL EXAM:  General: NAD, WDWN  Neuro:  Alert & oriented x 3  HEENT: NCAT, EOMI, conjunctiva clear  CV: +S1+S2 regular rate and rhythm  Lung: clear to auscultation bilaterally, respirations nonlabored, good inspiratory effort  Abdomen: soft, NTND. Normoactive BS. Midline wound with no gross purulence or drainage, dry dressing in place. Colostomy with stool and air.  Extremities: no pedal edema or calf tenderness. RUE midline catheter indwelling    LABS:                        11.3   11.39 )-----------( 424      ( 22 Jan 2020 07:17 )             39.0     01-22    140  |  102  |  38<H>  ----------------------------<  111<H>  4.8   |  26  |  6.21<H>    Ca    10.2<H>      22 Jan 2020 07:17  Phos  4.8     01-22  Mg     2.2     01-22    TPro  9.2<H>  /  Alb  2.7<L>  /  TBili  0.3  /  DBili  x   /  AST  16  /  ALT  <6<L>  /  AlkPhos  179<H>  01-22        A/P: 60 y/o male PMH ESRD on HD (MWF), HIV on HAART, HepC, HLD, and HTN, s/p Hartmanns procedure 9/2019 for perforated sigmoid diverticulitis, s/p UGIB due to gastric ulcers, s/p admission for infected abdominal wound, now readmitted with sepsis, possibly due to infected abdominal wound (no clinical evidence) vs chronically infected mesh, s/p RRT for metabolic encephalopathy - resolved  Afebrile.    -continue local wound care with dry dressing  -discharge planning to rehab after PT reeval  -C/w colostomy care, surrounding skin care with adaptive powder and stoma paste.  -Surgical planning as OP for elective excision of infected mesh, patient understands  -Continue Ancef/Flagyl per ID recommendations, Follow up appreciated  -Continue HD per nephrology, Follow up lab results, await repeat WBC   -will discuss with Dr Oliveira

## 2020-01-23 NOTE — DISCHARGE NOTE PROVIDER - NSDCCPCAREPLAN_GEN_ALL_CORE_FT
PRINCIPAL DISCHARGE DIAGNOSIS  Diagnosis: Infected hernioplasty mesh  Assessment and Plan of Treatment: Chronic infected mesh

## 2020-01-23 NOTE — PROGRESS NOTE ADULT - ASSESSMENT
s/p Hartmanns procedure 2/2 perforated sigmoid diverticulitis 9/2019 with subsequent admission to [facility] for UGIB likely due to gastric ulcers found on EGD (healed), and admission to our facility 10/2019 with infected abdominal wound, currently presented with drainage of abdominal wound again  known to us for Sigmoid Colon rsxn with creation of colostomy, peritoneal lavage s/p I&D of intra-abdominal abscess on 9/18/19 treated for abdominal incisional wounds, admitted with possible ?enterocutaneous fistula on 11/2019 all wound culture with fairly sensitive organism mssa, ecoli and klebsiella sensitive to po and dc with po abx   work up consistent with infected mesh covering current culture with ancef   now worsening wbc count   on ancef   sx follow up    flagyl improved wbc   discharge plan on suppressive keflex 250 daily and flagyl 500 tid   keflex for life   flagyl x 12 more days

## 2020-01-23 NOTE — DISCHARGE NOTE PROVIDER - CARE PROVIDER_API CALL
Nicole Oliveira)  Surgery  [address], [address]  [place] [address]  Phone: [phone]  Fax: [phone]  Follow Up Time:

## 2020-01-23 NOTE — PROGRESS NOTE ADULT - SUBJECTIVE AND OBJECTIVE BOX
Subjective: seen in HD. Depressed about length of stay       MEDICATIONS  (STANDING):  aspirin enteric coated 81 milliGRAM(s) Oral daily  ceFAZolin   IVPB 1000 milliGRAM(s) IV Intermittent every 24 hours  chlorhexidine 4% Liquid 1 Application(s) Topical <User Schedule>  darunavir 800 milliGRAM(s) Oral daily  dextrose 5%. 1000 milliLiter(s) (50 mL/Hr) IV Continuous <Continuous>  dextrose 50% Injectable 12.5 Gram(s) IV Push once  dextrose 50% Injectable 25 Gram(s) IV Push once  epoetin marsha Injectable 41917 Unit(s) IV Push <User Schedule>  etravirine 200 milliGRAM(s) Oral two times a day after meals  heparin  Injectable 5000 Unit(s) SubCutaneous every 12 hours  methimazole 10 milliGRAM(s) Oral daily  metroNIDAZOLE    Tablet 500 milliGRAM(s) Oral three times a day  midodrine. 15 milliGRAM(s) Oral every 8 hours  pantoprazole    Tablet 40 milliGRAM(s) Oral before breakfast  raltegravir Tablet 400 milliGRAM(s) Oral two times a day  ritonavir Tablet 100 milliGRAM(s) Oral every 24 hours    MEDICATIONS  (PRN):  dextrose 40% Gel 15 Gram(s) Oral once PRN Blood Glucose LESS THAN 70 milliGRAM(s)/deciliter  glucagon  Injectable 1 milliGRAM(s) IntraMuscular once PRN Glucose LESS THAN 70 milligrams/deciliter  ondansetron Injectable 4 milliGRAM(s) IV Push every 6 hours PRN Nausea and/or Vomiting          T(C): 36.1 (01-23-20 @ 11:15), Max: 36.5 (01-22-20 @ 17:00)  HR: 69 (01-23-20 @ 11:15) (69 - 93)  BP: 108/57 (01-23-20 @ 11:15) (89/52 - 108/57)  RR: 18 (01-23-20 @ 11:15) (16 - 18)  SpO2: 97% (01-23-20 @ 11:15) (97% - 100%)  Wt(kg): --        I&O's Detail    23 Jan 2020 07:01  -  23 Jan 2020 13:54  --------------------------------------------------------  IN:  Total IN: 0 mL    OUT:    Colostomy: 300 mL  Total OUT: 300 mL    Total NET: -300 mL               PHYSICAL EXAM:    GENERAL: NAD  EYES: EOMI, PERRLA, conjunctiva and sclera clear  NECK: Supple, no inc in JVP  CHEST/LUNG: Clear  HEART: S1S2  ABDOMEN: midline dressing, colostomy  EXTREMITIES:  min edema.   NEURO: no asterixis  L AVF pos thrill, bruit        LABS:  CBC Full  -  ( 22 Jan 2020 07:17 )  WBC Count : 11.39 K/uL  RBC Count : 4.32 M/uL  Hemoglobin : 11.3 g/dL  Hematocrit : 39.0 %  Platelet Count - Automated : 424 K/uL  Mean Cell Volume : 90.3 fl  Mean Cell Hemoglobin : 26.2 pg  Mean Cell Hemoglobin Concentration : 29.0 gm/dL  Auto Neutrophil # : x  Auto Lymphocyte # : x  Auto Monocyte # : x  Auto Eosinophil # : x  Auto Basophil # : x  Auto Neutrophil % : x  Auto Lymphocyte % : x  Auto Monocyte % : x  Auto Eosinophil % : x  Auto Basophil % : x    01-22    140  |  102  |  38<H>  ----------------------------<  111<H>  4.8   |  26  |  6.21<H>    Ca    10.2<H>      22 Jan 2020 07:17  Phos  4.8     01-22  Mg     2.2     01-22    TPro  9.2<H>  /  Alb  2.7<L>  /  TBili  0.3  /  DBili  x   /  AST  16  /  ALT  <6<L>  /  AlkPhos  179<H>  01-22            Impression:  * HD dependent ESRD  * 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 Polymicrobial abd wound infx  * Recent exp lap, sigm colon resection, perit lavage, I&D of abscess for perfed viscus  * HIV, Hep C    Recommendations:   * HD today as Rxed.   * 2K bath. UF goal 1.5-2 kg

## 2020-01-23 NOTE — DISCHARGE NOTE PROVIDER - NSDCCPGOAL_GEN_ALL_CORE_FT
Follow up in 7-10 days. Please call the office to make an appointment. Activity as tolerated. Rest as needed. You may eat a regular renal diet. Do not lift anything heavier than 10 pounds. You may take motrin or advil for pain as needed. You may take over the counter stool softeners as needed. Call for any fever over 101, nausea, vomiting, severe pain, no passing of gas or bowel movement. You may shower and pat dry abdomen.

## 2020-01-24 ENCOUNTER — RESULT REVIEW (OUTPATIENT)
Age: 60
End: 2020-01-24

## 2020-01-24 LAB
ALBUMIN SERPL ELPH-MCNC: 2.4 G/DL — LOW (ref 3.3–5)
ALBUMIN SERPL ELPH-MCNC: 3.5 G/DL — SIGNIFICANT CHANGE UP (ref 3.3–5)
ALP SERPL-CCNC: 147 U/L — HIGH (ref 40–120)
ALP SERPL-CCNC: 197 U/L — HIGH (ref 40–120)
ALT FLD-CCNC: <6 U/L — LOW (ref 12–78)
ALT FLD-CCNC: <6 U/L — LOW (ref 12–78)
ANION GAP SERPL CALC-SCNC: 17 MMOL/L — SIGNIFICANT CHANGE UP (ref 5–17)
ANION GAP SERPL CALC-SCNC: 18 MMOL/L — HIGH (ref 5–17)
ANISOCYTOSIS BLD QL: SLIGHT — SIGNIFICANT CHANGE UP
AST SERPL-CCNC: 43 U/L — HIGH (ref 15–37)
AST SERPL-CCNC: 50 U/L — HIGH (ref 15–37)
BASE EXCESS BLDA CALC-SCNC: -13.2 MMOL/L — LOW (ref -2–2)
BASE EXCESS BLDA CALC-SCNC: -8.1 MMOL/L — LOW (ref -2–2)
BASE EXCESS BLDA CALC-SCNC: 0.4 MMOL/L — SIGNIFICANT CHANGE UP (ref -2–2)
BASOPHILS # BLD AUTO: 0.06 K/UL — SIGNIFICANT CHANGE UP (ref 0–0.2)
BASOPHILS # BLD AUTO: 0.06 K/UL — SIGNIFICANT CHANGE UP (ref 0–0.2)
BASOPHILS NFR BLD AUTO: 0.2 % — SIGNIFICANT CHANGE UP (ref 0–2)
BASOPHILS NFR BLD AUTO: 0.3 % — SIGNIFICANT CHANGE UP (ref 0–2)
BILIRUB SERPL-MCNC: 0.4 MG/DL — SIGNIFICANT CHANGE UP (ref 0.2–1.2)
BILIRUB SERPL-MCNC: 0.5 MG/DL — SIGNIFICANT CHANGE UP (ref 0.2–1.2)
BLD GP AB SCN SERPL QL: SIGNIFICANT CHANGE UP
BLOOD GAS COMMENTS: SIGNIFICANT CHANGE UP
BLOOD GAS SOURCE: SIGNIFICANT CHANGE UP
BUN SERPL-MCNC: 46 MG/DL — HIGH (ref 7–23)
BUN SERPL-MCNC: 47 MG/DL — HIGH (ref 7–23)
CALCIUM SERPL-MCNC: 10.6 MG/DL — HIGH (ref 8.5–10.1)
CALCIUM SERPL-MCNC: 12 MG/DL — HIGH (ref 8.5–10.1)
CHLORIDE SERPL-SCNC: 101 MMOL/L — SIGNIFICANT CHANGE UP (ref 96–108)
CHLORIDE SERPL-SCNC: 93 MMOL/L — LOW (ref 96–108)
CO2 SERPL-SCNC: 17 MMOL/L — LOW (ref 22–31)
CO2 SERPL-SCNC: 27 MMOL/L — SIGNIFICANT CHANGE UP (ref 22–31)
CREAT SERPL-MCNC: 6.54 MG/DL — HIGH (ref 0.5–1.3)
CREAT SERPL-MCNC: 6.56 MG/DL — HIGH (ref 0.5–1.3)
CRP SERPL-MCNC: 9.94 MG/DL — HIGH (ref 0–0.4)
DACRYOCYTES BLD QL SMEAR: SLIGHT — SIGNIFICANT CHANGE UP
EOSINOPHIL # BLD AUTO: 0 K/UL — SIGNIFICANT CHANGE UP (ref 0–0.5)
EOSINOPHIL # BLD AUTO: 0 K/UL — SIGNIFICANT CHANGE UP (ref 0–0.5)
EOSINOPHIL NFR BLD AUTO: 0 % — SIGNIFICANT CHANGE UP (ref 0–6)
EOSINOPHIL NFR BLD AUTO: 0 % — SIGNIFICANT CHANGE UP (ref 0–6)
ERYTHROCYTE [SEDIMENTATION RATE] IN BLOOD: 73 MM/HR — HIGH (ref 0–20)
GIANT PLATELETS BLD QL SMEAR: PRESENT — SIGNIFICANT CHANGE UP
GLUCOSE BLDC GLUCOMTR-MCNC: 175 MG/DL — HIGH (ref 70–99)
GLUCOSE SERPL-MCNC: 154 MG/DL — HIGH (ref 70–99)
GLUCOSE SERPL-MCNC: 212 MG/DL — HIGH (ref 70–99)
HCO3 BLDA-SCNC: 12 MMOL/L — LOW (ref 21–29)
HCO3 BLDA-SCNC: 18 MMOL/L — LOW (ref 21–29)
HCO3 BLDA-SCNC: 24 MMOL/L — SIGNIFICANT CHANGE UP (ref 21–29)
HCT VFR BLD CALC: 39.7 % — SIGNIFICANT CHANGE UP (ref 39–50)
HCT VFR BLD CALC: 46.2 % — SIGNIFICANT CHANGE UP (ref 39–50)
HCT VFR BLD CALC: 48.5 % — SIGNIFICANT CHANGE UP (ref 39–50)
HGB BLD-MCNC: 11.4 G/DL — LOW (ref 13–17)
HGB BLD-MCNC: 13.7 G/DL — SIGNIFICANT CHANGE UP (ref 13–17)
HGB BLD-MCNC: 14.5 G/DL — SIGNIFICANT CHANGE UP (ref 13–17)
HOROWITZ INDEX BLDA+IHG-RTO: 0.95 — SIGNIFICANT CHANGE UP
HOROWITZ INDEX BLDA+IHG-RTO: 1 — SIGNIFICANT CHANGE UP
HOROWITZ INDEX BLDA+IHG-RTO: 100 — SIGNIFICANT CHANGE UP
IMM GRANULOCYTES NFR BLD AUTO: 0.6 % — SIGNIFICANT CHANGE UP (ref 0–1.5)
IMM GRANULOCYTES NFR BLD AUTO: 1.1 % — SIGNIFICANT CHANGE UP (ref 0–1.5)
INR BLD: 1.46 RATIO — HIGH (ref 0.88–1.16)
LACTATE SERPL-SCNC: 3.2 MMOL/L — HIGH (ref 0.7–2)
LACTATE SERPL-SCNC: 7.3 MMOL/L — CRITICAL HIGH (ref 0.7–2)
LG PLATELETS BLD QL AUTO: SLIGHT — SIGNIFICANT CHANGE UP
LYMPHOCYTES # BLD AUTO: 1.83 K/UL — SIGNIFICANT CHANGE UP (ref 1–3.3)
LYMPHOCYTES # BLD AUTO: 18.8 % — SIGNIFICANT CHANGE UP (ref 13–44)
LYMPHOCYTES # BLD AUTO: 4.65 K/UL — HIGH (ref 1–3.3)
LYMPHOCYTES # BLD AUTO: 9.5 % — LOW (ref 13–44)
MAGNESIUM SERPL-MCNC: 2.1 MG/DL — SIGNIFICANT CHANGE UP (ref 1.6–2.6)
MANUAL SMEAR VERIFICATION: SIGNIFICANT CHANGE UP
MCHC RBC-ENTMCNC: 26 PG — LOW (ref 27–34)
MCHC RBC-ENTMCNC: 26.3 PG — LOW (ref 27–34)
MCHC RBC-ENTMCNC: 26.4 PG — LOW (ref 27–34)
MCHC RBC-ENTMCNC: 28.7 GM/DL — LOW (ref 32–36)
MCHC RBC-ENTMCNC: 29.7 GM/DL — LOW (ref 32–36)
MCHC RBC-ENTMCNC: 29.9 GM/DL — LOW (ref 32–36)
MCV RBC AUTO: 87.8 FL — SIGNIFICANT CHANGE UP (ref 80–100)
MCV RBC AUTO: 87.9 FL — SIGNIFICANT CHANGE UP (ref 80–100)
MCV RBC AUTO: 91.9 FL — SIGNIFICANT CHANGE UP (ref 80–100)
MONOCYTES # BLD AUTO: 1.03 K/UL — HIGH (ref 0–0.9)
MONOCYTES # BLD AUTO: 1.56 K/UL — HIGH (ref 0–0.9)
MONOCYTES NFR BLD AUTO: 5.3 % — SIGNIFICANT CHANGE UP (ref 2–14)
MONOCYTES NFR BLD AUTO: 6.3 % — SIGNIFICANT CHANGE UP (ref 2–14)
NEUTROPHILS # BLD AUTO: 16.19 K/UL — HIGH (ref 1.8–7.4)
NEUTROPHILS # BLD AUTO: 18.32 K/UL — HIGH (ref 1.8–7.4)
NEUTROPHILS NFR BLD AUTO: 74.1 % — SIGNIFICANT CHANGE UP (ref 43–77)
NEUTROPHILS NFR BLD AUTO: 83.8 % — HIGH (ref 43–77)
NRBC # BLD: 0 /100 WBCS — SIGNIFICANT CHANGE UP (ref 0–0)
PCO2 BLDA: 29 MMHG — LOW (ref 32–46)
PCO2 BLDA: 38 MMHG — SIGNIFICANT CHANGE UP (ref 32–46)
PCO2 BLDA: 42 MMHG — SIGNIFICANT CHANGE UP (ref 32–46)
PH BLD: 7.26 — LOW (ref 7.35–7.45)
PH BLD: 7.26 — LOW (ref 7.35–7.45)
PH BLD: 7.42 — SIGNIFICANT CHANGE UP (ref 7.35–7.45)
PHOSPHATE SERPL-MCNC: 7.2 MG/DL — HIGH (ref 2.5–4.5)
PLAT MORPH BLD: NORMAL — SIGNIFICANT CHANGE UP
PLATELET # BLD AUTO: 544 K/UL — HIGH (ref 150–400)
PLATELET # BLD AUTO: 563 K/UL — HIGH (ref 150–400)
PLATELET # BLD AUTO: 615 K/UL — HIGH (ref 150–400)
PLATELET COUNT - ESTIMATE: ABNORMAL
PO2 BLDA: 305 MMHG — HIGH (ref 74–108)
PO2 BLDA: 341 MMHG — HIGH (ref 74–108)
PO2 BLDA: 84 MMHG — SIGNIFICANT CHANGE UP (ref 74–108)
POLYCHROMASIA BLD QL SMEAR: SLIGHT — SIGNIFICANT CHANGE UP
POTASSIUM SERPL-MCNC: 4.8 MMOL/L — SIGNIFICANT CHANGE UP (ref 3.5–5.3)
POTASSIUM SERPL-MCNC: 5.2 MMOL/L — SIGNIFICANT CHANGE UP (ref 3.5–5.3)
POTASSIUM SERPL-SCNC: 4.8 MMOL/L — SIGNIFICANT CHANGE UP (ref 3.5–5.3)
POTASSIUM SERPL-SCNC: 5.2 MMOL/L — SIGNIFICANT CHANGE UP (ref 3.5–5.3)
PROT SERPL-MCNC: 11.2 GM/DL — HIGH (ref 6–8.3)
PROT SERPL-MCNC: 8.4 GM/DL — HIGH (ref 6–8.3)
PROTHROM AB SERPL-ACNC: 16.5 SEC — HIGH (ref 10–12.9)
RBC # BLD: 4.32 M/UL — SIGNIFICANT CHANGE UP (ref 4.2–5.8)
RBC # BLD: 5.26 M/UL — SIGNIFICANT CHANGE UP (ref 4.2–5.8)
RBC # BLD: 5.52 M/UL — SIGNIFICANT CHANGE UP (ref 4.2–5.8)
RBC # FLD: 18.3 % — HIGH (ref 10.3–14.5)
RBC # FLD: 18.4 % — HIGH (ref 10.3–14.5)
RBC # FLD: 18.6 % — HIGH (ref 10.3–14.5)
RBC BLD AUTO: ABNORMAL
SAO2 % BLDA: 100 % — HIGH (ref 92–96)
SAO2 % BLDA: 92 % — SIGNIFICANT CHANGE UP (ref 92–96)
SAO2 % BLDA: 99 % — HIGH (ref 92–96)
SODIUM SERPL-SCNC: 136 MMOL/L — SIGNIFICANT CHANGE UP (ref 135–145)
SODIUM SERPL-SCNC: 137 MMOL/L — SIGNIFICANT CHANGE UP (ref 135–145)
WBC # BLD: 18.01 K/UL — HIGH (ref 3.8–10.5)
WBC # BLD: 19.32 K/UL — HIGH (ref 3.8–10.5)
WBC # BLD: 24.74 K/UL — HIGH (ref 3.8–10.5)
WBC # FLD AUTO: 18.01 K/UL — HIGH (ref 3.8–10.5)
WBC # FLD AUTO: 19.32 K/UL — HIGH (ref 3.8–10.5)
WBC # FLD AUTO: 24.74 K/UL — HIGH (ref 3.8–10.5)

## 2020-01-24 PROCEDURE — 74174 CTA ABD&PLVS W/CONTRAST: CPT | Mod: 26

## 2020-01-24 PROCEDURE — 99291 CRITICAL CARE FIRST HOUR: CPT | Mod: 25

## 2020-01-24 PROCEDURE — 44345 REVISION OF COLOSTOMY: CPT | Mod: AS

## 2020-01-24 PROCEDURE — 88307 TISSUE EXAM BY PATHOLOGIST: CPT | Mod: 26

## 2020-01-24 PROCEDURE — 36569 INSJ PICC 5 YR+ W/O IMAGING: CPT

## 2020-01-24 PROCEDURE — 99233 SBSQ HOSP IP/OBS HIGH 50: CPT

## 2020-01-24 PROCEDURE — 71045 X-RAY EXAM CHEST 1 VIEW: CPT | Mod: 26

## 2020-01-24 PROCEDURE — 36620 INSERTION CATHETER ARTERY: CPT

## 2020-01-24 RX ORDER — FENTANYL CITRATE 50 UG/ML
0.5 INJECTION INTRAVENOUS
Qty: 2500 | Refills: 0 | Status: DISCONTINUED | OUTPATIENT
Start: 2020-01-24 | End: 2020-01-25

## 2020-01-24 RX ORDER — NOREPINEPHRINE BITARTRATE/D5W 8 MG/250ML
0.05 PLASTIC BAG, INJECTION (ML) INTRAVENOUS
Qty: 8 | Refills: 0 | Status: DISCONTINUED | OUTPATIENT
Start: 2020-01-24 | End: 2020-01-24

## 2020-01-24 RX ORDER — AMIODARONE HYDROCHLORIDE 400 MG/1
150 TABLET ORAL ONCE
Refills: 0 | Status: COMPLETED | OUTPATIENT
Start: 2020-01-24 | End: 2020-01-24

## 2020-01-24 RX ORDER — PANTOPRAZOLE SODIUM 20 MG/1
40 TABLET, DELAYED RELEASE ORAL DAILY
Refills: 0 | Status: DISCONTINUED | OUTPATIENT
Start: 2020-01-24 | End: 2020-01-25

## 2020-01-24 RX ORDER — GLUCAGON INJECTION, SOLUTION 0.5 MG/.1ML
1 INJECTION, SOLUTION SUBCUTANEOUS ONCE
Refills: 0 | Status: DISCONTINUED | OUTPATIENT
Start: 2020-01-24 | End: 2020-01-25

## 2020-01-24 RX ORDER — DEXTROSE 50 % IN WATER 50 %
25 SYRINGE (ML) INTRAVENOUS ONCE
Refills: 0 | Status: DISCONTINUED | OUTPATIENT
Start: 2020-01-24 | End: 2020-01-25

## 2020-01-24 RX ORDER — METRONIDAZOLE 500 MG
500 TABLET ORAL EVERY 8 HOURS
Refills: 0 | Status: DISCONTINUED | OUTPATIENT
Start: 2020-01-24 | End: 2020-01-25

## 2020-01-24 RX ORDER — SODIUM CHLORIDE 9 MG/ML
1000 INJECTION INTRAMUSCULAR; INTRAVENOUS; SUBCUTANEOUS ONCE
Refills: 0 | Status: COMPLETED | OUTPATIENT
Start: 2020-01-24 | End: 2020-01-24

## 2020-01-24 RX ORDER — TRAMADOL HYDROCHLORIDE 50 MG/1
50 TABLET ORAL EVERY 8 HOURS
Refills: 0 | Status: DISCONTINUED | OUTPATIENT
Start: 2020-01-24 | End: 2020-01-24

## 2020-01-24 RX ORDER — SODIUM CHLORIDE 9 MG/ML
2000 INJECTION, SOLUTION INTRAVENOUS ONCE
Refills: 0 | Status: COMPLETED | OUTPATIENT
Start: 2020-01-24 | End: 2020-01-24

## 2020-01-24 RX ORDER — IOHEXOL 300 MG/ML
30 INJECTION, SOLUTION INTRAVENOUS ONCE
Refills: 0 | Status: DISCONTINUED | OUTPATIENT
Start: 2020-01-24 | End: 2020-01-24

## 2020-01-24 RX ORDER — SODIUM CHLORIDE 9 MG/ML
1000 INJECTION, SOLUTION INTRAVENOUS
Refills: 0 | Status: DISCONTINUED | OUTPATIENT
Start: 2020-01-24 | End: 2020-01-25

## 2020-01-24 RX ORDER — CHLORHEXIDINE GLUCONATE 213 G/1000ML
15 SOLUTION TOPICAL EVERY 12 HOURS
Refills: 0 | Status: DISCONTINUED | OUTPATIENT
Start: 2020-01-24 | End: 2020-01-25

## 2020-01-24 RX ORDER — VASOPRESSIN 20 [USP'U]/ML
0.04 INJECTION INTRAVENOUS
Qty: 50 | Refills: 0 | Status: DISCONTINUED | OUTPATIENT
Start: 2020-01-24 | End: 2020-01-25

## 2020-01-24 RX ORDER — MEROPENEM 1 G/30ML
500 INJECTION INTRAVENOUS EVERY 24 HOURS
Refills: 0 | Status: DISCONTINUED | OUTPATIENT
Start: 2020-01-25 | End: 2020-01-25

## 2020-01-24 RX ORDER — NOREPINEPHRINE BITARTRATE/D5W 8 MG/250ML
0.05 PLASTIC BAG, INJECTION (ML) INTRAVENOUS
Qty: 16 | Refills: 0 | Status: DISCONTINUED | OUTPATIENT
Start: 2020-01-24 | End: 2020-01-25

## 2020-01-24 RX ORDER — MEROPENEM 1 G/30ML
500 INJECTION INTRAVENOUS EVERY 24 HOURS
Refills: 0 | Status: DISCONTINUED | OUTPATIENT
Start: 2020-01-24 | End: 2020-01-24

## 2020-01-24 RX ORDER — AMIODARONE HYDROCHLORIDE 400 MG/1
1 TABLET ORAL
Qty: 900 | Refills: 0 | Status: DISCONTINUED | OUTPATIENT
Start: 2020-01-24 | End: 2020-01-25

## 2020-01-24 RX ORDER — CHLORHEXIDINE GLUCONATE 213 G/1000ML
1 SOLUTION TOPICAL
Refills: 0 | Status: DISCONTINUED | OUTPATIENT
Start: 2020-01-24 | End: 2020-01-25

## 2020-01-24 RX ORDER — DEXTROSE 50 % IN WATER 50 %
15 SYRINGE (ML) INTRAVENOUS ONCE
Refills: 0 | Status: DISCONTINUED | OUTPATIENT
Start: 2020-01-24 | End: 2020-01-25

## 2020-01-24 RX ORDER — DEXTROSE 50 % IN WATER 50 %
12.5 SYRINGE (ML) INTRAVENOUS ONCE
Refills: 0 | Status: DISCONTINUED | OUTPATIENT
Start: 2020-01-24 | End: 2020-01-25

## 2020-01-24 RX ORDER — INSULIN LISPRO 100/ML
VIAL (ML) SUBCUTANEOUS EVERY 6 HOURS
Refills: 0 | Status: DISCONTINUED | OUTPATIENT
Start: 2020-01-24 | End: 2020-01-25

## 2020-01-24 RX ORDER — METRONIDAZOLE 500 MG
500 TABLET ORAL EVERY 8 HOURS
Refills: 0 | Status: DISCONTINUED | OUTPATIENT
Start: 2020-01-24 | End: 2020-01-24

## 2020-01-24 RX ADMIN — ONDANSETRON 4 MILLIGRAM(S): 8 TABLET, FILM COATED ORAL at 08:58

## 2020-01-24 RX ADMIN — CHLORHEXIDINE GLUCONATE 1 APPLICATION(S): 213 SOLUTION TOPICAL at 06:32

## 2020-01-24 RX ADMIN — MORPHINE SULFATE 2 MILLIGRAM(S): 50 CAPSULE, EXTENDED RELEASE ORAL at 03:35

## 2020-01-24 RX ADMIN — MIDODRINE HYDROCHLORIDE 15 MILLIGRAM(S): 2.5 TABLET ORAL at 06:37

## 2020-01-24 RX ADMIN — Medication 500 MILLIGRAM(S): at 06:35

## 2020-01-24 RX ADMIN — SODIUM CHLORIDE 2000 MILLILITER(S): 9 INJECTION, SOLUTION INTRAVENOUS at 15:29

## 2020-01-24 RX ADMIN — HEPARIN SODIUM 5000 UNIT(S): 5000 INJECTION INTRAVENOUS; SUBCUTANEOUS at 06:35

## 2020-01-24 RX ADMIN — Medication 9.09 MICROGRAM(S)/KG/MIN: at 22:55

## 2020-01-24 RX ADMIN — RALTEGRAVIR 400 MILLIGRAM(S): 400 TABLET, FILM COATED ORAL at 06:34

## 2020-01-24 RX ADMIN — AMIODARONE HYDROCHLORIDE 33.33 MG/MIN: 400 TABLET ORAL at 22:55

## 2020-01-24 RX ADMIN — MORPHINE SULFATE 2 MILLIGRAM(S): 50 CAPSULE, EXTENDED RELEASE ORAL at 04:15

## 2020-01-24 RX ADMIN — PANTOPRAZOLE SODIUM 40 MILLIGRAM(S): 20 TABLET, DELAYED RELEASE ORAL at 06:35

## 2020-01-24 RX ADMIN — SODIUM CHLORIDE 1000 MILLILITER(S): 9 INJECTION INTRAMUSCULAR; INTRAVENOUS; SUBCUTANEOUS at 12:56

## 2020-01-24 RX ADMIN — CYCLOBENZAPRINE HYDROCHLORIDE 10 MILLIGRAM(S): 10 TABLET, FILM COATED ORAL at 00:13

## 2020-01-24 RX ADMIN — AMIODARONE HYDROCHLORIDE 618 MILLIGRAM(S): 400 TABLET ORAL at 21:45

## 2020-01-24 NOTE — CONSULT NOTE ADULT - CONSULT REASON
ESRD
HTN, DM, HLD, Hep C
change of mental status
hypotension
medical management
mesenteric ischemia
abdominal wound infection

## 2020-01-24 NOTE — BRIEF OPERATIVE NOTE - NSICDXBRIEFPROCEDURE_GEN_ALL_CORE_FT
PROCEDURES:  Peritoneal lavage 24-Jan-2020 20:12:41  Wali Thomas  Complicated revision of colostomy 24-Jan-2020 20:10:22  Wali Thomas  Lysis of intestinal adhesions 24-Jan-2020 20:09:38  Wali Thomas  Emergency exploratory laparotomy 24-Jan-2020 20:09:16  Wali Thomas

## 2020-01-24 NOTE — CHART NOTE - NSCHARTNOTEFT_GEN_A_CORE
Assessment:  Pt s/p CT scan, c bowel ischemia, going to OR.  Pt adm c wound infection, s/p acute metabolic encephalopathy, c hx of ESRD, HIV hepatitis C, s/p Dee procedure 2/2 perforated sigmoid diverticulitis 9/2109, UGI bleed likely due to gastric ulcers.    Factors impacting intake: [ ] none [ ] nausea  [ ] vomiting [ ] diarrhea [ ] constipation  [ ]chewing problems [ ] swallowing issues  [x ] other: ischemic bowel     Diet Prescription: Diet, NPO (01-24-20 @ 14:53)    Intake: 0%     Current Weight: 01/24, 94.6 kg, 01/06/20, 97 kg, c wt. loss of 2.4 kg  % Weight Change: 2.47%  no edema noted    Pertinent Medications: MEDICATIONS  (STANDING):  aspirin enteric coated 81 milliGRAM(s) Oral daily  chlorhexidine 4% Liquid 1 Application(s) Topical <User Schedule>  darunavir 800 milliGRAM(s) Oral daily  dextrose 5%. 1000 milliLiter(s) (50 mL/Hr) IV Continuous <Continuous>  dextrose 50% Injectable 12.5 Gram(s) IV Push once  dextrose 50% Injectable 25 Gram(s) IV Push once  epoetin marsha Injectable 75411 Unit(s) IV Push <User Schedule>  etravirine 200 milliGRAM(s) Oral two times a day after meals  heparin  Injectable 5000 Unit(s) SubCutaneous every 12 hours  lactated ringers Bolus 2000 milliLiter(s) IV Bolus once  meropenem  IVPB 500 milliGRAM(s) IV Intermittent every 24 hours  methimazole 10 milliGRAM(s) Oral daily  midodrine. 15 milliGRAM(s) Oral every 8 hours  pantoprazole    Tablet 40 milliGRAM(s) Oral before breakfast  raltegravir Tablet 400 milliGRAM(s) Oral two times a day  ritonavir Tablet 100 milliGRAM(s) Oral every 24 hours    MEDICATIONS  (PRN):  dextrose 40% Gel 15 Gram(s) Oral once PRN Blood Glucose LESS THAN 70 milliGRAM(s)/deciliter  glucagon  Injectable 1 milliGRAM(s) IntraMuscular once PRN Glucose LESS THAN 70 milligrams/deciliter  ondansetron Injectable 4 milliGRAM(s) IV Push every 6 hours PRN Nausea and/or Vomiting  traMADol 50 milliGRAM(s) Oral every 8 hours PRN Severe Pain (7 - 10)    Pertinent Labs: 01-24 Na137 mmol/L Glu 154 mg/dL<H> K+ 4.8 mmol/L Cr  6.56 mg/dL<H> BUN 47 mg/dL<H> 01-23 Phos 4.2 mg/dL 01-24 Alb 3.5 g/dL 01-07 [id number] 5.0 %     CAPILLARY BLOOD GLUCOSE      POCT Blood Glucose.: 160 mg/dL (23 Jan 2020 22:05)    Skin: abdominal midline wound noted    Estimated Needs:   [x ] no change since previous assessment(01/07/20)  [ ] recalculated:     Previous Nutrition Diagnosis:   Malnutrition Severe malnutrition in context of chronic illness.    Etiology Inadequate energy/protein intake + increased energy/protein needs related to multiple abdominal issues c infections & ESRD on HD tx.    Signs/Symptoms Wt loss of 9.7% x 6 weeks; < 75% nutrition needs > 1 month.      Nutrition Diagnosis is [x ] ongoing  [ ] resolved [ ] not applicable   Additional etiology for nutrition dx include hx of HIV, Hepatitis C     New Nutrition Diagnosis: [ x] not applicable       Interventions:   Recommend  [ ] Change Diet To:  [ ] Nutrition Supplement  [ x] Nutrition Support; consider alternate means of nutritional support. ie. TPN   [ ] Other:     Monitoring and Evaluation:   [ ] PO intake [ x ] Tolerance to diet prescription [ x ] weights [ x ] labs[ x ] follow up per protocol  [ ] other:

## 2020-01-24 NOTE — PROCEDURE NOTE - NSPROCDETAILS_GEN_ALL_CORE
positive blood return obtained via catheter/connected to a pressurized flush line/location identified, draped/prepped, sterile technique used, needle inserted/introduced/sutured in place/ultrasound guidance
sterile dressing applied/supine position/location identified, draped/prepped, sterile technique used/sterile technique, catheter placed/ultrasound guidance/ultrasound assessment
guidewire recovered/lumen(s) aspirated and flushed/sterile dressing applied/sterile technique, catheter placed/ultrasound guidance

## 2020-01-24 NOTE — PROGRESS NOTE ADULT - SUBJECTIVE AND OBJECTIVE BOX
[facility] NEPHROLOG[facility], [facility]  NEPHROLOGY AND HYPERTENSION  [address]  [address]  [place] [address]  [phone]    MD PARVEEN NOLAND, MD SHEYLA MILLS MD YELENA ROSENBERG, MD STACIE OWENS, MD DONAVAN CORTES MD          Patient with abd since yesterday,  No n/v;   Pain is constant.      MEDICATIONS  (STANDING):  aMIOdarone Infusion 1 mG/Min (33.333 mL/Hr) IV Continuous <Continuous>  chlorhexidine 0.12% Liquid 15 milliLiter(s) Oral Mucosa every 12 hours  chlorhexidine 4% Liquid 1 Application(s) Topical <User Schedule>  dextrose 5%. 1000 milliLiter(s) (50 mL/Hr) IV Continuous <Continuous>  dextrose 50% Injectable 12.5 Gram(s) IV Push once  dextrose 50% Injectable 25 Gram(s) IV Push once  dextrose 50% Injectable 25 Gram(s) IV Push once  fentaNYL   Infusion 0.5 MICROgram(s)/kG/Hr (4.85 mL/Hr) IV Continuous <Continuous>  insulin lispro (HumaLOG) corrective regimen sliding scale   SubCutaneous every 6 hours  metroNIDAZOLE  IVPB 500 milliGRAM(s) IV Intermittent every 8 hours  norepinephrine Infusion 0.05 MICROgram(s)/kG/Min (9.094 mL/Hr) IV Continuous <Continuous>  pantoprazole  Injectable 40 milliGRAM(s) IV Push daily  vasopressin Infusion 0.04 Unit(s)/Min (2.4 mL/Hr) IV Continuous <Continuous>    MEDICATIONS  (PRN):  dextrose 40% Gel 15 Gram(s) Oral once PRN Blood Glucose LESS THAN 70 milliGRAM(s)/deciliter  glucagon  Injectable 1 milliGRAM(s) IntraMuscular once PRN Glucose LESS THAN 70 milligrams/deciliter      01-23-20 @ 07:01  -  01-24-20 @ 07:00  --------------------------------------------------------  IN: 0 mL / OUT: 2300 mL / NET: -2300 mL      PHYSICAL EXAM:      T(C): 35.8 (01-24-20 @ 15:44), Max: 36.2 (01-24-20 @ 04:16)  HR: 149 (01-24-20 @ 21:57) (119 - 149)  BP: 137/79 (01-24-20 @ 15:44) (96/72 - 141/66)  RR: 16 (01-24-20 @ 15:44) (16 - 18)  SpO2: 100% (01-24-20 @ 21:57) (97% - 100%)  Wt(kg): --  Respiratory: clear anteriorly, decreased BS at bases  Cardiovascular: S1 S2  Gastrointestinal: soft mild tenderness  + ostomy  Extremities: 1  edema                                    11.4   24.74 )-----------( 563      ( 24 Jan 2020 22:13 )             39.7     01-24    136  |  101  |  46<H>  ----------------------------<  212<H>  5.2   |  17<L>  |  6.54<H>    Ca    10.6<H>      24 Jan 2020 22:13  Phos  7.2     01-24  Mg     2.1     01-24    TPro  8.4<H>  /  Alb  2.4<L>  /  TBili  0.5  /  DBili  x   /  AST  43<H>  /  ALT  <6<L>  /  AlkPhos  147<H>  01-24    ABG - ( 24 Jan 2020 22:55 )  pH, Arterial: x     pH, Blood: 7.26  /  pCO2: 29    /  pO2: 341   / HCO3: 12    / Base Excess: -13.2 /  SaO2: 99                LIVER FUNCTIONS - ( 24 Jan 2020 22:13 )  Alb: 2.4 g/dL / Pro: 8.4 gm/dL / ALK PHOS: 147 U/L / ALT: <6 U/L / AST: 43 U/L / GGT: x           Creatinine Trend: 6.54<--, 6.56<--, 8.39<--, 6.21<--, 9.32<--, 7.43<--      Assessment   ESRD; complicated course post Dee procedure; sepsis;   Abd pain; acute on chronic hypotension    Plan:  Agree with IVF bolus 2-3 Liters;   For CT angiogram   Will follow course and evaluate for HD as clinical status warrants.  Discussed with the surgical team      Jacob Hobson MD

## 2020-01-24 NOTE — CONSULT NOTE ADULT - CONSULT REQUESTED DATE/TIME
06-Jan-2020 15:00
06-Jan-2020 18:54
07-Jan-2020 09:00
10-Natalio-2020 19:37
14-Jan-2020 19:38
24-Jan-2020 22:32
07-Jan-2020

## 2020-01-24 NOTE — PROGRESS NOTE ADULT - SUBJECTIVE AND OBJECTIVE BOX
Patient is a 59y old  Male who presents with a chief complaint of Ischemic bowel (25 Jan 2020 17:31)      INTERVAL HPI/OVERNIGHT EVENTS:    Pt was seen and examined, 1/24/20, note wasn't written in chart as pt was in OR.  pt in abdominal plan, found to have ischemic colitis and perf, awaiting OR.      Allergies  No Known Allergies  Intolerances    Vital Signs Last 24 Hrs  T(C): 36.2 (24 Jan 2020 04:16), Max: 36.6 (23 Jan 2020 15:05)  T(F): 97.1 (24 Jan 2020 04:16), Max: 97.8 (23 Jan 2020 15:05)  HR: 129 (24 Jan 2020 06:57) (68 - 129)  BP: 141/66 (24 Jan 2020 06:57) (96/72 - 141/66)  RR: 18 (24 Jan 2020 06:57) (18 - 19)  SpO2: 97% (24 Jan 2020 04:16) (97% - 97%)      PHYSICAL EXAM:  GENERAL: ill appearing  HEAD:  Atraumatic  EYES: PERRLA  NERVOUS SYSTEM:  Awake, alert  CHEST/LUNG: Diminished  HEART: RRR  ABDOMEN: tender  EXTREMITIES:  no edema      LABS:                        7.3    22.10 )-----------( 332      ( 25 Jan 2020 08:31 )             27.4     01-25    143  |  102  |  35<H>  ----------------------------<  318<H>  7.6<HH>   |  5<LL>  |  5.47<H>    Ca    8.7      25 Jan 2020 13:49  Phos  7.2     01-24  Mg     2.1     01-24    TPro  5.7<L>  /  Alb  2.0<L>  /  TBili  0.9  /  DBili  x   /  AST  6060<H>  /  ALT  1165<H>  /  AlkPhos  139<H>  01-25    PT/INR - ( 25 Jan 2020 10:56 )   PT: 36.2 sec;   INR: 3.12 ratio         PTT - ( 25 Jan 2020 10:56 )  PTT:68.4 sec    CAPILLARY BLOOD GLUCOSE      POCT Blood Glucose.: 142 mg/dL (25 Jan 2020 14:33)  POCT Blood Glucose.: 87 mg/dL (25 Jan 2020 13:46)  POCT Blood Glucose.: 73 mg/dL (25 Jan 2020 12:43)  POCT Blood Glucose.: 144 mg/dL (25 Jan 2020 11:19)  POCT Blood Glucose.: 161 mg/dL (25 Jan 2020 11:01)  POCT Blood Glucose.: 172 mg/dL (25 Jan 2020 10:26)  POCT Blood Glucose.: 230 mg/dL (25 Jan 2020 09:00)  POCT Blood Glucose.: 294 mg/dL (25 Jan 2020 07:58)  POCT Blood Glucose.: 317 mg/dL (25 Jan 2020 06:27)  POCT Blood Glucose.: 281 mg/dL (25 Jan 2020 05:30)  POCT Blood Glucose.: 23 mg/dL (25 Jan 2020 05:22)  POCT Blood Glucose.: 175 mg/dL (24 Jan 2020 23:13)      RADIOLOGY & ADDITIONAL TESTS:    Imaging Personally Reviewed:  [ ] YES  [ ] NO    Consultant(s) Notes Reviewed:  [ ] YES  [ ] NO    Care Discussed with Consultants/Other Providers [ ] YES  [ ] NO Patient is a 59y old  Male who presents with a chief complaint of Ischemic bowel (25 Jan 2020 17:31)      INTERVAL HPI/OVERNIGHT EVENTS:    Pt was seen and examined, 1/24/20, note wasn't written in chart as pt was in OR.  pt in abdominal plan, found to have ischemic colitis and perf, awaiting OR.      Allergies  No Known Allergies  Intolerances    Vital Signs Last 24 Hrs  T(C): 36.2 (24 Jan 2020 04:16), Max: 36.6 (23 Jan 2020 15:05)  T(F): 97.1 (24 Jan 2020 04:16), Max: 97.8 (23 Jan 2020 15:05)  HR: 129 (24 Jan 2020 06:57) (68 - 129)  BP: 141/66 (24 Jan 2020 06:57) (96/72 - 141/66)  RR: 18 (24 Jan 2020 06:57) (18 - 19)  SpO2: 97% (24 Jan 2020 04:16) (97% - 97%)      PHYSICAL EXAM:  GENERAL: ill appearing  HEAD:  Atraumatic  EYES: PERRLA  NERVOUS SYSTEM:  Awake, alert  CHEST/LUNG: Diminished  HEART: RRR  ABDOMEN: tender  EXTREMITIES:  no edema      Labs reviewed    Imaging Personally Reviewed:  [ ] YES  [ ] NO    Consultant(s) Notes Reviewed:  [ ] YES  [ ] NO    Care Discussed with Consultants/Other Providers [ ] YES  [ ] NO

## 2020-01-24 NOTE — BRIEF OPERATIVE NOTE - NSICDXBRIEFOPLAUNCH_GEN_ALL_CORE
<--- Click to Launch ICDx for PreOp, PostOp and Procedure Perilesional Excision Additional Text (Leave Blank If You Do Not Want): The margin was drawn around the clinically apparent lesion. Incisions were then made along these lines to the appropriate tissue plane and the lesion was extirpated.

## 2020-01-24 NOTE — CONSULT NOTE ADULT - ASSESSMENT
59 year old male with MH HTN, ESRD (M/W/F), HIV, hep C, s/p hartmanns procedure 2/2 perforated sigmoid diverticulitis 9/2019 POD#0 for concern for mesenteric ischemia with alecia and jerel's. patient rec'd multiple viktor pushes in OR per anesthesia.  A line not working on arrival. concern for hypotension.  attempt to contact family but no answer. will place emergent a line and tlc for pressors and HD monitoring.  patient with runs of v tach during procedure. amio 150 given and will start drip    Plan  -goal map greater than 65  -a line  -central line  -amio 150  amio drip  -levo as needed  -fentanyl for rass o to -1  -flagyl/zosyn for abx  -f/u id recs  -keflex   -fu cultures  -nephro for hd  -discussed with family  -prognosis guarded    I have decided to review and order of clinical lab tests  Relevant radiology studies were reviewed  Decision made to obtain available old records  Discussion of test results with performing physician  Review and summarization of old records obtaining history from Anesthesia   discussion of case with another health care provider ICU PA  I have visualized independent imaging, EKGs and radiologies studies available but not otherwise officially read  Patient is critically ill and could decompensate imminently.  The patient required immediate attention  The medically necessary treatment decisions were required due to possibility of imminent threat to the patient  the patient is unable  to participate in giving history and/or making treatment decisions;     ccm 60 59 year old male with MH HTN, ESRD (M/W/F), HIV, hep C, s/p hartmanns procedure 2/2 perforated sigmoid diverticulitis 9/2019 POD#0 for concern for mesenteric ischemia with alecia and jerel's. patient rec'd multiple viktor pushes in OR per anesthesia.  A line not working on arrival. concern for hypotension.  attempt to contact family but no answer. will place emergent a line and tlc for pressors and HD monitoring.  patient with runs of v tach during procedure. amio 150 given and will start drip    Plan  -goal map greater than 65  -a line  -central line  -amio 150  amio drip  -levo as needed  -fentanyl for rass o to -1  -flagyl/crispin for abx  -f/u id recs  -keflex   -fu cultures  -nephro for hd  -discussed with family  -prognosis guarded    I have decided to review and order of clinical lab tests  Relevant radiology studies were reviewed  Decision made to obtain available old records  Discussion of test results with performing physician  Review and summarization of old records obtaining history from Anesthesia   discussion of case with another health care provider ICU PA  I have visualized independent imaging, EKGs and radiologies studies available but not otherwise officially read  Patient is critically ill and could decompensate imminently.  The patient required immediate attention  The medically necessary treatment decisions were required due to possibility of imminent threat to the patient  the patient is unable  to participate in giving history and/or making treatment decisions;     ccm 58

## 2020-01-24 NOTE — PROGRESS NOTE ADULT - ASSESSMENT
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, peritoneal lavage s/p I&D of intra-abdominal abscess on 9/18/19 treated for abdominal incisional wounds, admitted with possible ?enterocutaneous fistula on 11/2019 all wound culture with fairly sensitive organism mssa, ecoli and klebsiella sensitive to po and dc with po abx   work up consistent with infected mesh covering current culture with ancef   now worsening wbc count   on ancef   sx follow up    flagyl improved wbc   Plan - discharge plan on suppressive keflex 250 daily and flagyl 500 tid   keflex for life   flagyl x 11 more days   patient cc of nausea and GI upset today today   i will switch flagyl to IV   case discussed with RN

## 2020-01-24 NOTE — CHART NOTE - NSCHARTNOTEFT_GEN_A_CORE
Results of CTA reviewed with Radiology and relayed to Dr. Oliveira.    < from: CT Angio Abdomen and Pelvis w/ IV Cont (01.24.20 @ 14:19) >    IMPRESSION:     Pneumatosis involving a small bowel loop in the left hemiabdomen near the small bowel anastomosis, compatible with bowel ischemia. Small amount of air tracking within an adjacent mesenteric vein versus small contained perforation.    Breast hyperenhancement of the left common iliac and common femoral veins suggestive of arteriovenous fistula in the left lower extremity. Correlate clinically and with follow-up duplex ultrasound.    Critical findings were discussed with NUNU Guo on 1/24/2020 at 2:35 PM.     Additional findings as above.    MERYL RIDLEY M.D., ATTENDING RADIOLOGIST  This document has been electronically signed. Jan 24 2020  2:46PM    < end of copied text >    Patient booked for Emergent Ex lap, bowel resection, possible ileostomy for ischemic bowel, possible perforation   2uPRBC on hold for OR, T&S  NPO, IVF resuscitation   Discussed patient with ICU and Dr. Oliveira

## 2020-01-24 NOTE — PROGRESS NOTE ADULT - ASSESSMENT
59M PMH HTN, ESRD (M/W/F), HIV, hep C, s/p hartmanns procedure 2/2 perforated sigmoid diverticulitis 9/2019, UGIB likely due to gastric ulcers found on EGD, infected abdominal wound (cx with e-coli, klebsiella, staph) presents with increased drainage from abdominal wound with abdominal pain. Admitted with sepsis, abdominal wound infection. Course complicated by hypotension, septic shock. Transferred to ICU 1/7 for hemodynamic monitoring.  Now with acute abdomen.     Acute abdomen:  - Plan for ex lap.    S/p acute metabolic encephelopathy  - resolved.  Alert, oriented times 3.     Hypotension  - on  midodrine 10 mg tid.     ESRD  - HD per nephrology. Renal team is following.    HIV:  - cont antiretrovirals as ordered. ID is following.    S/p sepsis, possibly due to infected abdominal wound (no clinical evidence) vs chronically infected mesh,  - Continue Abx per ID  - Surgical planning in future for excision of infected mesh
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
## 2020-01-24 NOTE — PROGRESS NOTE ADULT - SUBJECTIVE AND OBJECTIVE BOX
INTERVAL HPI/OVERNIGHT EVENTS:  Pt. seen and examined at bedside with Dr. Oliveira. Patient c/o "severe and constant" generalized abdominal pain and back pain since HD yesterday, no relief with morphine. States pain is worse than it has been.    Also c/o associated nausea and states he vomited this morning. Refusing PT due to pain.   Denies fever/chills, chest pain, dyspnea, cough, dizziness.     Vital Signs Last 24 Hrs  T(C): 36.2 (24 Jan 2020 04:16), Max: 36.6 (23 Jan 2020 15:05)  T(F): 97.1 (24 Jan 2020 04:16), Max: 97.8 (23 Jan 2020 15:05)  HR: 129 (24 Jan 2020 06:57) (68 - 129)  BP: 141/66 (24 Jan 2020 06:57) (96/72 - 141/66)  RR: 18 (24 Jan 2020 06:57) (18 - 19)  SpO2: 97% (24 Jan 2020 04:16) (97% - 97%)    PHYSICAL EXAM:    GENERAL: NAD. Ill-appearing   CHEST/LUNG: Clear to ausculation, bilaterally   HEART: S1S2, tachycardic, regular  ABDOMEN: Midline wound clean and dry, healing well, no drainage. Colostomy pink and viable, functioning. Skin surrounding colostomy site healing, improved. Dressing changed.   Nondistended, Normoactive BS, Soft, +tenderness to palpation surrounding colostomy, no peritoneal signs  EXTREMITIES:  calf soft, non tender b/l. 2+ distal pulses b/l.     I&O's Detail    23 Jan 2020 07:01  -  24 Jan 2020 07:00  --------------------------------------------------------  IN:  Total IN: 0 mL    OUT:    Colostomy: 300 mL    Other: 2000 mL  Total OUT: 2300 mL    Total NET: -2300 mL    LABS:                        14.5   18.01 )-----------( 544      ( 24 Jan 2020 06:54 )             48.5     01-23    140  |  100  |  70<H>  ----------------------------<  97  4.8   |  25  |  8.39<H>    Ca    10.2<H>      23 Jan 2020 13:59  Phos  4.2     01-23    A/P: 58 y/o male PMH ESRD on HD (MWF), HIV on HAART, HepC, HLD, and HTN, s/p Hartmanns procedure 9/2019 for perforated sigmoid diverticulitis, s/p UGIB due to gastric ulcers, s/p admission for infected abdominal wound, now readmitted with sepsis, possibly due to infected abdominal wound (no clinical evidence) vs chronically infected mesh, s/p RRT for metabolic encephalopathy - resolved  Now with worsened abdominal pain and increased leukocytosis (18) - r/o mesenteric ischemia    -F/u STAT Lactate, CBC, CMP, ESR, CRP  -F/u STAT CTA abdomen/pelvis r/o mesenteric ischemia  -HD per Renal, discussed patient with Dr. Hobson, agrees with CTA now  -Normal Saline Bolus ordered  -Pain management PRN, Tramadol ordered  -Serial abdominal exams  -ID follow up appreciated, Continue IV ABX per ID  -F/u Rpt blood cultures  -continue local wound care, colostomy care, monitor output  -DVT ppx: Heparin Subq  -Discussed with Dr. Oliveira INTERVAL HPI/OVERNIGHT EVENTS:  Pt. seen and examined at bedside with Dr. Oliveira. Patient c/o "severe and constant" generalized abdominal pain and back pain since HD yesterday, no relief with morphine. States pain is worse than it has been.    Also c/o associated nausea and states he vomited this morning. Refusing PT due to pain.   Denies fever/chills, chest pain, dyspnea, cough, dizziness.     Vital Signs Last 24 Hrs  T(C): 36.2 (24 Jan 2020 04:16), Max: 36.6 (23 Jan 2020 15:05)  T(F): 97.1 (24 Jan 2020 04:16), Max: 97.8 (23 Jan 2020 15:05)  HR: 129 (24 Jan 2020 06:57) (68 - 129)  BP: 141/66 (24 Jan 2020 06:57) (96/72 - 141/66)  RR: 18 (24 Jan 2020 06:57) (18 - 19)  SpO2: 97% (24 Jan 2020 04:16) (97% - 97%)    PHYSICAL EXAM:    GENERAL: NAD. Ill-appearing   CHEST/LUNG: Clear to ausculation, bilaterally   HEART: S1S2, tachycardic, regular  ABDOMEN: Midline wound clean and dry, healing well, no drainage. Colostomy pink and viable, functioning. Skin surrounding colostomy site healing, improved. Dressing changed.   Nondistended, Normoactive BS, Soft, +tenderness to palpation surrounding colostomy, no peritoneal signs  EXTREMITIES:  calf soft, non tender b/l. 2+ distal pulses b/l.     I&O's Detail    23 Jan 2020 07:01  -  24 Jan 2020 07:00  --------------------------------------------------------  IN:  Total IN: 0 mL    OUT:    Colostomy: 300 mL    Other: 2000 mL  Total OUT: 2300 mL    Total NET: -2300 mL    LABS:                        14.5   18.01 )-----------( 544      ( 24 Jan 2020 06:54 )             48.5     01-23    140  |  100  |  70<H>  ----------------------------<  97  4.8   |  25  |  8.39<H>    Ca    10.2<H>      23 Jan 2020 13:59  Phos  4.2     01-23    A/P: 58 y/o male PMH ESRD on HD (MWF), HIV on HAART, HepC, HLD, and HTN, s/p Hartmanns procedure 9/2019 for perforated sigmoid diverticulitis, s/p UGIB due to gastric ulcers, s/p admission for infected abdominal wound, now readmitted with sepsis, possibly due to infected abdominal wound (no clinical evidence) vs chronically infected mesh, s/p RRT for metabolic encephalopathy - resolved  Now with worsened abdominal pain and increased leukocytosis (18) - r/o mesenteric ischemia    -F/u STAT Lactate, CBC, CMP, ESR, CRP  -F/u STAT CTA abdomen/pelvis r/o mesenteric ischemia  -HD per Renal, discussed patient with Dr. Hobson, agrees with CTA now  -Normal Saline Bolus ordered  -NPO  -Pain management PRN, Tramadol ordered  -Serial abdominal exams  -ID follow up appreciated, Continue IV ABX per ID  -F/u Rpt blood cultures, preop labs  -continue local wound care, colostomy care, monitor output  -DVT ppx: Heparin Subq  -Discussed with Dr. Oliveira

## 2020-01-24 NOTE — CHART NOTE - NSCHARTNOTEFT_GEN_A_CORE
Patient received from the OR and arterial line not working.   Patient was hypotensive on BP cuff and pressors needed to be started.   He had only one midline access.   Tachycardic to 150's.   Attempted to call emergency contact for consent but no answer.   Proceeded with emergency placement of central line and arterial line for resuscitation.

## 2020-01-24 NOTE — PROGRESS NOTE ADULT - SUBJECTIVE AND OBJECTIVE BOX
SURGERY PROGRESS HPI:  Pt seen and examined at bedside. C/o periumbilical abdominal pain overnight. Pt tolerating diet. Pt denies nausea and vomiting. +Ostomy functioning. Voiding well. Pt denies chest pain, SOB, dizziness, fever, chills.     Vital Signs Last 24 Hrs  T(C): 36.2 (24 Jan 2020 04:16), Max: 36.6 (23 Jan 2020 12:05)  T(F): 97.1 (24 Jan 2020 04:16), Max: 97.9 (23 Jan 2020 12:05)  HR: 129 (24 Jan 2020 06:57) (68 - 129)  BP: 141/66 (24 Jan 2020 06:57) (96/72 - 141/66)  BP(mean): --  RR: 18 (24 Jan 2020 06:57) (18 - 19)  SpO2: 97% (24 Jan 2020 04:16) (97% - 100%)      PHYSICAL EXAM:  General: NAD, WDWN  Neuro:  Alert & oriented x 3  HEENT: NCAT, EOMI, conjunctiva clear  CV: +S1+S2 regular rate and rhythm  Lung: clear to auscultation bilaterally, respirations nonlabored, good inspiratory effort  Abdomen: soft, NTND. Normoactive BS. Dressing clean/dry/intact, just changed by RN. Colostomy pink and viable with stool and air.  Extremities: no pedal edema or calf tenderness. RUE midline catheter indwelling    I&O's Detail    23 Jan 2020 07:01  -  24 Jan 2020 07:00  --------------------------------------------------------  IN:  Total IN: 0 mL    OUT:    Colostomy: 300 mL    Other: 2000 mL  Total OUT: 2300 mL    Total NET: -2300 mL          LABS:                        14.5   18.01 )-----------( 544      ( 24 Jan 2020 06:54 )             48.5     01-23    140  |  100  |  70<H>  ----------------------------<  97  4.8   |  25  |  8.39<H>    Ca    10.2<H>      23 Jan 2020 13:59  Phos  4.2     01-23        A/P: 60 y/o male PMH ESRD on HD (MWF), HIV on HAART, HepC, HLD, and HTN, s/p Hartmanns procedure 9/2019 for perforated sigmoid diverticulitis, s/p UGIB due to gastric ulcers, s/p admission for infected abdominal wound, now readmitted with sepsis, possibly due to infected abdominal wound (no clinical evidence) vs chronically infected mesh, s/p RRT for metabolic encephalopathy - resolved  Afebrile.    -continue local wound care with dry dressing  -discharge planning to rehab after PT reeval  -C/w colostomy care, surrounding skin care with adaptive powder and stoma paste.  -Surgical planning as OP for elective excision of infected mesh, patient understands  -Continue Ancef/Flagyl per ID. discharge plan on suppressive keflex 250 daily for life and flagyl 500 tid x 12 more days   -Continue HD per nephrology, Follow up lab results, await repeat WBC   -will discuss with Dr Oliveira

## 2020-01-24 NOTE — PROGRESS NOTE ADULT - SUBJECTIVE AND OBJECTIVE BOX
HPI:  Patient is a 59 year old male with a PMH HTN, ESRD (M/W/F), HIV, hep C, s/p hartmanns procedure 2/2 perforated sigmoid diverticulitis 9/2019 with subsequent admission to [facility] for UGIB likely due to gastric ulcers found on EGD (healed), and admission to our facility 10/2019 with infected abdominal wound, currently presented with drainage of abdominal wound again. Patient states his home health aide was dressing his wound, but he noticed increased drainage over the last 2 days with associated abdominal pain.   Admits to normal bowel function. Tolerating regular diet.   Denies fever, chills, chest pain, sob, palpitations, urinary symptoms. (06 Jan 2020 18:05)      Allergies    No Known Allergies    Intolerances        MEDICATIONS  (STANDING):  aspirin enteric coated 81 milliGRAM(s) Oral daily  ceFAZolin   IVPB 1000 milliGRAM(s) IV Intermittent every 24 hours  chlorhexidine 4% Liquid 1 Application(s) Topical <User Schedule>  darunavir 800 milliGRAM(s) Oral daily  dextrose 5%. 1000 milliLiter(s) (50 mL/Hr) IV Continuous <Continuous>  dextrose 50% Injectable 12.5 Gram(s) IV Push once  dextrose 50% Injectable 25 Gram(s) IV Push once  epoetin marsha Injectable 81973 Unit(s) IV Push <User Schedule>  etravirine 200 milliGRAM(s) Oral two times a day after meals  heparin  Injectable 5000 Unit(s) SubCutaneous every 12 hours  methimazole 10 milliGRAM(s) Oral daily  metroNIDAZOLE    Tablet 500 milliGRAM(s) Oral three times a day  midodrine. 15 milliGRAM(s) Oral every 8 hours  pantoprazole    Tablet 40 milliGRAM(s) Oral before breakfast  raltegravir Tablet 400 milliGRAM(s) Oral two times a day  ritonavir Tablet 100 milliGRAM(s) Oral every 24 hours    MEDICATIONS  (PRN):  dextrose 40% Gel 15 Gram(s) Oral once PRN Blood Glucose LESS THAN 70 milliGRAM(s)/deciliter  glucagon  Injectable 1 milliGRAM(s) IntraMuscular once PRN Glucose LESS THAN 70 milligrams/deciliter  ondansetron Injectable 4 milliGRAM(s) IV Push every 6 hours PRN Nausea and/or Vomiting  traMADol 50 milliGRAM(s) Oral every 8 hours PRN Severe Pain (7 - 10)      REVIEW OF SYSTEMS:    CONSTITUTIONAL: No fever, chills, weight loss, or fatigue  HEENT: No sore throat, runny nose, ear ache  RESPIRATORY: No cough, wheezing, No shortness of breath  CARDIOVASCULAR: No chest pain, palpitations, dizziness  GASTROINTESTINAL: cc of nausea and stomach upset   GENITOURINARY: No dysuria, increase frequency, hematuria, or incontinence  NEUROLOGICAL: No headaches, memory loss, loss of strength, numbness, or tremors, no weakness  EXTREMITY: No pedal edema BLE  SKIN: No itching, burning, rashes, or lesions     VITAL SIGNS:  T(C): 36.2 (01-24-20 @ 04:16), Max: 36.6 (01-23-20 @ 12:05)  T(F): 97.1 (01-24-20 @ 04:16), Max: 97.9 (01-23-20 @ 12:05)  HR: 129 (01-24-20 @ 06:57) (68 - 129)  BP: 141/66 (01-24-20 @ 06:57) (96/72 - 141/66)  RR: 18 (01-24-20 @ 06:57) (18 - 19)  SpO2: 97% (01-24-20 @ 04:16) (97% - 100%)  Wt(kg): --    PHYSICAL EXAM:    GENERAL: not in any distress  HEENT: Neck is supple, normocephalic, atraumatic   CHEST/LUNG: Clear to percussion bilaterally; No rales, rhonchi, wheezing  HEART: Regular rate and rhythm; No murmurs, rubs, or gallops  ABDOMEN: Soft, Nontender, Nondistended; Bowel sounds present, no rebound   EXTREMITIES:  2+ Peripheral Pulses, No clubbing, cyanosis, or edema  GENITOURINARY:   SKIN: No rashes or lesions  BACK: no pressor sore   NERVOUS SYSTEM:  Alert & Oriented X3,     LABS:                         14.5   18.01 )-----------( 544      ( 24 Jan 2020 06:54 )             48.5     01-23    140  |  100  |  70<H>  ----------------------------<  97  4.8   |  25  |  8.39<H>    Ca    10.2<H>      23 Jan 2020 13:59  Phos  4.2     01-23                                              Radiology:

## 2020-01-24 NOTE — CONSULT NOTE ADULT - REASON FOR ADMISSION
confusion r/o cva
Increased drainage form abdominal wound
mesenteric ischemia
abdominal wound infection

## 2020-01-25 VITALS — RESPIRATION RATE: 13 BRPM

## 2020-01-25 LAB
ALBUMIN SERPL ELPH-MCNC: 2 G/DL — LOW (ref 3.3–5)
ALP SERPL-CCNC: 139 U/L — HIGH (ref 40–120)
ALT FLD-CCNC: 1165 U/L — HIGH (ref 12–78)
ANION GAP SERPL CALC-SCNC: 33 MMOL/L — HIGH (ref 5–17)
ANION GAP SERPL CALC-SCNC: 34 MMOL/L — HIGH (ref 5–17)
ANION GAP SERPL CALC-SCNC: 36 MMOL/L — HIGH (ref 5–17)
APTT BLD: 68.4 SEC — HIGH (ref 27.5–36.3)
AST SERPL-CCNC: 6060 U/L — HIGH (ref 15–37)
BASE EXCESS BLDA CALC-SCNC: -14.7 MMOL/L — LOW (ref -2–2)
BASE EXCESS BLDA CALC-SCNC: -23 MMOL/L — LOW (ref -2–2)
BASE EXCESS BLDA CALC-SCNC: -24.3 MMOL/L — LOW (ref -2–2)
BASE EXCESS BLDA CALC-SCNC: -25 MMOL/L — LOW (ref -2–2)
BASOPHILS # BLD AUTO: 0.15 K/UL — SIGNIFICANT CHANGE UP (ref 0–0.2)
BASOPHILS NFR BLD AUTO: 0.6 % — SIGNIFICANT CHANGE UP (ref 0–2)
BILIRUB SERPL-MCNC: 0.9 MG/DL — SIGNIFICANT CHANGE UP (ref 0.2–1.2)
BLOOD GAS COMMENTS: SIGNIFICANT CHANGE UP
BLOOD GAS SOURCE: SIGNIFICANT CHANGE UP
BUN SERPL-MCNC: 35 MG/DL — HIGH (ref 7–23)
BUN SERPL-MCNC: 46 MG/DL — HIGH (ref 7–23)
BUN SERPL-MCNC: 48 MG/DL — HIGH (ref 7–23)
CALCIUM SERPL-MCNC: 8.7 MG/DL — SIGNIFICANT CHANGE UP (ref 8.5–10.1)
CALCIUM SERPL-MCNC: 9.7 MG/DL — SIGNIFICANT CHANGE UP (ref 8.5–10.1)
CALCIUM SERPL-MCNC: 9.8 MG/DL — SIGNIFICANT CHANGE UP (ref 8.5–10.1)
CHLORIDE SERPL-SCNC: 101 MMOL/L — SIGNIFICANT CHANGE UP (ref 96–108)
CHLORIDE SERPL-SCNC: 102 MMOL/L — SIGNIFICANT CHANGE UP (ref 96–108)
CHLORIDE SERPL-SCNC: 104 MMOL/L — SIGNIFICANT CHANGE UP (ref 96–108)
CO2 SERPL-SCNC: 5 MMOL/L — CRITICAL LOW (ref 22–31)
CO2 SERPL-SCNC: 8 MMOL/L — CRITICAL LOW (ref 22–31)
CO2 SERPL-SCNC: 8 MMOL/L — CRITICAL LOW (ref 22–31)
CREAT SERPL-MCNC: 5.47 MG/DL — HIGH (ref 0.5–1.3)
CREAT SERPL-MCNC: 6.85 MG/DL — HIGH (ref 0.5–1.3)
CREAT SERPL-MCNC: 6.89 MG/DL — HIGH (ref 0.5–1.3)
EOSINOPHIL # BLD AUTO: 0.02 K/UL — SIGNIFICANT CHANGE UP (ref 0–0.5)
EOSINOPHIL NFR BLD AUTO: 0.1 % — SIGNIFICANT CHANGE UP (ref 0–6)
FIBRINOGEN PPP-MCNC: 384 MG/DL — SIGNIFICANT CHANGE UP (ref 350–510)
GLUCOSE BLDC GLUCOMTR-MCNC: 142 MG/DL — HIGH (ref 70–99)
GLUCOSE BLDC GLUCOMTR-MCNC: 144 MG/DL — HIGH (ref 70–99)
GLUCOSE BLDC GLUCOMTR-MCNC: 161 MG/DL — HIGH (ref 70–99)
GLUCOSE BLDC GLUCOMTR-MCNC: 172 MG/DL — HIGH (ref 70–99)
GLUCOSE BLDC GLUCOMTR-MCNC: 23 MG/DL — CRITICAL LOW (ref 70–99)
GLUCOSE BLDC GLUCOMTR-MCNC: 230 MG/DL — HIGH (ref 70–99)
GLUCOSE BLDC GLUCOMTR-MCNC: 281 MG/DL — HIGH (ref 70–99)
GLUCOSE BLDC GLUCOMTR-MCNC: 294 MG/DL — HIGH (ref 70–99)
GLUCOSE BLDC GLUCOMTR-MCNC: 317 MG/DL — HIGH (ref 70–99)
GLUCOSE BLDC GLUCOMTR-MCNC: 73 MG/DL — SIGNIFICANT CHANGE UP (ref 70–99)
GLUCOSE BLDC GLUCOMTR-MCNC: 87 MG/DL — SIGNIFICANT CHANGE UP (ref 70–99)
GLUCOSE SERPL-MCNC: 304 MG/DL — HIGH (ref 70–99)
GLUCOSE SERPL-MCNC: 318 MG/DL — HIGH (ref 70–99)
GLUCOSE SERPL-MCNC: 40 MG/DL — CRITICAL LOW (ref 70–99)
HCO3 BLDA-SCNC: 11 MMOL/L — LOW (ref 21–29)
HCO3 BLDA-SCNC: 5 MMOL/L — LOW (ref 21–29)
HCO3 BLDA-SCNC: 7 MMOL/L — LOW (ref 21–29)
HCO3 BLDA-SCNC: 8 MMOL/L — LOW (ref 21–29)
HCT VFR BLD CALC: 27.4 % — LOW (ref 39–50)
HCT VFR BLD CALC: 35.5 % — LOW (ref 39–50)
HGB BLD-MCNC: 7.3 G/DL — LOW (ref 13–17)
HGB BLD-MCNC: 9.5 G/DL — LOW (ref 13–17)
HOROWITZ INDEX BLDA+IHG-RTO: 40 — SIGNIFICANT CHANGE UP
HOROWITZ INDEX BLDA+IHG-RTO: 50 — SIGNIFICANT CHANGE UP
IMM GRANULOCYTES NFR BLD AUTO: 5.2 % — HIGH (ref 0–1.5)
INR BLD: 3.12 RATIO — HIGH (ref 0.88–1.16)
LACTATE SERPL-SCNC: 14.6 MMOL/L — CRITICAL HIGH (ref 0.7–2)
LACTATE SERPL-SCNC: 16.2 MMOL/L — CRITICAL HIGH (ref 0.7–2)
LACTATE SERPL-SCNC: 21.6 MMOL/L — CRITICAL HIGH (ref 0.7–2)
LACTATE SERPL-SCNC: 28.3 MMOL/L — CRITICAL HIGH (ref 0.7–2)
LYMPHOCYTES # BLD AUTO: 2.36 K/UL — SIGNIFICANT CHANGE UP (ref 1–3.3)
LYMPHOCYTES # BLD AUTO: 8.7 % — LOW (ref 13–44)
MCHC RBC-ENTMCNC: 26.2 PG — LOW (ref 27–34)
MCHC RBC-ENTMCNC: 26.6 GM/DL — LOW (ref 32–36)
MCHC RBC-ENTMCNC: 26.8 GM/DL — LOW (ref 32–36)
MCHC RBC-ENTMCNC: 26.8 PG — LOW (ref 27–34)
MCV RBC AUTO: 100.7 FL — HIGH (ref 80–100)
MCV RBC AUTO: 97.8 FL — SIGNIFICANT CHANGE UP (ref 80–100)
MONOCYTES # BLD AUTO: 2.17 K/UL — HIGH (ref 0–0.9)
MONOCYTES NFR BLD AUTO: 8 % — SIGNIFICANT CHANGE UP (ref 2–14)
NEUTROPHILS # BLD AUTO: 20.92 K/UL — HIGH (ref 1.8–7.4)
NEUTROPHILS NFR BLD AUTO: 77.4 % — HIGH (ref 43–77)
NRBC # BLD: 0 /100 WBCS — SIGNIFICANT CHANGE UP (ref 0–0)
NRBC # BLD: 4 /100 WBCS — HIGH (ref 0–0)
PCO2 BLDA: 21 MMHG — LOW (ref 32–46)
PCO2 BLDA: 27 MMHG — LOW (ref 32–46)
PCO2 BLDA: 32 MMHG — SIGNIFICANT CHANGE UP (ref 32–46)
PCO2 BLDA: 43 MMHG — SIGNIFICANT CHANGE UP (ref 32–46)
PH BLD: 6.92 — CRITICAL LOW (ref 7.35–7.45)
PH BLD: 6.94 — CRITICAL LOW (ref 7.35–7.45)
PH BLD: 7 — CRITICAL LOW (ref 7.35–7.45)
PH BLD: 7.25 — LOW (ref 7.35–7.45)
PLATELET # BLD AUTO: 332 K/UL — SIGNIFICANT CHANGE UP (ref 150–400)
PLATELET # BLD AUTO: 516 K/UL — HIGH (ref 150–400)
PO2 BLDA: 102 MMHG — SIGNIFICANT CHANGE UP (ref 74–108)
PO2 BLDA: 109 MMHG — HIGH (ref 74–108)
PO2 BLDA: 139 MMHG — HIGH (ref 74–108)
PO2 BLDA: 159 MMHG — HIGH (ref 74–108)
POTASSIUM SERPL-MCNC: 6.9 MMOL/L — CRITICAL HIGH (ref 3.5–5.3)
POTASSIUM SERPL-MCNC: 7.6 MMOL/L — CRITICAL HIGH (ref 3.5–5.3)
POTASSIUM SERPL-MCNC: 8 MMOL/L — CRITICAL HIGH (ref 3.5–5.3)
POTASSIUM SERPL-SCNC: 6.9 MMOL/L — CRITICAL HIGH (ref 3.5–5.3)
POTASSIUM SERPL-SCNC: 7.6 MMOL/L — CRITICAL HIGH (ref 3.5–5.3)
POTASSIUM SERPL-SCNC: 8 MMOL/L — CRITICAL HIGH (ref 3.5–5.3)
PROT SERPL-MCNC: 5.7 GM/DL — LOW (ref 6–8.3)
PROTHROM AB SERPL-ACNC: 36.2 SEC — HIGH (ref 10–12.9)
RBC # BLD: 2.72 M/UL — LOW (ref 4.2–5.8)
RBC # BLD: 3.63 M/UL — LOW (ref 4.2–5.8)
RBC # FLD: 18.5 % — HIGH (ref 10.3–14.5)
RBC # FLD: 18.6 % — HIGH (ref 10.3–14.5)
SAO2 % BLDA: 94 % — SIGNIFICANT CHANGE UP (ref 92–96)
SAO2 % BLDA: 95 % — SIGNIFICANT CHANGE UP (ref 92–96)
SAO2 % BLDA: 96 % — SIGNIFICANT CHANGE UP (ref 92–96)
SAO2 % BLDA: 96 % — SIGNIFICANT CHANGE UP (ref 92–96)
SODIUM SERPL-SCNC: 143 MMOL/L — SIGNIFICANT CHANGE UP (ref 135–145)
SODIUM SERPL-SCNC: 143 MMOL/L — SIGNIFICANT CHANGE UP (ref 135–145)
SODIUM SERPL-SCNC: 145 MMOL/L — SIGNIFICANT CHANGE UP (ref 135–145)
WBC # BLD: 22.1 K/UL — HIGH (ref 3.8–10.5)
WBC # BLD: 27.03 K/UL — HIGH (ref 3.8–10.5)
WBC # FLD AUTO: 22.1 K/UL — HIGH (ref 3.8–10.5)
WBC # FLD AUTO: 27.03 K/UL — HIGH (ref 3.8–10.5)

## 2020-01-25 PROCEDURE — 93010 ELECTROCARDIOGRAM REPORT: CPT

## 2020-01-25 PROCEDURE — 99291 CRITICAL CARE FIRST HOUR: CPT

## 2020-01-25 RX ORDER — CALCIUM GLUCONATE 100 MG/ML
1 VIAL (ML) INTRAVENOUS ONCE
Refills: 0 | Status: COMPLETED | OUTPATIENT
Start: 2020-01-25 | End: 2020-01-25

## 2020-01-25 RX ORDER — INSULIN HUMAN 100 [IU]/ML
5 INJECTION, SOLUTION SUBCUTANEOUS ONCE
Refills: 0 | Status: COMPLETED | OUTPATIENT
Start: 2020-01-25 | End: 2020-01-25

## 2020-01-25 RX ORDER — SODIUM BICARBONATE 1 MEQ/ML
0.23 SYRINGE (ML) INTRAVENOUS
Qty: 150 | Refills: 0 | Status: DISCONTINUED | OUTPATIENT
Start: 2020-01-25 | End: 2020-01-25

## 2020-01-25 RX ORDER — CALCIUM GLUCONATE 100 MG/ML
2 VIAL (ML) INTRAVENOUS ONCE
Refills: 0 | Status: COMPLETED | OUTPATIENT
Start: 2020-01-25 | End: 2020-01-25

## 2020-01-25 RX ORDER — FLUCONAZOLE 150 MG/1
200 TABLET ORAL EVERY 24 HOURS
Refills: 0 | Status: DISCONTINUED | OUTPATIENT
Start: 2020-01-26 | End: 2020-01-25

## 2020-01-25 RX ORDER — DEXTROSE 50 % IN WATER 50 %
25 SYRINGE (ML) INTRAVENOUS ONCE
Refills: 0 | Status: COMPLETED | OUTPATIENT
Start: 2020-01-25 | End: 2020-01-25

## 2020-01-25 RX ORDER — INSULIN HUMAN 100 [IU]/ML
10 INJECTION, SOLUTION SUBCUTANEOUS ONCE
Refills: 0 | Status: COMPLETED | OUTPATIENT
Start: 2020-01-25 | End: 2020-01-25

## 2020-01-25 RX ORDER — FLUCONAZOLE 150 MG/1
TABLET ORAL
Refills: 0 | Status: DISCONTINUED | OUTPATIENT
Start: 2020-01-25 | End: 2020-01-25

## 2020-01-25 RX ORDER — SODIUM BICARBONATE 1 MEQ/ML
50 SYRINGE (ML) INTRAVENOUS ONCE
Refills: 0 | Status: COMPLETED | OUTPATIENT
Start: 2020-01-25 | End: 2020-01-25

## 2020-01-25 RX ORDER — NOREPINEPHRINE BITARTRATE/D5W 8 MG/250ML
0.05 PLASTIC BAG, INJECTION (ML) INTRAVENOUS
Qty: 16 | Refills: 0 | Status: DISCONTINUED | OUTPATIENT
Start: 2020-01-25 | End: 2020-01-25

## 2020-01-25 RX ORDER — HYDROCORTISONE 20 MG
50 TABLET ORAL EVERY 6 HOURS
Refills: 0 | Status: DISCONTINUED | OUTPATIENT
Start: 2020-01-25 | End: 2020-01-25

## 2020-01-25 RX ORDER — INSULIN HUMAN 100 [IU]/ML
10 INJECTION, SOLUTION SUBCUTANEOUS ONCE
Refills: 0 | Status: DISCONTINUED | OUTPATIENT
Start: 2020-01-25 | End: 2020-01-25

## 2020-01-25 RX ORDER — SODIUM CHLORIDE 9 MG/ML
1000 INJECTION INTRAMUSCULAR; INTRAVENOUS; SUBCUTANEOUS ONCE
Refills: 0 | Status: COMPLETED | OUTPATIENT
Start: 2020-01-25 | End: 2020-01-25

## 2020-01-25 RX ORDER — DEXTROSE 10 % IN WATER 10 %
1000 INTRAVENOUS SOLUTION INTRAVENOUS
Refills: 0 | Status: DISCONTINUED | OUTPATIENT
Start: 2020-01-25 | End: 2020-01-25

## 2020-01-25 RX ORDER — NOREPINEPHRINE BITARTRATE/D5W 8 MG/250ML
0.05 PLASTIC BAG, INJECTION (ML) INTRAVENOUS
Qty: 8 | Refills: 0 | Status: DISCONTINUED | OUTPATIENT
Start: 2020-01-25 | End: 2020-01-25

## 2020-01-25 RX ORDER — PHENYLEPHRINE HYDROCHLORIDE 10 MG/ML
0.5 INJECTION INTRAVENOUS
Qty: 160 | Refills: 0 | Status: DISCONTINUED | OUTPATIENT
Start: 2020-01-25 | End: 2020-01-25

## 2020-01-25 RX ORDER — ALBUMIN HUMAN 25 %
100 VIAL (ML) INTRAVENOUS ONCE
Refills: 0 | Status: COMPLETED | OUTPATIENT
Start: 2020-01-25 | End: 2020-01-25

## 2020-01-25 RX ORDER — DEXTROSE 50 % IN WATER 50 %
50 SYRINGE (ML) INTRAVENOUS ONCE
Refills: 0 | Status: COMPLETED | OUTPATIENT
Start: 2020-01-25 | End: 2020-01-25

## 2020-01-25 RX ORDER — HEPARIN SODIUM 5000 [USP'U]/ML
5000 INJECTION INTRAVENOUS; SUBCUTANEOUS EVERY 12 HOURS
Refills: 0 | Status: DISCONTINUED | OUTPATIENT
Start: 2020-01-25 | End: 2020-01-25

## 2020-01-25 RX ORDER — VANCOMYCIN HCL 1 G
1000 VIAL (EA) INTRAVENOUS ONCE
Refills: 0 | Status: COMPLETED | OUTPATIENT
Start: 2020-01-25 | End: 2020-01-25

## 2020-01-25 RX ORDER — DIGOXIN 250 MCG
0.25 TABLET ORAL ONCE
Refills: 0 | Status: COMPLETED | OUTPATIENT
Start: 2020-01-25 | End: 2020-01-25

## 2020-01-25 RX ORDER — FLUCONAZOLE 150 MG/1
200 TABLET ORAL ONCE
Refills: 0 | Status: COMPLETED | OUTPATIENT
Start: 2020-01-25 | End: 2020-01-25

## 2020-01-25 RX ORDER — FENTANYL CITRATE 50 UG/ML
100 INJECTION INTRAVENOUS ONCE
Refills: 0 | Status: DISCONTINUED | OUTPATIENT
Start: 2020-01-25 | End: 2020-01-25

## 2020-01-25 RX ADMIN — Medication 100 MILLIGRAM(S): at 10:31

## 2020-01-25 RX ADMIN — MEROPENEM 100 MILLIGRAM(S): 1 INJECTION INTRAVENOUS at 11:36

## 2020-01-25 RX ADMIN — SODIUM CHLORIDE 1000 MILLILITER(S): 9 INJECTION INTRAMUSCULAR; INTRAVENOUS; SUBCUTANEOUS at 01:51

## 2020-01-25 RX ADMIN — INSULIN HUMAN 10 UNIT(S): 100 INJECTION, SOLUTION SUBCUTANEOUS at 14:22

## 2020-01-25 RX ADMIN — PHENYLEPHRINE HYDROCHLORIDE 9.09 MICROGRAM(S)/KG/MIN: 10 INJECTION INTRAVENOUS at 09:44

## 2020-01-25 RX ADMIN — FENTANYL CITRATE 4.85 MICROGRAM(S)/KG/HR: 50 INJECTION INTRAVENOUS at 06:50

## 2020-01-25 RX ADMIN — Medication 50 MILLIEQUIVALENT(S): at 06:47

## 2020-01-25 RX ADMIN — Medication 50 MILLIEQUIVALENT(S): at 04:27

## 2020-01-25 RX ADMIN — Medication 250 MILLIGRAM(S): at 09:44

## 2020-01-25 RX ADMIN — Medication 50 MILLIGRAM(S): at 00:46

## 2020-01-25 RX ADMIN — Medication 100 MEQ/KG/HR: at 04:49

## 2020-01-25 RX ADMIN — VASOPRESSIN 2.4 UNIT(S)/MIN: 20 INJECTION INTRAVENOUS at 03:25

## 2020-01-25 RX ADMIN — INSULIN HUMAN 5 UNIT(S): 100 INJECTION, SOLUTION SUBCUTANEOUS at 06:46

## 2020-01-25 RX ADMIN — Medication 25 GRAM(S): at 06:47

## 2020-01-25 RX ADMIN — Medication 4.55 MICROGRAM(S)/KG/MIN: at 10:44

## 2020-01-25 RX ADMIN — Medication 100 MILLIGRAM(S): at 02:00

## 2020-01-25 RX ADMIN — Medication 200 GRAM(S): at 06:47

## 2020-01-25 RX ADMIN — Medication 0.25 MILLIGRAM(S): at 03:24

## 2020-01-25 RX ADMIN — Medication 1000 GRAM(S): at 04:26

## 2020-01-25 RX ADMIN — Medication 50 MILLIGRAM(S): at 11:44

## 2020-01-25 RX ADMIN — FLUCONAZOLE 100 MILLIGRAM(S): 150 TABLET ORAL at 13:21

## 2020-01-25 RX ADMIN — Medication 200 GRAM(S): at 05:58

## 2020-01-25 RX ADMIN — PHENYLEPHRINE HYDROCHLORIDE 9.09 MICROGRAM(S)/KG/MIN: 10 INJECTION INTRAVENOUS at 04:27

## 2020-01-25 RX ADMIN — INSULIN HUMAN 5 UNIT(S): 100 INJECTION, SOLUTION SUBCUTANEOUS at 06:19

## 2020-01-25 RX ADMIN — Medication 25 GRAM(S): at 13:04

## 2020-01-25 RX ADMIN — Medication 25 GRAM(S): at 05:39

## 2020-01-25 RX ADMIN — Medication 50 MILLILITER(S): at 14:41

## 2020-01-25 RX ADMIN — Medication 50 MILLIGRAM(S): at 05:00

## 2020-01-25 RX ADMIN — SODIUM CHLORIDE 1000 MILLILITER(S): 9 INJECTION INTRAMUSCULAR; INTRAVENOUS; SUBCUTANEOUS at 01:12

## 2020-01-25 RX ADMIN — Medication 200 GRAM(S): at 14:47

## 2020-01-25 RX ADMIN — PANTOPRAZOLE SODIUM 40 MILLIGRAM(S): 20 TABLET, DELAYED RELEASE ORAL at 11:44

## 2020-01-25 RX ADMIN — Medication 50 MILLIEQUIVALENT(S): at 04:28

## 2020-01-25 RX ADMIN — CHLORHEXIDINE GLUCONATE 15 MILLILITER(S): 213 SOLUTION TOPICAL at 04:53

## 2020-01-25 RX ADMIN — Medication 50 MILLILITER(S): at 14:40

## 2020-01-25 NOTE — PROGRESS NOTE ADULT - SUBJECTIVE AND OBJECTIVE BOX
Postoperative Day #:1  Patient seen and examined at bedside, intubated, still on amiodarone drip, and required additional pressor support.      T(F): 95.8 (01-25-20 @ 04:15), Max: 96.8 (01-25-20 @ 01:00)  HR: 86 (01-25-20 @ 07:15) (76 - 203)  BP: 93/27 (01-24-20 @ 23:00) (33/12 - 198/120)  RR: 14 (01-25-20 @ 07:15) (12 - 24)  SpO2: 100% (01-25-20 @ 05:10) (84% - 100%)  Wt(kg): --  CAPILLARY BLOOD GLUCOSE      POCT Blood Glucose.: 317 mg/dL (25 Jan 2020 06:27)  POCT Blood Glucose.: 281 mg/dL (25 Jan 2020 05:30)  POCT Blood Glucose.: 23 mg/dL (25 Jan 2020 05:22)  POCT Blood Glucose.: 175 mg/dL (24 Jan 2020 23:13)    PHYSICAL EXAM:  GENERAL: diaphoretic intubated, sedated on pressor support and amiodarone drip  NERVOUS SYSTEM:  Alert & Oriented X0. pupils reactive  CHEST/LUNG:on vent, good air entry  HEART:  tachy  ABDOMEN: soft, prevena vac in place with good seal, ostomy pink, serosanguinous drainage in bag, neg BS, NGT in place with brown output 700cc/12h  EXTREMITIES:  2+ Peripheral Pulses, No clubbing, cyanosis, or edema    LABS:                        9.5    27.03 )-----------( 516      ( 25 Jan 2020 04:30 )             35.5     01-25    145  |  104  |  46<H>  ----------------------------<  40<LL>  8.0<HH>   |  8<LL>  |  6.85<H>    Ca    9.7      25 Jan 2020 05:27  Phos  7.2     01-24  Mg     2.1     01-24    TPro  8.4<H>  /  Alb  2.4<L>  /  TBili  0.5  /  DBili  x   /  AST  43<H>  /  ALT  <6<L>  /  AlkPhos  147<H>  01-24    PT/INR - ( 24 Jan 2020 12:49 )   PT: 16.5 sec;   INR: 1.46 ratio           I&O's Detail    24 Jan 2020 07:01  -  25 Jan 2020 07:00  --------------------------------------------------------  IN:    amiodarone Infusion: 266.6 mL    fentaNYL  Infusion: 97 mL    norepinephrine Infusion: 523 mL    norepinephrine Infusion: 289 mL    phenylephrine   Infusion: 269 mL    sodium bicarbonate  Infusion: 500 mL    Sodium Chloride 0.9% IV Bolus: 2000 mL    Solution: 100 mL    vasopressin Infusion: 12 mL  Total IN: 4056.6 mL    OUT:    Drain: 700 mL  Total OUT: 700 mL    Total NET: 3356.6 mL          Impression: 59y Male s/p exploratory lap, extensive MAYDA, revision of Colostomy- POD#1- critical    PMH   ESRD (end stage renal disease) on dialysis  Diabetes  Hypertension  Hepatitis C  HIV (human immunodeficiency virus infection)  Anemia  Transient ischemic attack (TIA)  ESRD (end stage renal disease)  Dyslipidemia  DM (diabetes mellitus)  HTN (hypertension)    Plan:  -cont vent, pressor support and Amiodarone drip per ICU  - cont NPO/NGT  -GI prophylaxis  - IVF as needed  - HD per renal today, lactate 14.6, K 8  -continue VTE prophylaxis : sqh  -local wound/ostomy care  -trend labs   - will discuss with Dr. Oliveira

## 2020-01-25 NOTE — CHART NOTE - NSCHARTNOTEFT_GEN_A_CORE
Pt brought to ICU post op emergent exlap in septic shock, now with MODS, maxed on pressors, unable to tolerate dialysis with persistent hyperkalemia and acidosis. Pt tx with multiple rounds of insulin/dextrose/bicarb/calcium "hyperkalemia cocktail's" with persistent elevated lactate levels. Multiple discussions had with pt family and emergency contact Karuna Richmond, per wishes pt to be coded x1 only with allowing to pass if should cardiac arrest again. Pt noted initially with bradycardia pending hyperkalemia tx, given cocktail with return to original HR and rhythm. Pt again noted this time with bradycardia progress to asystole at 1533, ACLS protocol initiated with ROSC achieved at 1537. Family at bedside, including Ms. Karuna Richmond who states pt is now DNR and will be allowed to pass if and when pt is to arrest again. Dr. Colunga attending physician and eICU aware.

## 2020-01-25 NOTE — PROGRESS NOTE ADULT - REASON FOR ADMISSION
Wound Infection
Wound Infection
confusion
sepsis, possibly due to infected abdominal wound vs chronically infected mesh
Ischemic bowel
infected wound
sepsis
wound infection
Infected abdominal wound
abdominal wound
abdominal wound
drainage from abdominal wound

## 2020-01-25 NOTE — PROGRESS NOTE ADULT - SUBJECTIVE AND OBJECTIVE BOX
HPI:  Patient is a 59 year old male with a PMH HTN, ESRD (M/W/F), HIV, hep C, s/p hartmanns procedure 2/2 perforated sigmoid diverticulitis 9/2019 with subsequent admission to [facility] for UGIB likely due to gastric ulcers found on EGD (healed), and admission to our facility 10/2019 with infected abdominal wound, currently presented with drainage of abdominal wound again. Patient states his home health aide was dressing his wound, but he noticed increased drainage over the last 2 days with associated abdominal pain.   Admits to normal bowel function. Tolerating regular diet.   Denies fever, chills, chest pain, sob, palpitations, urinary symptoms. (06 Jan 2020 18:05)      24 hr events: Found to have intestinal pneumatosis on CT scan went for emergent ex-lap and had extensive lysis of adhesions and revision of colostomy. Remains intubated post-op on multiple pressors with severe lactic acidosis.    ## ROS:  [ x] unable to obtain    ## Vitals  ICU Vital Signs Last 24 Hrs  T(C): 35.4 (25 Jan 2020 04:15), Max: 36 (25 Jan 2020 01:00)  T(F): 95.8 (25 Jan 2020 04:15), Max: 96.8 (25 Jan 2020 01:00)  HR: 86 (25 Jan 2020 07:15) (76 - 203)  BP: 93/27 (24 Jan 2020 23:00) (33/12 - 198/120)  BP(mean): 42 (24 Jan 2020 23:00) (15 - 136)  ABP: 128/56 (25 Jan 2020 07:15) (84/46 - 162/66)  ABP(mean): 77 (25 Jan 2020 07:15) (56 - 110)  RR: 14 (25 Jan 2020 07:15) (12 - 24)  SpO2: 100% (25 Jan 2020 05:10) (84% - 100%)      ## Physical Exam:  Gen:  HEENT:  Resp:  CV:  Abd:  Ext:  Neuro:    ## Vent Data  Mode: AC/ CMV (Assist Control/ Continuous Mandatory Ventilation)  RR (machine): 12  TV (machine): 500  FiO2: 50  PEEP: 8  ITime: 0.91  MAP: 11  PIP: 23      ## Labs:  Chem:  01-25    145  |  104  |  46<H>  ----------------------------<  40<LL>  8.0<HH>   |  8<LL>  |  6.85<H>    Ca    9.7      25 Jan 2020 05:27  Phos  7.2     01-24  Mg     2.1     01-24    TPro  8.4<H>  /  Alb  2.4<L>  /  TBili  0.5  /  DBili  x   /  AST  43<H>  /  ALT  <6<L>  /  AlkPhos  147<H>  01-24    LIVER FUNCTIONS - ( 24 Jan 2020 22:13 )  Alb: 2.4 g/dL / Pro: 8.4 gm/dL / ALK PHOS: 147 U/L / ALT: <6 U/L / AST: 43 U/L / GGT: x           CBC:                        9.5    27.03 )-----------( 516      ( 25 Jan 2020 04:30 )             35.5     Coags:  PT/INR - ( 24 Jan 2020 12:49 )   PT: 16.5 sec;   INR: 1.46 ratio                 ## Cardiac        ## Blood Gas  ABG - ( 25 Jan 2020 06:00 )  pH, Arterial: x     pH, Blood: 6.92  /  pCO2: 43    /  pO2: 159   / HCO3: 8     / Base Excess: -23.0 /  SaO2: 96                  #I/Os  I&O's Detail    24 Jan 2020 07:01  -  25 Jan 2020 07:00  --------------------------------------------------------  IN:    amiodarone Infusion: 266.6 mL    fentaNYL  Infusion: 97 mL    norepinephrine Infusion: 523 mL    norepinephrine Infusion: 289 mL    phenylephrine   Infusion: 269 mL    sodium bicarbonate  Infusion: 500 mL    Sodium Chloride 0.9% IV Bolus: 2000 mL    Solution: 100 mL    vasopressin Infusion: 12 mL  Total IN: 4056.6 mL    OUT:    Drain: 700 mL  Total OUT: 700 mL    Total NET: 3356.6 mL          ## Imaging:    ## Medications:  MEDICATIONS  (STANDING):  aMIOdarone Infusion 1 mG/Min (33.333 mL/Hr) IV Continuous <Continuous>  chlorhexidine 0.12% Liquid 15 milliLiter(s) Oral Mucosa every 12 hours  chlorhexidine 4% Liquid 1 Application(s) Topical <User Schedule>  dextrose 5%. 1000 milliLiter(s) (50 mL/Hr) IV Continuous <Continuous>  dextrose 50% Injectable 12.5 Gram(s) IV Push once  dextrose 50% Injectable 25 Gram(s) IV Push once  dextrose 50% Injectable 25 Gram(s) IV Push once  fentaNYL   Infusion 0.5 MICROgram(s)/kG/Hr (4.85 mL/Hr) IV Continuous <Continuous>  hydrocortisone sodium succinate Injectable 50 milliGRAM(s) IV Push every 6 hours  insulin lispro (HumaLOG) corrective regimen sliding scale   SubCutaneous every 6 hours  meropenem  IVPB 500 milliGRAM(s) IV Intermittent every 24 hours  metroNIDAZOLE  IVPB 500 milliGRAM(s) IV Intermittent every 8 hours  pantoprazole  Injectable 40 milliGRAM(s) IV Push daily  phenylephrine    Infusion 0.5 MICROgram(s)/kG/Min (9.094 mL/Hr) IV Continuous <Continuous>  sodium bicarbonate  Infusion 0.232 mEq/kG/Hr (150 mL/Hr) IV Continuous <Continuous>  vasopressin Infusion 0.04 Unit(s)/Min (2.4 mL/Hr) IV Continuous <Continuous>    MEDICATIONS  (PRN):  dextrose 40% Gel 15 Gram(s) Oral once PRN Blood Glucose LESS THAN 70 milliGRAM(s)/deciliter  glucagon  Injectable 1 milliGRAM(s) IntraMuscular once PRN Glucose LESS THAN 70 milligrams/deciliter HPI:  Patient is a 59 year old male with a PMH HTN, ESRD (M/W/F), HIV, hep C, s/p hartmanns procedure 2/2 perforated sigmoid diverticulitis 9/2019 with subsequent admission to [facility] for UGIB likely due to gastric ulcers found on EGD (healed), and admission to our facility 10/2019 with infected abdominal wound, currently presented with drainage of abdominal wound again. Patient states his home health aide was dressing his wound, but he noticed increased drainage over the last 2 days with associated abdominal pain.   Admits to normal bowel function. Tolerating regular diet.   Denies fever, chills, chest pain, sob, palpitations, urinary symptoms. (06 Jan 2020 18:05)      24 hr events: Found to have intestinal pneumatosis on CT scan went for emergent ex-lap and had extensive lysis of adhesions and revision of colostomy. Remains intubated post-op on multiple pressors with severe lactic acidosis.    ## ROS:  [ x] unable to obtain    ## Vitals  ICU Vital Signs Last 24 Hrs  T(C): 35.4 (25 Jan 2020 04:15), Max: 36 (25 Jan 2020 01:00)  T(F): 95.8 (25 Jan 2020 04:15), Max: 96.8 (25 Jan 2020 01:00)  HR: 86 (25 Jan 2020 07:15) (76 - 203)  BP: 93/27 (24 Jan 2020 23:00) (33/12 - 198/120)  BP(mean): 42 (24 Jan 2020 23:00) (15 - 136)  ABP: 128/56 (25 Jan 2020 07:15) (84/46 - 162/66)  ABP(mean): 77 (25 Jan 2020 07:15) (56 - 110)  RR: 14 (25 Jan 2020 07:15) (12 - 24)  SpO2: 100% (25 Jan 2020 05:10) (84% - 100%)      ## Physical Exam:  Gen: Adult male intubated, sedated, on vent  HEENT: NC/AT sclerae anicteric  Resp: Mechanical breath sounds b/l, not overbreathing vent  CV: S1, S2  Abd: Soft, surgical wounds dressed, wound vac in place. Colostomy. Absent bowel sounds  Ext: WWP  Neuro: Sedated, unarousable. Does not follow commands    ## Vent Data  Mode: AC/ CMV (Assist Control/ Continuous Mandatory Ventilation)  RR (machine): 12  TV (machine): 500  FiO2: 50  PEEP: 8  ITime: 0.91  MAP: 11  PIP: 23      ## Labs:  Chem:  01-25    145  |  104  |  46<H>  ----------------------------<  40<LL>  8.0<HH>   |  8<LL>  |  6.85<H>    Ca    9.7      25 Jan 2020 05:27  Phos  7.2     01-24  Mg     2.1     01-24    TPro  8.4<H>  /  Alb  2.4<L>  /  TBili  0.5  /  DBili  x   /  AST  43<H>  /  ALT  <6<L>  /  AlkPhos  147<H>  01-24    LIVER FUNCTIONS - ( 24 Jan 2020 22:13 )  Alb: 2.4 g/dL / Pro: 8.4 gm/dL / ALK PHOS: 147 U/L / ALT: <6 U/L / AST: 43 U/L / GGT: x           CBC:                        9.5    27.03 )-----------( 516      ( 25 Jan 2020 04:30 )             35.5     Coags:  PT/INR - ( 24 Jan 2020 12:49 )   PT: 16.5 sec;   INR: 1.46 ratio                 ## Cardiac        ## Blood Gas  ABG - ( 25 Jan 2020 06:00 )  pH, Arterial: x     pH, Blood: 6.92  /  pCO2: 43    /  pO2: 159   / HCO3: 8     / Base Excess: -23.0 /  SaO2: 96                  #I/Os  I&O's Detail    24 Jan 2020 07:01  -  25 Jan 2020 07:00  --------------------------------------------------------  IN:    amiodarone Infusion: 266.6 mL    fentaNYL  Infusion: 97 mL    norepinephrine Infusion: 523 mL    norepinephrine Infusion: 289 mL    phenylephrine   Infusion: 269 mL    sodium bicarbonate  Infusion: 500 mL    Sodium Chloride 0.9% IV Bolus: 2000 mL    Solution: 100 mL    vasopressin Infusion: 12 mL  Total IN: 4056.6 mL    OUT:    Drain: 700 mL  Total OUT: 700 mL    Total NET: 3356.6 mL          ## Imaging:    ## Medications:  MEDICATIONS  (STANDING):  aMIOdarone Infusion 1 mG/Min (33.333 mL/Hr) IV Continuous <Continuous>  chlorhexidine 0.12% Liquid 15 milliLiter(s) Oral Mucosa every 12 hours  chlorhexidine 4% Liquid 1 Application(s) Topical <User Schedule>  dextrose 5%. 1000 milliLiter(s) (50 mL/Hr) IV Continuous <Continuous>  dextrose 50% Injectable 12.5 Gram(s) IV Push once  dextrose 50% Injectable 25 Gram(s) IV Push once  dextrose 50% Injectable 25 Gram(s) IV Push once  fentaNYL   Infusion 0.5 MICROgram(s)/kG/Hr (4.85 mL/Hr) IV Continuous <Continuous>  hydrocortisone sodium succinate Injectable 50 milliGRAM(s) IV Push every 6 hours  insulin lispro (HumaLOG) corrective regimen sliding scale   SubCutaneous every 6 hours  meropenem  IVPB 500 milliGRAM(s) IV Intermittent every 24 hours  metroNIDAZOLE  IVPB 500 milliGRAM(s) IV Intermittent every 8 hours  pantoprazole  Injectable 40 milliGRAM(s) IV Push daily  phenylephrine    Infusion 0.5 MICROgram(s)/kG/Min (9.094 mL/Hr) IV Continuous <Continuous>  sodium bicarbonate  Infusion 0.232 mEq/kG/Hr (150 mL/Hr) IV Continuous <Continuous>  vasopressin Infusion 0.04 Unit(s)/Min (2.4 mL/Hr) IV Continuous <Continuous>    MEDICATIONS  (PRN):  dextrose 40% Gel 15 Gram(s) Oral once PRN Blood Glucose LESS THAN 70 milliGRAM(s)/deciliter  glucagon  Injectable 1 milliGRAM(s) IntraMuscular once PRN Glucose LESS THAN 70 milligrams/deciliter

## 2020-01-25 NOTE — DISCHARGE NOTE FOR THE EXPIRED PATIENT - NS PATIENT DEATH CRITERIA
Patient is dead based on Cardiopulmonary criteria including absent breath sounds, pulselessness and/or asystole Pupils fixed/dilated, asystole on monitor, no s1/s2, no chest rise noted/Patient is dead based upon Brain Death Criteria/Patient is dead based on Cardiopulmonary criteria including absent breath sounds, pulselessness and/or asystole

## 2020-01-25 NOTE — PROGRESS NOTE ADULT - SUBJECTIVE AND OBJECTIVE BOX
Postoperative Day #:0  Patient seen and examined at bedside, sedated and on levophed. Pt became hypotensive after transfer from OR, new A-line placed as previously placed A-Line was not working. Pt also had runs of V-Tach and amiodarone drip was started. Pt has received a total of 4L.       T(F): 96.4 (01-24-20 @ 15:44), Max: 97.1 (01-24-20 @ 04:16)  HR: 135 (01-25-20 @ 00:00) (119 - 155)  BP: 93/27 (01-24-20 @ 23:00) (33/12 - 198/120)  RR: 22 (01-25-20 @ 00:00) (13 - 24)  SpO2: 100% (01-24-20 @ 23:32) (84% - 100%)  Wt(kg): --  CAPILLARY BLOOD GLUCOSE      POCT Blood Glucose.: 175 mg/dL (24 Jan 2020 23:13)    PHYSICAL EXAM:  GENERAL: diaphoretic intubated, sedated on pressor support and amiodarone drip  NERVOUS SYSTEM:  Alert & Oriented X0. pupils reactive  CHEST/LUNG:on vent, good air entry  HEART: sinus tachy, s1/s2  ABDOMEN: soft, prevena vac in place with good seal, ostomy pink, serosanguinous drainage in bag, neg BS  EXTREMITIES:  2+ Peripheral Pulses, No clubbing, cyanosis, or edema  LYMPH: No lymphadenopathy noted  SKIN: No rashes or lesions    LABS:                        11.4   24.74 )-----------( 563      ( 24 Jan 2020 22:13 )             39.7     01-24    136  |  101  |  46<H>  ----------------------------<  212<H>  5.2   |  17<L>  |  6.54<H>    Ca    10.6<H>      24 Jan 2020 22:13  Phos  7.2     01-24  Mg     2.1     01-24    TPro  8.4<H>  /  Alb  2.4<L>  /  TBili  0.5  /  DBili  x   /  AST  43<H>  /  ALT  <6<L>  /  AlkPhos  147<H>  01-24    PT/INR - ( 24 Jan 2020 12:49 )   PT: 16.5 sec;   INR: 1.46 ratio      Lactate, Blood: 7.3         I&O's Detail    23 Jan 2020 07:01  -  24 Jan 2020 07:00  --------------------------------------------------------  IN:  Total IN: 0 mL    OUT:    Colostomy: 300 mL    Other: 2000 mL  Total OUT: 2300 mL    Total NET: -2300 mL      Impression: 59y Male s/p exploratory lap, extensive MAYDA, revision of Colostomy- POD#0- critical    PMH   ESRD (end stage renal disease) on dialysis  Diabetes  Hypertension  Hepatitis C  HIV (human immunodeficiency virus infection)  Anemia  Transient ischemic attack (TIA)  ESRD (end stage renal disease)  Dyslipidemia  DM (diabetes mellitus)  HTN (hypertension)    Plan:  -cont vent, pressor support and Amiodarone drip per ICU  - cont NPO/NGT  - IVF as needed, if possible limit pressor use to avoid bowel ischemia  -continue VTE prophylaxis   -local wound/ostomy care  -trend labs labs

## 2020-01-25 NOTE — DISCHARGE NOTE FOR THE EXPIRED PATIENT - HOSPITAL COURSE
Mr. Davidson is a 58 yo male with a PMH HTN, ESRD on HD, HIV, HCV, UGIB 2/2 gastric ulcers, s/p hartmanns for perforated sigmoid diverticulitis with multiple post operative complications including abscess who was admitted to [facility] from home for increased drainage and abdominal pain. Hospital course was complicated by sepsis with hypotension in which he was admitted to ICU and downgraded with midodrine. Pt continued course with antibiotics, and was followed closely by ID specialist. On 1/24 pt with c/o now, severe constant abdominal pain with concern for new onset bowel ischemia. Pt taken to OR emergently in which abscess with purulent drainage was noted, with no bowel ischemia, pt s/p emergent ex lap, peritoneal lavage, MAYDA and colostomy revision as tx for abscess. No operative complications were described. Pt s/p OR brought to ICU in septic shock with MODS, with severe metabolic acidosis with persistently elevated LA and hyperkalemia. Pt requiring max dose pressors, bicarb drip, continuous dextrose infusion along with ventilator support. Pt unable to tolerated HD given hypotension, and only was able to sustain approximately 1.5 hours today. Multiple medical attempts at tx hyperkalemia made throughout the morning with frequent monitoring of labs. Pt family and stated emergency contact, niece, Ms. Beckman Basilio at bedside throughout the day. They were thoroughly educated, verbalized understanding, and per pt family wishes they were requesting pt only to be "coded"/ACLS one time. 1533 pt noted with bradycardia arrest which was tx with ACLS protocol along hyperkalemia cocktail. Pt with ROSC at 1537. At that time pt family requesting for pt to be made DNR at that time. Per family, pt to be stopped from vasopressors shortly thereafter. At 1727 pt noted with asystole on monitor, pupils were noted to be fixed/dilated, with no s1/s2 or chest rise noted. Family at bedside notified of expiration and condolences were offered. Autopsy was offered, family declined, however I did call the [facility] Medical Examiner who has denied this case for autopsy. Contact at ME case was MARY Pena. Mr. Davidson is a 58 yo male with a PMH HTN, ESRD on HD, HIV, HCV, UGIB 2/2 gastric ulcers, s/p hartmanns for perforated sigmoid diverticulitis in September 2019 with multiple post operative complications including abscess who was admitted to [facility] from home for increased drainage and abdominal pain. Hospital course was complicated by sepsis with hypotension in which he was admitted to ICU and downgraded with midodrine. Pt continued course with antibiotics, and was followed closely by ID specialist. On 1/24 pt with c/o now, severe constant abdominal pain with concern for new onset bowel ischemia. Pt taken to OR emergently in which abscess with purulent drainage was noted, with no bowel ischemia, pt s/p emergent ex lap, peritoneal lavage, MAYDA and colostomy revision as tx for abscess. No operative complications were described. Pt s/p OR brought to ICU in septic shock with MODS, with severe metabolic acidosis with persistently elevated LA and hyperkalemia. Pt requiring max dose pressors, bicarb drip, continuous dextrose infusion along with ventilator support. Pt unable to tolerated HD given hypotension, and only was able to sustain approximately 1.5 hours today. Multiple medical attempts at tx hyperkalemia made throughout the morning with frequent monitoring of labs. Pt family and stated emergency contact, niece, Ms. Beckman Basilio at bedside throughout the day. They were thoroughly educated, verbalized understanding, and per pt family wishes they were requesting pt only to be "coded"/ACLS one time. 1533 pt noted with bradycardia arrest which was tx with ACLS protocol along hyperkalemia cocktail. Pt with ROSC at 1537. At that time pt family requesting for pt to be made DNR at that time. Per family, pt to be stopped from vasopressors shortly thereafter. At 1727 pt noted with asystole on monitor, pupils were noted to be fixed/dilated, with no s1/s2 or chest rise noted. Family at bedside notified of expiration and condolences were offered. Autopsy was offered, family declined, however I did call the [facility] Medical Examiner who has denied this case for autopsy. Contact at ME case was MARY Pena. Mr. Davidson is a 60 yo male with a PMH HTN, ESRD on HD, HIV, HCV, UGIB 2/2 gastric ulcers, s/p hartmanns for perforated sigmoid diverticulitis in September 2019 with multiple post operative complications including abscess who was admitted to [facility] from home for increased drainage and abdominal pain. Hospital course was complicated by sepsis with hypotension in which he was admitted to ICU and downgraded with midodrine. Pt continued course with antibiotics, and was followed closely by ID specialist. On 1/24 pt with c/o now, severe constant abdominal pain with concern for new onset bowel ischemia. Pt taken to OR emergently in which abscess with purulent drainage was noted, with no bowel ischemia, pt s/p emergent ex lap, peritoneal lavage, MAYDA and colostomy revision as tx for abscess. No operative complications were described. Pt s/p OR brought to ICU in septic shock with MODS, with severe metabolic acidosis with persistently elevated LA and hyperkalemia. Pt requiring max dose pressors, bicarb drip, continuous dextrose infusion along with ventilator support. Pt unable to tolerated HD given hypotension, and only was able to sustain approximately 1.5 hours today. Multiple medical attempts at tx hyperkalemia made throughout the morning with frequent monitoring of labs. Pt family and stated emergency contact, niece, Ms. Beckman Basilio at bedside throughout the day. They were thoroughly educated, verbalized understanding, and per pt family wishes they were requesting pt only to be "coded"/ACLS one time. 1533 pt noted with bradycardia arrest which was tx with ACLS protocol along hyperkalemia cocktail. Pt with ROSC at 1537. At that time pt family requesting for pt to be made DNR at that time. Per family, pt to be stopped from vasopressors shortly thereafter. At 1727 pt noted with asystole on monitor, pupils were noted to be fixed/dilated, with no s1/s2 or chest rise noted. Family at bedside notified of expiration and condolences were offered. Autopsy was offered, family declined, however I did call the [facility] Medical Examiner who has denied this case for autopsy. Contact at ME case was NUNU Pena.

## 2020-01-25 NOTE — CHART NOTE - NSCHARTNOTEFT_GEN_A_CORE
Patient labs severely deranged. Patient remains tachycardic and bedside EKG shows peaked T waves.   Ph: 6.9, Bicarb: 8, Fingerstick 23, K: 8, Lactate 14.     Fluids were given for elevated lactate with no improvement.     Patient was given sodium bicarbonate and started on a drip.   2 amps of dextrose was given for hypoglycemia.   Insulin 5 units was given along with dextrose and calcium gluconate for hyperkalemia. Patient labs severely deranged. Patient remains tachycardic and bedside EKG shows peaked T waves.   Ph: 6.9, Bicarb: 8, Fingerstick 23, K: 8, Lactate 14.     Fluids were given for elevated lactate with no improvement.   phenylephrine was started and levophed was titrated off with some improvement in HR.   Patient was given sodium bicarbonate and started on a drip.   2 amps of dextrose was given for hypoglycemia.   Insulin 5 units was given along with dextrose and calcium gluconate for hyperkalemia.    Nephrology was called and notified about the potassium and need for dialysis.   They will see the patient.

## 2020-01-25 NOTE — PROCEDURAL SAFETY CHECKLIST WITH OR WITHOUT SEDATION - NSPREPROCDATE6_GEN_ALL_CORE
Epidural Block Performed by: Ana Luna DO Authorized by: Ana Luna DO  
 
Pre-Procedure Indication: labor epidural   
Preanesthetic Checklist: patient identified, risks and benefits discussed, anesthesia consent, site marked, patient being monitored, timeout performed and anesthesia consent Epidural:  
Patient position:  Seated Prep region:  Lumbar Prep: DuraPrep Location:  L3-4 Needle and Epidural Catheter:  
Needle Type:  Tuohy Needle Gauge:  17 G Injection Technique:  Loss of resistance using air Attempts:  1 Catheter Size:  19 G Catheter at Skin Depth (cm):  8 Depth in Epidural Space (cm):  4 Events: no blood with aspiration, no cerebrospinal fluid with aspiration, no paresthesia and negative aspiration test   
Test Dose:  Negative Assessment:  
Catheter Secured:  Tegaderm and tape Insertion:  Uncomplicated Patient tolerance:  Patient tolerated the procedure well with no immediate complications
24-Jan-2020 22:10
24-Jan-2020 20:45

## 2020-01-25 NOTE — PROGRESS NOTE ADULT - SUBJECTIVE AND OBJECTIVE BOX
NEPHROLOGY PROGRESS NOTE    CHIEF COMPLAINT:  ESRD    HPI:  Patient received about 1 hr, 40 minutes of dialysis this AM.  Treatment had to be stopped due to severe hypotension.  He is in profound shock on triple pressors and has a severe lactic acidosis.    ROS:  unable    EXAM:  T(F): 96 (01-25-20 @ 11:00)  HR: 110 (01-25-20 @ 11:06)  BP: 93/27 (01-24-20 @ 23:00)  RR: 24 (01-25-20 @ 11:06)  SpO2: 100% (01-25-20 @ 09:30)    Conversant, in no apparent distress  Normal respiratory effort, lungs clear bilaterally  Heart RRR with no murmur, no peripheral edema         LABS                             7.3    22.10 )-----------( 332      ( 25 Jan 2020 08:31 )             27.4          01-25    143  |  101  |  48<H>  ----------------------------<  304<H>  6.9<HH>   |  8<LL>  |  6.89<H>    Ca    9.8      25 Jan 2020 08:31  Phos  7.2     01-24  Mg     2.1     01-24    TPro  5.7<L>  /  Alb  2.0<L>  /  TBili  0.9  /  DBili  x   /  AST  6060<H>  /  ALT  1165<H>  /  AlkPhos  139<H>  01-25      ASSESSMENT:  1.  Profound shock with lactic acidosis  2.  ESRD with hyperkalemia, should be some improvement with dialysis today but will be only transitory    PLAN:  He is too unstable to hemodialyze.  If he were to receive maximum life support he would need to transfer to institution with CRRT.

## 2020-01-25 NOTE — PROVIDER CONTACT NOTE (EICU) - BACKGROUND
Was contacted by bedside team  as pt HR in 200s with borderline BP  Pt on levo, and amiodarone
HIV, ESRD on HD, perforated sigmoid diverticulitis (9/2019), recent UGIB 2/2 gastric ulcers, recurrent abdominal wound infections, readmitted with pneumatosis, s/p emergent ex lap. Multiorgan failure, septic shock. Unable to tolerate HD with progressive hyperkalemia. Had a PEA arrest; ROSC achieved within 4 minutes. Family made patient DNR.

## 2020-01-25 NOTE — PROGRESS NOTE ADULT - ASSESSMENT
59 year old male with MH HTN, ESRD (M/W/F), HIV, hep C, s/p hartmanns procedure 2/2 perforated sigmoid diverticulitis 9/2019 found to have intestinal pneumatosis on CT scan concerning for small bowel ischemia now s/p emergent ex-lap with extensive MAYDA and revision of colostomy. Post procedure patient remains intubated due to acute respiratory failure, refractory hypotension likely secondary to ischemic gut vs severe sepsis with septic shock, and lactic acidosis again likely secondary to ischemic gut vs severe sepsis with septic shock.     - Sedation as needed goal RASS -1 to 0  - Continue mechanical ventilation  - Titrate pressors as needed goal MAP >= 65  - Hydrocortisone 50mg q6hrs  - Bicarb gtt  - HD as per renal  - Post surgical care as per surgical team  - Broad spectrum abx  - Amio gtt for arrythmia    Attending critical care time 45 minutes 59 year old male with MH HTN, ESRD (M/W/F), HIV, hep C, s/p hartmanns procedure 2/2 perforated sigmoid diverticulitis 9/2019 found to have intestinal pneumatosis on CT scan concerning for small bowel ischemia now s/p emergent ex-lap with extensive MAYDA and revision of colostomy. Post procedure patient remains intubated due to acute respiratory failure, refractory hypotension likely secondary to ischemic gut vs severe sepsis with septic shock, and lactic acidosis again likely secondary to ischemic gut vs severe sepsis with septic shock.     - Sedation as needed goal RASS -1 to 0  - Continue mechanical ventilation  - Titrate pressors as needed goal MAP >= 65  - Hydrocortisone 50mg q6hrs  - Bicarb gtt  - HD as per renal  - Post surgical care as per surgical team  - Broad spectrum abx  - Amio gtt for arrythmia    Attending critical care time 50 minutes 59 year old male with MH HTN, ESRD (M/W/F), HIV, hep C, s/p hartmanns procedure 2/2 perforated sigmoid diverticulitis 9/2019 found to have intestinal pneumatosis on CT scan concerning for small bowel ischemia now s/p emergent ex-lap with extensive MAYDA and revision of colostomy. Post procedure patient remains intubated due to acute respiratory failure, refractory hypotension likely secondary to ischemic gut vs severe sepsis with septic shock, and lactic acidosis again likely secondary to ischemic gut vs severe sepsis with septic shock.     - Sedation as needed goal RASS -1 to 0  - Continue mechanical ventilation  - Titrate pressors as needed goal MAP >= 65  - Hydrocortisone 50mg q6hrs  - Bicarb gtt  - HD as per renal  - Post surgical care as per surgical team  - Broad spectrum abx  - Amio gtt for arrythmia  - Guarded prognosis    Attending critical care time 50 minutes 59 year old male with MH HTN, ESRD (M/W/F), HIV, hep C, s/p hartmanns procedure 2/2 perforated sigmoid diverticulitis 9/2019 found to have intestinal pneumatosis on CT scan concerning for small bowel ischemia now s/p emergent ex-lap with extensive MAYDA and revision of colostomy. Post procedure patient remains intubated due to acute respiratory failure, refractory hypotension likely secondary to ischemic gut vs severe sepsis with septic shock, and lactic acidosis again likely secondary to ischemic gut vs severe sepsis with septic shock. Shock liver. Acute blood loss anemia    - Sedation as needed goal RASS -1 to 0  - Continue mechanical ventilation  - Titrate pressors as needed goal MAP >= 65  - Hydrocortisone 50mg q6hrs  - Transfuse PRN  - Trend LFTs  - Bicarb gtt  - HD as per renal  - Post surgical care as per surgical team  - Broad spectrum abx  - Amio gtt for arrythmia  - Guarded prognosis    Attending critical care time 55 minutes

## 2020-01-25 NOTE — PROVIDER CONTACT NOTE (EICU) - RECOMMENDATIONS
suspect HR can be due levo use  will add vaso and taper down levo, give dig 0.25 x 1  if not improving will consider change levo to viktor.     will continue to monitor patient.  continue abx

## 2020-01-28 LAB — SURGICAL PATHOLOGY STUDY: SIGNIFICANT CHANGE UP

## 2020-01-30 LAB
CULTURE RESULTS: SIGNIFICANT CHANGE UP
CULTURE RESULTS: SIGNIFICANT CHANGE UP
SPECIMEN SOURCE: SIGNIFICANT CHANGE UP
SPECIMEN SOURCE: SIGNIFICANT CHANGE UP

## 2020-01-31 DIAGNOSIS — K94.09 OTHER COMPLICATIONS OF COLOSTOMY: ICD-10-CM

## 2020-01-31 DIAGNOSIS — B19.20 UNSPECIFIED VIRAL HEPATITIS C WITHOUT HEPATIC COMA: ICD-10-CM
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
## 2020-01-31 DIAGNOSIS — I12.0 HYPERTENSIVE CHRONIC KIDNEY DISEASE WITH STAGE 5 CHRONIC KIDNEY DISEASE OR END STAGE RENAL DISEASE: ICD-10-CM

## 2020-01-31 DIAGNOSIS — E05.90 THYROTOXICOSIS, UNSPECIFIED WITHOUT THYROTOXIC CRISIS OR STORM: ICD-10-CM

## 2020-01-31 DIAGNOSIS — J96.00 ACUTE RESPIRATORY FAILURE, UNSPECIFIED WHETHER WITH HYPOXIA OR HYPERCAPNIA: ICD-10-CM

## 2020-01-31 DIAGNOSIS — K63.89 OTHER SPECIFIED DISEASES OF INTESTINE: ICD-10-CM

## 2020-01-31 DIAGNOSIS — T46.3X5A ADVERSE EFFECT OF CORONARY VASODILATORS, INITIAL ENCOUNTER: ICD-10-CM

## 2020-01-31 DIAGNOSIS — E11.649 TYPE 2 DIABETES MELLITUS WITH HYPOGLYCEMIA WITHOUT COMA: ICD-10-CM

## 2020-01-31 DIAGNOSIS — T81.44XA SEPSIS FOLLOWING A PROCEDURE, INITIAL ENCOUNTER: ICD-10-CM
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
## 2020-01-31 DIAGNOSIS — I47.2 VENTRICULAR TACHYCARDIA: ICD-10-CM

## 2020-01-31 DIAGNOSIS — E11.22 TYPE 2 DIABETES MELLITUS WITH DIABETIC CHRONIC KIDNEY DISEASE: ICD-10-CM

## 2020-01-31 DIAGNOSIS — Z90.49 ACQUIRED ABSENCE OF OTHER SPECIFIED PARTS OF DIGESTIVE TRACT: ICD-10-CM

## 2020-01-31 DIAGNOSIS — T85.79XA INFECTION AND INFLAMMATORY REACTION DUE TO OTHER INTERNAL PROSTHETIC DEVICES, IMPLANTS AND GRAFTS, INITIAL ENCOUNTER: ICD-10-CM

## 2020-01-31 DIAGNOSIS — E83.52 HYPERCALCEMIA: ICD-10-CM
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
## 2020-01-31 DIAGNOSIS — Z99.2 DEPENDENCE ON RENAL DIALYSIS: ICD-10-CM

## 2020-01-31 DIAGNOSIS — I46.9 CARDIAC ARREST, CAUSE UNSPECIFIED: ICD-10-CM

## 2020-01-31 DIAGNOSIS — Z79.82 LONG TERM (CURRENT) USE OF ASPIRIN: ICD-10-CM

## 2020-01-31 DIAGNOSIS — E87.5 HYPERKALEMIA: ICD-10-CM

## 2020-01-31 DIAGNOSIS — I95.9 HYPOTENSION, UNSPECIFIED: ICD-10-CM

## 2020-01-31 DIAGNOSIS — Z66 DO NOT RESUSCITATE: ICD-10-CM

## 2020-01-31 DIAGNOSIS — Z21 ASYMPTOMATIC HUMAN IMMUNODEFICIENCY VIRUS [HIV] INFECTION STATUS: ICD-10-CM

## 2020-01-31 DIAGNOSIS — I95.2 HYPOTENSION DUE TO DRUGS: ICD-10-CM

## 2020-01-31 DIAGNOSIS — E86.9 VOLUME DEPLETION, UNSPECIFIED: ICD-10-CM

## 2020-01-31 DIAGNOSIS — G93.41 METABOLIC ENCEPHALOPATHY: ICD-10-CM

## 2020-01-31 DIAGNOSIS — E78.5 HYPERLIPIDEMIA, UNSPECIFIED: ICD-10-CM

## 2020-01-31 DIAGNOSIS — Z86.73 PERSONAL HISTORY OF TRANSIENT ISCHEMIC ATTACK (TIA), AND CEREBRAL INFARCTION WITHOUT RESIDUAL DEFICITS: ICD-10-CM

## 2020-01-31 DIAGNOSIS — E87.2 ACIDOSIS: ICD-10-CM

## 2020-01-31 DIAGNOSIS — N18.6 END STAGE RENAL DISEASE: ICD-10-CM

## 2020-01-31 DIAGNOSIS — T81.40XA INFECTION FOLLOWING A PROCEDURE, UNSPECIFIED, INITIAL ENCOUNTER: ICD-10-CM

## 2020-01-31 DIAGNOSIS — B96.20 UNSPECIFIED ESCHERICHIA COLI [E. COLI] AS THE CAUSE OF DISEASES CLASSIFIED ELSEWHERE: ICD-10-CM

## 2020-01-31 DIAGNOSIS — K72.00 ACUTE AND SUBACUTE HEPATIC FAILURE WITHOUT COMA: ICD-10-CM

## 2020-01-31 DIAGNOSIS — T81.49XA INFECTION FOLLOWING A PROCEDURE, OTHER SURGICAL SITE, INITIAL ENCOUNTER: ICD-10-CM

## 2020-01-31 DIAGNOSIS — A41.9 SEPSIS, UNSPECIFIED ORGANISM: ICD-10-CM

## 2020-01-31 DIAGNOSIS — T81.12XA POSTPROCEDURAL SEPTIC SHOCK, INITIAL ENCOUNTER: ICD-10-CM

## 2020-01-31 DIAGNOSIS — D62 ACUTE POSTHEMORRHAGIC ANEMIA: ICD-10-CM

## 2020-01-31 DIAGNOSIS — I95.3 HYPOTENSION OF HEMODIALYSIS: ICD-10-CM

## 2020-01-31 DIAGNOSIS — E43 UNSPECIFIED SEVERE PROTEIN-CALORIE MALNUTRITION: ICD-10-CM

## 2020-01-31 DIAGNOSIS — K66.0 PERITONEAL ADHESIONS (POSTPROCEDURAL) (POSTINFECTION): ICD-10-CM

## 2020-08-08 NOTE — DISCHARGE NOTE PROVIDER - NSDCHHATTENDCERT_GEN_ALL_CORE
My signature below certifies that the above stated patient is homebound and upon completion of the Face-To-Face encounter, has the need for intermittent skilled nursing, physical therapy and/or speech or occupational therapy services in their home for their current diagnosis as outlined in their initial plan of care. These services will continue to be monitored by myself or another physician. decreased strength/impaired balance

## 2020-08-25 NOTE — ED PROVIDER NOTE - CONSTITUTIONAL NEGATIVE STATEMENT, MLM
Impression: Diagnostic Test - OCT MAC: Good-no significant ERM or CME Plan: see Davina Marrero / Plan no fever and no chills.

## 2021-01-07 NOTE — PHYSICAL THERAPY INITIAL EVALUATION ADULT - ASSISTIVE DEVICE: SCOOT/BRIDGE, REHAB EVAL
Progress Note Patient: Maik Schmid MRN: [id number]  SSN: [id number] YOB: 1958  Age: 57 y.o. Sex: male Admit Date: 12/26/2020 LOS: 12 days Subjective:  
Patient followed for bacteremia and suspected compression fracture lumbar spine. Ceretec scan felt to be negative for osteomyelitis. He is afebrile with normal WBC. Procal and CRP decreasing. Unasyn discontinued. Patient resting comfortably with no complaints. Pending disposition to SNF. Objective:  
 
Vitals:  
 01/07/21 0140 01/07/21 8997 01/07/21 0945 01/07/21 1041 BP: (!) 100/48  (!) [id number] Pulse: 98  98 Resp: 18  20 Temp: 98.9 °F (37.2 °C)  98.1 °F (36.7 °C) SpO2: 93% 100% 91% 100% Weight:      
Height:      
  
 
Intake and Output: 
Current Shift: 01/07 0701 - 01/07 1900 In: -  
Out: 491 [FUANO:232] Last three shifts: 01/05 1901 - 01/07 0700 In: 300 [P.O.:300] Out: 2000 [Urine:1300] Physical Exam:  
 Vitals signs and nursing note reviewed. Constitutional:   
   General: He is not in acute distress. Appearance: He is ill-appearing. Cardiovascular:  
   Rate and Rhythm: Normal rate and regular rhythm. Heart sounds: No murmur. Pulmonary:  
   Breath sounds: No wheezing, rhonchi or rales. Abdominal:  
   General: There is distension (with everted umbilicus). Palpations: Abdomen is soft. Tenderness: There is no abdominal tenderness Genitourinary: 
   Penis: Normal.   
   Comments: No Marquez Musculoskeletal:  
   Right lower leg: No edema. Left lower leg: No edema. Skin: 
   Findings: No rash. Neurological:  
   Mental Status: He is alert. He is disoriented. Psychiatric:     
   Mood and Affect: Mood normal.     
   Behavior: Behavior normal.  
  
 
Lab/Data Review: WBC 5,800 Procalcitonin 0.12 < 0.17 <0.30 < 0.38 
CRP 5.86 <8.26 <9.75 <9.24 ESR 77 Blood cultures (12/26) Streptococcus mitis/oralis and Streptococcus anginosus Blood cultures (12/27) No growth FINAL Assessment:  
 
Active Problems: 
  Liver cirrhosis ([facility] [address] (12/26/2020) Pulmonary nodule (12/26/2020) Intractable back pain (12/26/2020) Closed compression fracture of L4 lumbar vertebra, initial encounter ([facility] [address] (12/26/2020) Hypokalemia (12/26/2020) 1. Bacteremia with Streptococcus mitis/oralis and Streptococcus anginosus, clinical significance unclear, TTE negative for vegetations, Day #7 IV Unasyn. 2. Fever, possibly secondary to #1, resolved. 3. Abnormal uptake L4 vertebra, consistent with compression fracture, negative for osteomyelitis per Ceretec scan. 4. Alcoholic cirrhosis 
 5. Hepatic encephalopathy with elevated ammonia Comment:  Procal and CRP decreasing. Plan: 1. Unasyn discontinued 2. Will continue to follow prn Signed By: Onelia Pantoja MD   
 January 7, 2021 bed rails
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
## 2021-08-10 NOTE — DISCHARGE NOTE NURSING/CASE MANAGEMENT/SOCIAL WORK - PATIENT PORTAL LINK FT
You can access the FollowMyHealth Patient Portal offered by [facility] by registering at the following website: [email or web address]. By joining [employer]’s FollowMyHealth portal, you will also be able to view your health information using other applications (apps) compatible with our system. Principal Discharge DX:	Acute UTI   1

## 2022-02-24 NOTE — PATIENT PROFILE ADULT - PSYCHOSOCIAL CONCERNS
What Type Of Note Output Would You Prefer (Optional)?: Standard Output
Hpi Title: Evaluation of a Skin Lesion
How Severe Are Your Spot(S)?: mild
Have Your Spot(S) Been Treated In The Past?: has not been treated
none

## 2022-03-28 NOTE — PRE-OP CHECKLIST - ALLERGIES REVIEWED
done
Additional Notes: Patient consent was obtained to proceed with the visit and recommended plan of care after discussion of all risks and benefits, including the risks of COVID-19 exposure.
Detail Level: Simple
done

## 2022-05-12 NOTE — DIETITIAN INITIAL EVALUATION ADULT. - NUTRITION DIAGNOSIS
Implemented All Universal Safety Interventions:  [place] to call system. Call bell, personal items and telephone within reach. Instruct patient to call for assistance. Room bathroom lighting operational. Non-slip footwear when patient is off stretcher. Physically safe environment: no spills, clutter or unnecessary equipment. Stretcher in lowest position, wheels locked, appropriate side rails in place. yes...

## 2022-07-07 NOTE — PROGRESS NOTE ADULT - PROBLEM SELECTOR PLAN 6
-Reported hx of T2DM, med rec reveals patient is not on anti -diabetic medication  -f/u Hgb A1c  -NPO for now for possible upper endoscopy; FS q6h while NPO  -Once initiate diet, switch to FS before meals and at bedtime, lss Cyclosporine Counseling:  I discussed with the patient the risks of cyclosporine including but not limited to hypertension, gingival hyperplasia,myelosuppression, immunosuppression, liver damage, kidney damage, neurotoxicity, lymphoma, and serious infections. The patient understands that monitoring is required including baseline blood pressure, CBC, CMP, lipid panel and uric acid, and then 1-2 times monthly CMP and blood pressure.

## 2022-07-21 NOTE — DISCHARGE NOTE NURSING/CASE MANAGEMENT/SOCIAL WORK - NSCORESITESY/N_GEN_A_CORE_RD
The patient has been examined and the H&P has been reviewed:    I concur with the findings and no changes have occurred since H&P was written.    Surgery risks, benefits and alternative options discussed and understood by patient/family.          There are no hospital problems to display for this patient.     Yes

## 2022-07-26 NOTE — CONSULT NOTE ADULT - ATTENDING COMMENTS
pt seen and examined, pt with hx of esrd on he, hiv, hep c, presents post  sigmoid bleed, presents with dark stool, pt noted to be alert and oriented,  bp 110/70 rr 18 heent no jvd, lungs clear, abdomen benign, ext no edema,  neuro non focal, labs noted,   -60 yo male with probable gi bleed, hemodynamically stable, suggest  serial cbcs, protonix iv bid, surgical evaluation. pt does not require  micu care. please reconsult if condition changes.
Agree with above. Doubt CAD given recent negative cath. Troponin elevated but without delta. May proceed with planned EGD.
Female

## 2022-08-01 NOTE — PROGRESS NOTE ADULT - PROVIDER SPECIALTY LIST ADULT
Nephrology [place]t 

## 2022-09-09 NOTE — H&P ADULT - PROBLEM SELECTOR PLAN 2
Problem: Plan of Care - These are the overarching goals to be used throughout the patient stay.    Goal: Optimal Comfort and Wellbeing  Outcome: Adequate for Care Transition   Goal Outcome Evaluation:      Pt. Pleasant and cooperative this shit shift, pt. To Discharge home, discharge paperwork discussed with guardian and questions were answered, belongings sent with pt.                  nephrology consult, c/w HD acute on chronic kidney disease  nephrology consult, c/w HD ?Possible ECF  NPO, IVF  IV zosyn  Follow up wound culture result  local wound care per surgical team  GI consult for TPN

## 2022-10-24 NOTE — ED ADULT TRIAGE NOTE - HEART RATE (BEATS/MIN)
S: Toni Ramirez is a 43 year old male who presents for follow-up from urgent care.  He was seen in urgent care over the weekend due to cold symptoms.  He admits he had a headache, sinus pressure.  Admits still somewhat present but more dull.  His bigger concern though as he did develop last evening about midnight till 5:00 a.m. constant diarrhea.  States he was having complete liquid stools going to the bathroom every 10 minutes.  This morning states he will his dog out and then he started violently vomiting as well.  He has only taken some Benadryl and Mucinex.  He does admit he feels bloated but this is been an ongoing problem.  He has seen GI and was started on some pantoprazole.  He does not necessarily feel it has made any improvement with his bloating her GI issues.  Has not scheduled any follow-up with them.  Denies any other rashes.  Denies fevers, chills.  He admits he feels worn out and tired.  He does admit simply trying to take some water after he had his episode of vomiting he felt nauseous and sick.      Current Outpatient Medications   Medication Sig Dispense Refill   • ondansetron (Zofran ODT) 4 MG disintegrating tablet Place 1 tablet onto the tongue every 8 hours as needed for Nausea. 20 tablet 0   • tiZANidine (ZANAFLEX) 4 MG tablet Take 1 tablet by mouth every 6 hours as needed (spasm/pain). 30 tablet 1   • amphetamine-dextroamphetamine (ADDERALL) 20 MG tablet Take 1 tablet by mouth 3 times daily. 90 tablet 0   • triamcinolone (ARISTOCORT) 0.1 % cream Apply twice daily as needed 45 g 5   • TEMazepam (RESTORIL) 15 MG capsule TAKE 1 CAPSULE BY MOUTH NIGHTLY AS NEEDED FOR SLEEP 30 capsule 0   • Genvoya [id number] MG per tablet Take 1 tablet by mouth once daily 90 tablet 2   • famotidine (Pepcid) 40 MG tablet Take 1 tablet by mouth nightly. 30 tablet 5   • pantoprazole (PROTONIX) 40 MG tablet Take 1 tablet by mouth in the morning and 1 tablet in the evening. Take before meals. Take 30-60  minutes before breakfast and supper 60 tablet 5   • meloxicam (MOBIC) 15 MG tablet Take 1 tablet by mouth daily as needed for Pain. 30 tablet 1   • diclofenac (Voltaren) 1 % gel Apply 2-4 g topically every 6 hours as needed (left shoulder/trapezius pain). 300 g 3   • EpiPen 2-Darrel 0.3 MG/0.3ML auto-injector Inject 0.3 mLs into the muscle as needed (USE ONLY AS INSTRUCTED). 1 each 3   • albuterol 108 (90 Base) MCG/ACT inhaler Inhale 2 puffs into the lungs every 4 hours as needed for Shortness of Breath or Wheezing. 1 each 5   • cetirizine (ZyrTEC Allergy) 10 MG tablet Take 1 tablet by mouth daily. 30 tablet 2   • sildenafil (Viagra) 100 MG tablet Take 1 tablet by mouth daily as needed for Erectile Dysfunction. 30 tablet 5   • omeprazole (PRILOSEC) 40 MG capsule Take 1 capsule by mouth daily. 30 capsule 0   • ciclopirox (PENLAC) 8 % topical solution Apply topically nightly. (Patient taking differently: Apply topically as needed.) 6.6 mL 11   • Bioflavonoid Products (SLAVA-C) tablet Take 1 tablet by mouth daily.     • calcium-vitamin D (OSCAL-500) 500-200 MG-UNIT per tablet Take 2 tablets by mouth daily.        No current facility-administered medications for this visit.         ALLERGIES:   Allergen Reactions   • Bee Sting ANAPHYLAXIS     angioedema of the tongue, throat and eyelids. Hard to breathe/sob.    • Lamictal HIVES   • Vicodin [Hydrocodone-Acetaminophen] HIVES, SWELLING and PRURITUS   • Chloraprep One Step RASH and PRURITUS      chlorhexidine scrub   • Septra Ds [Bactrim Ds] GI UPSET     Doesn't remember severity       Social History     Tobacco Use   • Smoking status: Former Smoker     Packs/day: 0.25     Years: 12.00     Pack years: 3.00     Types: Cigarettes     Start date:      Quit date: 3/28/2022     Years since quittin.5   • Smokeless tobacco: Never Used   Vaping Use   • Vaping Use: never used   Substance Use Topics   • Alcohol use: Yes     Comment: occasionally   • Drug use: No     Comment:  NONE       O:   Vitals:    10/24/22 0832   BP: 114/78   BP Location: RUE - Right upper extremity   Patient Position: Sitting   Cuff Size: Large Adult   Pulse: 100   Resp: 16   Temp: 98.4 °F (36.9 °C)   TempSrc: Tympanic   SpO2: 94%   Weight: 109.9 kg (242 lb 4.8 oz)        GEN:  Nontoxic adult male in no acute distress  SKIN:  Warm to the touch with no rashes present  HEENT:  Normocephalic, atraumatic.  Oral mucosa is moist  HEART:  Regular without murmur  LUNGS:  Clear to auscultation bilaterally.  ABD:  Soft to palpation.  He did have tenderness on palpation on the left side.  No rebound or guarding.  No tenderness in the right lower quadrant or right side.      ASSESSMENT:  1. Vomiting and diarrhea          PLAN:  Orders Placed This Encounter   • Comprehensive Metabolic Panel   • CBC with Automated Differential   • Amylase   • Lipase   • ondansetron (Zofran ODT) 4 MG disintegrating tablet     Due to the acute episode he had will get some labs today.  Will get a CBC, CMP, amylase and lipase.  Will notify him the results when they do become available.  I do feel there is a potential this is a viral syndrome at this point.  In the meantime did recommend that he continue pushing fluids and rest.  Due the fact that he continues to struggle with this bloating and did recommend that he follow-up with GI as he has seeing them to see if they have any other options regarding the distension and bloating he is experiencing.    Return if symptoms worsen or fail to improve.         102

## 2022-11-03 NOTE — PROCEDURAL SAFETY CHECKLIST WITH OR WITHOUT SEDATION - NSTEAMCONFIRM_GEN_ALL_CORE
SECOND BILAT L3,4,5 MBB    AUTH APPROVED 11/2/22-5/1/23    OK to schedule procedure approved as above. Please note sides/levels approved and date range. (If applicable, sides/levels approved may differ from those ordered)    TO BE SCHEDULED WITH DR. Keith Marquez done

## 2022-11-22 NOTE — PROGRESS NOTE ADULT - PROBLEM/PLAN-1
ID Progress Note      SUBJECTIVE:    Denies fever, chills, cough or SOB    OBJECTIVE:  Tmax Temp (24hrs), Av.1 °F (36.7 °C), Min:97.3 °F (36.3 °C), Max:98.6 °F (37 °C)     Last Vitals   Vitals:    22 1406   BP: (!) 157/90   Pulse: 77   Resp:    Temp:          Gen: Well developed, well nourished. Not in distress  HEENT: EOMI, BAILEY, No oral lesions  Neck:  Supple, No JVD, No bruits, No LAP.  CVS:  HR - RRR, S1, S2. No murmur  Lung: Clear to auscultation, no dullness to percussion.  Abd:  Soft, Non-tender. No organomegaly, No palpable masses, NABS  : Unremarkable  Extr:  No cyanosis, clubbing or edema. L foot wound there is no drainage  Neuro: No focal deficits  Skin: No rashes.      MEDICATIONS;    As per MAR and reviewed      LABS CULTURES AND IMAGING: REVIEWED    Recent Labs     22  0606   WBC 4.5   RBC 3.95*   HGB 11.2*   HCT 35.7*            Microbiology Results     None           No results found.       ASSESSMENT:     # L diabetic foot ulcer, can not entirely r/o infection  # S/P (B) TMA  # CAD-CABG  # ESRD on HD  # Hx of DFI       Recommendation:     # cont keflex as planned  # cont wound care  # Will follow as needed. Please call with any questions.        Tim Melvin MD  2022    
DISPLAY PLAN FREE TEXT

## 2022-12-05 NOTE — ED ADULT TRIAGE NOTE - BSA (M2)
LTM/EMU   Video: YES  Photic: No  HV: No  Impedances: 6-10  Leads Fixed: No  Events seen: No  Patient ratio: 1/5   2.06

## 2023-04-07 NOTE — DISCHARGE NOTE FOR THE EXPIRED PATIENT - NS EXPIRED FAMCONTACTED
yes/Karuna Basilio yes/Karuna Richmond was at bedside Ears: no ear pain and no hearing problems. Nose: no nasal congestion and no nasal drainage. Mouth/Throat: no dysphagia, no hoarseness and no throat pain. Neck: no lumps, no pain, no stiffness and no swollen glands.
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
## 2024-04-24 NOTE — PROGRESS NOTE ADULT - PROBLEM SELECTOR PROBLEM 4
Call from LORENA Lord with [facility]    Requesting verbal orders to resume SN, PT, & OT    Attempting to open her case today 4/24/24    Ok for verbals given by Lexi Mcneal RN         HIV (human immunodeficiency virus infection)
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
## 2024-11-13 NOTE — DIETITIAN INITIAL EVALUATION ADULT. - PROBLEM SELECTOR PLAN 4
The patient has been re-examined and I agree with the above assessment or I updated with my findings.
Continue home medications

## 2024-12-05 NOTE — PHYSICAL THERAPY INITIAL EVALUATION ADULT - CRITERIA FOR SKILLED THERAPEUTIC INTERVENTIONS
VM left for pt to call back for MD response.    Can provide number for [facility] crisis line: [phone]. Does Pt have a  or ?         anticipated discharge recommendation/impairments found

## 2025-03-13 NOTE — CONSULT NOTE ADULT - ASSESSMENT
Mom calling, never got the nurses VM.  They are planning to [place] seas and wish to speak with an RN.     RN took the call    s/p hartmanns procedure 2/2 perforated sigmoid diverticulitis 9/2019 with subsequent admission to [facility] for UGIB likely due to gastric ulcers found on EGD (healed), and admission to our facility 10/2019 with infected abdominal wound, currently presented with drainage of abdominal wound again  known to us for Sigmoid Colon rsxn with creation of colostomy, peritoneal lavage s/p I&D of intra-abdominal abscess on 9/18/19 treated for abdominal incisional wounds, possible ?enterocutaneous fistula o n11/2019 all wound culture with fairly sensitive organism mssa, ecoli and klebsiella sensitive to po and dc with po abx     NOTE TO CONTINUE  EVALUATION IN PROGRESS s/p hartmanns procedure 2/2 perforated sigmoid diverticulitis 9/2019 with subsequent admission to [facility] for UGIB likely due to gastric ulcers found on EGD (healed), and admission to our facility 10/2019 with infected abdominal wound, currently presented with drainage of abdominal wound again  known to us for Sigmoid Colon rsxn with creation of colostomy, peritoneal lavage s/p I&D of intra-abdominal abscess on 9/18/19 treated for abdominal incisional wounds, admitted with possible ?enterocutaneous fistula on 11/2019 all wound culture with fairly sensitive organism mssa, ecoli and klebsiella sensitive to po and dc with po abx       will give empiric zosyn   surgical evaluation for wound CT abd with   Postsurgical changes anterior abdominal wall with small foci of air. No discrete subcutaneous or intra-abdominal fluid collection noted.  No direct visualization of enterocutaneous fistula.  resume HAART   recent CD4 335 on 11/2019   no need prophylaxis this time   can resend level   wound culture   we will fu  thanks
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
